# Patient Record
Sex: MALE | Race: WHITE | Employment: OTHER | URBAN - NONMETROPOLITAN AREA
[De-identification: names, ages, dates, MRNs, and addresses within clinical notes are randomized per-mention and may not be internally consistent; named-entity substitution may affect disease eponyms.]

---

## 2017-10-23 ENCOUNTER — HOSPITAL ENCOUNTER (OUTPATIENT)
Dept: GENERAL RADIOLOGY | Age: 52
Discharge: HOME | End: 2017-10-23
Payer: MEDICAID

## 2017-10-23 DIAGNOSIS — Z12.5 SCREENING FOR PROSTATE CANCER: ICD-10-CM

## 2017-10-23 DIAGNOSIS — E78.5 HYPERLIPIDEMIA, UNSPECIFIED HYPERLIPIDEMIA TYPE: ICD-10-CM

## 2017-10-23 DIAGNOSIS — I10 HYPERTENSION, UNSPECIFIED TYPE: ICD-10-CM

## 2017-10-23 DIAGNOSIS — M54.16 LUMBAR RADICULOPATHY: ICD-10-CM

## 2017-10-23 DIAGNOSIS — M54.5 LOW BACK PAIN, UNSPECIFIED BACK PAIN LATERALITY, UNSPECIFIED CHRONICITY, WITH SCIATICA PRESENCE UNSPECIFIED: ICD-10-CM

## 2017-10-23 DIAGNOSIS — G47.00 INSOMNIA, UNSPECIFIED TYPE: ICD-10-CM

## 2017-10-23 DIAGNOSIS — E55.9 VITAMIN D DEFICIENCY: ICD-10-CM

## 2017-10-23 PROCEDURE — 72110 X-RAY EXAM L-2 SPINE 4/>VWS: CPT

## 2017-10-26 ENCOUNTER — HOSPITAL ENCOUNTER (OUTPATIENT)
Dept: PHYSICAL THERAPY | Age: 52
Setting detail: THERAPIES SERIES
Discharge: HOME | End: 2017-10-26
Payer: MEDICAID

## 2017-10-26 PROCEDURE — 97162 PT EVAL MOD COMPLEX 30 MIN: CPT

## 2017-10-26 PROCEDURE — G8978 MOBILITY CURRENT STATUS: HCPCS

## 2017-10-26 PROCEDURE — G8979 MOBILITY GOAL STATUS: HCPCS

## 2017-10-26 ASSESSMENT — PAIN SCALES - GENERAL: PAINLEVEL_OUTOF10: 8

## 2017-10-26 ASSESSMENT — PAIN DESCRIPTION - ORIENTATION: ORIENTATION: RIGHT;LEFT

## 2017-10-26 ASSESSMENT — PAIN DESCRIPTION - LOCATION: LOCATION: BACK;LEG;BUTTOCKS

## 2017-10-26 ASSESSMENT — PAIN DESCRIPTION - PAIN TYPE: TYPE: ACUTE PAIN

## 2017-10-26 ASSESSMENT — PAIN DESCRIPTION - DESCRIPTORS: DESCRIPTORS: ACHING;DISCOMFORT;TIGHTNESS;SORE

## 2017-10-26 ASSESSMENT — PAIN DESCRIPTION - FREQUENCY: FREQUENCY: CONTINUOUS

## 2017-10-26 NOTE — PROGRESS NOTES
pain forward bending and \"slumping'  Sensation  Overall Sensation Status: WFL  Bed mobility  Rolling to Left: Modified independent  Rolling to Right: Modified independent  Supine to Sit: Modified independent  Transfers  Sit to Stand: Modified independent  Stand to sit: Modified independent  Bed to Chair: Independent  Stand Pivot Transfers: Independent  Ambulation  Ambulation?: Yes  Ambulation 1  Surface: level tile  Device: No Device  Assistance: Independent  Quality of Gait: Patient walks with decreased arm swing on right side, slow pace with decreased stance time on right LE. Comments: Patient able to walk with less gait deviation and with less reported pain when fitted with SI belt and heel lift in shoe. Also less pain with sitting and coming to standing. Balance  Posture: Fair  Sitting - Static: Good  Sitting - Dynamic: Good  Standing - Static: Good  Standing - Dynamic: Good  Comments: See postural deviations in the 'observation' section of chart. Assessment   Conditions Requiring Skilled Therapeutic Intervention  Body structures, Functions, Activity limitations: Decreased strength;Decreased ROM; Decreased functional mobility ; Decreased high-level IADLs  Assessment: Patient has roughly 2 month history of low back pain which appears to stem from 2 incidents involving heavy lifting and falling on stairs onto his buttocks. His pain appears to be concentrated greatest in low back and pelvis on right side and his symptoms improved with application of SI belt and lift. Tolerance for sitting and performing transitional motions improved as well. Reported radicular pain does not appear well defined, with some lateral thigh radiation reported on left side at time of evaluation. He was wearing a wallet in his right hip pocket and was encouraged to move it to front pocket to reduce pressure (he states he had already been told that by his doctor).    Prognosis: Good;Fair  Decision Making: Medium

## 2017-11-06 ENCOUNTER — APPOINTMENT (OUTPATIENT)
Dept: PHYSICAL THERAPY | Age: 52
End: 2017-11-06
Payer: MEDICAID

## 2017-11-07 ENCOUNTER — HOSPITAL ENCOUNTER (OUTPATIENT)
Dept: PHYSICAL THERAPY | Age: 52
Setting detail: THERAPIES SERIES
Discharge: HOME | End: 2017-11-07
Payer: MEDICAID

## 2017-11-07 PROCEDURE — 97110 THERAPEUTIC EXERCISES: CPT

## 2017-11-07 PROCEDURE — G0283 ELEC STIM OTHER THAN WOUND: HCPCS

## 2017-11-07 ASSESSMENT — PAIN SCALES - GENERAL: PAINLEVEL_OUTOF10: 7

## 2017-11-07 ASSESSMENT — PAIN DESCRIPTION - ORIENTATION: ORIENTATION: LOWER

## 2017-11-07 ASSESSMENT — PAIN DESCRIPTION - LOCATION: LOCATION: BACK

## 2017-11-07 ASSESSMENT — PAIN DESCRIPTION - PAIN TYPE: TYPE: CHRONIC PAIN

## 2017-11-07 NOTE — PROGRESS NOTES
Physical Therapy  Daily Treatment Note  Date: 2017  Patient Name: Wandy Crowe  MRN: [id number]     :   1965    Subjective:   General  Additional Pertinent Hx: 49year old male referred to PT with diagnosis of lower back pain and lumbar radiculopathy. Referring Practitioner: Ro Quach DO  PT Visit Information  Total # of Visits Approved: 7  Total # of Visits to Date: 2  Plan of Care/Certification Expiration Date: 17  Progress Note Due Date: 17  Subjective: States the SI belt is helping, hurting primairly across his low back today  Patient Currently in Pain: Yes  Pain Level: 7 (2/10 post tx)  Pain Type: Chronic pain  Pain Location: Back  Pain Orientation: Lower       Treatment Activities:      Exercises  Exercise 1: supine lower trunk rotation to right 3\" x 10  Exercise 2: left quadratus stretch to right  10\" x 5  Exercise 3: bilateral single leg bridges x 10 ea  Exercise 4: right leg manual distraction at 45 degrees  3 x 15\"  Exercise 5: isometric resisted right hip ext.  (from end range SKTC position) 5\" x 5  Exercise 6: isometric resisted left hip flexion (at 90/90 position) 5\" x 5 manual resistance by therapist (decreased L knee ROM)  Exercise 7: pelvic tilts with raised head  x 10  Exercise 8: abdominal series (holding pelvic tilt): alternating arm, legs, arms/legs  x 5 ea UE's only due to L knee pain  Exercise 9: sitting right hip retraction with left hip protraction  x 5  Exercise 10: sitting forward reach to left shoe  x 5  Exercise 11: repeated sit to stand with abdominal contractions  x 5 (wear SI belt for standing exercises)  Exercise 12: standing back rotation to left with right glute squeeze  x 10  Exercise 13: standing IT band stretch (left leg over right with slight forward bend)  5\" x 5  Exercise 14: standing back extension  x 10  Exercise 15: bilateral hip abduction  x 10  Exercise 16: standing left hip flexion  x 10  Exercise 17: standing left hip hike  x 5  Exercise 18: mobilization, lower level core strengthening exercises, and instruction in HEP.   Timed Code Treatment Minutes: 48 Minutes     Therapy Time   Individual Concurrent Group Co-treatment   Time In 1900         Time Out 1604         Minutes 69         Timed Code Treatment Minutes: [address] Cookie Heimlich, PTA    Electronically signed by Angeles Mattson PTA on 11/7/2017 at 4:08 PM

## 2017-11-10 ENCOUNTER — HOSPITAL ENCOUNTER (OUTPATIENT)
Dept: PHYSICAL THERAPY | Age: 52
Setting detail: THERAPIES SERIES
Discharge: HOME | End: 2017-11-10
Payer: MEDICAID

## 2017-11-10 PROCEDURE — 97110 THERAPEUTIC EXERCISES: CPT

## 2017-11-10 PROCEDURE — G0283 ELEC STIM OTHER THAN WOUND: HCPCS

## 2017-11-10 ASSESSMENT — PAIN DESCRIPTION - PAIN TYPE: TYPE: CHRONIC PAIN

## 2017-11-10 ASSESSMENT — PAIN DESCRIPTION - LOCATION: LOCATION: BACK

## 2017-11-10 ASSESSMENT — PAIN DESCRIPTION - ORIENTATION: ORIENTATION: LOWER

## 2017-11-10 ASSESSMENT — PAIN SCALES - GENERAL: PAINLEVEL_OUTOF10: 5

## 2017-11-10 NOTE — PROGRESS NOTES
degrees forward flexion. Long term goal 2: Patient to have >= 35 degrees sidebending bilaterally. Long term goal 3: Patient to score <= 19% on the Oswestry Disability Questionairre. Patient Goals   Patient goals : Decreased back pain. Plan:    Plan  Times per week: 2x per week  Plan weeks: 6 weeks  Current Treatment Recommendations: Strengthening, ROM, Safety Education & Training, Home Exercise Program, Patient/Caregiver Education & Training, Pain Management, Positioning, Modalities  Plan Comment: Plan of care to include e-stim to low back , trial use of heel lift and SI belt, lumbar ROM, SI joint mobilization, lower level core strengthening exercises, and instruction in HEP.   Timed Code Treatment Minutes: 40 Minutes     Therapy Time   Individual Concurrent Group Co-treatment   Time In 3251         Time Out 4781         Minutes 60         Timed Code Treatment Minutes: 40 Minutes    Electronically signed by Marvin Mercado PT on 11/10/2017 at 5:19 PM    Marvin Mercado PT

## 2017-11-14 ENCOUNTER — HOSPITAL ENCOUNTER (OUTPATIENT)
Dept: PHYSICAL THERAPY | Age: 52
Setting detail: THERAPIES SERIES
Discharge: HOME | End: 2017-11-14
Payer: MEDICAID

## 2017-11-14 PROCEDURE — 97110 THERAPEUTIC EXERCISES: CPT

## 2017-11-14 PROCEDURE — G0283 ELEC STIM OTHER THAN WOUND: HCPCS

## 2017-11-14 ASSESSMENT — PAIN DESCRIPTION - FREQUENCY: FREQUENCY: CONTINUOUS

## 2017-11-14 ASSESSMENT — PAIN DESCRIPTION - PAIN TYPE: TYPE: CHRONIC PAIN

## 2017-11-14 ASSESSMENT — PAIN DESCRIPTION - ORIENTATION: ORIENTATION: LOWER

## 2017-11-14 ASSESSMENT — PAIN DESCRIPTION - LOCATION: LOCATION: BACK

## 2017-11-14 ASSESSMENT — PAIN SCALES - GENERAL: PAINLEVEL_OUTOF10: 7

## 2017-11-14 NOTE — PROGRESS NOTES
Physical Therapy  Daily Treatment Note  Date: 2017  Patient Name: Uzma Le  MRN: [id number]     :   1965    Subjective:   General  Additional Pertinent Hx: 47year old male referred to PT with diagnosis of lower back pain and lumbar radiculopathy. Response To Previous Treatment: Patient reporting fatigue but able to participate. (pt reports soreness after last visit)  Family / Caregiver Present: No  Referring Practitioner: Agustin Jimenez,   PT Visit Information  Total # of Visits Approved: 7  Total # of Visits to Date: 4  Plan of Care/Certification Expiration Date: 17  Progress Note Due Date: 17  Subjective  Subjective: Pt relays he has been sore and is sore since last coming to PT. \"I've been trying to walk without my cane. \"  Pt is wearing SI belt. Pain Screening  Patient Currently in Pain: Yes  Pain Assessment  Pain Assessment: 0-10  Pain Level: 7 (7/10 to start and 3/10 to finish)  Pain Type: Chronic pain  Pain Location: Back  Pain Orientation: Lower  Pain Radiating Towards: radiates into L side and knee  Pain Descriptors: Radiating;Aching;Discomfort; Sore  Pain Frequency: Continuous (only brief relief)  Vital Signs  Patient Currently in Pain: Yes       Treatment Activities:                 Exercises  Exercise 1: supine lower trunk rotation to right 5\" x 10  Exercise 2: left quadratus stretch to right x  5 for 5-10\" hold  Exercise 3: bilateral single leg bridges x 10 ea  Exercise 4: right leg manual distraction at 45 degrees  5 x 5\"  Exercise 5: isometric resisted right hip ext.  (from end range SKTC position) 5\" x 5 (towel used to assist with reach)  Exercise 6: isometric resisted left hip flexion (at 90/90 position) 5\" x 5 manual resistance by therapist  Exercise 7: pelvic tilts with raised head  x 10 (had to place hand under LB as target to achieve proper motion)  Exercise 8: abdominal series (holding pelvic tilt): alternating arm, legs, arms/legs  x 10 (no combo today)  Exercise Patient independent with trial use of heel lift and SI belt. (11/14 came to session wearing both, no complaints)  Short term goal 3: Patient to report tolerating > 30 minutes of sitting. (11/14 reports cont limited benjie of sitting <30 min so alternates positions)  Short term goal 4: Patient to report less difficulty with negotiation of stairs. Long term goals  Time Frame for Long term goals : 4-6 weeks  Long term goal 1: Patient to have >=25 degrees back extension and 60 degrees forward flexion. Long term goal 2: Patient to have >= 35 degrees sidebending bilaterally. Long term goal 3: Patient to score <= 19% on the Oswestry Disability Questionairre. Patient Goals   Patient goals : Decreased back pain. Plan:    Plan  Times per week: 2x per week  Plan weeks: 6 weeks  Current Treatment Recommendations: Strengthening, ROM, Safety Education & Training, Home Exercise Program, Patient/Caregiver Education & Training, Pain Management, Positioning, Modalities  Plan Comment: Plan of care to include e-stim to low back , trial use of heel lift and SI belt, lumbar ROM, SI joint mobilization, lower level core strengthening exercises, and instruction in HEP.   Timed Code Treatment Minutes: 53 Minutes (20 min IFC)     Therapy Time   Individual Concurrent Group Co-treatment   Time In 1257         Time Out 1410         Minutes 73         Timed Code Treatment Minutes: 48 Minutes (20 min IFC)       Brien Mariee PTA     Electronically signed by Brien Mariee PTA on 11/14/2017 at 2:53 PM

## 2017-11-17 ENCOUNTER — HOSPITAL ENCOUNTER (OUTPATIENT)
Dept: PHYSICAL THERAPY | Age: 52
Setting detail: THERAPIES SERIES
Discharge: HOME | End: 2017-11-17
Payer: MEDICAID

## 2017-11-17 PROCEDURE — 97110 THERAPEUTIC EXERCISES: CPT

## 2017-11-17 ASSESSMENT — PAIN DESCRIPTION - ORIENTATION: ORIENTATION: LOWER

## 2017-11-17 ASSESSMENT — PAIN DESCRIPTION - LOCATION: LOCATION: BACK

## 2017-11-17 ASSESSMENT — PAIN SCALES - GENERAL: PAINLEVEL_OUTOF10: 5

## 2017-11-17 ASSESSMENT — PAIN DESCRIPTION - PAIN TYPE: TYPE: CHRONIC PAIN

## 2017-11-17 NOTE — PROGRESS NOTES
Physical Therapy  Daily Treatment Note  Date: 2017  Patient Name: Hector Souza  MRN: [id number]     :   1965    Subjective:   General  Additional Pertinent Hx: 47year old male referred to PT with diagnosis of lower back pain and lumbar radiculopathy. Response To Previous Treatment: Patient reporting fatigue but able to participate. (pt reports soreness after last visit)  Family / Caregiver Present: No  Referring Practitioner: Jackie Spann DO  PT Visit Information  Total # of Visits Approved: 7  Total # of Visits to Date: 5  Plan of Care/Certification Expiration Date: 17  Subjective  Subjective: Patient states he has the exercises at home and is trying to learn them. He reports being sore today. Pain Screening  Patient Currently in Pain: Yes  Pain Assessment  Pain Assessment: 0-10  Pain Level: 5 (2/10 post treatment)  Pain Type: Chronic pain  Pain Location: Back  Pain Orientation: Lower  Vital Signs  Patient Currently in Pain: Yes       Treatment Activities:           Exercises  Exercise 1: supine lower trunk rotation to right 5\" x 10  Exercise 2: left quadratus stretch to right x  5 for 5\" hold  Exercise 3: bilateral single leg bridges x 10 ea  (poor quality, limited motion)  Exercise 4: right leg manual distraction at 45 degrees  5 x 5\"  Exercise 5: isometric resisted right hip ext.  (from end range SKTC position) 5\" x 5 (towel used to assist with reach)  Exercise 6: isometric resisted left hip flexion (at 90/90 position) 5\" x 5 manual resistance by therapist  Exercise 7: pelvic tilts with raised head  x 10 (had to place hand under LB as target to achieve proper motion and said 'push your back down')  Exercise 8: abdominal series (holding pelvic tilt): alternating arm, legs, arms/legs  x 10 (no combo today)  Exercise 9: sitting right hip retraction with left hip protraction  x 5 (diff coordinating this even with physical assist)  Exercise 10: sitting forward reach to left shoe  x 5  Exercise 11: repeated sit to stand with abdominal contractions  x 5  Exercise 12: standing back rotation to left with right glute squeeze  x 10 with 3\" hold  Exercise 13: standing IT band stretch (left leg over right with slight forward bend)  5\" x 3 (discontinued due to diff with positioning and reports hurting R LB)  Exercise 14: standing back extension  x 10  Exercise 15: bilateral hip abduction  x 10  Exercise 16: standing left hip flexion  x 10  Exercise 17: standing left hip hike  x 5  Exercise 18: partial squats  x 10   not today  Exercise 19: corner stretch  5-10\" x 5  Exercise 20: e-stim with moist heat (PRN and if pain level rises)  IFC x 20'                                   Assessment:   Conditions Requiring Skilled Therapeutic Intervention  Body structures, Functions, Activity limitations: Decreased strength;Decreased ROM; Decreased functional mobility ; Decreased high-level IADLs  Assessment: Patient with difficulty performing pelvic tilts, hip hikes, and hip retraction/protraction today. Patient needed max tactile cues for improved technique on these exercises. Pain levels lower this session than previous sessions with even further decrease in pain after estim and moist heat. REQUIRES PT FOLLOW UP: Yes  Discharge Recommendations: Continue to assess pending progress        Goals:  Short term goals  Time Frame for Short term goals: 3-4 weeks   Short term goal 1: Patient independent with HEP. (11/10/17 HEP issued, 11/14 reports only looked at it, hasn't worked on it)  Short term goal 2: Patient independent with trial use of heel lift and SI belt. (11/14 came to session wearing both, no complaints)  Short term goal 3: Patient to report tolerating > 30 minutes of sitting. (11/14 reports cont limited benjie of sitting <30 min so alternates positions)  Short term goal 4: Patient to report less difficulty with negotiation of stairs.   Long term goals  Time Frame for Long term goals : 4-6 weeks  Long term goal 1: Patient to have >=25 degrees back extension and 60 degrees forward flexion. Long term goal 2: Patient to have >= 35 degrees sidebending bilaterally. Long term goal 3: Patient to score <= 19% on the Oswestry Disability Questionairre. Patient Goals   Patient goals : Decreased back pain. Plan:    Plan  Times per week: 2x per week  Plan weeks: 6 weeks  Current Treatment Recommendations: Strengthening, ROM, Safety Education & Training, Home Exercise Program, Patient/Caregiver Education & Training, Pain Management, Positioning, Modalities  Plan Comment: Plan of care to include e-stim to low back , trial use of heel lift and SI belt, lumbar ROM, SI joint mobilization, lower level core strengthening exercises, and instruction in HEP.   Timed Code Treatment Minutes: 38 Minutes (20 min IFC)     Therapy Time   Individual Concurrent Group Co-treatment   Time In 1331         Time Out 1429         Minutes 58         Timed Code Treatment Minutes: 38 Minutes (20 min IFC)       Juan Jose Grewal PT   Electronically signed by Juan Jose Grewal PT on 11/17/2017 at 2:37 PM

## 2017-11-21 ENCOUNTER — HOSPITAL ENCOUNTER (OUTPATIENT)
Dept: PHYSICAL THERAPY | Age: 52
Setting detail: THERAPIES SERIES
Discharge: HOME | End: 2017-11-21
Payer: MEDICAID

## 2017-11-21 PROCEDURE — 97110 THERAPEUTIC EXERCISES: CPT

## 2017-11-21 PROCEDURE — G0283 ELEC STIM OTHER THAN WOUND: HCPCS

## 2017-11-21 ASSESSMENT — PAIN DESCRIPTION - PAIN TYPE: TYPE: CHRONIC PAIN

## 2017-11-21 ASSESSMENT — PAIN SCALES - GENERAL: PAINLEVEL_OUTOF10: 4

## 2017-11-21 ASSESSMENT — PAIN DESCRIPTION - LOCATION: LOCATION: BACK

## 2017-11-21 ASSESSMENT — PAIN DESCRIPTION - FREQUENCY: FREQUENCY: CONTINUOUS

## 2017-11-21 ASSESSMENT — PAIN DESCRIPTION - ORIENTATION: ORIENTATION: LOWER

## 2017-11-21 NOTE — PROGRESS NOTES
worked on it; 11/21 states some of them he needs help with so does ones he can do indep)  Short term goal 2: Patient independent with trial use of heel lift and SI belt. (11/14 came to session wearing both, no complaints; 11/21 wearing both however SI belt is too high)  Short term goal 3: Patient to report tolerating > 30 minutes of sitting. (11/14 reports cont limited benjie of sitting <30 min so alternates positions; 11/21 states he can sit for 30-45 min before needing to stand)  Short term goal 4: Patient to report less difficulty with negotiation of stairs. (11/21 reports cont to be painful with stairs)  Long term goals  Time Frame for Long term goals : 4-6 weeks  Long term goal 1: Patient to have >=25 degrees back extension and 60 degrees forward flexion. Long term goal 2: Patient to have >= 35 degrees sidebending bilaterally. Long term goal 3: Patient to score <= 19% on the Oswestry Disability Questionairre. Patient Goals   Patient goals : Decreased back pain. Plan:    Plan  Times per week: 2x per week  Plan weeks: 6 weeks  Current Treatment Recommendations: Strengthening, ROM, Safety Education & Training, Home Exercise Program, Patient/Caregiver Education & Training, Pain Management, Positioning, Modalities  Plan Comment: Plan of care to include e-stim to low back , trial use of heel lift and SI belt, lumbar ROM, SI joint mobilization, lower level core strengthening exercises, and instruction in HEP.   Timed Code Treatment Minutes: 53 Minutes (20 min IFC)     Therapy Time   Individual Concurrent Group Co-treatment   Time In 1324         Time Out 1437         Minutes 73         Timed Code Treatment Minutes: 53 Minutes (20 min IFC)       Hal Mckeon PTA     Electronically signed by Hal Mckeon PTA on 11/21/2017 at 3:17 PM

## 2017-11-24 ENCOUNTER — APPOINTMENT (OUTPATIENT)
Dept: PHYSICAL THERAPY | Age: 52
End: 2017-11-24
Payer: MEDICAID

## 2017-11-28 ENCOUNTER — HOSPITAL ENCOUNTER (OUTPATIENT)
Dept: PHYSICAL THERAPY | Age: 52
Setting detail: THERAPIES SERIES
Discharge: HOME | End: 2017-11-28
Payer: MEDICAID

## 2017-11-28 PROCEDURE — G0283 ELEC STIM OTHER THAN WOUND: HCPCS

## 2017-11-28 PROCEDURE — G8978 MOBILITY CURRENT STATUS: HCPCS

## 2017-11-28 PROCEDURE — G8979 MOBILITY GOAL STATUS: HCPCS

## 2017-11-28 PROCEDURE — 97110 THERAPEUTIC EXERCISES: CPT

## 2017-11-28 ASSESSMENT — PAIN DESCRIPTION - LOCATION: LOCATION: BACK

## 2017-11-28 ASSESSMENT — PAIN DESCRIPTION - FREQUENCY: FREQUENCY: CONTINUOUS

## 2017-11-28 ASSESSMENT — PAIN SCALES - GENERAL: PAINLEVEL_OUTOF10: 3

## 2017-11-28 ASSESSMENT — PAIN DESCRIPTION - ORIENTATION: ORIENTATION: LOWER

## 2017-11-28 ASSESSMENT — PAIN DESCRIPTION - PAIN TYPE: TYPE: CHRONIC PAIN

## 2017-11-28 NOTE — PROGRESS NOTES
Patient to report tolerating > 30 minutes of sitting.--progress (11/14 reports cont limited benjie of sitting <30 min so alternates positions; 11/21 and 11/28/17 states he can sit for 30-45 min before needing to stand. )  Short term goal 4: Patient to report less difficulty with negotiation of stairs. --progress (11/21 reports cont to be painful with stairs. 11/28/17 able to negotiate stairs better.)  Long term goals  Time Frame for Long term goals : 4-6 weeks  Long term goal 1: Patient to have >=25 degrees back extension and 60 degrees forward flexion. --progress (11/28/17 back extension 20 degrees, forward flexion 50 degrees. )  Long term goal 2: Patient to have >= 35 degrees sidebending bilaterally. --progress (11/28/17 left sidebending 25 deg, right sidebending 20 degrees.)  Long term goal 3: Patient to score <= 19% on the Oswestry Disability [place]. --progress (11/28/17 26% impairment on the Oswestry.)  Patient Goals   Patient goals : Decreased back pain. Plan:    Plan  Times per week: 2x per week  Plan weeks: 6 weeks  Current Treatment Recommendations: Strengthening, ROM, Safety Education & Training, Home Exercise Program, Patient/Caregiver Education & Training, Pain Management, Positioning, Modalities  Plan Comment: Plan of care has included e-stim to low back , trial use of heel lift and SI belt, lumbar ROM, SI joint mobilization, lower level core strengthening exercises, and instruction in HEP.   Timed Code Treatment Minutes: 60 Minutes     Therapy Time   Individual Concurrent Group Co-treatment   Time In 1300         Time Out 1400         Minutes 60         Timed Code Treatment Minutes: 60 Minutes    Electronically signed by Justen Christiansen PT on 11/28/2017 at 2:16 PM    Justen Christiansen PT

## 2017-12-06 ENCOUNTER — APPOINTMENT (OUTPATIENT)
Dept: PHYSICAL THERAPY | Age: 52
End: 2017-12-06
Payer: MEDICAID

## 2017-12-08 ENCOUNTER — HOSPITAL ENCOUNTER (OUTPATIENT)
Dept: PHYSICAL THERAPY | Age: 52
Setting detail: THERAPIES SERIES
Discharge: HOME | End: 2017-12-08
Payer: MEDICAID

## 2017-12-08 PROCEDURE — G0283 ELEC STIM OTHER THAN WOUND: HCPCS

## 2017-12-08 PROCEDURE — 97110 THERAPEUTIC EXERCISES: CPT

## 2017-12-08 ASSESSMENT — PAIN SCALES - GENERAL: PAINLEVEL_OUTOF10: 4

## 2017-12-08 ASSESSMENT — PAIN DESCRIPTION - LOCATION: LOCATION: BACK

## 2017-12-08 ASSESSMENT — PAIN DESCRIPTION - PAIN TYPE: TYPE: CHRONIC PAIN

## 2017-12-08 ASSESSMENT — PAIN DESCRIPTION - ORIENTATION: ORIENTATION: LOWER

## 2017-12-08 NOTE — PROGRESS NOTES
Physical Therapy  Daily Treatment Note  Date: 2017  Patient Name: Gianfranco Navarro  MRN: [id number]     :   1965    Subjective:   General  Additional Pertinent Hx: 47year old male referred to PT with diagnosis of lower back pain and lumbar radiculopathy. Response To Previous Treatment: Patient reporting fatigue but able to participate. (pt reports soreness after last visit)  Family / Caregiver Present: No  Referring Practitioner: Neo Thomson DO  PT Visit Information  Total # of Visits Approved: 12  Total # of Visits to Date: 8  Plan of Care/Certification Expiration Date: 18  Progress Note Due Date: 17  Subjective  Subjective: Patient states he is hurting some today, but says when he rides the bus it makes him hurt due to how \"bumpy\" it is. Pain Screening  Patient Currently in Pain: Yes  Pain Assessment  Pain Assessment: 0-10  Pain Level: 4 (1/10 post tx)  Pain Type: Chronic pain  Pain Location: Back  Pain Orientation: Lower  Vital Signs  Patient Currently in Pain: Yes       Treatment Activities:                                      Exercises  Exercise 1: supine lower trunk rotation to right 5\" x 10  Exercise 2: left quadratus stretch to right x  5 for 5\" hold--  Exercise 3: bilateral single leg bridges x 10 ea  (poor quality, limited motion) (more diff with L due to painful)  Exercise 4: right leg manual distraction at 45 degrees  4 x 15\"  Exercise 5: isometric resisted right hip ext.  (from end range SKTC position) 5\" x 5 (towel used to assist with reach)  Exercise 6: isometric resisted left hip flexion (at 90/90 position) 5\" x 5 (pt able to perform with instruction and positioning)  Exercise 7: pelvic tilts with raised head  x 5 (had to place hand under LB as target to achieve proper motion and said 'push your back down' but still had difficulty and needed constant cues)  Exercise 8: abdominal series (holding pelvic tilt): alone 10 sec x 5, alternating arm, legs, arms/legs 1 x 10 ea (no combo today)  Exercise 9: sitting right hip retraction with left hip protraction  x 5 (diff coordinating this even with physical assist)  Exercise 10: sitting forward reach to left shoe  1 x 7  Exercise 11: repeated sit to stand with abdominal contractions  1 x 5  Exercise 12: standing back rotation to left with right glute squeeze  x 10 with 3\" hold (Exercises 12 through 19 not performed 11/28/17 due to reassessment.)  Exercise 14: standing back extension  x 10  Exercise 15: bilateral hip abduction  x 10  Exercise 16: standing left hip flexion  x 10  Exercise 17: standing left hip hike 1 x 5  Exercise 18: partial squats  x 10     Exercise 19: corner stretch  5-10\" x 5  Exercise 20: e-stim with moist heat (PRN and if pain level rises)  IFC x 20'                                   Assessment:   Conditions Requiring Skilled Therapeutic Intervention  Body structures, Functions, Activity limitations: Decreased strength;Decreased ROM; Decreased functional mobility ; Decreased high-level IADLs  Assessment: Patient did well with treatment with min mod to max v.c., tactile cues and demo for proper tech with ex's today. Patient had min report of increased pain during ex's, but was able to complete most all ex's and required min to no rest breaks. Patient had decreased pain post tx today. Goals:  Short term goals  Time Frame for Short term goals: 3-4 weeks   Short term goal 1: Patient independent with HEP.--progress (11/10/17 HEP issued, 11/14 reports only looked at it, hasn't worked on it; 11/21 states some of them he needs help with so does ones he can do indep. 11/28/17 patient states he had HEP but he lost it. )  Short term goal 2: Patient independent with trial use of heel lift and SI belt. --progress   (11/14 came to session wearing both, no complaints; 11/21 wearing both however SI belt is too high.  11/28/17 patient routinely wearing heel lift and SI belt.)  Short term goal 3:

## 2017-12-12 ENCOUNTER — APPOINTMENT (OUTPATIENT)
Dept: PHYSICAL THERAPY | Age: 52
End: 2017-12-12
Payer: MEDICAID

## 2017-12-14 ENCOUNTER — APPOINTMENT (OUTPATIENT)
Dept: PHYSICAL THERAPY | Age: 52
End: 2017-12-14
Payer: MEDICAID

## 2017-12-27 NOTE — PROGRESS NOTES
[facility]  OUTPATIENT PHYSICAL THERAPY  DISCHARGE SUMMARY    Date: 2017  Patient Name: Lockie Runner        MRN: [id number]    ACCOUNT #: [de-identified]  : 1965  (47 y.o.)  Gender: male   Referring Practitioner: Josué Fierro DO  Diagnosis: radiculopathy, lumbar region (M54.16), low back pain (M54.5)  Referral Date : 10/19/17       Total # of Visits Approved: 12  Total # of Visits to Date: 8    Subjective  Subjective: Patient states he is hurting some today, but says when he rides the bus it makes him hurt due to how \"bumpy\" it is. (Statement made at last session attended 17). Additional Pertinent Hx: 47year old male referred to PT with diagnosis of lower back pain and lumbar radiculopathy. Objective  Treatments received included e-stim to low back, trial use of heel lift and SI belt, lumbar ROM, SI joint mobilization, lower level core strengthening exercises, and instruction in HEP. See objective/subjective data in goals    Assessment  Assessment: Mr. Tejal Kidd did not return to PT after his 17 visit, reporting he was changing his insurance plan. He did attend 8 of his planned 12 PT visits and appeared to be making gradual progress as demonstrated by his reassessment on 17. He will be formally discharged from PT today pending further contact from patient and/or his referring practitioner. Plan: Discharge from PT today pending further contact from patient and/or his referring practitioner. Goals  Short term goals  Time Frame for Short term goals: 3-4 weeks   Short term goal 1: Patient independent with HEP.--progress (11/10/17 HEP issued,  reports only looked at it, hasn't worked on it;  states some of them he needs help with so does ones he can do indep. 17 patient states he had HEP but he lost it. )  Short term goal 2: Patient independent with trial use of heel lift and SI belt. --progress   ( came to session wearing both, no complaints; 11/21 wearing both however SI belt is too high. 11/28/17 patient routinely wearing heel lift and SI belt.)  Short term goal 3: Patient to report tolerating > 30 minutes of sitting.--progress (11/14 reports cont limited benjie of sitting <30 min so alternates positions; 11/21 and 11/28/17 states he can sit for 30-45 min before needing to stand. )  Short term goal 4: Patient to report less difficulty with negotiation of stairs. --progress (11/21 reports cont to be painful with stairs. 11/28/17 able to negotiate stairs better.)    Long term goals  Time Frame for Long term goals : 4-6 weeks  Long term goal 1: Patient to have >=25 degrees back extension and 60 degrees forward flexion. --progress (11/28/17 back extension 20 degrees, forward flexion 50 degrees. )  Long term goal 2: Patient to have >= 35 degrees sidebending bilaterally. --progress (11/28/17 left sidebending 25 deg, right sidebending 20 degrees.)  Long term goal 3: Patient to score <= 19% on the Oswestry Disability Jerez Amada. --progress (11/28/17 26% impairment on the Oswestry. )    PT G-Codes  Functional Assessment Tool Used: Ending g-codes not determined due to patient discontinuing PT     Electronically signed by Marvin Mercado PT on 12/27/2017 at 11:05 AM

## 2018-02-10 ENCOUNTER — HOSPITAL ENCOUNTER (EMERGENCY)
Facility: HOSPITAL | Age: 53
Discharge: HOME | End: 2018-02-10
Attending: EMERGENCY MEDICINE | Admitting: EMERGENCY MEDICINE

## 2018-02-10 ENCOUNTER — APPOINTMENT (OUTPATIENT)
Dept: GENERAL RADIOLOGY | Facility: HOSPITAL | Age: 53
End: 2018-02-10

## 2018-02-10 VITALS
HEART RATE: 79 BPM | TEMPERATURE: 98.4 F | HEIGHT: 68 IN | BODY MASS INDEX: 33.95 KG/M2 | DIASTOLIC BLOOD PRESSURE: 90 MMHG | SYSTOLIC BLOOD PRESSURE: 139 MMHG | RESPIRATION RATE: 18 BRPM | OXYGEN SATURATION: 94 % | WEIGHT: 224 LBS

## 2018-02-10 DIAGNOSIS — R73.9 HYPERGLYCEMIA: Primary | ICD-10-CM

## 2018-02-10 LAB
ACETONE BLD QL: NEGATIVE
ALBUMIN SERPL-MCNC: 4.4 G/DL (ref 3.5–5)
ALBUMIN/GLOB SERPL: 1.3 G/DL (ref 1.1–2.5)
ALP SERPL-CCNC: 106 U/L (ref 24–120)
ALT SERPL W P-5'-P-CCNC: 161 U/L (ref 0–54)
ANION GAP SERPL CALCULATED.3IONS-SCNC: 11 MMOL/L (ref 4–13)
ARTERIAL PATENCY WRIST A: POSITIVE
AST SERPL-CCNC: 99 U/L (ref 7–45)
ATMOSPHERIC PRESS: 751 MMHG
BASE EXCESS BLDA CALC-SCNC: 2.6 MMOL/L (ref 0–2)
BASOPHILS # BLD MANUAL: 0.13 10*3/MM3 (ref 0–0.2)
BASOPHILS NFR BLD AUTO: 1 % (ref 0–2)
BDY SITE: ABNORMAL
BILIRUB SERPL-MCNC: 0.5 MG/DL (ref 0.1–1)
BILIRUB UR QL STRIP: NEGATIVE
BODY TEMPERATURE: 37 C
BUN BLD-MCNC: 16 MG/DL (ref 5–21)
BUN/CREAT SERPL: 21.9 (ref 7–25)
CALCIUM SPEC-SCNC: 10 MG/DL (ref 8.4–10.4)
CHLORIDE SERPL-SCNC: 98 MMOL/L (ref 98–110)
CLARITY UR: CLEAR
CO2 SERPL-SCNC: 27 MMOL/L (ref 24–31)
COLOR UR: YELLOW
CREAT BLD-MCNC: 0.73 MG/DL (ref 0.5–1.4)
DEPRECATED RDW RBC AUTO: 39.8 FL (ref 40–54)
EOSINOPHIL # BLD MANUAL: 0.39 10*3/MM3 (ref 0–0.7)
EOSINOPHIL NFR BLD MANUAL: 3 % (ref 0–4)
ERYTHROCYTE [DISTWIDTH] IN BLOOD BY AUTOMATED COUNT: 12.9 % (ref 12–15)
GFR SERPL CREATININE-BSD FRML MDRD: 113 ML/MIN/1.73
GLOBULIN UR ELPH-MCNC: 3.5 GM/DL
GLUCOSE BLD-MCNC: 221 MG/DL (ref 70–100)
GLUCOSE BLDC GLUCOMTR-MCNC: 159 MG/DL (ref 70–130)
GLUCOSE BLDC GLUCOMTR-MCNC: 246 MG/DL (ref 70–130)
GLUCOSE UR STRIP-MCNC: ABNORMAL MG/DL
HBA1C MFR BLD: 8.2 %
HCO3 BLDA-SCNC: 27.7 MMOL/L (ref 20–26)
HCT VFR BLD AUTO: 45.2 % (ref 40–52)
HGB BLD-MCNC: 15.9 G/DL (ref 14–18)
HGB UR QL STRIP.AUTO: NEGATIVE
INR PPP: 0.91 (ref 0.91–1.09)
KETONES UR QL STRIP: NEGATIVE
LEUKOCYTE ESTERASE UR QL STRIP.AUTO: NEGATIVE
LYMPHOCYTES # BLD MANUAL: 4.01 10*3/MM3 (ref 0.72–4.86)
LYMPHOCYTES NFR BLD MANUAL: 31 % (ref 15–45)
LYMPHOCYTES NFR BLD MANUAL: 4 % (ref 4–12)
Lab: ABNORMAL
MCH RBC QN AUTO: 30.1 PG (ref 28–32)
MCHC RBC AUTO-ENTMCNC: 35.2 G/DL (ref 33–36)
MCV RBC AUTO: 85.4 FL (ref 82–95)
MODALITY: ABNORMAL
MONOCYTES # BLD AUTO: 0.52 10*3/MM3 (ref 0.19–1.3)
NEUTROPHILS # BLD AUTO: 6.73 10*3/MM3 (ref 1.87–8.4)
NEUTROPHILS NFR BLD MANUAL: 48 % (ref 39–78)
NEUTS BAND NFR BLD MANUAL: 4 % (ref 0–10)
NITRITE UR QL STRIP: NEGATIVE
PCO2 BLDA: 42.8 MM HG (ref 35–45)
PH BLDA: 7.42 PH UNITS (ref 7.35–7.45)
PH UR STRIP.AUTO: 7 [PH] (ref 5–8)
PLATELET # BLD AUTO: 300 10*3/MM3 (ref 130–400)
PMV BLD AUTO: 10.6 FL (ref 6–12)
PO2 BLDA: 77.1 MM HG (ref 83–108)
POTASSIUM BLD-SCNC: 4.5 MMOL/L (ref 3.5–5.3)
PROT SERPL-MCNC: 7.9 G/DL (ref 6.3–8.7)
PROT UR QL STRIP: NEGATIVE
PROTHROMBIN TIME: 12.5 SECONDS (ref 11.9–14.6)
RBC # BLD AUTO: 5.29 10*6/MM3 (ref 4.8–5.9)
RBC MORPH BLD: NORMAL
SAO2 % BLDCOA: 96.4 % (ref 94–99)
SCAN SLIDE: NORMAL
SMALL PLATELETS BLD QL SMEAR: ADEQUATE
SODIUM BLD-SCNC: 136 MMOL/L (ref 135–145)
SP GR UR STRIP: 1.02 (ref 1–1.03)
UROBILINOGEN UR QL STRIP: ABNORMAL
VARIANT LYMPHS NFR BLD MANUAL: 9 % (ref 0–5)
VENTILATOR MODE: ABNORMAL
WBC MORPH BLD: NORMAL
WBC NRBC COR # BLD: 12.94 10*3/MM3 (ref 4.8–10.8)

## 2018-02-10 PROCEDURE — 82962 GLUCOSE BLOOD TEST: CPT

## 2018-02-10 PROCEDURE — 85025 COMPLETE CBC W/AUTO DIFF WBC: CPT | Performed by: EMERGENCY MEDICINE

## 2018-02-10 PROCEDURE — 36600 WITHDRAWAL OF ARTERIAL BLOOD: CPT

## 2018-02-10 PROCEDURE — 82803 BLOOD GASES ANY COMBINATION: CPT

## 2018-02-10 PROCEDURE — 85007 BL SMEAR W/DIFF WBC COUNT: CPT | Performed by: EMERGENCY MEDICINE

## 2018-02-10 PROCEDURE — 82009 KETONE BODYS QUAL: CPT | Performed by: EMERGENCY MEDICINE

## 2018-02-10 PROCEDURE — 83036 HEMOGLOBIN GLYCOSYLATED A1C: CPT | Performed by: EMERGENCY MEDICINE

## 2018-02-10 PROCEDURE — 81003 URINALYSIS AUTO W/O SCOPE: CPT | Performed by: EMERGENCY MEDICINE

## 2018-02-10 PROCEDURE — 85610 PROTHROMBIN TIME: CPT | Performed by: EMERGENCY MEDICINE

## 2018-02-10 PROCEDURE — 99284 EMERGENCY DEPT VISIT MOD MDM: CPT

## 2018-02-10 PROCEDURE — 71045 X-RAY EXAM CHEST 1 VIEW: CPT

## 2018-02-10 PROCEDURE — 80053 COMPREHEN METABOLIC PANEL: CPT | Performed by: EMERGENCY MEDICINE

## 2018-02-10 RX ORDER — ACETAMINOPHEN 500 MG
1000 TABLET ORAL ONCE
Status: COMPLETED | OUTPATIENT
Start: 2018-02-10 | End: 2018-02-10

## 2018-02-10 RX ADMIN — ACETAMINOPHEN 1000 MG: 500 TABLET ORAL at 13:50

## 2018-02-10 NOTE — PROGRESS NOTES
"Continued Stay Note  [facility]     Patient Name: Mali Yarbrough  MRN: [id number]  Today's Date: 2/10/2018    Admit Date: 2/10/2018          Discharge Plan       02/10/18 1405    Case Management/Social Work Plan    Plan SW was called by ER  Riana due to pt needing a ride home.  SW spoke to pt's spouse who was here in the ER.  She stated that they had a friend drop them off but that the friend had already left.  SW asked if the friend could come back and she stated no.  SW asked if they had money for a cab and she stated no.  SW asked if she could call the friend to verify she couldn't come back and spouse got irate and said \"I will figure this out myself\" and hung up.  ARABELLA Terry.    Patient/Family In Agreement With Plan yes              Discharge Codes     None            ARABELLA Bird    "

## 2018-02-10 NOTE — ED NOTES
PT STATES HIS BG LEVELS HAVE BEEN RUNNING HIGH FOR AT LEAST THE LAST WEEK. PT DENIES NEW MEDS OR ILLNESS HOWEVER, HE'S BEEN UNDER A LOT OF STRESS. HE SAYS THE CHANGE BG STARTED AT THE SAME TIME HIS STRESS DID. PT IS TAKING METFORMIN TO CONTROL AND STATES  HE'S DONE WELL ON IT. PT IS ALERT AND ORIENTATED X3. FAMILY IS AT BEDSIDE. PT HAS A HEADACHE.      Priyanka Guerin RN  02/10/18 1095

## 2018-02-10 NOTE — ED PROVIDER NOTES
Subjective   HPI Comments: Patient is a 52-year-old male who presents with concerns for hyperglycemia.  History of diabetes on metformin, hypertension, hyperlipidemia.  Patient does smoke.  Over the past several weeks he has noted worsening stress.  He notes he has had difficulty controlling his blood glucose.  Yesterday, noted to be in the 300 range and he drank water although the blood glucose continued to rise.  Today, he also noted elevations near 300 and was concerned.  He does not take insulin.  He denies any cough, shortness of breath, difficulty breathing, chest pain.  Denies any fevers, sore throat.  Denies any urinary symptoms.  He does note some thirst today but otherwise normal.  He states he has been taking his metformin.  Denies any abdominal pain, nausea, vomiting.      History provided by:  Patient and spouse      Review of Systems   Constitutional: Negative for activity change, chills, diaphoresis and fever.   HENT: Negative for sore throat.    Eyes: Negative for visual disturbance.   Respiratory: Negative for shortness of breath.    Cardiovascular: Negative for chest pain.   Gastrointestinal: Negative for abdominal pain, constipation, diarrhea, nausea and vomiting.   Endocrine: Negative for polydipsia and polyuria.   Genitourinary: Negative for hematuria.   Musculoskeletal: Negative for back pain, neck pain and neck stiffness.   Skin: Negative for rash.   Neurological: Positive for headaches. Negative for dizziness, tremors, syncope, weakness, light-headedness and numbness.       Past Medical History:   Diagnosis Date   • Depression    • Diabetes mellitus    • Hyperlipidemia    • Hypertension        Allergies   Allergen Reactions   • Penicillins Hives       Past Surgical History:   Procedure Laterality Date   • ADENOIDECTOMY     • CHOLECYSTECTOMY         History reviewed. No pertinent family history.    Social History     Social History   • Marital status:      Spouse name: N/A   • Number of  children: N/A   • Years of education: N/A     Social History Main Topics   • Smoking status: Current Every Day Smoker     Types: Cigarettes   • Smokeless tobacco: None   • Alcohol use No   • Drug use: No   • Sexual activity: Defer     Other Topics Concern   • None     Social History Narrative   • None       Lab Results (last 24 hours)     Procedure Component Value Units Date/Time    POC Glucose Once [[id number]]  (Abnormal) Collected:  02/10/18 1108    Specimen:  Blood Updated:  02/10/18 1120     Glucose 246 (H) mg/dL       : [id number] DrivertobiasMeter ID: [id number]       CBC & Differential [[id number]] Collected:  02/10/18 1158    Specimen:  Blood Updated:  02/10/18 1251    Narrative:       The following orders were created for panel order CBC & Differential.  Procedure                               Abnormality         Status                     ---------                               -----------         ------                     Manual Differential[[id number]]          Abnormal            Final result               Scan Slide[[id number]]                                       Final result               CBC Auto Differential[[id number]]        Abnormal            Final result                 Please view results for these tests on the individual orders.    Comprehensive Metabolic Panel [[id number]]  (Abnormal) Collected:  02/10/18 1158    Specimen:  Blood Updated:  02/10/18 1302     Glucose 221 (H) mg/dL      BUN 16 mg/dL      Creatinine 0.73 mg/dL      Sodium 136 mmol/L      Potassium 4.5 mmol/L      Chloride 98 mmol/L      CO2 27.0 mmol/L      Calcium 10.0 mg/dL      Total Protein 7.9 g/dL      Albumin 4.40 g/dL      ALT (SGPT) 161 (H) U/L      AST (SGOT) 99 (H) U/L      Alkaline Phosphatase 106 U/L      Total Bilirubin 0.5 mg/dL      eGFR Non African Amer 113 mL/min/1.73      Globulin 3.5 gm/dL      A/G Ratio 1.3 g/dL      BUN/Creatinine Ratio 21.9     Anion Gap 11.0 mmol/L     Protime-INR [[id number]]   (Normal) Collected:  02/10/18 1158    Specimen:  Blood Updated:  02/10/18 1226     Protime 12.5 Seconds      INR 0.91    Ketone Bodies, Serum (Not performed at [place]) [[id number]] Collected:  02/10/18 1158    Specimen:  Blood Updated:  02/10/18 1231    Narrative:       The following orders were created for panel order Ketone Bodies, Serum (Not performed at [place]).  Procedure                               Abnormality         Status                     ---------                               -----------         ------                     Acetone[[id number]]                      Normal              Final result                 Please view results for these tests on the individual orders.    CBC Auto Differential [[id number]]  (Abnormal) Collected:  02/10/18 1158    Specimen:  Blood Updated:  02/10/18 1251     WBC 12.94 (H) 10*3/mm3      RBC 5.29 10*6/mm3      Hemoglobin 15.9 g/dL      Hematocrit 45.2 %      MCV 85.4 fL      MCH 30.1 pg      MCHC 35.2 g/dL      RDW 12.9 %      RDW-SD 39.8 (L) fl      MPV 10.6 fL      Platelets 300 10*3/mm3     Acetone [[id number]]  (Normal) Collected:  02/10/18 1158    Specimen:  Blood Updated:  02/10/18 1231     Acetone Negative    Hemoglobin A1c [[id number]] Collected:  02/10/18 1158    Specimen:  Blood Updated:  02/10/18 1253     Hemoglobin A1C 8.2 %     Narrative:       Less than 6.0           Non-Diabetic Range  6.0-7.0                 ADA Therapeutic Target  Greater than 7.0        Action Suggested    Scan Slide [[id number]] Collected:  02/10/18 1158    Specimen:  Blood Updated:  02/10/18 1251     Scan Slide --      See Manual Differential Results       Manual Differential [[id number]]  (Abnormal) Collected:  02/10/18 1158    Specimen:  Blood Updated:  02/10/18 1251     Neutrophil % 48.0 %      Lymphocyte % 31.0 %      Monocyte % 4.0 %      Eosinophil % 3.0 %      Basophil % 1.0 %      Bands %  4.0 %      Atypical Lymphocyte % 9.0 (H) %      Neutrophils Absolute 6.73 10*3/mm3       Lymphocytes Absolute 4.01 10*3/mm3      Monocytes Absolute 0.52 10*3/mm3      Eosinophils Absolute 0.39 10*3/mm3      Basophils Absolute 0.13 10*3/mm3      RBC Morphology Normal     WBC Morphology Normal     Platelet Estimate Adequate    Blood Gas, Arterial [[id number]]  (Abnormal) Collected:  02/10/18 1207    Specimen:  Arterial Blood Updated:  02/10/18 1213     Site Right Radial     Kar's Test Positive     pH, Arterial 7.419 pH units      pCO2, Arterial 42.8 mm Hg      pO2, Arterial 77.1 (L) mm Hg      HCO3, Arterial 27.7 (H) mmol/L      Base Excess, Arterial 2.6 (H) mmol/L      O2 Saturation, Arterial 96.4 %      Temperature 37.0 C      Barometric Pressure for Blood Gas 751 mmHg      Modality Room Air     Ventilator Mode NA     Collected by [id number]    Urinalysis With / Culture If Indicated - Urine, Clean Catch [[id number]]  (Abnormal) Collected:  02/10/18 1212    Specimen:  Urine from Urine, Clean Catch Updated:  02/10/18 1221     Color, UA Yellow     Appearance, UA Clear     pH, UA 7.0     Specific Gravity, UA 1.021     Glucose,  mg/dL (2+) (A)     Ketones, UA Negative     Bilirubin, UA Negative     Blood, UA Negative     Protein, UA Negative     Leuk Esterase, UA Negative     Nitrite, UA Negative     Urobilinogen, UA 1.0 E.U./dL    Narrative:       Urine microscopic not indicated.          Objective   Physical Exam   Constitutional: He is oriented to person, place, and time. Vital signs are normal. He appears well-developed and well-nourished. He is cooperative.  Non-toxic appearance. He does not have a sickly appearance. He does not appear ill. No distress.   HENT:   Head: Atraumatic.   Mouth/Throat: Uvula is midline, oropharynx is clear and moist and mucous membranes are normal.   Eyes: EOM are normal. Pupils are equal, round, and reactive to light.   Neck: Normal range of motion. Neck supple.   Cardiovascular: Normal rate, regular rhythm and normal heart sounds.  Exam reveals no gallop and no friction  "rub.    No murmur heard.  Pulmonary/Chest: Effort normal and breath sounds normal. No respiratory distress. He has no wheezes. He has no rhonchi. He has no rales.   Abdominal: Soft. He exhibits no distension. There is no tenderness. There is no rebound and no guarding.   Musculoskeletal: Normal range of motion. He exhibits no edema.   Neurological: He is alert and oriented to person, place, and time. He has normal strength. No cranial nerve deficit or sensory deficit. He exhibits normal muscle tone. GCS eye subscore is 4. GCS verbal subscore is 5. GCS motor subscore is 6.   Skin: Skin is warm and dry.   Psychiatric: He has a normal mood and affect.   Nursing note and vitals reviewed.      Procedures         XR Chest 1 View   Final Result   Shallow inspiration, no acute cardiopulmonary abnormality.       This report was finalized on 02/10/2018 12:04 by Dr. Arley Moss MD.          /90  Pulse 81  Temp 97.4 °F (36.3 °C)  Resp 14  Ht 171.5 cm (67.5\")  Wt 102 kg (224 lb)  SpO2 96%  BMI 34.57 kg/m2    ED Course    ED Course   Value Comment By Time   ALT (SGPT): (!) 161 (Reviewed) Henok Mahoney MD 02/10 1326   AST (SGOT): (!) 99 (Reviewed) Henok Mahoney MD 02/10 1326   Glucose: (!) 221 (Reviewed) Henok Mahoney MD 02/10 1326   Hemoglobin A1C: 8.2 (Reviewed) Henok Mahoney MD 02/10 1326    This is a 52-year-old male who presents in setting of hypoglycemia.  His blood glucose today is 221.  No acidosis or ketosis.  Her analysis unremarkable.  No evidence of infection.  Slight leukocytosis of 12,000.  Chest x-ray negative.  Patient had benign exam.  He is well-appearing.  No signs of dehydration.  He is only on 500 mg of metformin.  I did discuss he should follow up with his PCP this week for further instructions and possible increase in medication.  I would not start him on insulin today.  He does have slight increase in his LFTs which should be followed up.  His abdomen exam was " benign.  No hepatomegaly.  Patient does understand to follow up with his PCP this week.  I did discuss return precautions. Henok Mahoney MD 02/10 2506       Medications   acetaminophen (TYLENOL) tablet 1,000 mg (not administered)            MDM  Number of Diagnoses or Management Options  Hyperglycemia: new and requires workup     Amount and/or Complexity of Data Reviewed  Clinical lab tests: reviewed and ordered  Tests in the radiology section of CPT®: reviewed and ordered  Decide to obtain previous medical records or to obtain history from someone other than the patient: yes    Risk of Complications, Morbidity, and/or Mortality  Presenting problems: low  Diagnostic procedures: low  Management options: low    Patient Progress  Patient progress: stable      Final diagnoses:   Hyperglycemia          Henok Mahoney MD  02/10/18 [id number]

## 2018-09-07 ENCOUNTER — APPOINTMENT (OUTPATIENT)
Dept: PHYSICAL THERAPY | Age: 53
End: 2018-09-07
Payer: MEDICAID

## 2018-09-21 ENCOUNTER — HOSPITAL ENCOUNTER (OUTPATIENT)
Dept: PHYSICAL THERAPY | Age: 53
Setting detail: THERAPIES SERIES
Discharge: HOME | End: 2018-09-21
Payer: MEDICAID

## 2018-09-21 PROCEDURE — G8979 MOBILITY GOAL STATUS: HCPCS

## 2018-09-21 PROCEDURE — 97162 PT EVAL MOD COMPLEX 30 MIN: CPT

## 2018-09-21 PROCEDURE — G8978 MOBILITY CURRENT STATUS: HCPCS

## 2018-09-21 ASSESSMENT — PAIN DESCRIPTION - FREQUENCY: FREQUENCY: CONTINUOUS

## 2018-09-21 ASSESSMENT — PAIN DESCRIPTION - ORIENTATION: ORIENTATION: RIGHT;LEFT;LOWER

## 2018-09-21 ASSESSMENT — PAIN DESCRIPTION - LOCATION: LOCATION: BACK

## 2018-09-21 ASSESSMENT — PAIN DESCRIPTION - PAIN TYPE: TYPE: CHRONIC PAIN

## 2018-10-23 NOTE — PROGRESS NOTES
[facility]  Discharge Summary        Date:10/23/2018    Patient Name:Nguyen Patterson    ZCJ:[id number]    :1965    Diagnosis:Diagnosis: Low back pain           Total # of Visits to Date: 1 with 1 missed visits. Subjective: Pt presents with chronic low back pain. He attended PT at end of  and states \"It did get a little better for awhile, but now it's worse. \"  He generally is unable to tolerate any one position for greater than 15 minutes due to pain. States he has to use a wheelchair to wash dishes so he can sit down. Reports muscle spasms down bilat LE to feet, with R usually worse than L. His knees \"give out\" on occasion. Currently unable to work due to pain, and states he has difficulty and increased pain with ADLs due to pain. Reports pain at 4/10 today, \"10 plus at worst.  Its hard to get out of bed. I hate it. I'm usually active. \"  He states the only thing that helps relieve pain is a hot shower.     Objective data:     Range of Motion:     Initial Evaluation Discharge   Lumbar: 15 degrees flexion, 8 degrees extension, 5 degrees SB L, 10 degrees SB R, Rotation L 50%, Rotation R 25% Lumbar: 15 degrees flexion, 8 degrees extension, 5 degrees SB L, 10 degrees SB R, Rotation L 50%, Rotation R 25%       Manual Muscle Test:    Initial Evaluation Discharge   Strength RLE  R Hip Flexion: 4+/5  R Hip ABduction: 5/5  R Hip ADduction: 5/5  R Knee Flexion: 4+/5  R Knee Extension: 5/5  R Ankle Dorsiflexion: 4/5, 4+/5  R Ankle Plantar flexion: 5/5 Strength RLE  R Hip Flexion: 4+/5  R Hip ABduction: 5/5  R Hip ADduction: 5/5  R Knee Flexion: 4+/5  R Knee Extension: 5/5  R Ankle Dorsiflexion: 4/5, 4+/5  R Ankle Plantar flexion: 5/5   Strength LLE  L Hip Flexion: 4+/5  L Hip ABduction: 5/5  L Hip ADduction: 5/5  L Knee Flexion: 4+/5  L Knee Extension: 5/5  L Ankle Dorsiflexion: 4+/5  L Ankle Plantar Flexion: 5/5 Strength LLE  L Hip Flexion: 4+/5  L Hip ABduction: 5/5  L Hip ADduction: goal 4: Improve Oswestry score to less than 20% impairment. PLAN:  D/c from services.       Electronically signed by Jeramy Hawthorne PT on 10/23/2018 at 10:27 AM

## 2019-01-18 ENCOUNTER — NURSE TRIAGE (OUTPATIENT)
Dept: CALL CENTER | Facility: HOSPITAL | Age: 54
End: 2019-01-18

## 2019-01-19 NOTE — TELEPHONE ENCOUNTER
"Caller had been to [facility] two days ago with chest pain which was ruled out.  Diagnosed with stress and diabetes.  Discharged yesterday.  Today he has increased weakness, can hardly walk without assistance.  BG was >500 in the hospital, current BG = 391, refuses to take metformin.  Cannot walk without assistance.  Advised to go to ED.    Reason for Disposition  • [1] MODERATE weakness (i.e., interferes with work, school, normal activities) AND [2] cause unknown  (Exceptions: weakness with acute minor illness, or weakness from poor fluid intake)    Additional Information  • Negative: Severe difficulty breathing (e.g., struggling for each breath, speaks in single words)  • Negative: Shock suspected (e.g., cold/pale/clammy skin, too weak to stand, low BP, rapid pulse)  • Negative: Difficult to awaken or acting confused (e.g., disoriented, slurred speech)  • Negative: [1] Fainted > 15 minutes ago AND [2] still feels too weak or dizzy to stand  • Negative: [1] SEVERE weakness (i.e., unable to walk or barely able to walk, requires support) AND     [2] new onset or worsening  • Negative: Sounds like a life-threatening emergency to the triager  • Negative: Weakness of the face, arm or leg on one side of the body  • Negative: [1] Has diabetes (diabetes mellitus) AND [2] weakness from low blood sugar (i.e., < 60 mg/dl or 3.5 mmol/l)  • Negative: Heat exhaustion suspected (i.e., dehydration from heat exposure)  • Negative: Vomiting is main symptom  • Negative: Diarrhea is main symptom  • Negative: Difficulty breathing  • Negative: Heart beating < 50 beats per minute OR > 140 beats per minute  • Negative: Extra heart beats OR irregular heart beating   (i.e., \"palpitations\")  • Negative: Follows bleeding (e.g., from vomiting, rectum, vagina; EXCEPTION: small brief weakness from sight of a small amount blood)  • Negative: Black or tarry bowel movements  • Negative: [1] Drinking very little AND [2] " "dehydration suspected (e.g., no urine > 12 hours, very dry mouth, very lightheaded)  • Negative: Patient sounds very sick or weak to the triager    Answer Assessment - Initial Assessment Questions  1. DESCRIPTION: \"Describe how you are feeling.\"      Extremely weak, light headed  2. SEVERITY: \"How bad is it?\"  \"Can you stand and walk?\"    - MILD - Feels weak or tired, but does not interfere with work, school or normal activities    - MODERATE - Able to stand and walk; weakness interferes with work, school, or normal activities    - SEVERE - Unable to stand or walk      moderate  3. ONSET:  \"When did the weakness begin?\"      yesterday  4. CAUSE: \"What do you think is causing the weakness?\"      unknown  5. MEDICINES: \"Have you recently started a new medicine or had a change in the amount of a medicine?\"      Stopped taking celexa today  6. OTHER SYMPTOMS: \"Do you have any other symptoms?\" (e.g., chest pain, fever, cough, SOB, vomiting, diarrhea, bleeding)      Smokers cough, SOB  7. PREGNANCY: \"Is there any chance you are pregnant?\" \"When was your last menstrual period?\"      na    Protocols used: WEAKNESS (GENERALIZED) AND FATIGUE-ADULT-AH      "

## 2019-02-09 ENCOUNTER — HOSPITAL ENCOUNTER (INPATIENT)
Age: 54
LOS: 3 days | Discharge: HOME | DRG: 885 | End: 2019-02-12
Attending: EMERGENCY MEDICINE | Admitting: PSYCHIATRY & NEUROLOGY
Payer: MEDICAID

## 2019-02-09 DIAGNOSIS — R73.9 HYPERGLYCEMIA: ICD-10-CM

## 2019-02-09 DIAGNOSIS — F32.A DEPRESSION WITH SUICIDAL IDEATION: Primary | ICD-10-CM

## 2019-02-09 DIAGNOSIS — R45.851 DEPRESSION WITH SUICIDAL IDEATION: Primary | ICD-10-CM

## 2019-02-09 LAB
ACETAMINOPHEN LEVEL: <15 UG/ML
ALBUMIN SERPL-MCNC: 4.5 G/DL (ref 3.5–5.2)
ALP BLD-CCNC: 114 U/L (ref 40–130)
ALT SERPL-CCNC: 83 U/L (ref 5–41)
AMPHETAMINE SCREEN, URINE: NEGATIVE
ANION GAP SERPL CALCULATED.3IONS-SCNC: 13 MMOL/L (ref 7–19)
ANION GAP SERPL CALCULATED.3IONS-SCNC: 16 MMOL/L (ref 7–19)
AST SERPL-CCNC: 60 U/L (ref 5–40)
ATYPICAL LYMPHOCYTE RELATIVE PERCENT: 2 % (ref 0–8)
BARBITURATE SCREEN URINE: NEGATIVE
BASOPHILS ABSOLUTE: 0 K/UL (ref 0–0.2)
BASOPHILS MANUAL: 0 %
BASOPHILS RELATIVE PERCENT: 0 % (ref 0–1)
BENZODIAZEPINE SCREEN, URINE: NEGATIVE
BILIRUB SERPL-MCNC: 0.7 MG/DL (ref 0.2–1.2)
BUN BLDV-MCNC: 11 MG/DL (ref 6–20)
BUN BLDV-MCNC: 9 MG/DL (ref 6–20)
CALCIUM SERPL-MCNC: 9.3 MG/DL (ref 8.6–10)
CALCIUM SERPL-MCNC: 9.6 MG/DL (ref 8.6–10)
CANNABINOID SCREEN URINE: NEGATIVE
CHLORIDE BLD-SCNC: 90 MMOL/L (ref 98–111)
CHLORIDE BLD-SCNC: 94 MMOL/L (ref 98–111)
CO2: 23 MMOL/L (ref 22–29)
CO2: 26 MMOL/L (ref 22–29)
COCAINE METABOLITE SCREEN URINE: NEGATIVE
CREAT SERPL-MCNC: 0.6 MG/DL (ref 0.5–1.2)
CREAT SERPL-MCNC: 0.7 MG/DL (ref 0.5–1.2)
EOSINOPHILS ABSOLUTE: 0.3 K/UL (ref 0–0.6)
EOSINOPHILS RELATIVE PERCENT: 2 % (ref 0–5)
ETHANOL: <10 MG/DL (ref 0–0.08)
GFR NON-AFRICAN AMERICAN: >60
GFR NON-AFRICAN AMERICAN: >60
GLUCOSE BLD-MCNC: 285 MG/DL (ref 74–109)
GLUCOSE BLD-MCNC: 287 MG/DL (ref 70–99)
GLUCOSE BLD-MCNC: 288 MG/DL
GLUCOSE BLD-MCNC: 288 MG/DL (ref 70–99)
GLUCOSE BLD-MCNC: 347 MG/DL (ref 74–109)
HBA1C MFR BLD: 11.4 % (ref 4–6)
HCT VFR BLD CALC: 48.1 % (ref 42–52)
HEMOGLOBIN: 16.3 G/DL (ref 14–18)
LYMPHOCYTES ABSOLUTE: 5.4 K/UL (ref 1.1–4.5)
LYMPHOCYTES RELATIVE PERCENT: 34 % (ref 20–40)
Lab: NORMAL
MCH RBC QN AUTO: 30.4 PG (ref 27–31)
MCHC RBC AUTO-ENTMCNC: 33.9 G/DL (ref 33–37)
MCV RBC AUTO: 89.6 FL (ref 80–94)
MONOCYTES ABSOLUTE: 0.6 K/UL (ref 0–0.9)
MONOCYTES RELATIVE PERCENT: 4 % (ref 0–10)
NEUTROPHILS ABSOLUTE: 8.7 K/UL (ref 1.5–7.5)
NEUTROPHILS MANUAL: 58 %
NEUTROPHILS RELATIVE PERCENT: 58 % (ref 50–65)
OPIATE SCREEN URINE: NEGATIVE
PDW BLD-RTO: 12.6 % (ref 11.5–14.5)
PERFORMED ON: ABNORMAL
PERFORMED ON: ABNORMAL
PLATELET # BLD: 312 K/UL (ref 130–400)
PLATELET SLIDE REVIEW: ADEQUATE
PMV BLD AUTO: 10.8 FL (ref 9.4–12.4)
POTASSIUM SERPL-SCNC: 3.8 MMOL/L (ref 3.5–5)
POTASSIUM SERPL-SCNC: 4.3 MMOL/L (ref 3.5–5)
RBC # BLD: 5.37 M/UL (ref 4.7–6.1)
RBC # BLD: NORMAL 10*6/UL
SALICYLATE, SERUM: <3 MG/DL (ref 3–10)
SODIUM BLD-SCNC: 129 MMOL/L (ref 136–145)
SODIUM BLD-SCNC: 133 MMOL/L (ref 136–145)
TOTAL PROTEIN: 7.9 G/DL (ref 6.6–8.7)
WBC # BLD: 15 K/UL (ref 4.8–10.8)

## 2019-02-09 PROCEDURE — G0480 DRUG TEST DEF 1-7 CLASSES: HCPCS

## 2019-02-09 PROCEDURE — 80307 DRUG TEST PRSMV CHEM ANLYZR: CPT

## 2019-02-09 PROCEDURE — 6370000000 HC RX 637 (ALT 250 FOR IP): Performed by: EMERGENCY MEDICINE

## 2019-02-09 PROCEDURE — 80053 COMPREHEN METABOLIC PANEL: CPT

## 2019-02-09 PROCEDURE — 82948 REAGENT STRIP/BLOOD GLUCOSE: CPT

## 2019-02-09 PROCEDURE — 6370000000 HC RX 637 (ALT 250 FOR IP)

## 2019-02-09 PROCEDURE — 83036 HEMOGLOBIN GLYCOSYLATED A1C: CPT

## 2019-02-09 PROCEDURE — 99285 EMERGENCY DEPT VISIT HI MDM: CPT

## 2019-02-09 PROCEDURE — 36415 COLL VENOUS BLD VENIPUNCTURE: CPT

## 2019-02-09 PROCEDURE — 6370000000 HC RX 637 (ALT 250 FOR IP): Performed by: PSYCHIATRY & NEUROLOGY

## 2019-02-09 PROCEDURE — 85025 COMPLETE CBC W/AUTO DIFF WBC: CPT

## 2019-02-09 PROCEDURE — 1240000000 HC EMOTIONAL WELLNESS R&B

## 2019-02-09 RX ORDER — NICOTINE 21 MG/24HR
1 PATCH, TRANSDERMAL 24 HOURS TRANSDERMAL DAILY
Status: DISCONTINUED | OUTPATIENT
Start: 2019-02-10 | End: 2019-02-12 | Stop reason: HOSPADM

## 2019-02-09 RX ORDER — QUETIAPINE FUMARATE 50 MG/1
50 TABLET, FILM COATED ORAL ONCE
Status: COMPLETED | OUTPATIENT
Start: 2019-02-09 | End: 2019-02-09

## 2019-02-09 RX ORDER — MONTELUKAST SODIUM 10 MG/1
10 TABLET ORAL NIGHTLY
COMMUNITY
End: 2019-02-09 | Stop reason: SDDI

## 2019-02-09 RX ORDER — LAMOTRIGINE 100 MG/1
100 TABLET ORAL 2 TIMES DAILY
COMMUNITY
End: 2019-06-14 | Stop reason: SDUPTHER

## 2019-02-09 RX ORDER — PRAVASTATIN SODIUM 80 MG/1
80 TABLET ORAL NIGHTLY
COMMUNITY
End: 2019-07-03 | Stop reason: SDUPTHER

## 2019-02-09 RX ORDER — CARVEDILOL 25 MG/1
25 TABLET ORAL 2 TIMES DAILY WITH MEALS
COMMUNITY
End: 2019-07-03 | Stop reason: SDUPTHER

## 2019-02-09 RX ORDER — BUSPIRONE HYDROCHLORIDE 10 MG/1
10 TABLET ORAL 2 TIMES DAILY
COMMUNITY
End: 2019-02-09 | Stop reason: SDDI

## 2019-02-09 RX ORDER — OMEPRAZOLE 20 MG/1
20 CAPSULE, DELAYED RELEASE ORAL DAILY
COMMUNITY
End: 2019-10-28 | Stop reason: SDUPTHER

## 2019-02-09 RX ORDER — NAPROXEN 500 MG/1
500 TABLET ORAL 2 TIMES DAILY WITH MEALS
COMMUNITY
End: 2019-09-17 | Stop reason: SDUPTHER

## 2019-02-09 RX ORDER — TIZANIDINE 4 MG/1
4 TABLET ORAL EVERY 6 HOURS PRN
COMMUNITY
End: 2020-01-08

## 2019-02-09 RX ORDER — NICOTINE 21 MG/24HR
PATCH, TRANSDERMAL 24 HOURS TRANSDERMAL
Status: COMPLETED
Start: 2019-02-09 | End: 2019-02-10

## 2019-02-09 RX ORDER — CITALOPRAM 40 MG/1
40 TABLET ORAL DAILY
COMMUNITY
End: 2019-06-14 | Stop reason: SDUPTHER

## 2019-02-09 RX ADMIN — QUETIAPINE FUMARATE 50 MG: 50 TABLET ORAL at 22:27

## 2019-02-09 RX ADMIN — INSULIN HUMAN 10 UNITS: 100 INJECTION, SOLUTION PARENTERAL at 20:12

## 2019-02-09 ASSESSMENT — SLEEP AND FATIGUE QUESTIONNAIRES
DO YOU USE A SLEEP AID: NO
SLEEP PATTERN: RESTLESSNESS
DIFFICULTY STAYING ASLEEP: YES
RESTFUL SLEEP: YES
DIFFICULTY STAYING ASLEEP: YES
DIFFICULTY FALLING ASLEEP: YES
DIFFICULTY ARISING: YES
DO YOU HAVE DIFFICULTY SLEEPING: YES
DIFFICULTY FALLING ASLEEP: YES
DO YOU HAVE DIFFICULTY SLEEPING: YES
AVERAGE NUMBER OF SLEEP HOURS: 6
AVERAGE NUMBER OF SLEEP HOURS: 6
DIFFICULTY ARISING: YES
RESTFUL SLEEP: YES
DO YOU USE A SLEEP AID: YES
SLEEP PATTERN: RESTLESSNESS

## 2019-02-09 ASSESSMENT — LIFESTYLE VARIABLES
HISTORY_ALCOHOL_USE: NO
HISTORY_ALCOHOL_USE: NO

## 2019-02-09 ASSESSMENT — PATIENT HEALTH QUESTIONNAIRE - PHQ9
SUM OF ALL RESPONSES TO PHQ QUESTIONS 1-9: 10
SUM OF ALL RESPONSES TO PHQ QUESTIONS 1-9: 10

## 2019-02-09 ASSESSMENT — PAIN SCALES - GENERAL: PAINLEVEL_OUTOF10: 0

## 2019-02-10 PROBLEM — F32.A DEPRESSION WITH SUICIDAL IDEATION: Status: ACTIVE | Noted: 2019-02-10

## 2019-02-10 PROBLEM — R45.851 DEPRESSION WITH SUICIDAL IDEATION: Status: ACTIVE | Noted: 2019-02-10

## 2019-02-10 LAB
GLUCOSE BLD-MCNC: 194 MG/DL (ref 70–99)
GLUCOSE BLD-MCNC: 281 MG/DL (ref 70–99)
GLUCOSE BLD-MCNC: 383 MG/DL (ref 70–99)
PERFORMED ON: ABNORMAL

## 2019-02-10 PROCEDURE — 90792 PSYCH DIAG EVAL W/MED SRVCS: CPT | Performed by: PSYCHIATRY & NEUROLOGY

## 2019-02-10 PROCEDURE — 1240000000 HC EMOTIONAL WELLNESS R&B

## 2019-02-10 PROCEDURE — 6370000000 HC RX 637 (ALT 250 FOR IP): Performed by: EMERGENCY MEDICINE

## 2019-02-10 PROCEDURE — 6370000000 HC RX 637 (ALT 250 FOR IP): Performed by: PSYCHIATRY & NEUROLOGY

## 2019-02-10 PROCEDURE — 82948 REAGENT STRIP/BLOOD GLUCOSE: CPT

## 2019-02-10 PROCEDURE — 6370000000 HC RX 637 (ALT 250 FOR IP): Performed by: FAMILY MEDICINE

## 2019-02-10 RX ORDER — DEXTROSE MONOHYDRATE 25 G/50ML
12.5 INJECTION, SOLUTION INTRAVENOUS PRN
Status: DISCONTINUED | OUTPATIENT
Start: 2019-02-10 | End: 2019-02-12 | Stop reason: HOSPADM

## 2019-02-10 RX ORDER — CARVEDILOL 3.12 MG/1
6.25 TABLET ORAL 2 TIMES DAILY WITH MEALS
Status: DISCONTINUED | OUTPATIENT
Start: 2019-02-10 | End: 2019-02-12 | Stop reason: HOSPADM

## 2019-02-10 RX ORDER — LAMOTRIGINE 100 MG/1
100 TABLET ORAL 2 TIMES DAILY
Status: DISCONTINUED | OUTPATIENT
Start: 2019-02-10 | End: 2019-02-12 | Stop reason: HOSPADM

## 2019-02-10 RX ORDER — NICOTINE POLACRILEX 4 MG
15 LOZENGE BUCCAL PRN
Status: DISCONTINUED | OUTPATIENT
Start: 2019-02-10 | End: 2019-02-12 | Stop reason: HOSPADM

## 2019-02-10 RX ORDER — PANTOPRAZOLE SODIUM 40 MG/1
40 TABLET, DELAYED RELEASE ORAL
Status: DISCONTINUED | OUTPATIENT
Start: 2019-02-11 | End: 2019-02-12 | Stop reason: HOSPADM

## 2019-02-10 RX ORDER — QUETIAPINE FUMARATE 50 MG/1
50 TABLET, FILM COATED ORAL NIGHTLY
Status: DISCONTINUED | OUTPATIENT
Start: 2019-02-10 | End: 2019-02-12 | Stop reason: HOSPADM

## 2019-02-10 RX ORDER — INSULIN GLARGINE 100 [IU]/ML
10 INJECTION, SOLUTION SUBCUTANEOUS NIGHTLY
Status: DISCONTINUED | OUTPATIENT
Start: 2019-02-10 | End: 2019-02-12

## 2019-02-10 RX ORDER — ACETAMINOPHEN 325 MG/1
650 TABLET ORAL EVERY 4 HOURS PRN
Status: DISCONTINUED | OUTPATIENT
Start: 2019-02-10 | End: 2019-02-12 | Stop reason: HOSPADM

## 2019-02-10 RX ORDER — CITALOPRAM 20 MG/1
40 TABLET ORAL DAILY
Status: DISCONTINUED | OUTPATIENT
Start: 2019-02-10 | End: 2019-02-12 | Stop reason: HOSPADM

## 2019-02-10 RX ORDER — DEXTROSE MONOHYDRATE 50 MG/ML
100 INJECTION, SOLUTION INTRAVENOUS PRN
Status: DISCONTINUED | OUTPATIENT
Start: 2019-02-10 | End: 2019-02-12 | Stop reason: HOSPADM

## 2019-02-10 RX ORDER — PRAVASTATIN SODIUM 20 MG
80 TABLET ORAL DAILY
Status: DISCONTINUED | OUTPATIENT
Start: 2019-02-10 | End: 2019-02-10

## 2019-02-10 RX ORDER — CARVEDILOL 25 MG/1
25 TABLET ORAL 2 TIMES DAILY WITH MEALS
Status: DISCONTINUED | OUTPATIENT
Start: 2019-02-10 | End: 2019-02-10

## 2019-02-10 RX ORDER — ACETAMINOPHEN 325 MG/1
650 TABLET ORAL EVERY 4 HOURS PRN
Status: CANCELLED | OUTPATIENT
Start: 2019-02-10

## 2019-02-10 RX ORDER — NAPROXEN 500 MG/1
500 TABLET ORAL 2 TIMES DAILY WITH MEALS
Status: DISCONTINUED | OUTPATIENT
Start: 2019-02-10 | End: 2019-02-12 | Stop reason: HOSPADM

## 2019-02-10 RX ORDER — TIZANIDINE 4 MG/1
4 TABLET ORAL EVERY 6 HOURS PRN
Status: DISCONTINUED | OUTPATIENT
Start: 2019-02-10 | End: 2019-02-12 | Stop reason: HOSPADM

## 2019-02-10 RX ADMIN — METFORMIN HYDROCHLORIDE 500 MG: 500 TABLET ORAL at 18:48

## 2019-02-10 RX ADMIN — QUETIAPINE FUMARATE 50 MG: 50 TABLET ORAL at 21:08

## 2019-02-10 RX ADMIN — CARVEDILOL 6.25 MG: 3.12 TABLET, FILM COATED ORAL at 18:48

## 2019-02-10 RX ADMIN — INSULIN LISPRO 2 UNITS: 100 INJECTION, SOLUTION INTRAVENOUS; SUBCUTANEOUS at 21:54

## 2019-02-10 RX ADMIN — INSULIN LISPRO 5 UNITS: 100 INJECTION, SOLUTION INTRAVENOUS; SUBCUTANEOUS at 18:49

## 2019-02-10 RX ADMIN — INSULIN GLARGINE 10 UNITS: 100 INJECTION, SOLUTION SUBCUTANEOUS at 21:07

## 2019-02-10 RX ADMIN — LAMOTRIGINE 100 MG: 100 TABLET ORAL at 21:07

## 2019-02-10 RX ADMIN — CITALOPRAM HYDROBROMIDE 40 MG: 20 TABLET ORAL at 21:07

## 2019-02-10 RX ADMIN — NAPROXEN 500 MG: 500 TABLET ORAL at 21:54

## 2019-02-10 ASSESSMENT — PAIN SCALES - GENERAL: PAINLEVEL_OUTOF10: 7

## 2019-02-11 LAB
CHOLESTEROL, TOTAL: 151 MG/DL (ref 160–199)
GLUCOSE BLD-MCNC: 212 MG/DL (ref 70–99)
GLUCOSE BLD-MCNC: 222 MG/DL (ref 70–99)
GLUCOSE BLD-MCNC: 281 MG/DL (ref 70–99)
GLUCOSE BLD-MCNC: 284 MG/DL (ref 70–99)
HDLC SERPL-MCNC: 28 MG/DL (ref 55–121)
LDL CHOLESTEROL CALCULATED: 86 MG/DL
PERFORMED ON: ABNORMAL
TRIGL SERPL-MCNC: 185 MG/DL (ref 0–149)
TSH SERPL DL<=0.05 MIU/L-ACNC: 2.15 UIU/ML (ref 0.27–4.2)
VITAMIN B-12: 741 PG/ML (ref 211–946)
VITAMIN D 25-HYDROXY: 17.2 NG/ML

## 2019-02-11 PROCEDURE — 6370000000 HC RX 637 (ALT 250 FOR IP): Performed by: EMERGENCY MEDICINE

## 2019-02-11 PROCEDURE — 80061 LIPID PANEL: CPT

## 2019-02-11 PROCEDURE — 6370000000 HC RX 637 (ALT 250 FOR IP): Performed by: PSYCHIATRY & NEUROLOGY

## 2019-02-11 PROCEDURE — 36415 COLL VENOUS BLD VENIPUNCTURE: CPT

## 2019-02-11 PROCEDURE — 6370000000 HC RX 637 (ALT 250 FOR IP): Performed by: FAMILY MEDICINE

## 2019-02-11 PROCEDURE — 82607 VITAMIN B-12: CPT

## 2019-02-11 PROCEDURE — 1240000000 HC EMOTIONAL WELLNESS R&B

## 2019-02-11 PROCEDURE — 82948 REAGENT STRIP/BLOOD GLUCOSE: CPT

## 2019-02-11 PROCEDURE — 82306 VITAMIN D 25 HYDROXY: CPT

## 2019-02-11 PROCEDURE — 99233 SBSQ HOSP IP/OBS HIGH 50: CPT | Performed by: PSYCHIATRY & NEUROLOGY

## 2019-02-11 PROCEDURE — 84443 ASSAY THYROID STIM HORMONE: CPT

## 2019-02-11 RX ORDER — ERGOCALCIFEROL (VITAMIN D2) 1250 MCG
50000 CAPSULE ORAL WEEKLY
Qty: 11 CAPSULE | Refills: 0 | Status: SHIPPED | OUTPATIENT
Start: 2019-02-11 | End: 2019-02-12

## 2019-02-11 RX ORDER — BUSPIRONE HYDROCHLORIDE 10 MG/1
10 TABLET ORAL 2 TIMES DAILY
Status: ON HOLD | COMMUNITY
End: 2019-02-12 | Stop reason: HOSPADM

## 2019-02-11 RX ORDER — LEVOFLOXACIN 750 MG/1
750 TABLET ORAL DAILY
Status: ON HOLD | COMMUNITY
End: 2019-02-12 | Stop reason: HOSPADM

## 2019-02-11 RX ORDER — METFORMIN HYDROCHLORIDE 750 MG/1
750 TABLET, EXTENDED RELEASE ORAL 2 TIMES DAILY
COMMUNITY
End: 2019-04-29 | Stop reason: SINTOL

## 2019-02-11 RX ORDER — ERGOCALCIFEROL 1.25 MG/1
50000 CAPSULE ORAL WEEKLY
Status: DISCONTINUED | OUTPATIENT
Start: 2019-02-11 | End: 2019-02-12 | Stop reason: HOSPADM

## 2019-02-11 RX ADMIN — QUETIAPINE FUMARATE 50 MG: 50 TABLET ORAL at 20:52

## 2019-02-11 RX ADMIN — NAPROXEN 500 MG: 500 TABLET ORAL at 17:26

## 2019-02-11 RX ADMIN — INSULIN GLARGINE 10 UNITS: 100 INJECTION, SOLUTION SUBCUTANEOUS at 20:53

## 2019-02-11 RX ADMIN — PANTOPRAZOLE SODIUM 40 MG: 40 TABLET, DELAYED RELEASE ORAL at 06:21

## 2019-02-11 RX ADMIN — INSULIN LISPRO 3 UNITS: 100 INJECTION, SOLUTION INTRAVENOUS; SUBCUTANEOUS at 13:36

## 2019-02-11 RX ADMIN — CARVEDILOL 6.25 MG: 3.12 TABLET, FILM COATED ORAL at 17:26

## 2019-02-11 RX ADMIN — METFORMIN HYDROCHLORIDE 500 MG: 500 TABLET ORAL at 17:26

## 2019-02-11 RX ADMIN — LAMOTRIGINE 100 MG: 100 TABLET ORAL at 09:00

## 2019-02-11 RX ADMIN — NAPROXEN 500 MG: 500 TABLET ORAL at 09:01

## 2019-02-11 RX ADMIN — INSULIN LISPRO 2 UNITS: 100 INJECTION, SOLUTION INTRAVENOUS; SUBCUTANEOUS at 20:52

## 2019-02-11 RX ADMIN — LAMOTRIGINE 100 MG: 100 TABLET ORAL at 20:52

## 2019-02-11 RX ADMIN — ERGOCALCIFEROL 50000 UNITS: 1.25 CAPSULE ORAL at 13:44

## 2019-02-11 RX ADMIN — INSULIN LISPRO 2 UNITS: 100 INJECTION, SOLUTION INTRAVENOUS; SUBCUTANEOUS at 17:24

## 2019-02-11 RX ADMIN — METFORMIN HYDROCHLORIDE 500 MG: 500 TABLET ORAL at 09:01

## 2019-02-11 RX ADMIN — CARVEDILOL 6.25 MG: 3.12 TABLET, FILM COATED ORAL at 09:00

## 2019-02-11 RX ADMIN — CITALOPRAM HYDROBROMIDE 40 MG: 20 TABLET ORAL at 09:00

## 2019-02-11 ASSESSMENT — PAIN SCALES - GENERAL
PAINLEVEL_OUTOF10: 1
PAINLEVEL_OUTOF10: 0

## 2019-02-12 VITALS
BODY MASS INDEX: 34.78 KG/M2 | RESPIRATION RATE: 20 BRPM | WEIGHT: 221.6 LBS | OXYGEN SATURATION: 91 % | SYSTOLIC BLOOD PRESSURE: 130 MMHG | TEMPERATURE: 97.8 F | DIASTOLIC BLOOD PRESSURE: 87 MMHG | HEART RATE: 78 BPM | HEIGHT: 67 IN

## 2019-02-12 LAB
GLUCOSE BLD-MCNC: 200 MG/DL (ref 70–99)
GLUCOSE BLD-MCNC: 206 MG/DL (ref 70–99)
PERFORMED ON: ABNORMAL
PERFORMED ON: ABNORMAL

## 2019-02-12 PROCEDURE — 6370000000 HC RX 637 (ALT 250 FOR IP): Performed by: PSYCHIATRY & NEUROLOGY

## 2019-02-12 PROCEDURE — 6370000000 HC RX 637 (ALT 250 FOR IP): Performed by: EMERGENCY MEDICINE

## 2019-02-12 PROCEDURE — 99239 HOSP IP/OBS DSCHRG MGMT >30: CPT | Performed by: PSYCHIATRY & NEUROLOGY

## 2019-02-12 PROCEDURE — 82948 REAGENT STRIP/BLOOD GLUCOSE: CPT

## 2019-02-12 PROCEDURE — 6370000000 HC RX 637 (ALT 250 FOR IP): Performed by: FAMILY MEDICINE

## 2019-02-12 RX ORDER — NICOTINE 21 MG/24HR
1 PATCH, TRANSDERMAL 24 HOURS TRANSDERMAL DAILY
Qty: 30 PATCH | Refills: 3 | Status: SHIPPED | OUTPATIENT
Start: 2019-02-13 | End: 2019-04-29 | Stop reason: ALTCHOICE

## 2019-02-12 RX ORDER — INSULIN GLARGINE 100 [IU]/ML
15 INJECTION, SOLUTION SUBCUTANEOUS NIGHTLY
Status: DISCONTINUED | OUTPATIENT
Start: 2019-02-12 | End: 2019-02-12 | Stop reason: HOSPADM

## 2019-02-12 RX ORDER — QUETIAPINE FUMARATE 50 MG/1
50 TABLET, FILM COATED ORAL NIGHTLY
Qty: 30 TABLET | Refills: 3 | Status: SHIPPED | OUTPATIENT
Start: 2019-02-12 | End: 2020-01-08

## 2019-02-12 RX ORDER — ERGOCALCIFEROL (VITAMIN D2) 1250 MCG
50000 CAPSULE ORAL WEEKLY
Qty: 11 CAPSULE | Refills: 0 | Status: SHIPPED | OUTPATIENT
Start: 2019-02-12 | End: 2020-01-10

## 2019-02-12 RX ADMIN — METFORMIN HYDROCHLORIDE 500 MG: 500 TABLET ORAL at 08:31

## 2019-02-12 RX ADMIN — LAMOTRIGINE 100 MG: 100 TABLET ORAL at 08:33

## 2019-02-12 RX ADMIN — CITALOPRAM HYDROBROMIDE 40 MG: 20 TABLET ORAL at 08:31

## 2019-02-12 RX ADMIN — INSULIN LISPRO 2 UNITS: 100 INJECTION, SOLUTION INTRAVENOUS; SUBCUTANEOUS at 08:37

## 2019-02-12 RX ADMIN — INSULIN LISPRO 2 UNITS: 100 INJECTION, SOLUTION INTRAVENOUS; SUBCUTANEOUS at 13:10

## 2019-02-12 RX ADMIN — PANTOPRAZOLE SODIUM 40 MG: 40 TABLET, DELAYED RELEASE ORAL at 06:18

## 2019-02-12 RX ADMIN — CARVEDILOL 6.25 MG: 3.12 TABLET, FILM COATED ORAL at 08:32

## 2019-04-20 ENCOUNTER — HOSPITAL ENCOUNTER (INPATIENT)
Age: 54
LOS: 8 days | Discharge: HOME | DRG: 507 | End: 2019-04-28
Attending: EMERGENCY MEDICINE | Admitting: HOSPITALIST
Payer: MEDICAID

## 2019-04-20 ENCOUNTER — APPOINTMENT (OUTPATIENT)
Dept: GENERAL RADIOLOGY | Age: 54
DRG: 507 | End: 2019-04-20
Payer: MEDICAID

## 2019-04-20 DIAGNOSIS — L03.114 CELLULITIS OF LEFT UPPER EXTREMITY: Primary | ICD-10-CM

## 2019-04-20 DIAGNOSIS — M71.10 SEPTIC BURSITIS: ICD-10-CM

## 2019-04-20 DIAGNOSIS — L03.114 CELLULITIS OF LEFT ELBOW: ICD-10-CM

## 2019-04-20 LAB
ANION GAP SERPL CALCULATED.3IONS-SCNC: 12 MMOL/L (ref 7–19)
BUN BLDV-MCNC: 10 MG/DL (ref 6–20)
C-REACTIVE PROTEIN: 18 MG/DL (ref 0–0.5)
CALCIUM SERPL-MCNC: 9.3 MG/DL (ref 8.6–10)
CHLORIDE BLD-SCNC: 95 MMOL/L (ref 98–111)
CO2: 25 MMOL/L (ref 22–29)
CREAT SERPL-MCNC: 0.5 MG/DL (ref 0.5–1.2)
GFR NON-AFRICAN AMERICAN: >60
GLUCOSE BLD-MCNC: 267 MG/DL (ref 74–109)
HCT VFR BLD CALC: 43.9 % (ref 42–52)
HEMOGLOBIN: 15.1 G/DL (ref 14–18)
MCH RBC QN AUTO: 30.4 PG (ref 27–31)
MCHC RBC AUTO-ENTMCNC: 34.4 G/DL (ref 33–37)
MCV RBC AUTO: 88.3 FL (ref 80–94)
PDW BLD-RTO: 12.1 % (ref 11.5–14.5)
PLATELET # BLD: 297 K/UL (ref 130–400)
PMV BLD AUTO: 11.2 FL (ref 9.4–12.4)
POTASSIUM SERPL-SCNC: 4.1 MMOL/L (ref 3.5–5)
RBC # BLD: 4.97 M/UL (ref 4.7–6.1)
SODIUM BLD-SCNC: 132 MMOL/L (ref 136–145)
WBC # BLD: 18.1 K/UL (ref 4.8–10.8)

## 2019-04-20 PROCEDURE — 36415 COLL VENOUS BLD VENIPUNCTURE: CPT

## 2019-04-20 PROCEDURE — 87040 BLOOD CULTURE FOR BACTERIA: CPT

## 2019-04-20 PROCEDURE — 99284 EMERGENCY DEPT VISIT MOD MDM: CPT

## 2019-04-20 PROCEDURE — 6360000002 HC RX W HCPCS: Performed by: INTERNAL MEDICINE

## 2019-04-20 PROCEDURE — 1210000000 HC MED SURG R&B

## 2019-04-20 PROCEDURE — 6370000000 HC RX 637 (ALT 250 FOR IP): Performed by: INTERNAL MEDICINE

## 2019-04-20 PROCEDURE — 99222 1ST HOSP IP/OBS MODERATE 55: CPT | Performed by: INTERNAL MEDICINE

## 2019-04-20 PROCEDURE — 73080 X-RAY EXAM OF ELBOW: CPT

## 2019-04-20 PROCEDURE — 2500000003 HC RX 250 WO HCPCS: Performed by: INTERNAL MEDICINE

## 2019-04-20 PROCEDURE — 85027 COMPLETE CBC AUTOMATED: CPT

## 2019-04-20 PROCEDURE — 86140 C-REACTIVE PROTEIN: CPT

## 2019-04-20 PROCEDURE — 80048 BASIC METABOLIC PNL TOTAL CA: CPT

## 2019-04-20 PROCEDURE — 99285 EMERGENCY DEPT VISIT HI MDM: CPT | Performed by: EMERGENCY MEDICINE

## 2019-04-20 PROCEDURE — 2500000003 HC RX 250 WO HCPCS: Performed by: EMERGENCY MEDICINE

## 2019-04-20 RX ORDER — CARVEDILOL 25 MG/1
25 TABLET ORAL 2 TIMES DAILY WITH MEALS
Status: DISCONTINUED | OUTPATIENT
Start: 2019-04-20 | End: 2019-04-28 | Stop reason: HOSPADM

## 2019-04-20 RX ORDER — NAPROXEN 500 MG/1
500 TABLET ORAL 2 TIMES DAILY WITH MEALS
Status: DISCONTINUED | OUTPATIENT
Start: 2019-04-20 | End: 2019-04-28 | Stop reason: HOSPADM

## 2019-04-20 RX ORDER — PRAVASTATIN SODIUM 20 MG
80 TABLET ORAL NIGHTLY
Status: DISCONTINUED | OUTPATIENT
Start: 2019-04-20 | End: 2019-04-28 | Stop reason: HOSPADM

## 2019-04-20 RX ORDER — CLINDAMYCIN PHOSPHATE 600 MG/50ML
600 INJECTION INTRAVENOUS EVERY 6 HOURS
Status: DISCONTINUED | OUTPATIENT
Start: 2019-04-20 | End: 2019-04-23

## 2019-04-20 RX ORDER — QUETIAPINE FUMARATE 50 MG/1
50 TABLET, FILM COATED ORAL NIGHTLY
Status: DISCONTINUED | OUTPATIENT
Start: 2019-04-20 | End: 2019-04-28 | Stop reason: HOSPADM

## 2019-04-20 RX ORDER — SODIUM CHLORIDE 0.9 % (FLUSH) 0.9 %
10 SYRINGE (ML) INJECTION PRN
Status: DISCONTINUED | OUTPATIENT
Start: 2019-04-20 | End: 2019-04-28 | Stop reason: HOSPADM

## 2019-04-20 RX ORDER — PANTOPRAZOLE SODIUM 40 MG/1
40 TABLET, DELAYED RELEASE ORAL
Status: DISCONTINUED | OUTPATIENT
Start: 2019-04-21 | End: 2019-04-28 | Stop reason: HOSPADM

## 2019-04-20 RX ORDER — SODIUM CHLORIDE 0.9 % (FLUSH) 0.9 %
10 SYRINGE (ML) INJECTION EVERY 12 HOURS SCHEDULED
Status: DISCONTINUED | OUTPATIENT
Start: 2019-04-20 | End: 2019-04-28 | Stop reason: HOSPADM

## 2019-04-20 RX ORDER — TIZANIDINE 4 MG/1
4 TABLET ORAL EVERY 6 HOURS PRN
Status: DISCONTINUED | OUTPATIENT
Start: 2019-04-20 | End: 2019-04-28 | Stop reason: HOSPADM

## 2019-04-20 RX ORDER — ACETAMINOPHEN 325 MG/1
650 TABLET ORAL EVERY 4 HOURS PRN
Status: DISCONTINUED | OUTPATIENT
Start: 2019-04-20 | End: 2019-04-28 | Stop reason: HOSPADM

## 2019-04-20 RX ORDER — METFORMIN HYDROCHLORIDE 750 MG/1
750 TABLET, EXTENDED RELEASE ORAL 2 TIMES DAILY
Status: DISCONTINUED | OUTPATIENT
Start: 2019-04-20 | End: 2019-04-21

## 2019-04-20 RX ORDER — LAMOTRIGINE 100 MG/1
100 TABLET ORAL 2 TIMES DAILY
Status: DISCONTINUED | OUTPATIENT
Start: 2019-04-20 | End: 2019-04-28 | Stop reason: HOSPADM

## 2019-04-20 RX ORDER — CLINDAMYCIN PHOSPHATE 600 MG/50ML
600 INJECTION INTRAVENOUS ONCE
Status: COMPLETED | OUTPATIENT
Start: 2019-04-20 | End: 2019-04-20

## 2019-04-20 RX ORDER — ONDANSETRON 2 MG/ML
4 INJECTION INTRAMUSCULAR; INTRAVENOUS EVERY 6 HOURS PRN
Status: DISCONTINUED | OUTPATIENT
Start: 2019-04-20 | End: 2019-04-28 | Stop reason: HOSPADM

## 2019-04-20 RX ORDER — CITALOPRAM 40 MG/1
40 TABLET ORAL DAILY
Status: DISCONTINUED | OUTPATIENT
Start: 2019-04-21 | End: 2019-04-28 | Stop reason: HOSPADM

## 2019-04-20 RX ADMIN — QUETIAPINE FUMARATE 50 MG: 50 TABLET ORAL at 21:52

## 2019-04-20 RX ADMIN — METFORMIN HYDROCHLORIDE 750 MG: 750 TABLET, EXTENDED RELEASE ORAL at 21:53

## 2019-04-20 RX ADMIN — NAPROXEN 500 MG: 500 TABLET ORAL at 21:51

## 2019-04-20 RX ADMIN — CARVEDILOL 25 MG: 25 TABLET, FILM COATED ORAL at 21:51

## 2019-04-20 RX ADMIN — ENOXAPARIN SODIUM 40 MG: 40 INJECTION SUBCUTANEOUS at 21:46

## 2019-04-20 RX ADMIN — LAMOTRIGINE 100 MG: 100 TABLET ORAL at 21:50

## 2019-04-20 RX ADMIN — PRAVASTATIN SODIUM 80 MG: 20 TABLET ORAL at 21:50

## 2019-04-20 RX ADMIN — CLINDAMYCIN PHOSPHATE 600 MG: 600 INJECTION, SOLUTION INTRAVENOUS at 17:15

## 2019-04-20 RX ADMIN — CLINDAMYCIN PHOSPHATE 600 MG: 600 INJECTION, SOLUTION INTRAVENOUS at 23:47

## 2019-04-20 ASSESSMENT — ENCOUNTER SYMPTOMS
EYE PAIN: 0
CONSTIPATION: 0
SHORTNESS OF BREATH: 0
BACK PAIN: 0
DIARRHEA: 0
NAUSEA: 1
COLOR CHANGE: 1
VOMITING: 0
ABDOMINAL PAIN: 0

## 2019-04-20 ASSESSMENT — PAIN SCALES - GENERAL
PAINLEVEL_OUTOF10: 10
PAINLEVEL_OUTOF10: 0
PAINLEVEL_OUTOF10: 0

## 2019-04-20 ASSESSMENT — PAIN DESCRIPTION - ORIENTATION: ORIENTATION: LEFT

## 2019-04-20 ASSESSMENT — PAIN DESCRIPTION - LOCATION: LOCATION: ARM
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
## 2019-04-20 NOTE — H&P
[facility] Medicine H&P    Patient:  Yanelis Watkins  MRN: [id number]    Consulting Physician: Nish Razo MD  Reason for Consult: Medical Management  Primacy Care Physician: No primary care provider on file. HISTORY OF PRESENT ILLNESS:   The patient is a 47 y.o. male presents with worsening left elbow pain. He had some surgery on the elbow 3 months ago, but is unsure what he had done (?olecranon bursa resection?). He has developed swelling, redness, and warmth of the entire left arm. Past Medical History:        Diagnosis Date    Depression     Diabetes mellitus ([address]     GERD (gastroesophageal reflux disease)     Hyperlipidemia     Hypertension        Past Surgical History:        Procedure Laterality Date    APPENDECTOMY      CHOLECYSTECTOMY      ELBOW SURGERY      left       Medications: Scheduled Meds:   clindamycin (CLEOCIN) IV  600 mg Intravenous Once     Continuous Infusions:  PRN Meds:. Allergies:  Pcn [penicillins]    Social History:   TOBACCO:   reports that he has been smoking. He has never used smokeless tobacco.  ETOH:   reports that he does not drink alcohol. Family History:   History reviewed. No pertinent family history. ROS:  Review of Systems   Constitutional: Negative for activity change and fever. Respiratory: Negative for shortness of breath. Cardiovascular: Negative for chest pain and leg swelling. Gastrointestinal: Positive for nausea. Negative for constipation, diarrhea and vomiting. Genitourinary: Negative for difficulty urinating and dysuria. Musculoskeletal: Negative for back pain and neck pain. Skin: Positive for color change and wound. Neurological: Negative for dizziness and light-headedness. Physical Exam:    Vitals: /82   Pulse 72   Temp 97.6 °F (36.4 °C)   Resp 15   SpO2 93%     Physical Exam   Constitutional: He is oriented to person, place, and time. He appears well-developed and well-nourished.    HENT:   Head: Normocephalic and atraumatic. Mouth/Throat: Oropharynx is clear and moist.   Eyes: Pupils are equal, round, and reactive to light. Conjunctivae are normal.   Cardiovascular: Normal rate, regular rhythm and normal heart sounds. Pulmonary/Chest: Effort normal and breath sounds normal.   Musculoskeletal:        Arms:  Neurological: He is alert and oriented to person, place, and time. Skin: Skin is warm and dry. Vitals reviewed. CBC:   Recent Labs     04/20/19  1630   WBC 18.1*   HGB 15.1        BMP:    Recent Labs     04/20/19  1630   *   K 4.1   CL 95*   CO2 25   BUN 10   CREATININE 0.5   GLUCOSE 267*     Hepatic: No results for input(s): AST, ALT, ALB, BILITOT, ALKPHOS in the last 72 hours. Troponin: No results for input(s): TROPONINI in the last 72 hours. BNP: No results for input(s): BNP in the last 72 hours. Lipids: No results for input(s): CHOL, HDL in the last 72 hours. Invalid input(s): LDLCALCU  ABGs: No results found for: PHART, PO2ART, LET5SMX  INR: No results for input(s): INR in the last 72 hours. -----------------------------------------------------------------  Xr Elbow Left (min 3 Views)    Result Date: 4/20/2019  EXAMINATION: XR ELBOW LEFT (MIN 3 VIEWS) 4/20/2019 4:56 PM HISTORY: Left elbow swelling and pain. Report: 3 views of the left elbow were obtained. There are no comparison studies. No fracture or dislocation is identified. There is no joint effusion. There is extensive soft tissue swelling posteriorly over the olecranon region. No soft tissue gas or radiopaque foreign bodies identified. 1. No acute osseous findings. 2. Extensive soft tissue swelling over the posterior olecranon region compatible with cellulitis and possible olecranon bursitis. Signed by Dr Gregory Gimenez. Patti on 4/20/2019 4:57 PM          Assessment and Plan   1. Cellulitis    Active Problems:    Cellulitis of left elbow  Resolved Problems:    * No resolved hospital problems. *  Admit.   IV Clindamycin. Orthopedics consult. Supportive care.     Rasheed Barragan DO

## 2019-04-20 NOTE — ED NOTES
Medical record release form faxed to Ambrose steele at [facility]  04/20/19 0487

## 2019-04-21 LAB
ANION GAP SERPL CALCULATED.3IONS-SCNC: 11 MMOL/L (ref 7–19)
BASOPHILS ABSOLUTE: 0.1 K/UL (ref 0–0.2)
BASOPHILS RELATIVE PERCENT: 0.6 % (ref 0–1)
BUN BLDV-MCNC: 11 MG/DL (ref 6–20)
CALCIUM SERPL-MCNC: 9.4 MG/DL (ref 8.6–10)
CHLORIDE BLD-SCNC: 96 MMOL/L (ref 98–111)
CO2: 24 MMOL/L (ref 22–29)
CREAT SERPL-MCNC: 0.8 MG/DL (ref 0.5–1.2)
EOSINOPHILS ABSOLUTE: 0.8 K/UL (ref 0–0.6)
EOSINOPHILS RELATIVE PERCENT: 5.2 % (ref 0–5)
GFR NON-AFRICAN AMERICAN: >60
GLUCOSE BLD-MCNC: 167 MG/DL (ref 70–99)
GLUCOSE BLD-MCNC: 177 MG/DL (ref 70–99)
GLUCOSE BLD-MCNC: 214 MG/DL (ref 70–99)
GLUCOSE BLD-MCNC: 257 MG/DL (ref 70–99)
GLUCOSE BLD-MCNC: 260 MG/DL (ref 74–109)
HBA1C MFR BLD: 9.4 % (ref 4–6)
HCT VFR BLD CALC: 44.8 % (ref 42–52)
HEMOGLOBIN: 14.8 G/DL (ref 14–18)
LYMPHOCYTES ABSOLUTE: 3.8 K/UL (ref 1.1–4.5)
LYMPHOCYTES RELATIVE PERCENT: 24.1 % (ref 20–40)
MAGNESIUM: 1.9 MG/DL (ref 1.6–2.6)
MCH RBC QN AUTO: 29.7 PG (ref 27–31)
MCHC RBC AUTO-ENTMCNC: 33 G/DL (ref 33–37)
MCV RBC AUTO: 90 FL (ref 80–94)
MONOCYTES ABSOLUTE: 0.9 K/UL (ref 0–0.9)
MONOCYTES RELATIVE PERCENT: 5.7 % (ref 0–10)
NEUTROPHILS ABSOLUTE: 9.9 K/UL (ref 1.5–7.5)
NEUTROPHILS RELATIVE PERCENT: 63.8 % (ref 50–65)
PDW BLD-RTO: 12.3 % (ref 11.5–14.5)
PERFORMED ON: ABNORMAL
PHOSPHORUS: 3.5 MG/DL (ref 2.5–4.5)
PLATELET # BLD: 314 K/UL (ref 130–400)
PMV BLD AUTO: 10.9 FL (ref 9.4–12.4)
POTASSIUM SERPL-SCNC: 4.1 MMOL/L (ref 3.5–5)
RBC # BLD: 4.98 M/UL (ref 4.7–6.1)
SODIUM BLD-SCNC: 131 MMOL/L (ref 136–145)
WBC # BLD: 15.6 K/UL (ref 4.8–10.8)

## 2019-04-21 PROCEDURE — 36415 COLL VENOUS BLD VENIPUNCTURE: CPT

## 2019-04-21 PROCEDURE — 83036 HEMOGLOBIN GLYCOSYLATED A1C: CPT

## 2019-04-21 PROCEDURE — 1210000000 HC MED SURG R&B

## 2019-04-21 PROCEDURE — 80048 BASIC METABOLIC PNL TOTAL CA: CPT

## 2019-04-21 PROCEDURE — 6370000000 HC RX 637 (ALT 250 FOR IP): Performed by: INTERNAL MEDICINE

## 2019-04-21 PROCEDURE — 6360000002 HC RX W HCPCS: Performed by: INTERNAL MEDICINE

## 2019-04-21 PROCEDURE — 99232 SBSQ HOSP IP/OBS MODERATE 35: CPT | Performed by: INTERNAL MEDICINE

## 2019-04-21 PROCEDURE — 2500000003 HC RX 250 WO HCPCS: Performed by: INTERNAL MEDICINE

## 2019-04-21 PROCEDURE — 2580000003 HC RX 258: Performed by: INTERNAL MEDICINE

## 2019-04-21 PROCEDURE — 82948 REAGENT STRIP/BLOOD GLUCOSE: CPT

## 2019-04-21 PROCEDURE — 84100 ASSAY OF PHOSPHORUS: CPT

## 2019-04-21 PROCEDURE — 85025 COMPLETE CBC W/AUTO DIFF WBC: CPT

## 2019-04-21 PROCEDURE — 83735 ASSAY OF MAGNESIUM: CPT

## 2019-04-21 RX ORDER — DEXTROSE MONOHYDRATE 50 MG/ML
100 INJECTION, SOLUTION INTRAVENOUS PRN
Status: DISCONTINUED | OUTPATIENT
Start: 2019-04-21 | End: 2019-04-28 | Stop reason: HOSPADM

## 2019-04-21 RX ORDER — DEXTROSE MONOHYDRATE 25 G/50ML
12.5 INJECTION, SOLUTION INTRAVENOUS PRN
Status: DISCONTINUED | OUTPATIENT
Start: 2019-04-21 | End: 2019-04-28 | Stop reason: HOSPADM

## 2019-04-21 RX ORDER — INSULIN GLARGINE 100 [IU]/ML
20 INJECTION, SOLUTION SUBCUTANEOUS NIGHTLY
Status: DISCONTINUED | OUTPATIENT
Start: 2019-04-21 | End: 2019-04-24

## 2019-04-21 RX ORDER — NICOTINE POLACRILEX 4 MG
15 LOZENGE BUCCAL PRN
Status: DISCONTINUED | OUTPATIENT
Start: 2019-04-21 | End: 2019-04-28 | Stop reason: HOSPADM

## 2019-04-21 RX ADMIN — QUETIAPINE FUMARATE 50 MG: 50 TABLET ORAL at 22:26

## 2019-04-21 RX ADMIN — CARVEDILOL 25 MG: 25 TABLET, FILM COATED ORAL at 08:07

## 2019-04-21 RX ADMIN — NAPROXEN 500 MG: 500 TABLET ORAL at 18:14

## 2019-04-21 RX ADMIN — LAMOTRIGINE 100 MG: 100 TABLET ORAL at 08:07

## 2019-04-21 RX ADMIN — CLINDAMYCIN PHOSPHATE 600 MG: 600 INJECTION, SOLUTION INTRAVENOUS at 12:17

## 2019-04-21 RX ADMIN — INSULIN LISPRO 1 UNITS: 100 INJECTION, SOLUTION INTRAVENOUS; SUBCUTANEOUS at 22:26

## 2019-04-21 RX ADMIN — CITALOPRAM HYDROBROMIDE 40 MG: 40 TABLET ORAL at 08:07

## 2019-04-21 RX ADMIN — CLINDAMYCIN PHOSPHATE 600 MG: 600 INJECTION, SOLUTION INTRAVENOUS at 05:10

## 2019-04-21 RX ADMIN — ENOXAPARIN SODIUM 40 MG: 40 INJECTION SUBCUTANEOUS at 08:07

## 2019-04-21 RX ADMIN — INSULIN GLARGINE 20 UNITS: 100 INJECTION, SOLUTION SUBCUTANEOUS at 22:27

## 2019-04-21 RX ADMIN — INSULIN LISPRO 2 UNITS: 100 INJECTION, SOLUTION INTRAVENOUS; SUBCUTANEOUS at 08:16

## 2019-04-21 RX ADMIN — CLINDAMYCIN PHOSPHATE 600 MG: 600 INJECTION, SOLUTION INTRAVENOUS at 18:14

## 2019-04-21 RX ADMIN — Medication 10 ML: at 22:28

## 2019-04-21 RX ADMIN — CLINDAMYCIN PHOSPHATE 600 MG: 600 INJECTION, SOLUTION INTRAVENOUS at 23:20

## 2019-04-21 RX ADMIN — NAPROXEN 500 MG: 500 TABLET ORAL at 08:08

## 2019-04-21 RX ADMIN — INSULIN LISPRO 5 UNITS: 100 INJECTION, SOLUTION INTRAVENOUS; SUBCUTANEOUS at 12:18

## 2019-04-21 RX ADMIN — CARVEDILOL 25 MG: 25 TABLET, FILM COATED ORAL at 18:14

## 2019-04-21 RX ADMIN — LAMOTRIGINE 100 MG: 100 TABLET ORAL at 22:26

## 2019-04-21 RX ADMIN — INSULIN LISPRO 5 UNITS: 100 INJECTION, SOLUTION INTRAVENOUS; SUBCUTANEOUS at 18:18

## 2019-04-21 RX ADMIN — PRAVASTATIN SODIUM 80 MG: 20 TABLET ORAL at 22:26

## 2019-04-21 RX ADMIN — PANTOPRAZOLE SODIUM 40 MG: 40 TABLET, DELAYED RELEASE ORAL at 05:11

## 2019-04-21 ASSESSMENT — PAIN SCALES - GENERAL
PAINLEVEL_OUTOF10: 8
PAINLEVEL_OUTOF10: 3
PAINLEVEL_OUTOF10: 0
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
## 2019-04-21 NOTE — PROGRESS NOTES
[facility] Progress Note    Patient:  Chioma Watson  YOB: 1965  Date of Service: 4/21/2019  MRN: [id number]   Acct: [de-identified]   Primary Care Physician: No primary care provider on file. Advance Directive: Full Code  Admit Date: 4/20/2019       Hospital Day: 1      CHIEF COMPLAINT:     Chief Complaint   Patient presents with    Arm Swelling       4/21/2019 12:58 PM  Subjective / Interval History:   Left elbow pain improves/controlled. No acute overnight events reported. Patient in no acute distress, and denies any acute complaints at this time    Cumulative Hospital History:    As per Initial admission HPI 4/20/2019, quoted below; \"The patient is a 47 y.o. male presents with worsening left elbow pain. He had some surgery on the elbow 3 months ago, but is unsure what he had done (?olecranon bursa resection?). He has developed swelling, redness, and warmth of the entire left arm\"          Review of Systems:   Review of Systems  ROS: 14 point review of systems is negative except as specifically addressed above. DIET GENERAL;     Intake/Output Summary (Last 24 hours) at 4/21/2019 1258  Last data filed at 4/21/2019 6811  Gross per 24 hour   Intake 480 ml   Output --   Net 480 ml       Medications:   dextrose       Current Facility-Administered Medications   Medication Dose Route Frequency Provider Last Rate Last Dose    glucose (GLUTOSE) 40 % oral gel 15 g  15 g Oral PRN Vignesh Chawla MD        dextrose 50 % solution 12.5 g  12.5 g Intravenous PRN Vignesh Chawla MD        glucagon (rDNA) injection 1 mg  1 mg Intramuscular PRN Vignesh Chawla MD        dextrose 5 % solution  100 mL/hr Intravenous PRN Vignesh Chawla MD        insulin glargine (LANTUS) injection vial 20 Units  20 Units Subcutaneous Nightly Vignesh Chawla MD        insulin lispro (HUMALOG) injection vial 5 Units  5 Units Subcutaneous TID WC Vignesh Chawla MD   5 Units at 04/21/19 1218  insulin lispro (HUMALOG) injection vial 0-6 Units  0-6 Units Subcutaneous TID  Evans Tafoya MD   2 Units at 04/21/19 0816    insulin lispro (HUMALOG) injection vial 0-3 Units  0-3 Units Subcutaneous Nightly Evans Tafoya MD        carvedilol (COREG) tablet 25 mg  25 mg Oral BID WC Gonsalo Neeraj, DO   25 mg at 04/21/19 8589    citalopram (CELEXA) tablet 40 mg  40 mg Oral Daily Gonsalo Neeraj, DO   40 mg at 04/21/19 7350    lamoTRIgine (LAMICTAL) tablet 100 mg  100 mg Oral BID Gonsalo Neeraj, DO   100 mg at 04/21/19 7308    naproxen (NAPROSYN) tablet 500 mg  500 mg Oral BID WC Gonsalo Neeraj, DO   500 mg at 04/21/19 0900    pantoprazole (PROTONIX) tablet 40 mg  40 mg Oral QAM AC Gonsalo Neeraj, DO   40 mg at 04/21/19 4931    pravastatin (PRAVACHOL) tablet 80 mg  80 mg Oral Nightly Gonsalo Neeraj, DO   80 mg at 04/20/19 2150    QUEtiapine (SEROQUEL) tablet 50 mg  50 mg Oral Nightly Gonsalo Neeraj, DO   50 mg at 04/20/19 2152    tiZANidine (ZANAFLEX) tablet 4 mg  4 mg Oral Q6H PRN Gonsalo Neeraj, DO        sodium chloride flush 0.9 % injection 10 mL  10 mL Intravenous 2 times per day Gonsalo Neeraj, DO        sodium chloride flush 0.9 % injection 10 mL  10 mL Intravenous PRN Gonsalo Neeraj, DO        magnesium hydroxide (MILK OF MAGNESIA) 400 MG/5ML suspension 30 mL  30 mL Oral Daily PRN Gonsalo Neeraj, DO        ondansetron [facility]) injection 4 mg  4 mg Intravenous Q6H PRN Gonsalo Neeraj, DO        enoxaparin (LOVENOX) injection 40 mg  40 mg Subcutaneous Daily Gonsalo Neeraj, DO   40 mg at 04/21/19 3974    clindamycin (CLEOCIN) 600 mg in dextrose 5 % 50 mL IVPB  600 mg Intravenous Q6H Gonsalo Neeraj,  mL/hr at 04/21/19 1217 600 mg at 04/21/19 1217    acetaminophen (TYLENOL) tablet 650 mg  650 mg Oral Q4H PRN Gonsalo Pickard,              dextrose        insulin glargine  20 Units Subcutaneous Nightly lesions      Assessment/plan:     Patient Active Problem List    Diagnosis Date Noted    Cellulitis of left elbow 04/20/2019     Priority: High     Left elbow cellulitis  · Reportedly from recent ? Left open procedure   · Presenting initially reportedly with a Left olecranon bursa swelling  · Initial x-ray of left elbow: Impression: \"Extensive soft tissue swelling over the posterior olecranon region compatible with cellulitis and possible olecranon bursitis. \"   · Afebrile, with Initial WBC of 18.1  · Elevated C-reactive protein  · Blood cultures ×2 sets pending  · Continue clindamycin  · Orthopedics surgery consulted on admission        Diabetes mellitus ([facility]      Priority: Medium     # History of Type 2 Diabetes Mellitus   - Uncontrolled  - Reported Home regimen include,   --> Metformin  - Holding home PO Med while inpatient    Inpatient Regimens to include;  - Insulin Glargine (Lantus) 20 units subcu nightly  - Insulin Lispro (Humalog) 5 units subcu pre-meal 3 times a day  - Insulin Lispro (Humalog) on a low dose sliding scale  - Monitor POC glucose, and adjusts insulin regimen accordingly based on daily insulin requirement. - Hemoglobin A1C         Hypertension     Hyperlipidemia     GERD (gastroesophageal reflux disease)     Depression with suicidal ideation 02/10/2019         Principal Problem:    Cellulitis of left elbow  Active Problems:    Diabetes mellitus ([facility]    Hypertension    Hyperlipidemia    GERD (gastroesophageal reflux disease)  Resolved Problems:    * No resolved hospital problems. *                Continue management of other chronic medical conditions - see above and orders.           Advance Directive: Full Code    DIET GENERAL;     DVT prophylaxis: Enoxaparin    Consults Made:   IP CONSULT TO ORTHOPEDIC SURGERY  IP CONSULT TO ORTHOPEDIC SURGERY    Discharge planning: tbd       Electronically signed by   Ivana Nunez MD, MPH,   Internal Medicine Hospitalist   4/21/2019 12:58 PM

## 2019-04-21 NOTE — PROGRESS NOTES
[address] arrived to room # 26  Presented with: CELLULITIS TO LEFT ARM  Mental Status: Patient is able to concentrate and follow conversation. Vitals:    04/20/19 2358   BP: 101/66   Pulse: 71   Resp: 18   Temp: 96.4 °F (35.8 °C)   SpO2: 94%     Patient safety contract and falls prevention contract reviewed with patient Yes. Oriented Patient to room. Call light within reach. Yes.   Needs, issues or concerns expressed at this time:NO      Electronically signed by Jena Melchor LPN on 3/68/2664 at 9:25 AM

## 2019-04-22 ENCOUNTER — APPOINTMENT (OUTPATIENT)
Dept: CT IMAGING | Age: 54
DRG: 507 | End: 2019-04-22
Payer: MEDICAID

## 2019-04-22 LAB
ANION GAP SERPL CALCULATED.3IONS-SCNC: 13 MMOL/L (ref 7–19)
BASOPHILS ABSOLUTE: 0.1 K/UL (ref 0–0.2)
BASOPHILS RELATIVE PERCENT: 0.7 % (ref 0–1)
BUN BLDV-MCNC: 12 MG/DL (ref 6–20)
CALCIUM SERPL-MCNC: 9.2 MG/DL (ref 8.6–10)
CHLORIDE BLD-SCNC: 98 MMOL/L (ref 98–111)
CO2: 25 MMOL/L (ref 22–29)
CREAT SERPL-MCNC: 0.6 MG/DL (ref 0.5–1.2)
EOSINOPHILS ABSOLUTE: 0.8 K/UL (ref 0–0.6)
EOSINOPHILS RELATIVE PERCENT: 4.5 % (ref 0–5)
GFR NON-AFRICAN AMERICAN: >60
GLUCOSE BLD-MCNC: 174 MG/DL (ref 70–99)
GLUCOSE BLD-MCNC: 178 MG/DL (ref 70–99)
GLUCOSE BLD-MCNC: 183 MG/DL (ref 74–109)
GLUCOSE BLD-MCNC: 189 MG/DL (ref 70–99)
GLUCOSE BLD-MCNC: 214 MG/DL (ref 70–99)
HCT VFR BLD CALC: 40.2 % (ref 42–52)
HEMOGLOBIN: 13.5 G/DL (ref 14–18)
LYMPHOCYTES ABSOLUTE: 4.8 K/UL (ref 1.1–4.5)
LYMPHOCYTES RELATIVE PERCENT: 28.4 % (ref 20–40)
MCH RBC QN AUTO: 29.5 PG (ref 27–31)
MCHC RBC AUTO-ENTMCNC: 33.6 G/DL (ref 33–37)
MCV RBC AUTO: 88 FL (ref 80–94)
MONOCYTES ABSOLUTE: 1.2 K/UL (ref 0–0.9)
MONOCYTES RELATIVE PERCENT: 6.8 % (ref 0–10)
NEUTROPHILS ABSOLUTE: 10 K/UL (ref 1.5–7.5)
NEUTROPHILS RELATIVE PERCENT: 59 % (ref 50–65)
PDW BLD-RTO: 12.3 % (ref 11.5–14.5)
PERFORMED ON: ABNORMAL
PLATELET # BLD: 320 K/UL (ref 130–400)
PMV BLD AUTO: 10.7 FL (ref 9.4–12.4)
POTASSIUM SERPL-SCNC: 3.6 MMOL/L (ref 3.5–5)
RBC # BLD: 4.57 M/UL (ref 4.7–6.1)
SODIUM BLD-SCNC: 136 MMOL/L (ref 136–145)
WBC # BLD: 17 K/UL (ref 4.8–10.8)

## 2019-04-22 PROCEDURE — 6370000000 HC RX 637 (ALT 250 FOR IP): Performed by: INTERNAL MEDICINE

## 2019-04-22 PROCEDURE — 6360000002 HC RX W HCPCS: Performed by: INTERNAL MEDICINE

## 2019-04-22 PROCEDURE — 36415 COLL VENOUS BLD VENIPUNCTURE: CPT

## 2019-04-22 PROCEDURE — 2500000003 HC RX 250 WO HCPCS: Performed by: INTERNAL MEDICINE

## 2019-04-22 PROCEDURE — 1210000000 HC MED SURG R&B

## 2019-04-22 PROCEDURE — 85025 COMPLETE CBC W/AUTO DIFF WBC: CPT

## 2019-04-22 PROCEDURE — 82948 REAGENT STRIP/BLOOD GLUCOSE: CPT

## 2019-04-22 PROCEDURE — 2580000003 HC RX 258: Performed by: INTERNAL MEDICINE

## 2019-04-22 PROCEDURE — 80048 BASIC METABOLIC PNL TOTAL CA: CPT

## 2019-04-22 PROCEDURE — 99233 SBSQ HOSP IP/OBS HIGH 50: CPT | Performed by: INTERNAL MEDICINE

## 2019-04-22 PROCEDURE — 73200 CT UPPER EXTREMITY W/O DYE: CPT

## 2019-04-22 RX ADMIN — INSULIN GLARGINE 20 UNITS: 100 INJECTION, SOLUTION SUBCUTANEOUS at 21:55

## 2019-04-22 RX ADMIN — CARVEDILOL 25 MG: 25 TABLET, FILM COATED ORAL at 10:17

## 2019-04-22 RX ADMIN — PRAVASTATIN SODIUM 80 MG: 20 TABLET ORAL at 21:57

## 2019-04-22 RX ADMIN — ACETAMINOPHEN 650 MG: 325 TABLET ORAL at 11:47

## 2019-04-22 RX ADMIN — PANTOPRAZOLE SODIUM 40 MG: 40 TABLET, DELAYED RELEASE ORAL at 06:45

## 2019-04-22 RX ADMIN — INSULIN LISPRO 1 UNITS: 100 INJECTION, SOLUTION INTRAVENOUS; SUBCUTANEOUS at 11:59

## 2019-04-22 RX ADMIN — LAMOTRIGINE 100 MG: 100 TABLET ORAL at 21:57

## 2019-04-22 RX ADMIN — CLINDAMYCIN PHOSPHATE 600 MG: 600 INJECTION, SOLUTION INTRAVENOUS at 17:45

## 2019-04-22 RX ADMIN — INSULIN LISPRO 5 UNITS: 100 INJECTION, SOLUTION INTRAVENOUS; SUBCUTANEOUS at 09:12

## 2019-04-22 RX ADMIN — INSULIN LISPRO 5 UNITS: 100 INJECTION, SOLUTION INTRAVENOUS; SUBCUTANEOUS at 17:53

## 2019-04-22 RX ADMIN — INSULIN LISPRO 1 UNITS: 100 INJECTION, SOLUTION INTRAVENOUS; SUBCUTANEOUS at 21:56

## 2019-04-22 RX ADMIN — CLINDAMYCIN PHOSPHATE 600 MG: 600 INJECTION, SOLUTION INTRAVENOUS at 06:45

## 2019-04-22 RX ADMIN — Medication 10 ML: at 10:19

## 2019-04-22 RX ADMIN — NAPROXEN 500 MG: 500 TABLET ORAL at 10:16

## 2019-04-22 RX ADMIN — LAMOTRIGINE 100 MG: 100 TABLET ORAL at 10:17

## 2019-04-22 RX ADMIN — INSULIN LISPRO 1 UNITS: 100 INJECTION, SOLUTION INTRAVENOUS; SUBCUTANEOUS at 17:52

## 2019-04-22 RX ADMIN — CARVEDILOL 25 MG: 25 TABLET, FILM COATED ORAL at 17:45

## 2019-04-22 RX ADMIN — ENOXAPARIN SODIUM 40 MG: 40 INJECTION SUBCUTANEOUS at 10:17

## 2019-04-22 RX ADMIN — Medication 10 ML: at 21:58

## 2019-04-22 RX ADMIN — CITALOPRAM HYDROBROMIDE 40 MG: 40 TABLET ORAL at 10:16

## 2019-04-22 RX ADMIN — NAPROXEN 500 MG: 500 TABLET ORAL at 17:45

## 2019-04-22 RX ADMIN — INSULIN LISPRO 5 UNITS: 100 INJECTION, SOLUTION INTRAVENOUS; SUBCUTANEOUS at 12:01

## 2019-04-22 RX ADMIN — CLINDAMYCIN PHOSPHATE 600 MG: 600 INJECTION, SOLUTION INTRAVENOUS at 11:48

## 2019-04-22 ASSESSMENT — PAIN SCALES - GENERAL
PAINLEVEL_OUTOF10: 5
PAINLEVEL_OUTOF10: 2
PAINLEVEL_OUTOF10: 0
PAINLEVEL_OUTOF10: 0
PAINLEVEL_OUTOF10: 5

## 2019-04-22 ASSESSMENT — PAIN DESCRIPTION - ORIENTATION: ORIENTATION: LEFT

## 2019-04-22 ASSESSMENT — PAIN DESCRIPTION - LOCATION: LOCATION: ARM

## 2019-04-22 NOTE — PROGRESS NOTES
[facility]    Patient:  Jesenia Hansen  YOB: 1965  Date of Service: 4/22/2019  MRN: [id number]   Acct: [de-identified]   Primary Care Physician: No primary care provider on file. Advance Directive: Full Code  Admit Date: 4/20/2019       Hospital Day: 2      CHIEF COMPLAINT:     Chief Complaint   Patient presents with    Arm Swelling       4/22/2019 9:51 AM  Subjective / Interval History:   04/22/2019  Reported worsening Left Elbow swelling. Pain is fairly controlled. No acute overnight event or any other acute complaints reported. 04/21/2019  Left elbow pain improves/controlled. No acute overnight events reported. Patient in no acute distress, and denies any acute complaints at this time    Cumulative Hospital History:    As per Initial admission HPI 4/20/2019, quoted below; \"The patient is a 47 y.o. male presents with worsening left elbow pain. He had some surgery on the elbow 3 months ago, but is unsure what he had done (?olecranon bursa resection?). He has developed swelling, redness, and warmth of the entire left arm\"          Review of Systems:   Review of Systems  ROS: 14 point review of systems is negative except as specifically addressed above. DIET GENERAL;     Intake/Output Summary (Last 24 hours) at 4/22/2019 0951  Last data filed at 4/22/2019 0836  Gross per 24 hour   Intake 1910 ml   Output --   Net 1910 ml       Medications:   dextrose       Current Facility-Administered Medications   Medication Dose Route Frequency Provider Last Rate Last Dose    glucose (GLUTOSE) 40 % oral gel 15 g  15 g Oral PRN Josephine Samayoa MD        dextrose 50 % solution 12.5 g  12.5 g Intravenous PRN Josephine Samayoa MD        glucagon (rDNA) injection 1 mg  1 mg Intramuscular PRN Josephine Samayoa MD        dextrose 5 % solution  100 mL/hr Intravenous PRN Josephine Samayoa MD        insulin glargine (LANTUS) injection vial 20 Units  20 Units Subcutaneous Nightly Isma Gramajo DO   Stopped at 04/22/19 0715    acetaminophen (TYLENOL) tablet 650 mg  650 mg Oral Q4H PRN Teodora Anderson DO             dextrose        insulin glargine  20 Units Subcutaneous Nightly    insulin lispro  5 Units Subcutaneous TID WC    insulin lispro  0-6 Units Subcutaneous TID WC    insulin lispro  0-3 Units Subcutaneous Nightly    carvedilol  25 mg Oral BID WC    citalopram  40 mg Oral Daily    lamoTRIgine  100 mg Oral BID    naproxen  500 mg Oral BID WC    pantoprazole  40 mg Oral QAM AC    pravastatin  80 mg Oral Nightly    QUEtiapine  50 mg Oral Nightly    sodium chloride flush  10 mL Intravenous 2 times per day    enoxaparin  40 mg Subcutaneous Daily    clindamycin (CLEOCIN) IV  600 mg Intravenous Q6H     glucose, dextrose, glucagon (rDNA), dextrose, tiZANidine, sodium chloride flush, magnesium hydroxide, ondansetron, acetaminophen  DIET GENERAL;       Labs:     Recent Labs     04/20/19  1630 04/21/19  0925 04/22/19  0315   WBC 18.1* 15.6* 17.0*   RBC 4.97 4.98 4.57*   HGB 15.1 14.8 13.5*   HCT 43.9 44.8 40.2*   MCV 88.3 90.0 88.0   MCH 30.4 29.7 29.5   MCHC 34.4 33.0 33.6    314 320     Recent Labs     04/20/19  1630 04/21/19  0925 04/22/19  0315   * 131* 136   K 4.1 4.1 3.6   ANIONGAP 12 11 13   CL 95* 96* 98   CO2 25 24 25   BUN 10 11 12   CREATININE 0.5 0.8 0.6   GLUCOSE 267* 260* 183*   CALCIUM 9.3 9.4 9.2     Recent Labs     04/21/19  0925   MG 1.9   PHOS 3.5     No results for input(s): AST, ALT, ALB, BILITOT, ALKPHOS, ALB in the last 72 hours. HgBA1c:  No components found for: HGBA1C  FLP:    Lab Results   Component Value Date    TRIG 185 02/11/2019    HDL 28 02/11/2019    LDLCALC 86 02/11/2019     TSH:    Lab Results   Component Value Date    TSH 2.150 02/11/2019     Troponin T: No results for input(s): TROPONINI in the last 72 hours. Pro-BNP: No results for input(s): BNP in the last 72 hours.   INR: No results for input(s): INR in the last 72 organomegaly  Extremities: Tender Left elbow with erythema and edema. extremities normal, atraumatic, no cyanosis or edema  Neurologic: No focal neurologic deficits, normal sensation, alert and oriented, affect and mood appropriate. CN II-XII Intact  Skin: Skin color, texture, turgor normal. No rashes or lesions    Fluctuant Left Elbow photo below (04/22/219) - worsening Edema          Assessment/plan:     Patient Active Problem List    Diagnosis Date Noted    Cellulitis of left elbow 04/20/2019     Priority: High     Left elbow cellulitis  · Reportedly from recent ? Left open procedure   · Presenting initially reportedly with a Left olecranon bursa swelling  · Initial x-ray of left elbow: Impression: \"Extensive soft tissue swelling over the posterior olecranon region compatible with cellulitis and possible olecranon bursitis. \"   · Afebrile, with Initial WBC of 18.1  · Elevated C-reactive protein  · Blood cultures no growth to date  · Continue clindamycin  · Orthopedics surgery following - appreciate recommendations  · No indication for surgical intervention at this time    · Worsening Edema   · Consider CT Left Elbow. ·         Diabetes mellitus ([facility] [address]      Priority: Medium     # History of Type 2 Diabetes Mellitus   - Uncontrolled  - Reported Home regimen include,   --> Metformin  - Holding home PO Med while inpatient    Inpatient Regimens to include;  - Insulin Glargine (Lantus) 20 units subcu nightly  - Insulin Lispro (Humalog) 5 units subcu pre-meal 3 times a day  - Insulin Lispro (Humalog) on a low dose sliding scale  - Monitor POC glucose, and adjusts insulin regimen accordingly based on daily insulin requirement.   - Hemoglobin A1C         Hypertension     Hyperlipidemia     GERD (gastroesophageal reflux disease)     Depression with suicidal ideation 02/10/2019         Principal Problem:    Cellulitis of left elbow  Active Problems:    Diabetes mellitus ([address]    Hypertension    Hyperlipidemia    GERD

## 2019-04-23 PROBLEM — M70.22 OLECRANON BURSITIS OF LEFT ELBOW: Status: ACTIVE | Noted: 2019-04-23

## 2019-04-23 LAB
ANION GAP SERPL CALCULATED.3IONS-SCNC: 11 MMOL/L (ref 7–19)
BASOPHILS ABSOLUTE: 0.1 K/UL (ref 0–0.2)
BASOPHILS RELATIVE PERCENT: 0.8 % (ref 0–1)
BUN BLDV-MCNC: 8 MG/DL (ref 6–20)
CALCIUM SERPL-MCNC: 9.2 MG/DL (ref 8.6–10)
CHLORIDE BLD-SCNC: 97 MMOL/L (ref 98–111)
CO2: 27 MMOL/L (ref 22–29)
CREAT SERPL-MCNC: 0.7 MG/DL (ref 0.5–1.2)
EOSINOPHILS ABSOLUTE: 0.8 K/UL (ref 0–0.6)
EOSINOPHILS RELATIVE PERCENT: 4.8 % (ref 0–5)
GFR NON-AFRICAN AMERICAN: >60
GLUCOSE BLD-MCNC: 136 MG/DL (ref 70–99)
GLUCOSE BLD-MCNC: 164 MG/DL (ref 70–99)
GLUCOSE BLD-MCNC: 165 MG/DL (ref 74–109)
GLUCOSE BLD-MCNC: 189 MG/DL (ref 70–99)
GLUCOSE BLD-MCNC: 255 MG/DL (ref 70–99)
HCT VFR BLD CALC: 41.8 % (ref 42–52)
HEMOGLOBIN: 13.6 G/DL (ref 14–18)
LYMPHOCYTES ABSOLUTE: 4.7 K/UL (ref 1.1–4.5)
LYMPHOCYTES RELATIVE PERCENT: 27.7 % (ref 20–40)
MCH RBC QN AUTO: 29.5 PG (ref 27–31)
MCHC RBC AUTO-ENTMCNC: 32.5 G/DL (ref 33–37)
MCV RBC AUTO: 90.7 FL (ref 80–94)
MONOCYTES ABSOLUTE: 1 K/UL (ref 0–0.9)
MONOCYTES RELATIVE PERCENT: 5.9 % (ref 0–10)
NEUTROPHILS ABSOLUTE: 10.1 K/UL (ref 1.5–7.5)
NEUTROPHILS RELATIVE PERCENT: 59.9 % (ref 50–65)
PDW BLD-RTO: 12.4 % (ref 11.5–14.5)
PERFORMED ON: ABNORMAL
PLATELET # BLD: 313 K/UL (ref 130–400)
PMV BLD AUTO: 11 FL (ref 9.4–12.4)
POTASSIUM SERPL-SCNC: 3.8 MMOL/L (ref 3.5–5)
RBC # BLD: 4.61 M/UL (ref 4.7–6.1)
SODIUM BLD-SCNC: 135 MMOL/L (ref 136–145)
WBC # BLD: 16.8 K/UL (ref 4.8–10.8)

## 2019-04-23 PROCEDURE — 36415 COLL VENOUS BLD VENIPUNCTURE: CPT

## 2019-04-23 PROCEDURE — 99233 SBSQ HOSP IP/OBS HIGH 50: CPT | Performed by: INTERNAL MEDICINE

## 2019-04-23 PROCEDURE — 6370000000 HC RX 637 (ALT 250 FOR IP): Performed by: INTERNAL MEDICINE

## 2019-04-23 PROCEDURE — 80048 BASIC METABOLIC PNL TOTAL CA: CPT

## 2019-04-23 PROCEDURE — 85025 COMPLETE CBC W/AUTO DIFF WBC: CPT

## 2019-04-23 PROCEDURE — 1210000000 HC MED SURG R&B

## 2019-04-23 PROCEDURE — 82948 REAGENT STRIP/BLOOD GLUCOSE: CPT

## 2019-04-23 PROCEDURE — 2580000003 HC RX 258: Performed by: INTERNAL MEDICINE

## 2019-04-23 PROCEDURE — 6360000002 HC RX W HCPCS: Performed by: INTERNAL MEDICINE

## 2019-04-23 PROCEDURE — 2500000003 HC RX 250 WO HCPCS: Performed by: INTERNAL MEDICINE

## 2019-04-23 RX ADMIN — CLINDAMYCIN PHOSPHATE 600 MG: 600 INJECTION, SOLUTION INTRAVENOUS at 01:11

## 2019-04-23 RX ADMIN — PANTOPRAZOLE SODIUM 40 MG: 40 TABLET, DELAYED RELEASE ORAL at 06:36

## 2019-04-23 RX ADMIN — CITALOPRAM HYDROBROMIDE 40 MG: 40 TABLET ORAL at 10:16

## 2019-04-23 RX ADMIN — Medication 10 ML: at 13:07

## 2019-04-23 RX ADMIN — LAMOTRIGINE 100 MG: 100 TABLET ORAL at 10:17

## 2019-04-23 RX ADMIN — INSULIN LISPRO 5 UNITS: 100 INJECTION, SOLUTION INTRAVENOUS; SUBCUTANEOUS at 12:00

## 2019-04-23 RX ADMIN — INSULIN LISPRO 3 UNITS: 100 INJECTION, SOLUTION INTRAVENOUS; SUBCUTANEOUS at 09:30

## 2019-04-23 RX ADMIN — NAPROXEN 500 MG: 500 TABLET ORAL at 10:16

## 2019-04-23 RX ADMIN — LAMOTRIGINE 100 MG: 100 TABLET ORAL at 22:15

## 2019-04-23 RX ADMIN — INSULIN GLARGINE 20 UNITS: 100 INJECTION, SOLUTION SUBCUTANEOUS at 22:22

## 2019-04-23 RX ADMIN — CARVEDILOL 25 MG: 25 TABLET, FILM COATED ORAL at 17:13

## 2019-04-23 RX ADMIN — INSULIN LISPRO 5 UNITS: 100 INJECTION, SOLUTION INTRAVENOUS; SUBCUTANEOUS at 17:18

## 2019-04-23 RX ADMIN — NAPROXEN 500 MG: 500 TABLET ORAL at 17:13

## 2019-04-23 RX ADMIN — ENOXAPARIN SODIUM 40 MG: 40 INJECTION SUBCUTANEOUS at 10:17

## 2019-04-23 RX ADMIN — CARVEDILOL 25 MG: 25 TABLET, FILM COATED ORAL at 10:16

## 2019-04-23 RX ADMIN — VANCOMYCIN HYDROCHLORIDE 1500 MG: 10 INJECTION, POWDER, LYOPHILIZED, FOR SOLUTION INTRAVENOUS at 13:07

## 2019-04-23 RX ADMIN — INSULIN LISPRO 1 UNITS: 100 INJECTION, SOLUTION INTRAVENOUS; SUBCUTANEOUS at 12:00

## 2019-04-23 RX ADMIN — INSULIN LISPRO 5 UNITS: 100 INJECTION, SOLUTION INTRAVENOUS; SUBCUTANEOUS at 09:30

## 2019-04-23 RX ADMIN — QUETIAPINE FUMARATE 50 MG: 50 TABLET ORAL at 22:15

## 2019-04-23 RX ADMIN — PRAVASTATIN SODIUM 80 MG: 20 TABLET ORAL at 22:15

## 2019-04-23 ASSESSMENT — PAIN SCALES - GENERAL
PAINLEVEL_OUTOF10: 8
PAINLEVEL_OUTOF10: 1
PAINLEVEL_OUTOF10: 0

## 2019-04-23 NOTE — PROGRESS NOTES
Pharmacy Note  Vancomycin Consult    Elsie Avalos is a 47 y.o. male started on Vancomycin for cellulitis of left elbow; consult received from Dr. Gilmer Fuentes to manage therapy. Patient Active Problem List   Diagnosis    Depression with suicidal ideation    Cellulitis of left elbow    Hypertension    Hyperlipidemia    GERD (gastroesophageal reflux disease)    Diabetes mellitus (HCC)    Olecranon bursitis of left elbow       Allergies:  Pcn [penicillins]     Temp max: 97.8    Recent Labs     04/22/19  0315 04/23/19  0338   BUN 12 8       Recent Labs     04/22/19  0315 04/23/19  0338   CREATININE 0.6 0.7       Recent Labs     04/22/19  0315 04/23/19  0338   WBC 17.0* 16.8*         Intake/Output Summary (Last 24 hours) at 4/23/2019 0914  Last data filed at 4/23/2019 0142  Gross per 24 hour   Intake 720 ml   Output 500 ml   Net 220 ml       Culture Date Source Results   04/20/19 Blood x 2 No growth                 Ht Readings from Last 1 Encounters:   04/20/19 5' 7\" (1.702 m)        Wt Readings from Last 1 Encounters:   04/23/19 212 lb 4 oz (96.3 kg)         Body mass index is 33.24 kg/m². Estimated Creatinine Clearance: 137 mL/min (based on SCr of 0.7 mg/dL). Assessment/Plan:  Will initiate vancomycin 1500 mg IV every 12 hours. Timing of trough level will be determined based on culture results, renal function, and clinical response. Thank you for the consult. Will continue to follow.     Electronically signed by EMMA [facility] on 4/23/2019 at 9:14 AM

## 2019-04-23 NOTE — PROGRESS NOTES
[facility] Hospitalists Progress Note    Patient:  Shabana Lott  YOB: 1965  Date of Service: 4/23/2019  MRN: [id number]   Acct: [de-identified]   Primary Care Physician: No primary care provider on file. Advance Directive: Full Code  Admit Date: 4/20/2019       Hospital Day: 3      CHIEF COMPLAINT:     Chief Complaint   Patient presents with    Arm Swelling       4/23/2019 8:40 AM  Subjective / Interval History:   04/23/2019  No acute overnight event reported. Patient endorses persistently worsening non tender left elbow swelling. Dinies any other acute complaints or distress at this time. 04/22/2019  Reported worsening Left Elbow swelling. Pain is fairly controlled. No acute overnight event or any other acute complaints reported. Cumulative Hospital History:    As per Initial admission HPI 4/20/2019, quoted below; \"The patient is a 47 y.o. male presents with worsening left elbow pain. He had some surgery on the elbow 3 months ago, but is unsure what he had done (?olecranon bursa resection?). He has developed swelling, redness, and warmth of the entire left arm\"          Review of Systems:   Review of Systems  ROS: 14 point review of systems is negative except as specifically addressed above. DIET GENERAL;     Intake/Output Summary (Last 24 hours) at 4/23/2019 0840  Last data filed at 4/23/2019 0142  Gross per 24 hour   Intake 720 ml   Output 500 ml   Net 220 ml       Medications:   dextrose       Current Facility-Administered Medications   Medication Dose Route Frequency Provider Last Rate Last Dose    glucose (GLUTOSE) 40 % oral gel 15 g  15 g Oral PRN Jose Daniel Verdin MD        dextrose 50 % solution 12.5 g  12.5 g Intravenous PRN Jose Daniel Verdin MD        glucagon (rDNA) injection 1 mg  1 mg Intramuscular PRN Jose Daniel Verdin MD        dextrose 5 % solution  100 mL/hr Intravenous PRN Jose Daniel Verdin MD        insulin glargine (LANTUS) injection vial 20 Units  20 Units Subcutaneous Nightly Eliseo Brink MD   20 Units at 04/22/19 2155    insulin lispro (HUMALOG) injection vial 5 Units  5 Units Subcutaneous TID  Eliseo Brink MD   5 Units at 04/22/19 1753    insulin lispro (HUMALOG) injection vial 0-6 Units  0-6 Units Subcutaneous TID  Eliseo Brink MD   1 Units at 04/22/19 1752    insulin lispro (HUMALOG) injection vial 0-3 Units  0-3 Units Subcutaneous Nightly Eliseo Brink MD   1 Units at 04/22/19 2156    carvedilol (COREG) tablet 25 mg  25 mg Oral BID WC Ilean Failing, DO   25 mg at 04/22/19 1745    citalopram (CELEXA) tablet 40 mg  40 mg Oral Daily Ilean Failing, DO   40 mg at 04/22/19 1016    lamoTRIgine (LAMICTAL) tablet 100 mg  100 mg Oral BID Ilean Failing, DO   100 mg at 04/22/19 2157    naproxen (NAPROSYN) tablet 500 mg  500 mg Oral BID WC Ilean Failing, DO   500 mg at 04/22/19 1745    pantoprazole (PROTONIX) tablet 40 mg  40 mg Oral QAM AC Ilean Failing, DO   40 mg at 04/23/19 0636    pravastatin (PRAVACHOL) tablet 80 mg  80 mg Oral Nightly Ilean Failing, DO   80 mg at 04/22/19 2157    QUEtiapine (SEROQUEL) tablet 50 mg  50 mg Oral Nightly Ilean Failing, DO   50 mg at 04/21/19 2226    tiZANidine (ZANAFLEX) tablet 4 mg  4 mg Oral Q6H PRN Ilean Failing, DO        sodium chloride flush 0.9 % injection 10 mL  10 mL Intravenous 2 times per day Ilean Failing, DO   10 mL at 04/22/19 2158    sodium chloride flush 0.9 % injection 10 mL  10 mL Intravenous PRN Ilean Failing, DO        magnesium hydroxide (MILK OF MAGNESIA) 400 MG/5ML suspension 30 mL  30 mL Oral Daily PRN Ilean Failing, DO        ondansetron [facility]) injection 4 mg  4 mg Intravenous Q6H PRN Ilean Failing, DO        enoxaparin (LOVENOX) injection 40 mg  40 mg Subcutaneous Daily Ilean Failing, DO   40 mg at 04/22/19 1017    acetaminophen (TYLENOL) tablet 650 mg  650 mg Oral Q4H PRN Prakash [place], DO   650 mg at 04/22/19 1147         dextrose        insulin glargine  20 Units Subcutaneous Nightly    insulin lispro  5 Units Subcutaneous TID WC    insulin lispro  0-6 Units Subcutaneous TID WC    insulin lispro  0-3 Units Subcutaneous Nightly    carvedilol  25 mg Oral BID WC    citalopram  40 mg Oral Daily    lamoTRIgine  100 mg Oral BID    naproxen  500 mg Oral BID WC    pantoprazole  40 mg Oral QAM AC    pravastatin  80 mg Oral Nightly    QUEtiapine  50 mg Oral Nightly    sodium chloride flush  10 mL Intravenous 2 times per day    enoxaparin  40 mg Subcutaneous Daily     glucose, dextrose, glucagon (rDNA), dextrose, tiZANidine, sodium chloride flush, magnesium hydroxide, ondansetron, acetaminophen  DIET GENERAL;       Labs:     Recent Labs     04/21/19 0925 04/22/19 0315 04/23/19 0338   WBC 15.6* 17.0* 16.8*   RBC 4.98 4.57* 4.61*   HGB 14.8 13.5* 13.6*   HCT 44.8 40.2* 41.8*   MCV 90.0 88.0 90.7   MCH 29.7 29.5 29.5   MCHC 33.0 33.6 32.5*    320 313     Recent Labs     04/21/19 0925 04/22/19 0315 04/23/19 0338   * 136 135*   K 4.1 3.6 3.8   ANIONGAP 11 13 11   CL 96* 98 97*   CO2 24 25 27   BUN 11 12 8   CREATININE 0.8 0.6 0.7   GLUCOSE 260* 183* 165*   CALCIUM 9.4 9.2 9.2     Recent Labs     04/21/19 0925   MG 1.9   PHOS 3.5     No results for input(s): AST, ALT, ALB, BILITOT, ALKPHOS, ALB in the last 72 hours. HgBA1c:  No components found for: HGBA1C  FLP:    Lab Results   Component Value Date    TRIG 185 02/11/2019    HDL 28 02/11/2019    LDLCALC 86 02/11/2019     TSH:    Lab Results   Component Value Date    TSH 2.150 02/11/2019     Troponin T: No results for input(s): TROPONINI in the last 72 hours. Pro-BNP: No results for input(s): BNP in the last 72 hours. INR: No results for input(s): INR in the last 72 hours.   ABGs: No results found for: PHART, PO2ART, FOP4EMF  UA:No results for input(s): NITRITE, COLORU, PHUR, LABCAST, [address], RBCUA, MUCUS, TRICHOMONAS, YEAST, BACTERIA, CLARITYU, SPECGRAV, LEUKOCYTESUR, UROBILINOGEN, BILIRUBINUR, BLOODU, GLUCOSEU, AMORPHOUS in the last 72 hours. Invalid input(s): Xiao Young      RAD:   Xr Elbow Left (min 3 Views)    Result Date: 4/20/2019  EXAMINATION: XR ELBOW LEFT (MIN 3 VIEWS) 4/20/2019 4:56 PM HISTORY: Left elbow swelling and pain. Report: 3 views of the left elbow were obtained. There are no comparison studies. No fracture or dislocation is identified. There is no joint effusion. There is extensive soft tissue swelling posteriorly over the olecranon region. No soft tissue gas or radiopaque foreign bodies identified. 1. No acute osseous findings. 2. Extensive soft tissue swelling over the posterior olecranon region compatible with cellulitis and possible olecranon bursitis. Signed by Dr Joi Armstrong on 4/20/2019 4:57 PM    Narrative   EXAMINATION:  CT ELBOW LEFT WO CONTRAST  4/22/2019 3:07 PM   HISTORY: Elbow pain. Assess tendon tear and/or bursitis suspected. COMPARISON: 4/20/2019 elbow radiographs   There is a large olecranon bursitis. The fluid collection along the   olecranon measures approximate 6.4 x 3.4 x 8.5 cm. TECHNIQUE: Radiation dose equals  mGy cm. AUTOMATED EXPOSURE   CONTROL dose reduction technique was implemented. Thin section axial images were obtained. 2-D sagittal and coronal   reconstruction images were generated. FINDINGS:    There is a large fluid collection identified in the olecranon bursa   compatible with bursitis measuring 6.4 x 3.4 x 8.5 cm. Cyst the fluid   collection is low attenuation suggesting simple fluid, old hematoma as   well as infected fluid not excluded. There is no abnormal gas or   complicating features associated with the collection. Additionally there is a large amount of subcutaneous edema extending   diffusely along the proximal forearm as well as the distal arm. The bony structures of the elbow are maintained without fracture.    There is no periostitis. There are no blastic or lytic lesions. There   is no evidence of osteomyelitis. Mild degenerative spurring along the   coronoid process observed. No definite intramuscular abnormality observed. There is no CT evidence of significant fat pad displacement or elbow   joint effusion. Evaluation of ligamentous and tendinous structures is suboptimal, MR   is suggested clinically indicated.       Impression   1. Large fluid collection the olecranon bursa differential   considerations include bursitis. 2. Diffuse superficial edema extending into the forearm as well as the   proximal arm. 3. No acute osseous abnormality. Signed by Dr Bev Ha on 4/22/2019 3:17 PM             Objective:   Vitals: /67   Pulse 70   Temp 96.9 °F (36.1 °C) (Temporal)   Resp 18   Ht 5' 7\" (1.702 m)   Wt 212 lb 4 oz (96.3 kg)   SpO2 94%   BMI 33.24 kg/m²   24HR INTAKE/OUTPUT:      Intake/Output Summary (Last 24 hours) at 4/23/2019 0840  Last data filed at 4/23/2019 0142  Gross per 24 hour   Intake 720 ml   Output 500 ml   Net 220 ml        Physical Exam   Nursing note and vitals reviewed. General appearance: alert and cooperative with exam  HEENT: atraumatic, eyes with clear conjunctiva and normal lids, pupils and irises normal, external ears and nose are normal, lips normal.  Neck without masses, lympadenopathy, bruit, thyroid normal  Lungs: Patient in no acute respiratory distress, no increased work of breathing, \"clear to auscultation bilaterally\" without rales, rhonchi or wheezes  Heart: regular rate and rhythm, S1, S2 normal, no murmur, click, rub or gallop  Abdomen: soft, non-tender; non-distended normal bowel sounds no masses, no organomegaly  Extremities: Tender Left elbow with erythema and edema. extremities normal, atraumatic, no cyanosis or edema  Neurologic: No focal neurologic deficits, normal sensation, alert and oriented, affect and mood appropriate.  CN II-XII Intact  Skin: Skin color, texture, turgor normal. No rashes or lesions    Fluctuant Left Elbow photo below (04/22/219) - worsening Edema          Assessment/plan:     Patient Active Problem List    Diagnosis Date Noted    Cellulitis of left elbow 04/20/2019     Priority: High     Left elbow cellulitis  · Reportedly from recent ? Left open procedure   · Presenting initially reportedly with a Left olecranon bursa swelling  · Initial x-ray of left elbow: Impression: \"Extensive soft tissue swelling over the posterior olecranon region compatible with cellulitis and possible olecranon bursitis. \"   · Afebrile, with Initial WBC of 18.1  · Elevated C-reactive protein  · Blood cultures no growth to date  · Orthopedics surgery following - appreciate recommendations  · No indication for surgical intervention at this time    · Worsening Edema   · CT Left Elbow. -04/22/2019 - Findings;  · \"There is a large fluid collection identified in the olecranon bursa compatible with bursitis measuring 6.4 x 3.4 x 8.5 cm. Cyst the fluid collection is low attenuation suggesting simple fluid, old hematoma as well as infected fluid not excluded. There is no abnormal gas or complicating features associated with the collection.  Additionally there is a large amount of subcutaneous edema extending diffusely along the proximal forearm as well as the distal arm\"    · Will need Bursa Aspiration  vs I&D and Culture of the Aspirate to Guide Antibiotic therapy  · Expand Empiric Antibiotic Coverage, given worsening Edema and Persistent Leukocytosis   · Start Vancomycin, pending Cx  · Anticipate 14-21 day Course of Antibiotic          Diabetes mellitus ([facility] [address]      Priority: Medium     # History of Type 2 Diabetes Mellitus   - Uncontrolled  - Reported Home regimen include,   --> Metformin  - Holding home PO Med while inpatient    Inpatient Regimens to include;  - Insulin Glargine (Lantus) 20 units subcu nightly  - Insulin Lispro (Humalog) 5 units subcu pre-meal 3 times a day  - Insulin Lispro (Humalog) on a low dose sliding scale  - Monitor POC glucose, and adjusts insulin regimen accordingly based on daily insulin requirement. - Hemoglobin A1C         Hypertension     Hyperlipidemia     GERD (gastroesophageal reflux disease)     Depression with suicidal ideation 02/10/2019         Principal Problem:    Cellulitis of left elbow  Active Problems:    Diabetes mellitus ([address]    Hypertension    Hyperlipidemia    GERD (gastroesophageal reflux disease)  Resolved Problems:    * No resolved hospital problems. *                Continue management of other chronic medical conditions - see above and orders.           Advance Directive: Full Code    DIET GENERAL;     DVT prophylaxis: Enoxaparin    Consults Made:   IP CONSULT TO ORTHOPEDIC SURGERY  IP CONSULT TO ORTHOPEDIC SURGERY  PHARMACY TO DOSE VANCOMYCIN    Discharge planning: tbd       Electronically signed by   Huong Bose MD, MPH,   Internal Medicine Hospitalist   4/23/2019 8:40 AM

## 2019-04-23 NOTE — PROGRESS NOTES
Spoke with Dr. Pedro Rodriguez regarding Dr. Wil Lino seeing patient again. He stated just to call him in the morning and let him know.

## 2019-04-24 LAB
ANION GAP SERPL CALCULATED.3IONS-SCNC: 12 MMOL/L (ref 7–19)
APPEARANCE FLUID: NORMAL
BASOPHILS ABSOLUTE: 0.1 K/UL (ref 0–0.2)
BASOPHILS RELATIVE PERCENT: 0.9 % (ref 0–1)
BUN BLDV-MCNC: 9 MG/DL (ref 6–20)
CALCIUM SERPL-MCNC: 9.1 MG/DL (ref 8.6–10)
CELL COUNT FLUID TYPE: NORMAL
CHLORIDE BLD-SCNC: 97 MMOL/L (ref 98–111)
CLOT EVALUATION: NORMAL
CO2: 27 MMOL/L (ref 22–29)
COLOR FLUID: NORMAL
CREAT SERPL-MCNC: 0.7 MG/DL (ref 0.5–1.2)
EOSINOPHILS ABSOLUTE: 0.6 K/UL (ref 0–0.6)
EOSINOPHILS RELATIVE PERCENT: 4.3 % (ref 0–5)
FLUID PATH CONSULT: NO
GFR NON-AFRICAN AMERICAN: >60
GLUCOSE BLD-MCNC: 171 MG/DL (ref 70–99)
GLUCOSE BLD-MCNC: 177 MG/DL (ref 70–99)
GLUCOSE BLD-MCNC: 182 MG/DL (ref 70–99)
GLUCOSE BLD-MCNC: 218 MG/DL (ref 74–109)
GLUCOSE BLD-MCNC: 222 MG/DL (ref 70–99)
HCT VFR BLD CALC: 40.1 % (ref 42–52)
HEMOGLOBIN: 13.6 G/DL (ref 14–18)
LYMPHOCYTES ABSOLUTE: 4.2 K/UL (ref 1.1–4.5)
LYMPHOCYTES RELATIVE PERCENT: 28.7 % (ref 20–40)
LYMPHOCYTES, BODY FLUID: 2 %
MCH RBC QN AUTO: 30.3 PG (ref 27–31)
MCHC RBC AUTO-ENTMCNC: 33.9 G/DL (ref 33–37)
MCV RBC AUTO: 89.3 FL (ref 80–94)
MONOCYTE, FLUID: 1 %
MONOCYTES ABSOLUTE: 0.9 K/UL (ref 0–0.9)
MONOCYTES RELATIVE PERCENT: 6.4 % (ref 0–10)
NEUTROPHIL, FLUID: 97 %
NEUTROPHILS ABSOLUTE: 8.6 K/UL (ref 1.5–7.5)
NEUTROPHILS RELATIVE PERCENT: 58.2 % (ref 50–65)
NUCLEATED CELLS FLUID: NORMAL /CUMM
NUMBER OF CELLS COUNTED FLUID: 100
PDW BLD-RTO: 12.3 % (ref 11.5–14.5)
PERFORMED ON: ABNORMAL
PLATELET # BLD: 386 K/UL (ref 130–400)
PMV BLD AUTO: 10.5 FL (ref 9.4–12.4)
POTASSIUM SERPL-SCNC: 3.8 MMOL/L (ref 3.5–5)
RBC # BLD: 4.49 M/UL (ref 4.7–6.1)
RBC FLUID: NORMAL /CUMM
SODIUM BLD-SCNC: 136 MMOL/L (ref 136–145)
WBC # BLD: 14.8 K/UL (ref 4.8–10.8)

## 2019-04-24 PROCEDURE — 87070 CULTURE OTHR SPECIMN AEROBIC: CPT

## 2019-04-24 PROCEDURE — 6360000002 HC RX W HCPCS: Performed by: INTERNAL MEDICINE

## 2019-04-24 PROCEDURE — 80048 BASIC METABOLIC PNL TOTAL CA: CPT

## 2019-04-24 PROCEDURE — 87186 SC STD MICRODIL/AGAR DIL: CPT

## 2019-04-24 PROCEDURE — 87205 SMEAR GRAM STAIN: CPT

## 2019-04-24 PROCEDURE — 85025 COMPLETE CBC W/AUTO DIFF WBC: CPT

## 2019-04-24 PROCEDURE — 87015 SPECIMEN INFECT AGNT CONCNTJ: CPT

## 2019-04-24 PROCEDURE — 6370000000 HC RX 637 (ALT 250 FOR IP): Performed by: INTERNAL MEDICINE

## 2019-04-24 PROCEDURE — 82948 REAGENT STRIP/BLOOD GLUCOSE: CPT

## 2019-04-24 PROCEDURE — 2580000003 HC RX 258: Performed by: INTERNAL MEDICINE

## 2019-04-24 PROCEDURE — 89060 EXAM SYNOVIAL FLUID CRYSTALS: CPT

## 2019-04-24 PROCEDURE — 0R9M3ZX DRAINAGE OF LEFT ELBOW JOINT, PERCUTANEOUS APPROACH, DIAGNOSTIC: ICD-10-PCS | Performed by: ORTHOPAEDIC SURGERY

## 2019-04-24 PROCEDURE — 36415 COLL VENOUS BLD VENIPUNCTURE: CPT

## 2019-04-24 PROCEDURE — 89051 BODY FLUID CELL COUNT: CPT

## 2019-04-24 PROCEDURE — 87075 CULTR BACTERIA EXCEPT BLOOD: CPT

## 2019-04-24 PROCEDURE — 87077 CULTURE AEROBIC IDENTIFY: CPT

## 2019-04-24 PROCEDURE — 1210000000 HC MED SURG R&B

## 2019-04-24 PROCEDURE — 99232 SBSQ HOSP IP/OBS MODERATE 35: CPT | Performed by: INTERNAL MEDICINE

## 2019-04-24 RX ORDER — ETHYL CHLORIDE 100 %
AEROSOL, SPRAY (ML) TOPICAL PRN
Status: DISCONTINUED | OUTPATIENT
Start: 2019-04-24 | End: 2019-04-28 | Stop reason: HOSPADM

## 2019-04-24 RX ORDER — LIDOCAINE HYDROCHLORIDE 10 MG/ML
2 INJECTION, SOLUTION EPIDURAL; INFILTRATION; INTRACAUDAL; PERINEURAL ONCE
Status: DISCONTINUED | OUTPATIENT
Start: 2019-04-24 | End: 2019-04-28 | Stop reason: HOSPADM

## 2019-04-24 RX ORDER — INSULIN GLARGINE 100 [IU]/ML
25 INJECTION, SOLUTION SUBCUTANEOUS NIGHTLY
Status: DISCONTINUED | OUTPATIENT
Start: 2019-04-24 | End: 2019-04-28 | Stop reason: HOSPADM

## 2019-04-24 RX ADMIN — CITALOPRAM HYDROBROMIDE 40 MG: 40 TABLET ORAL at 08:16

## 2019-04-24 RX ADMIN — LAMOTRIGINE 100 MG: 100 TABLET ORAL at 08:16

## 2019-04-24 RX ADMIN — TIZANIDINE 4 MG: 4 TABLET ORAL at 01:10

## 2019-04-24 RX ADMIN — Medication 10 ML: at 08:17

## 2019-04-24 RX ADMIN — INSULIN GLARGINE 25 UNITS: 100 INJECTION, SOLUTION SUBCUTANEOUS at 20:55

## 2019-04-24 RX ADMIN — INSULIN LISPRO 6 UNITS: 100 INJECTION, SOLUTION INTRAVENOUS; SUBCUTANEOUS at 12:29

## 2019-04-24 RX ADMIN — VANCOMYCIN HYDROCHLORIDE 1500 MG: 10 INJECTION, POWDER, LYOPHILIZED, FOR SOLUTION INTRAVENOUS at 01:08

## 2019-04-24 RX ADMIN — PANTOPRAZOLE SODIUM 40 MG: 40 TABLET, DELAYED RELEASE ORAL at 06:14

## 2019-04-24 RX ADMIN — INSULIN LISPRO 1 UNITS: 100 INJECTION, SOLUTION INTRAVENOUS; SUBCUTANEOUS at 08:21

## 2019-04-24 RX ADMIN — CARVEDILOL 25 MG: 25 TABLET, FILM COATED ORAL at 08:16

## 2019-04-24 RX ADMIN — INSULIN LISPRO 1 UNITS: 100 INJECTION, SOLUTION INTRAVENOUS; SUBCUTANEOUS at 17:43

## 2019-04-24 RX ADMIN — Medication 10 ML: at 21:00

## 2019-04-24 RX ADMIN — CARVEDILOL 25 MG: 25 TABLET, FILM COATED ORAL at 17:37

## 2019-04-24 RX ADMIN — INSULIN LISPRO 1 UNITS: 100 INJECTION, SOLUTION INTRAVENOUS; SUBCUTANEOUS at 20:55

## 2019-04-24 RX ADMIN — INSULIN LISPRO 5 UNITS: 100 INJECTION, SOLUTION INTRAVENOUS; SUBCUTANEOUS at 08:17

## 2019-04-24 RX ADMIN — NAPROXEN 500 MG: 500 TABLET ORAL at 17:37

## 2019-04-24 RX ADMIN — VANCOMYCIN HYDROCHLORIDE 1500 MG: 10 INJECTION, POWDER, LYOPHILIZED, FOR SOLUTION INTRAVENOUS at 12:30

## 2019-04-24 RX ADMIN — ENOXAPARIN SODIUM 40 MG: 40 INJECTION SUBCUTANEOUS at 08:16

## 2019-04-24 RX ADMIN — NAPROXEN 500 MG: 500 TABLET ORAL at 08:16

## 2019-04-24 RX ADMIN — ACETAMINOPHEN 650 MG: 325 TABLET ORAL at 23:30

## 2019-04-24 RX ADMIN — LAMOTRIGINE 100 MG: 100 TABLET ORAL at 20:56

## 2019-04-24 RX ADMIN — INSULIN LISPRO 6 UNITS: 100 INJECTION, SOLUTION INTRAVENOUS; SUBCUTANEOUS at 17:38

## 2019-04-24 RX ADMIN — Medication 10 ML: at 01:11

## 2019-04-24 RX ADMIN — PRAVASTATIN SODIUM 80 MG: 20 TABLET ORAL at 20:56

## 2019-04-24 RX ADMIN — INSULIN LISPRO 2 UNITS: 100 INJECTION, SOLUTION INTRAVENOUS; SUBCUTANEOUS at 12:30

## 2019-04-24 ASSESSMENT — PAIN SCALES - GENERAL
PAINLEVEL_OUTOF10: 0
PAINLEVEL_OUTOF10: 7
PAINLEVEL_OUTOF10: 1
PAINLEVEL_OUTOF10: 1

## 2019-04-24 ASSESSMENT — PAIN DESCRIPTION - ORIENTATION: ORIENTATION: LEFT

## 2019-04-24 ASSESSMENT — PAIN DESCRIPTION - LOCATION: LOCATION: ARM

## 2019-04-24 NOTE — PROGRESS NOTES
sodium chloride flush 0.9 % injection 10 mL  10 mL Intravenous 2 times per day Shayla Latus, DO   10 mL at 04/24/19 0115    sodium chloride flush 0.9 % injection 10 mL  10 mL Intravenous PRN Shayla Latus, DO        magnesium hydroxide (MILK OF MAGNESIA) 400 MG/5ML suspension 30 mL  30 mL Oral Daily PRN Shayla Latus, DO        ondansetron [facility]) injection 4 mg  4 mg Intravenous Q6H PRN Shayla Latus, DO        enoxaparin (LOVENOX) injection 40 mg  40 mg Subcutaneous Daily Shayla Latus, DO   40 mg at 04/24/19 2048    acetaminophen (TYLENOL) tablet 650 mg  650 mg Oral Q4H PRN Shayla Latus, DO   650 mg at 04/22/19 1147         dextrose        lidocaine PF  2 mL Intradermal Once    insulin glargine  25 Units Subcutaneous Nightly    insulin lispro  6 Units Subcutaneous TID WC    vancomycin (VANCOCIN) intermittent dosing (placeholder)   Other RX Placeholder    vancomycin  1,500 mg Intravenous Q12H    insulin lispro  0-6 Units Subcutaneous TID WC    insulin lispro  0-3 Units Subcutaneous Nightly    carvedilol  25 mg Oral BID WC    citalopram  40 mg Oral Daily    lamoTRIgine  100 mg Oral BID    naproxen  500 mg Oral BID WC    pantoprazole  40 mg Oral QAM AC    pravastatin  80 mg Oral Nightly    QUEtiapine  50 mg Oral Nightly    sodium chloride flush  10 mL Intravenous 2 times per day    enoxaparin  40 mg Subcutaneous Daily     ethyl chloride, glucose, dextrose, glucagon (rDNA), dextrose, tiZANidine, sodium chloride flush, magnesium hydroxide, ondansetron, acetaminophen  DIET GENERAL;       Labs:     Recent Labs     04/22/19 0315 04/23/19 0338 04/24/19  0423   WBC 17.0* 16.8* 14.8*   RBC 4.57* 4.61* 4.49*   HGB 13.5* 13.6* 13.6*   HCT 40.2* 41.8* 40.1*   MCV 88.0 90.7 89.3   MCH 29.5 29.5 30.3   MCHC 33.6 32.5* 33.9    313 386     Recent Labs     04/22/19 0315 04/23/19 0338 04/24/19  0423    135* 136   K 3.6 3.8 3.8   ANIONGAP 13 11 12   CL 98 97* 97*   CO2 25 27 27   BUN 12 8 9   CREATININE 0.6 0.7 0.7   GLUCOSE 183* 165* 218*   CALCIUM 9.2 9.2 9.1     No results for input(s): MG, PHOS in the last 72 hours. No results for input(s): AST, ALT, ALB, BILITOT, ALKPHOS, ALB in the last 72 hours. HgBA1c:  No components found for: HGBA1C  FLP:    Lab Results   Component Value Date    TRIG 185 02/11/2019    HDL 28 02/11/2019    LDLCALC 86 02/11/2019     TSH:    Lab Results   Component Value Date    TSH 2.150 02/11/2019     Troponin T: No results for input(s): TROPONINI in the last 72 hours. Pro-BNP: No results for input(s): BNP in the last 72 hours. INR: No results for input(s): INR in the last 72 hours. ABGs: No results found for: PHART, PO2ART, WDE2YNZ  UA:No results for input(s): NITRITE, COLORU, PHUR, LABCAST, WBCUA, RBCUA, MUCUS, TRICHOMONAS, YEAST, BACTERIA, CLARITYU, SPECGRAV, LEUKOCYTESUR, UROBILINOGEN, BILIRUBINUR, BLOODU, GLUCOSEU, AMORPHOUS in the last 72 hours. Invalid input(s): Alexandro Ratliff      RAD:   Xr Elbow Left (min 3 Views)    Result Date: 4/20/2019  EXAMINATION: XR ELBOW LEFT (MIN 3 VIEWS) 4/20/2019 4:56 PM HISTORY: Left elbow swelling and pain. Report: 3 views of the left elbow were obtained. There are no comparison studies. No fracture or dislocation is identified. There is no joint effusion. There is extensive soft tissue swelling posteriorly over the olecranon region. No soft tissue gas or radiopaque foreign bodies identified. 1. No acute osseous findings. 2. Extensive soft tissue swelling over the posterior olecranon region compatible with cellulitis and possible olecranon bursitis. Signed by Dr Millie Valdez. Patti on 4/20/2019 4:57 PM    Narrative   EXAMINATION:  CT ELBOW LEFT WO CONTRAST  4/22/2019 3:07 PM   HISTORY: Elbow pain. Assess tendon tear and/or bursitis suspected. COMPARISON: 4/20/2019 elbow radiographs   There is a large olecranon bursitis.  The fluid collection along the   olecranon measures approximate 6.4 x 3.4 x 8. 5 cm. TECHNIQUE: Radiation dose equals  mGy cm. AUTOMATED EXPOSURE   CONTROL dose reduction technique was implemented. Thin section axial images were obtained. 2-D sagittal and coronal   reconstruction images were generated. FINDINGS:    There is a large fluid collection identified in the olecranon bursa   compatible with bursitis measuring 6.4 x 3.4 x 8.5 cm. Cyst the fluid   collection is low attenuation suggesting simple fluid, old hematoma as   well as infected fluid not excluded. There is no abnormal gas or   complicating features associated with the collection. Additionally there is a large amount of subcutaneous edema extending   diffusely along the proximal forearm as well as the distal arm. The bony structures of the elbow are maintained without fracture. There is no periostitis. There are no blastic or lytic lesions. There   is no evidence of osteomyelitis. Mild degenerative spurring along the   coronoid process observed. No definite intramuscular abnormality observed. There is no CT evidence of significant fat pad displacement or elbow   joint effusion. Evaluation of ligamentous and tendinous structures is suboptimal, MR   is suggested clinically indicated.       Impression   1. Large fluid collection the olecranon bursa differential   considerations include bursitis. 2. Diffuse superficial edema extending into the forearm as well as the   proximal arm. 3. No acute osseous abnormality. Signed by Dr Lorraine Mcclain on 4/22/2019 3:17 PM             Objective:   Vitals: /76   Pulse 66   Temp 97.4 °F (36.3 °C) (Temporal)   Resp 16   Ht 5' 7\" (1.702 m)   Wt 211 lb 6 oz (95.9 kg)   SpO2 95%   BMI 33.11 kg/m²   24HR INTAKE/OUTPUT:      Intake/Output Summary (Last 24 hours) at 4/24/2019 0940  Last data filed at 4/24/2019 9100  Gross per 24 hour   Intake 1959 ml   Output 300 ml   Net 1659 ml        Physical Exam   Nursing note and vitals reviewed.   General appearance: alert and cooperative with exam  HEENT: atraumatic, eyes with clear conjunctiva and normal lids, pupils and irises normal, external ears and nose are normal, lips normal.  Neck without masses, lympadenopathy, bruit, thyroid normal  Lungs: Patient in no acute respiratory distress, no increased work of breathing, \"clear to auscultation bilaterally\" without rales, rhonchi or wheezes  Heart: regular rate and rhythm, S1, S2 normal, no murmur, click, rub or gallop  Abdomen: soft, non-tender; non-distended normal bowel sounds no masses, no organomegaly  Extremities: notender Left elbow with erythema and edema. extremities normal, atraumatic, no cyanosis or edema  Neurologic: No focal neurologic deficits, normal sensation, alert and oriented, affect and mood appropriate. CN II-XII Intact  Skin: Skin color, texture, turgor normal. No rashes or lesions        Assessment/plan:     Patient Active Problem List    Diagnosis Date Noted    Olecranon bursitis of left elbow 04/23/2019     Priority: High    Cellulitis of left elbow 04/20/2019     Priority: High     Olecranon bursitis of left elbow and Left elbow cellulitis  · Reportedly from recent ? Left open procedure   · Presenting initially reportedly with a Left olecranon bursa swelling  · Initial x-ray of left elbow: Impression: \"Extensive soft tissue swelling over the posterior olecranon region compatible with cellulitis and possible olecranon bursitis. \"   · Afebrile, with leukocytosis (Initial WBC of 18.1)  · Leukocytosis improving  · Elevated C-reactive protein  · Blood cultures no growth to date  · Orthopedics surgery following - appreciate recommendations  · No indication for surgical intervention at this time    · CT Left Elbow. -04/22/2019 - Findings;  · \"There is a large fluid collection identified in the olecranon bursa compatible with bursitis measuring 6.4 x 3.4 x 8.5 cm.  Cyst the fluid collection is low attenuation suggesting simple fluid, old hematoma as well as infected fluid not excluded. There is no abnormal gas or complicating features associated with the collection. Additionally there is a large amount of subcutaneous edema extending diffusely along the proximal forearm as well as the distal arm\"    · S/p Bursa Aspiration - 04/24/2019  · Aspirate sent for culture, cell count, crystals   · Culture of the Aspirate to Guide Antibiotic therapy  · Continue Vancomycin, pending Cx  · Anticipate 14-21 day Course of Antibiotic          Hypertension      Priority: Medium    Hyperlipidemia      Priority: Medium    GERD (gastroesophageal reflux disease)      Priority: Medium    Diabetes mellitus ([facility] [address]      Priority: Medium     # History of Type 2 Diabetes Mellitus   · - Uncontrolled  · - Hemoglobin A1c 9.4% (04/21/2019)  · - Reported Home regimen include,   · --> Metformin  · - Holding home PO Med while inpatient  ·   · Inpatient Regimens to include;  · - Increase basal - Insulin Glargine (Lantus) 25 units subcu nightly  · - Increase pre-meal - Insulin Lispro (Humalog) 6 units subcu pre-meal 3 times a day  · - Insulin Lispro (Humalog) on a low dose sliding scale  · - Continue to Monitor POC glucose, and adjusts insulin regimen accordingly based on daily insulin requirement.  Depression with suicidal ideation 02/10/2019         Principal Problem:    Cellulitis of left elbow  Active Problems:    Olecranon bursitis of left elbow    Hypertension    Hyperlipidemia    GERD (gastroesophageal reflux disease)    Diabetes mellitus ([facility] [address]  Resolved Problems:    * No resolved hospital problems. *                Continue management of other chronic medical conditions - see above and orders.           Advance Directive: Full Code    DIET GENERAL;     DVT prophylaxis: Enoxaparin    Consults Made:   IP CONSULT TO ORTHOPEDIC SURGERY  IP CONSULT TO ORTHOPEDIC SURGERY  PHARMACY TO DOSE VANCOMYCIN    Discharge planning: tbd       Electronically signed by   Karla De Souza MD, MPH,

## 2019-04-24 NOTE — PROGRESS NOTES
Orthopedic Surgery Progress Note    Nguyen Olivo  4/24/2019      Subjective:     Systemic or Specific Complaints: Pain Control    Pain moderate. C/o no better today than yesterday or day before. Objective:     Patient Vitals for the past 24 hrs:   BP Temp Temp src Pulse Resp SpO2 Weight   04/24/19 0718 116/76 97.4 °F (36.3 °C) Temporal 66 16 95 % --   04/24/19 0503 -- -- -- -- -- -- 211 lb 6 oz (95.9 kg)   04/23/19 1914 119/69 97.5 °F (36.4 °C) Temporal 76 18 97 % --       Left upper  General: alert, appears stated age and cooperative   Wound: None. Dressing: clean, dry, and intact   Extremity: Distal NVI. Swollen, red, warm bursa left elbow. Tender to palpation           DVT Exam: No evidence of DVT seen on physical exam.                   Data Review:  Recent Labs     04/23/19  0338 04/24/19  0423   HGB 13.6* 13.6*     Recent Labs     04/24/19  0423      K 3.8   CREATININE 0.7       Assessment:     Cellulitis, olecranon bursitis, L arm. Plan:      1:  DVT prophylaxis, ICE, elevate  2:  Pain control  3:  Physical therapy/Occupational therapy  4:  Performed aspiration of bursa this am.  See full note. Aspirate sent for crystals, culture, cell count.    5: Weight bearing as tolerated, KORI.        Electronically signed by Jon Shi PA-C on 4/24/2019 at 7:30 AM

## 2019-04-24 NOTE — PLAN OF CARE
Problem: Pain:  Goal: Pain level will decrease  Description  Pain level will decrease  4/24/2019 0921 by Barbara Armstrong RN  Outcome: Ongoing  4/24/2019 0419 by Shay Dugan RN  Outcome: Ongoing  Goal: Control of acute pain  Description  Control of acute pain  4/24/2019 0921 by Barbara Armstrong RN  Outcome: Ongoing  4/24/2019 0419 by Shay Dugan RN  Outcome: Ongoing  Goal: Control of chronic pain  Description  Control of chronic pain  4/24/2019 0921 by Barbara Armstrong RN  Outcome: Ongoing  4/24/2019 0419 by Shay Dugan RN  Outcome: Ongoing     Problem: Discharge Planning:  Goal: Discharged to appropriate level of care  Description  Discharged to appropriate level of care  4/24/2019 0921 by Barbara Armstrong RN  Outcome: Ongoing  4/24/2019 0419 by Shay Dugan RN  Outcome: Ongoing     Problem:  Body Temperature - Imbalanced:  Goal: Ability to maintain a body temperature in the normal range will improve  Description  Ability to maintain a body temperature in the normal range will improve  4/24/2019 0921 by Barbara Armstrong RN  Outcome: Ongoing  4/24/2019 0419 by Shay Dugan RN  Outcome: Ongoing     Problem: Mobility - Impaired:  Goal: Mobility will improve to maximum level  Description  Mobility will improve to maximum level  4/24/2019 0921 by Barbara Armstrong RN  Outcome: Ongoing  4/24/2019 0419 by Shay Dugan RN  Outcome: Ongoing     Problem: Skin Integrity - Impaired:  Goal: Will show no infection signs and symptoms  Description  Will show no infection signs and symptoms  4/24/2019 0921 by Barbara Armstrong RN  Outcome: Ongoing  4/24/2019 0419 by Shay Dugan RN  Outcome: Ongoing  Goal: Absence of new skin breakdown  Description  Absence of new skin breakdown  4/24/2019 0921 by Barbara Armstrong RN  Outcome: Ongoing  4/24/2019 0419 by Shay Dugan RN  Outcome: Ongoing     Problem: Serum Glucose Level - Abnormal:  Goal: Ability to maintain appropriate glucose levels will improve  Description  Ability to maintain appropriate glucose levels will improve  4/24/2019 0921 by Pascual Edwards RN  Outcome: Ongoing  4/24/2019 0419 by Dashawn Cormier RN  Outcome: Ongoing     Problem: Sensory Perception - Impaired:  Goal: Ability to maintain a stable neurologic state will improve  Description  Ability to maintain a stable neurologic state will improve  4/24/2019 0921 by Pascual Edwards RN  Outcome: Ongoing  4/24/2019 0419 by Dashawn Cormier RN  Outcome: Ongoing     Problem: Falls - Risk of:  Goal: Will remain free from falls  Description  Will remain free from falls  4/24/2019 0921 by Pascual Edwards RN  Outcome: Ongoing  4/24/2019 0419 by Dashawn Cormier RN  Outcome: Ongoing  Goal: Absence of physical injury  Description  Absence of physical injury  4/24/2019 0921 by Pascual Edwards RN  Outcome: Ongoing  4/24/2019 0419 by Dashawn Cormier RN  Outcome: Ongoing

## 2019-04-25 LAB
ANION GAP SERPL CALCULATED.3IONS-SCNC: 9 MMOL/L (ref 7–19)
BASOPHILS ABSOLUTE: 0.2 K/UL (ref 0–0.2)
BASOPHILS RELATIVE PERCENT: 1 % (ref 0–1)
BLOOD CULTURE, ROUTINE: NORMAL
BUN BLDV-MCNC: 9 MG/DL (ref 6–20)
CALCIUM SERPL-MCNC: 9.6 MG/DL (ref 8.6–10)
CHLORIDE BLD-SCNC: 98 MMOL/L (ref 98–111)
CO2: 28 MMOL/L (ref 22–29)
CREAT SERPL-MCNC: 0.7 MG/DL (ref 0.5–1.2)
CULTURE, BLOOD 2: NORMAL
EOSINOPHILS ABSOLUTE: 0.33 K/UL (ref 0–0.6)
EOSINOPHILS RELATIVE PERCENT: 2 % (ref 0–5)
GFR NON-AFRICAN AMERICAN: >60
GLUCOSE BLD-MCNC: 103 MG/DL (ref 70–99)
GLUCOSE BLD-MCNC: 154 MG/DL (ref 74–109)
GLUCOSE BLD-MCNC: 155 MG/DL (ref 70–99)
GLUCOSE BLD-MCNC: 176 MG/DL (ref 70–99)
GLUCOSE BLD-MCNC: 256 MG/DL (ref 70–99)
HCT VFR BLD CALC: 40.9 % (ref 42–52)
HEMOGLOBIN: 13.7 G/DL (ref 14–18)
LYMPHOCYTES ABSOLUTE: 5.3 K/UL (ref 1.1–4.5)
LYMPHOCYTES RELATIVE PERCENT: 32 % (ref 20–40)
MCH RBC QN AUTO: 30.6 PG (ref 27–31)
MCHC RBC AUTO-ENTMCNC: 33.5 G/DL (ref 33–37)
MCV RBC AUTO: 91.5 FL (ref 80–94)
MONOCYTES ABSOLUTE: 0.7 K/UL (ref 0–0.9)
MONOCYTES RELATIVE PERCENT: 4 % (ref 0–10)
MYELOCYTE PERCENT: 1 %
NEUTROPHILS ABSOLUTE: 10.2 K/UL (ref 1.5–7.5)
NEUTROPHILS RELATIVE PERCENT: 60 % (ref 50–65)
OVALOCYTES: ABNORMAL
PDW BLD-RTO: 12.2 % (ref 11.5–14.5)
PERFORMED ON: ABNORMAL
PLATELET # BLD: 401 K/UL (ref 130–400)
PLATELET SLIDE REVIEW: ABNORMAL
PMV BLD AUTO: 10.6 FL (ref 9.4–12.4)
POTASSIUM SERPL-SCNC: 4.2 MMOL/L (ref 3.5–5)
RBC # BLD: 4.47 M/UL (ref 4.7–6.1)
SODIUM BLD-SCNC: 135 MMOL/L (ref 136–145)
VANCOMYCIN TROUGH: 23.2 UG/ML (ref 10–20)
VANCOMYCIN TROUGH: 6.5 UG/ML (ref 10–20)
WBC # BLD: 16.7 K/UL (ref 4.8–10.8)

## 2019-04-25 PROCEDURE — 80048 BASIC METABOLIC PNL TOTAL CA: CPT

## 2019-04-25 PROCEDURE — 2580000003 HC RX 258: Performed by: INTERNAL MEDICINE

## 2019-04-25 PROCEDURE — 6360000002 HC RX W HCPCS: Performed by: INTERNAL MEDICINE

## 2019-04-25 PROCEDURE — 6370000000 HC RX 637 (ALT 250 FOR IP): Performed by: INTERNAL MEDICINE

## 2019-04-25 PROCEDURE — 80202 ASSAY OF VANCOMYCIN: CPT

## 2019-04-25 PROCEDURE — 1210000000 HC MED SURG R&B

## 2019-04-25 PROCEDURE — 85025 COMPLETE CBC W/AUTO DIFF WBC: CPT

## 2019-04-25 PROCEDURE — 82948 REAGENT STRIP/BLOOD GLUCOSE: CPT

## 2019-04-25 PROCEDURE — 0R9M0ZZ DRAINAGE OF LEFT ELBOW JOINT, OPEN APPROACH: ICD-10-PCS | Performed by: ORTHOPAEDIC SURGERY

## 2019-04-25 PROCEDURE — 36415 COLL VENOUS BLD VENIPUNCTURE: CPT

## 2019-04-25 PROCEDURE — 99232 SBSQ HOSP IP/OBS MODERATE 35: CPT | Performed by: HOSPITALIST

## 2019-04-25 RX ADMIN — INSULIN LISPRO 6 UNITS: 100 INJECTION, SOLUTION INTRAVENOUS; SUBCUTANEOUS at 12:26

## 2019-04-25 RX ADMIN — INSULIN LISPRO 1 UNITS: 100 INJECTION, SOLUTION INTRAVENOUS; SUBCUTANEOUS at 12:26

## 2019-04-25 RX ADMIN — INSULIN LISPRO 2 UNITS: 100 INJECTION, SOLUTION INTRAVENOUS; SUBCUTANEOUS at 21:27

## 2019-04-25 RX ADMIN — CITALOPRAM HYDROBROMIDE 40 MG: 40 TABLET ORAL at 08:38

## 2019-04-25 RX ADMIN — Medication 10 ML: at 08:43

## 2019-04-25 RX ADMIN — Medication 10 ML: at 00:35

## 2019-04-25 RX ADMIN — CARVEDILOL 25 MG: 25 TABLET, FILM COATED ORAL at 17:50

## 2019-04-25 RX ADMIN — VANCOMYCIN HYDROCHLORIDE 1500 MG: 10 INJECTION, POWDER, LYOPHILIZED, FOR SOLUTION INTRAVENOUS at 00:35

## 2019-04-25 RX ADMIN — NAPROXEN 500 MG: 500 TABLET ORAL at 08:38

## 2019-04-25 RX ADMIN — ENOXAPARIN SODIUM 40 MG: 40 INJECTION SUBCUTANEOUS at 08:38

## 2019-04-25 RX ADMIN — NAPROXEN 500 MG: 500 TABLET ORAL at 17:50

## 2019-04-25 RX ADMIN — INSULIN GLARGINE 25 UNITS: 100 INJECTION, SOLUTION SUBCUTANEOUS at 21:26

## 2019-04-25 RX ADMIN — LAMOTRIGINE 100 MG: 100 TABLET ORAL at 08:38

## 2019-04-25 RX ADMIN — PANTOPRAZOLE SODIUM 40 MG: 40 TABLET, DELAYED RELEASE ORAL at 07:17

## 2019-04-25 RX ADMIN — PRAVASTATIN SODIUM 80 MG: 20 TABLET ORAL at 21:26

## 2019-04-25 RX ADMIN — INSULIN LISPRO 1 UNITS: 100 INJECTION, SOLUTION INTRAVENOUS; SUBCUTANEOUS at 08:39

## 2019-04-25 RX ADMIN — LAMOTRIGINE 100 MG: 100 TABLET ORAL at 21:26

## 2019-04-25 RX ADMIN — INSULIN LISPRO 6 UNITS: 100 INJECTION, SOLUTION INTRAVENOUS; SUBCUTANEOUS at 08:42

## 2019-04-25 RX ADMIN — INSULIN LISPRO 6 UNITS: 100 INJECTION, SOLUTION INTRAVENOUS; SUBCUTANEOUS at 17:50

## 2019-04-25 ASSESSMENT — PAIN DESCRIPTION - LOCATION: LOCATION: ARM

## 2019-04-25 ASSESSMENT — PAIN SCALES - GENERAL
PAINLEVEL_OUTOF10: 4
PAINLEVEL_OUTOF10: 0
PAINLEVEL_OUTOF10: 0

## 2019-04-25 ASSESSMENT — PAIN DESCRIPTION - ORIENTATION: ORIENTATION: LEFT

## 2019-04-25 NOTE — PLAN OF CARE
Problem: Pain:  Goal: Pain level will decrease  Description  Pain level will decrease  Outcome: Ongoing  Goal: Control of acute pain  Description  Control of acute pain  Outcome: Ongoing  Goal: Control of chronic pain  Description  Control of chronic pain  Outcome: Ongoing     Problem: Discharge Planning:  Goal: Discharged to appropriate level of care  Description  Discharged to appropriate level of care  Outcome: Ongoing     Problem:  Body Temperature - Imbalanced:  Goal: Ability to maintain a body temperature in the normal range will improve  Description  Ability to maintain a body temperature in the normal range will improve  Outcome: Ongoing     Problem: Mobility - Impaired:  Goal: Mobility will improve to maximum level  Description  Mobility will improve to maximum level  Outcome: Ongoing

## 2019-04-25 NOTE — PROGRESS NOTES
[facility]    Patient:  Palmer Amezcua  YOB: 1965  Date of Service: 4/25/2019  MRN: [id number]   Acct: [de-identified]   Primary Care Physician: ALENA Cleary  Advance Directive: Full Code  Admit Date: 4/20/2019       Hospital Day: 5      CHIEF COMPLAINT:     Chief Complaint   Patient presents with    Arm Swelling       4/25/2019 3:08 PM  Subjective / Interval History:   04/25: pt seen and examined. Pain is somewhat controlled. No new complaints. Awaiting I/D    04/24/2019  Doing well. Status post bursa aspiration left elbow. No acute overnight event reported patient denies any acute distress or complaints at this time. 04/23/2019  No acute overnight event reported. Patient endorses persistently worsening non tender left elbow swelling. Dinies any other acute complaints or distress at this time. 04/22/2019  Reported worsening Left Elbow swelling. Pain is fairly controlled. No acute overnight event or any other acute complaints reported. Cumulative Hospital History:    As per Initial admission HPI 4/20/2019, quoted below; \"The patient is a 47 y.o. male presents with worsening left elbow pain. He had some surgery on the elbow 3 months ago, but is unsure what he had done (?olecranon bursa resection? ). He has developed swelling, redness, and warmth of the entire left arm\"    Status post aspiration of bursa  - 04/24/2019. Aspirate sent for culture, cell count, crystals. Review of Systems:   Review of Systems  ROS: 14 point review of systems is negative except as specifically addressed above. DIET GENERAL;     Intake/Output Summary (Last 24 hours) at 4/25/2019 1508  Last data filed at 4/25/2019 1336  Gross per 24 hour   Intake 1684 ml   Output --   Net 1684 ml       Medications:   dextrose       Current Facility-Administered Medications   Medication Dose Route Frequency Provider Last Rate Last Dose    vancomycin (VANCOCIN) 1,250 mg in dextrose 5 % 250 mL IVPB 1,250 mg Intravenous Q8H Shana Sow MD        lidocaine PF 1 % injection 2 mL  2 mL Intradermal Once Shana Sow MD        ethyl chloride spray   Topical PRN Shana Sow MD        insulin glargine (LANTUS) injection vial 25 Units  25 Units Subcutaneous Nightly Shana Sow MD   25 Units at 04/24/19 2055    insulin lispro (HUMALOG) injection vial 6 Units  6 Units Subcutaneous TID WC Shana Sow MD   6 Units at 04/25/19 1226    vancomycin (VANCOCIN) intermittent dosing (placeholder)   Other RX Placeholder Shana Sow MD        glucose (GLUTOSE) 40 % oral gel 15 g  15 g Oral PRN Shana Sow MD        dextrose 50 % solution 12.5 g  12.5 g Intravenous PRN Shana Sow MD        glucagon (rDNA) injection 1 mg  1 mg Intramuscular PRN Shana Sow MD        dextrose 5 % solution  100 mL/hr Intravenous PRN Shana Sow MD        insulin lispro (HUMALOG) injection vial 0-6 Units  0-6 Units Subcutaneous TID WC Shana Sow MD   1 Units at 04/25/19 1226    insulin lispro (HUMALOG) injection vial 0-3 Units  0-3 Units Subcutaneous Nightly Shana Sow MD   1 Units at 04/24/19 2055    carvedilol (COREG) tablet 25 mg  25 mg Oral BID WC Ginette Homar, DO   25 mg at 04/24/19 1737    citalopram (CELEXA) tablet 40 mg  40 mg Oral Daily Ginette Homar, DO   40 mg at 04/25/19 3191    lamoTRIgine (LAMICTAL) tablet 100 mg  100 mg Oral BID Ginette Homar, DO   100 mg at 04/25/19 9382    naproxen (NAPROSYN) tablet 500 mg  500 mg Oral BID WC Ginette Homar, DO   500 mg at 04/25/19 0257    pantoprazole (PROTONIX) tablet 40 mg  40 mg Oral QAM AC Ginette Homar, DO   40 mg at 04/25/19 2743    pravastatin (PRAVACHOL) tablet 80 mg  80 mg Oral Nightly Ginette Homar, DO   80 mg at 04/24/19 2056    QUEtiapine (SEROQUEL) tablet 50 mg  50 mg Oral Nightly Ginette Homar, DO   50 mg at 04/23/19 5997    04/23/19  0338 04/24/19  0423 04/25/19  0103   * 136 135*   K 3.8 3.8 4.2   ANIONGAP 11 12 9   CL 97* 97* 98   CO2 27 27 28   BUN 8 9 9   CREATININE 0.7 0.7 0.7   GLUCOSE 165* 218* 154*   CALCIUM 9.2 9.1 9.6     No results for input(s): MG, PHOS in the last 72 hours. No results for input(s): AST, ALT, ALB, BILITOT, ALKPHOS, ALB in the last 72 hours. HgBA1c:  No components found for: HGBA1C  FLP:    Lab Results   Component Value Date    TRIG 185 02/11/2019    HDL 28 02/11/2019    LDLCALC 86 02/11/2019     TSH:    Lab Results   Component Value Date    TSH 2.150 02/11/2019     Troponin T: No results for input(s): TROPONINI in the last 72 hours. Pro-BNP: No results for input(s): BNP in the last 72 hours. INR: No results for input(s): INR in the last 72 hours. ABGs: No results found for: PHART, PO2ART, UTV4DQD  UA:No results for input(s): NITRITE, COLORU, PHUR, LABCAST, WBCUA, RBCUA, MUCUS, TRICHOMONAS, YEAST, BACTERIA, CLARITYU, SPECGRAV, LEUKOCYTESUR, UROBILINOGEN, BILIRUBINUR, BLOODU, GLUCOSEU, AMORPHOUS in the last 72 hours. Invalid input(s): Shanda Boggs      RAD:   Xr Elbow Left (min 3 Views)    Result Date: 4/20/2019  EXAMINATION: XR ELBOW LEFT (MIN 3 VIEWS) 4/20/2019 4:56 PM HISTORY: Left elbow swelling and pain. Report: 3 views of the left elbow were obtained. There are no comparison studies. No fracture or dislocation is identified. There is no joint effusion. There is extensive soft tissue swelling posteriorly over the olecranon region. No soft tissue gas or radiopaque foreign bodies identified. 1. No acute osseous findings. 2. Extensive soft tissue swelling over the posterior olecranon region compatible with cellulitis and possible olecranon bursitis. Signed by Dr Gregory Gimenez. Patti on 4/20/2019 4:57 PM    Narrative   EXAMINATION:  CT ELBOW LEFT WO CONTRAST  4/22/2019 3:07 PM   HISTORY: Elbow pain. Assess tendon tear and/or bursitis suspected.    COMPARISON: 4/20/2019 elbow radiographs   There is a large olecranon bursitis. The fluid collection along the   olecranon measures approximate 6.4 x 3.4 x 8.5 cm. TECHNIQUE: Radiation dose equals  mGy cm. AUTOMATED EXPOSURE   CONTROL dose reduction technique was implemented. Thin section axial images were obtained. 2-D sagittal and coronal   reconstruction images were generated. FINDINGS:    There is a large fluid collection identified in the olecranon bursa   compatible with bursitis measuring 6.4 x 3.4 x 8.5 cm. Cyst the fluid   collection is low attenuation suggesting simple fluid, old hematoma as   well as infected fluid not excluded. There is no abnormal gas or   complicating features associated with the collection. Additionally there is a large amount of subcutaneous edema extending   diffusely along the proximal forearm as well as the distal arm. The bony structures of the elbow are maintained without fracture. There is no periostitis. There are no blastic or lytic lesions. There   is no evidence of osteomyelitis. Mild degenerative spurring along the   coronoid process observed. No definite intramuscular abnormality observed. There is no CT evidence of significant fat pad displacement or elbow   joint effusion. Evaluation of ligamentous and tendinous structures is suboptimal, MR   is suggested clinically indicated.       Impression   1. Large fluid collection the olecranon bursa differential   considerations include bursitis. 2. Diffuse superficial edema extending into the forearm as well as the   proximal arm. 3. No acute osseous abnormality.    Signed by Dr Bev aH on 4/22/2019 3:17 PM             Objective:   Vitals: /79   Pulse 61   Temp 96.8 °F (36 °C) (Temporal)   Resp 20   Ht 5' 7\" (1.702 m)   Wt 214 lb 12.8 oz (97.4 kg)   SpO2 93%   BMI 33.64 kg/m²   24HR INTAKE/OUTPUT:      Intake/Output Summary (Last 24 hours) at 4/25/2019 1508  Last data filed at 4/25/2019 1336  Gross per 24 hour   Intake 1684 ml Output --   Net 1684 ml        Physical Exam   Nursing note and vitals reviewed. General appearance: alert and cooperative with exam  HEENT: atraumatic, eyes with clear conjunctiva and normal lids, pupils and irises normal, external ears and nose are normal, lips normal.  Neck without masses, lympadenopathy, bruit, thyroid normal  Lungs: Patient in no acute respiratory distress, no increased work of breathing, \"clear to auscultation bilaterally\" without rales, rhonchi or wheezes  Heart: regular rate and rhythm, S1, S2 normal, no murmur, click, rub or gallop  Abdomen: soft, non-tender; non-distended normal bowel sounds no masses, no organomegaly  Extremities: notender Left elbow with erythema and edema. extremities normal, atraumatic, no cyanosis or edema  Neurologic: No focal neurologic deficits, normal sensation, alert and oriented, affect and mood appropriate. CN II-XII Intact  Skin: Skin color, texture, turgor normal. No rashes or lesions        Assessment/plan:     Patient Active Problem List    Diagnosis Date Noted    Olecranon bursitis of left elbow 04/23/2019    Hypertension     Hyperlipidemia     GERD (gastroesophageal reflux disease)     Diabetes mellitus ([facility] [address]      # History of Type 2 Diabetes Mellitus   · - Uncontrolled  · - Hemoglobin A1c 9.4% (04/21/2019)  · - Reported Home regimen include,   · --> Metformin  · - Holding home PO Med while inpatient  ·   · Inpatient Regimens to include;  · - Increase basal - Insulin Glargine (Lantus) 25 units subcu nightly  · - Increase pre-meal - Insulin Lispro (Humalog) 6 units subcu pre-meal 3 times a day  · - Insulin Lispro (Humalog) on a low dose sliding scale  · - Continue to Monitor POC glucose, and adjusts insulin regimen accordingly based on daily insulin requirement.  Cellulitis of left elbow 04/20/2019     Olecranon bursitis of left elbow and Left elbow cellulitis  · Reportedly from recent ?  Left open procedure   · Presenting initially reportedly with a Left olecranon bursa swelling  · Initial x-ray of left elbow: Impression: \"Extensive soft tissue swelling over the posterior olecranon region compatible with cellulitis and possible olecranon bursitis. \"   · Afebrile, with leukocytosis (Initial WBC of 18.1)  · Leukocytosis improving  · Elevated C-reactive protein  · Blood cultures no growth to date  · Orthopedics surgery following - appreciate recommendations  · No indication for surgical intervention at this time    · CT Left Elbow. -04/22/2019 - Findings;  · \"There is a large fluid collection identified in the olecranon bursa compatible with bursitis measuring 6.4 x 3.4 x 8.5 cm. Cyst the fluid collection is low attenuation suggesting simple fluid, old hematoma as well as infected fluid not excluded. There is no abnormal gas or complicating features associated with the collection. Additionally there is a large amount of subcutaneous edema extending diffusely along the proximal forearm as well as the distal arm\"    · S/p Bursa Aspiration - 04/24/2019  · Aspirate sent for culture, cell count, crystals   · Culture of the Aspirate to Guide Antibiotic therapy  · Continue Vancomycin, pending Cx  · Anticipate 14-21 day Course of Antibiotic          Depression with suicidal ideation 02/10/2019         Principal Problem:    Cellulitis of left elbow  Active Problems:    Hypertension    Hyperlipidemia    GERD (gastroesophageal reflux disease)    Diabetes mellitus (HCC)    Olecranon bursitis of left elbow  Resolved Problems:    * No resolved hospital problems. *                Continue management of other chronic medical conditions - see above and orders.           Advance Directive: Full Code    DIET GENERAL;     DVT prophylaxis: Enoxaparin    Consults Made:   IP CONSULT TO ORTHOPEDIC SURGERY  IP CONSULT TO ORTHOPEDIC SURGERY  PHARMACY TO DOSE VANCOMYCIN    Discharge planning: tbd       Electronically signed by   Shanon Sandhu,   Internal Medicine Hospitalist   4/25/2019 3:08 PM

## 2019-04-25 NOTE — PROGRESS NOTES
Note:    I returned this afternoon to perform bedside incision and drainage on left elbow. Before I arrived, his abscess had partially ruptured already and   Expressed a large amount of purulence. Nonetheless, after a sterile prep, I incised his elbow an additional 10 mm using a #11 scalpel. No anesthetic was required. About 5 cc of additional   Purulence was expressed. He tolerated the procedure without complication. He states he feels much better since wound evacuated. Begin dressing changes TID. Upon d/c, he needs to followup with his primary orthopedist, Dr. Yaya Nguyen.           Sagar Walters, MPAS, PA-C

## 2019-04-25 NOTE — PROGRESS NOTES
Orthopedic Surgery Progress Note    Nguyen Hightower  4/25/2019      Subjective:     Systemic or Specific Complaints:     Pain is much better today. Objective:     Patient Vitals for the past 24 hrs:   BP Temp Temp src Pulse Resp SpO2 Weight   04/25/19 0614 127/79 96.8 °F (36 °C) Temporal 61 20 93 % --   04/25/19 0414 -- -- -- -- -- -- 214 lb 12.8 oz (97.4 kg)   04/24/19 1859 105/66 97.9 °F (36.6 °C) Temporal 75 18 94 % --       Left upper  General: alert, appears stated age and cooperative   Wound: None. Dressing: clean, dry, and intact   Extremity: Distal NVI. Swollen, red, warm bursa left elbow. Minimally Tender to palpation. Two pustular heads noted this am.             DVT Exam: No evidence of DVT seen on physical exam.                   Data Review:  Recent Labs     04/24/19  0423 04/25/19  0103   HGB 13.6* 13.7*     Recent Labs     04/25/19  0103   *   K 4.2   CREATININE 0.7       Assessment:     Cellulitis, olecranon bursitis, L arm. Plan:      1:  DVT prophylaxis, ICE, elevate  2:  Pain control  3:  Physical therapy/Occupational therapy  4:  No rush for operative management given clinical improvement. Will discuss with Dr. Tiffanie Suarez.     5: Weight bearing as tolerated, KORI.        Electronically signed by Cecilia Garcia PA-C on 4/25/2019 at 7:19 AM

## 2019-04-25 NOTE — PROGRESS NOTES
Pharmacy Vancomycin Consult     Vancomycin Day: 3  Current Dosing: Vancomycin 1500 mg every 12 hours    Temp max:  97.9    Recent Labs     04/24/19  0423 04/25/19  0103   BUN 9 9       Recent Labs     04/24/19  0423 04/25/19  0103   CREATININE 0.7 0.7       Recent Labs     04/24/19  0423 04/25/19  0103   WBC 14.8* 16.7*         Intake/Output Summary (Last 24 hours) at 4/25/2019 1449  Last data filed at 4/25/2019 1336  Gross per 24 hour   Intake 1684 ml   Output --   Net 1684 ml       Culture Date Source Results   04/20/19 Blood No growth   04/24/19 Synovial fluid streptococcus            Ht Readings from Last 1 Encounters:   04/20/19 5' 7\" (1.702 m)        Wt Readings from Last 1 Encounters:   04/25/19 214 lb 12.8 oz (97.4 kg)         Body mass index is 33.64 kg/m². Estimated Creatinine Clearance: 137 mL/min (based on SCr of 0.7 mg/dL).     Trough: 6.5 darwn 1.5 hours late    Assessment/Plan: Change Vancomycin IV to 1250 mg every 8 hours    Electronically signed by Rylee Arevalo [facility] on 4/25/2019 at 2:49 PM

## 2019-04-25 NOTE — PLAN OF CARE
Problem: Pain:  Goal: Pain level will decrease  Description  Pain level will decrease  4/25/2019 1141 by Laura Nunez RN  Outcome: Ongoing  4/25/2019 0231 by Ibeth Jordan RN  Outcome: Ongoing  Goal: Control of acute pain  Description  Control of acute pain  4/25/2019 1141 by Laura Nunez RN  Outcome: Ongoing  4/25/2019 0231 by Ibeth Jordan RN  Outcome: Ongoing  Goal: Control of chronic pain  Description  Control of chronic pain  4/25/2019 1141 by Laura Nunez RN  Outcome: Ongoing  4/25/2019 0231 by Ibeth Jordan RN  Outcome: Ongoing     Problem: Discharge Planning:  Goal: Discharged to appropriate level of care  Description  Discharged to appropriate level of care  4/25/2019 1141 by Laura Nunez RN  Outcome: Ongoing  4/25/2019 0231 by Ibeth Jordan RN  Outcome: Ongoing     Problem:  Body Temperature - Imbalanced:  Goal: Ability to maintain a body temperature in the normal range will improve  Description  Ability to maintain a body temperature in the normal range will improve  4/25/2019 1141 by Laura Nunez RN  Outcome: Ongoing  4/25/2019 0231 by Ibeth Jordan RN  Outcome: Ongoing     Problem: Mobility - Impaired:  Goal: Mobility will improve to maximum level  Description  Mobility will improve to maximum level  4/25/2019 1141 by Laura Nunez RN  Outcome: Ongoing  4/25/2019 0231 by Ibeth Jordan RN  Outcome: Ongoing     Problem: Skin Integrity - Impaired:  Goal: Will show no infection signs and symptoms  Description  Will show no infection signs and symptoms  4/25/2019 1141 by Laura Nunez RN  Outcome: Ongoing  4/25/2019 0231 by Ibeth Jordan RN  Outcome: Ongoing  Goal: Absence of new skin breakdown  Description  Absence of new skin breakdown  4/25/2019 1141 by Laura Nunez RN  Outcome: Ongoing  4/25/2019 0231 by Ibeth Jordan RN  Outcome: Ongoing     Problem: Serum Glucose Level - Abnormal:  Goal: Ability to maintain appropriate glucose levels will improve  Description  Ability to maintain appropriate glucose levels will improve  4/25/2019 1141 by Norma Torres RN  Outcome: Ongoing  4/25/2019 0231 by Eb Baig RN  Outcome: Ongoing     Problem: Sensory Perception - Impaired:  Goal: Ability to maintain a stable neurologic state will improve  Description  Ability to maintain a stable neurologic state will improve  4/25/2019 1141 by Norma Torres RN  Outcome: Ongoing  4/25/2019 0231 by Eb Baig RN  Outcome: Ongoing     Problem: Falls - Risk of:  Goal: Will remain free from falls  Description  Will remain free from falls  4/25/2019 1141 by Norma Torres RN  Outcome: Ongoing  4/25/2019 0231 by Eb Baig RN  Outcome: Ongoing  Goal: Absence of physical injury  Description  Absence of physical injury  4/25/2019 1141 by Norma Torres RN  Outcome: Ongoing  4/25/2019 0231 by Eb Baig RN  Outcome: Ongoing

## 2019-04-26 LAB
ANION GAP SERPL CALCULATED.3IONS-SCNC: 12 MMOL/L (ref 7–19)
BASOPHILS ABSOLUTE: 0.2 K/UL (ref 0–0.2)
BASOPHILS RELATIVE PERCENT: 1.2 % (ref 0–1)
BUN BLDV-MCNC: 10 MG/DL (ref 6–20)
C-REACTIVE PROTEIN: 8.08 MG/DL (ref 0–0.5)
CALCIUM SERPL-MCNC: 9.7 MG/DL (ref 8.6–10)
CHLORIDE BLD-SCNC: 96 MMOL/L (ref 98–111)
CO2: 29 MMOL/L (ref 22–29)
CREAT SERPL-MCNC: 0.8 MG/DL (ref 0.5–1.2)
EOSINOPHILS ABSOLUTE: 0.6 K/UL (ref 0–0.6)
EOSINOPHILS RELATIVE PERCENT: 4.7 % (ref 0–5)
GFR NON-AFRICAN AMERICAN: >60
GLUCOSE BLD-MCNC: 100 MG/DL (ref 70–99)
GLUCOSE BLD-MCNC: 131 MG/DL (ref 70–99)
GLUCOSE BLD-MCNC: 239 MG/DL (ref 70–99)
GLUCOSE BLD-MCNC: 250 MG/DL (ref 70–99)
GLUCOSE BLD-MCNC: 268 MG/DL (ref 74–109)
HCT VFR BLD CALC: 40.4 % (ref 42–52)
HEMOGLOBIN: 13.3 G/DL (ref 14–18)
LACTIC ACID: 2.2 MMOL/L (ref 0.5–1.9)
LYMPHOCYTES ABSOLUTE: 4.8 K/UL (ref 1.1–4.5)
LYMPHOCYTES RELATIVE PERCENT: 36.6 % (ref 20–40)
MCH RBC QN AUTO: 29.6 PG (ref 27–31)
MCHC RBC AUTO-ENTMCNC: 32.9 G/DL (ref 33–37)
MCV RBC AUTO: 90 FL (ref 80–94)
MONOCYTES ABSOLUTE: 0.8 K/UL (ref 0–0.9)
MONOCYTES RELATIVE PERCENT: 5.9 % (ref 0–10)
NEUTROPHILS ABSOLUTE: 6.5 K/UL (ref 1.5–7.5)
NEUTROPHILS RELATIVE PERCENT: 49.5 % (ref 50–65)
PDW BLD-RTO: 12.4 % (ref 11.5–14.5)
PERFORMED ON: ABNORMAL
PLATELET # BLD: 461 K/UL (ref 130–400)
PMV BLD AUTO: 10.2 FL (ref 9.4–12.4)
POTASSIUM SERPL-SCNC: 4 MMOL/L (ref 3.5–5)
RBC # BLD: 4.49 M/UL (ref 4.7–6.1)
SODIUM BLD-SCNC: 137 MMOL/L (ref 136–145)
WBC # BLD: 13.2 K/UL (ref 4.8–10.8)

## 2019-04-26 PROCEDURE — 82948 REAGENT STRIP/BLOOD GLUCOSE: CPT

## 2019-04-26 PROCEDURE — 6360000002 HC RX W HCPCS: Performed by: INTERNAL MEDICINE

## 2019-04-26 PROCEDURE — 2580000003 HC RX 258: Performed by: INTERNAL MEDICINE

## 2019-04-26 PROCEDURE — 83605 ASSAY OF LACTIC ACID: CPT

## 2019-04-26 PROCEDURE — 6370000000 HC RX 637 (ALT 250 FOR IP): Performed by: INTERNAL MEDICINE

## 2019-04-26 PROCEDURE — 36415 COLL VENOUS BLD VENIPUNCTURE: CPT

## 2019-04-26 PROCEDURE — 86140 C-REACTIVE PROTEIN: CPT

## 2019-04-26 PROCEDURE — 80048 BASIC METABOLIC PNL TOTAL CA: CPT

## 2019-04-26 PROCEDURE — 85025 COMPLETE CBC W/AUTO DIFF WBC: CPT

## 2019-04-26 PROCEDURE — 99232 SBSQ HOSP IP/OBS MODERATE 35: CPT | Performed by: HOSPITALIST

## 2019-04-26 PROCEDURE — 1210000000 HC MED SURG R&B

## 2019-04-26 RX ADMIN — INSULIN LISPRO 2 UNITS: 100 INJECTION, SOLUTION INTRAVENOUS; SUBCUTANEOUS at 21:33

## 2019-04-26 RX ADMIN — INSULIN LISPRO 6 UNITS: 100 INJECTION, SOLUTION INTRAVENOUS; SUBCUTANEOUS at 12:49

## 2019-04-26 RX ADMIN — PRAVASTATIN SODIUM 80 MG: 20 TABLET ORAL at 20:56

## 2019-04-26 RX ADMIN — NAPROXEN 500 MG: 500 TABLET ORAL at 17:56

## 2019-04-26 RX ADMIN — VANCOMYCIN HYDROCHLORIDE 1250 MG: 10 INJECTION, POWDER, LYOPHILIZED, FOR SOLUTION INTRAVENOUS at 13:18

## 2019-04-26 RX ADMIN — ENOXAPARIN SODIUM 40 MG: 40 INJECTION SUBCUTANEOUS at 08:40

## 2019-04-26 RX ADMIN — INSULIN LISPRO 2 UNITS: 100 INJECTION, SOLUTION INTRAVENOUS; SUBCUTANEOUS at 12:49

## 2019-04-26 RX ADMIN — PANTOPRAZOLE SODIUM 40 MG: 40 TABLET, DELAYED RELEASE ORAL at 06:17

## 2019-04-26 RX ADMIN — NAPROXEN 500 MG: 500 TABLET ORAL at 08:39

## 2019-04-26 RX ADMIN — INSULIN LISPRO 6 UNITS: 100 INJECTION, SOLUTION INTRAVENOUS; SUBCUTANEOUS at 08:38

## 2019-04-26 RX ADMIN — CITALOPRAM HYDROBROMIDE 40 MG: 40 TABLET ORAL at 08:39

## 2019-04-26 RX ADMIN — LAMOTRIGINE 100 MG: 100 TABLET ORAL at 20:56

## 2019-04-26 RX ADMIN — INSULIN LISPRO 6 UNITS: 100 INJECTION, SOLUTION INTRAVENOUS; SUBCUTANEOUS at 17:57

## 2019-04-26 RX ADMIN — INSULIN GLARGINE 25 UNITS: 100 INJECTION, SOLUTION SUBCUTANEOUS at 20:56

## 2019-04-26 RX ADMIN — CARVEDILOL 25 MG: 25 TABLET, FILM COATED ORAL at 08:39

## 2019-04-26 RX ADMIN — Medication 10 ML: at 20:56

## 2019-04-26 RX ADMIN — CARVEDILOL 25 MG: 25 TABLET, FILM COATED ORAL at 17:56

## 2019-04-26 RX ADMIN — LAMOTRIGINE 100 MG: 100 TABLET ORAL at 08:40

## 2019-04-26 ASSESSMENT — PAIN SCALES - GENERAL
PAINLEVEL_OUTOF10: 0
PAINLEVEL_OUTOF10: 0

## 2019-04-26 NOTE — PROGRESS NOTES
Nutrition Assessment    Type and Reason for Visit: Initial    Nutrition Recommendations: modify current diet to include Carb Control and double protein portions    Nutrition Assessment: Following for LOS x 6 days. Modifying current diet to include Carb control and adding double protein portions for increased needs. Pt is at nutritional risk due to wound    Malnutrition Assessment:  · Malnutrition Status: At risk for malnutrition  · Context: Acute illness or injury  · Findings of the 6 clinical characteristics of malnutrition (Minimum of 2 out of 6 clinical characteristics is required to make the diagnosis of moderate or severe Protein Calorie Malnutrition based on AND/ASPEN Guidelines):  1. Energy Intake- ,      2. Weight Loss-No significant weight loss,    3. Fat Loss-No significant subcutaneous fat loss,    4. Muscle Loss-No significant muscle mass loss,    5. Fluid Accumulation-No significant fluid accumulation,    6.  Strength-Not measured    Nutrition Risk Level:  Moderate    Nutrient Needs:  · Estimated Daily Total Kcal: 1780-2812 kcals (15-18kcals/kg)  · Estimated Daily Protein (g): 97-117g  · Estimated Daily Total Fluid (ml/day): 7517-7419 ml (15-25ml/kg)    Nutrition Diagnosis:   · Problem: Inadequate oral intake, Altered nutrition-related lab values  · Etiology: related to Increased demand for energy/nutrients, Endocrine dysfunction     Signs and symptoms:  as evidenced by Presence of wounds, Lab values    Objective Information:  · Nutrition-Focused Physical Findings: well nourished apperaing gentleman  · Wound Type: Pressure Ulcer  · Current Nutrition Therapies:  · Oral Diet Orders: General   · Oral Diet intake: %  · Oral Nutrition Supplement (ONS) Orders: None    · Anthropometric Measures:  · Ht: 5' 7\" (170.2 cm)   · Current Body Wt: 214 lb 12 oz (97.4 kg)  · Admission Body Wt: 211 lb 3 oz (95.8 kg)  · Ideal Body Wt: 148 lb (67.1 kg), % Ideal Body 144.6%  · BMI Classification: BMI 30.0 - 34.9 Obese Class I    Nutrition Interventions:   Modify current diet  Continued Inpatient Monitoring    Nutrition Evaluation:   · Evaluation: Goals set   · Goals: po intake 75% or greater.   Accuc[place]k;s 175 or less, weight stable    · Monitoring: Meal Intake, Diet Tolerance, Skin Integrity, Wound Healing, Weight, Pertinent Labs      Electronically signed by Richard Sanchez MS, RD, LD on 4/26/19 at 1:30 PM    Contact Number: [phone]

## 2019-04-26 NOTE — PROGRESS NOTES
Contacted pharmacy and notified patient now agreeing to take Vancomycin; requesting times and trough be adjusted.   Electronically signed by Jayden Santizo RN on 4/26/2019 at 1:38 PM

## 2019-04-26 NOTE — PROGRESS NOTES
After speaking with family, patient stated he is now agreeable to IV access and antibiotics.   Electronically signed by Tigre Castillo RN on 4/26/2019 at 1:02 PM

## 2019-04-26 NOTE — PLAN OF CARE
Nutrition Problem: Inadequate oral intake, Altered nutrition-related lab values  Intervention: Food and/or Nutrient Delivery: Modify current diet  Nutritional Goals: po intake 75% or greater.   Accuchek;s 175 or less, weight stable

## 2019-04-26 NOTE — PROGRESS NOTES
Patient refusing IV access and refusing antibiotics. I explained to patient the importance of antibiotics to treat the infection as well as to decrease re-occurrence, exemplifying that this is the second occurrence of similar infection to elbow according to patient. I also explained that failure to treat infections with antibiotics can lead to continued infection, spread of infection, sepsis and death. Patient voiced understanding to all of this and continued to state he did not want an IV and did not want the IV antibiotics. I explained that the antibiotics are the physicians order and are ordered to treat his infection, and that not receiving the antibiotics is choosing against the medical advice of the physician. He continued to voice understanding and continued to refuse.   Electronically signed by Annemarie Diaz RN on 4/26/2019 at 8:50 AM

## 2019-04-27 LAB
ANAEROBIC CULTURE: ABNORMAL
ANION GAP SERPL CALCULATED.3IONS-SCNC: 11 MMOL/L (ref 7–19)
ATYPICAL LYMPHOCYTE RELATIVE PERCENT: 3 % (ref 0–8)
BASOPHILS ABSOLUTE: 0 K/UL (ref 0–0.2)
BASOPHILS RELATIVE PERCENT: 0 % (ref 0–1)
BODY FLUID CULTURE, STERILE: ABNORMAL
BODY FLUID CULTURE, STERILE: ABNORMAL
BUN BLDV-MCNC: 14 MG/DL (ref 6–20)
CALCIUM SERPL-MCNC: 9.3 MG/DL (ref 8.6–10)
CHLORIDE BLD-SCNC: 96 MMOL/L (ref 98–111)
CO2: 28 MMOL/L (ref 22–29)
CREAT SERPL-MCNC: 0.7 MG/DL (ref 0.5–1.2)
EOSINOPHILS ABSOLUTE: 0.57 K/UL (ref 0–0.6)
EOSINOPHILS RELATIVE PERCENT: 4 % (ref 0–5)
GFR NON-AFRICAN AMERICAN: >60
GLUCOSE BLD-MCNC: 187 MG/DL (ref 70–99)
GLUCOSE BLD-MCNC: 189 MG/DL (ref 70–99)
GLUCOSE BLD-MCNC: 210 MG/DL (ref 70–99)
GLUCOSE BLD-MCNC: 245 MG/DL (ref 70–99)
GLUCOSE BLD-MCNC: 289 MG/DL (ref 74–109)
GRAM STAIN RESULT: ABNORMAL
HCT VFR BLD CALC: 41 % (ref 42–52)
HEMOGLOBIN: 13.5 G/DL (ref 14–18)
LYMPHOCYTES ABSOLUTE: 5.5 K/UL (ref 1.1–4.5)
LYMPHOCYTES RELATIVE PERCENT: 36 % (ref 20–40)
MCH RBC QN AUTO: 29.8 PG (ref 27–31)
MCHC RBC AUTO-ENTMCNC: 32.9 G/DL (ref 33–37)
MCV RBC AUTO: 90.5 FL (ref 80–94)
MONOCYTES ABSOLUTE: 0 K/UL (ref 0–0.9)
MONOCYTES RELATIVE PERCENT: 0 % (ref 0–10)
NEUTROPHILS ABSOLUTE: 8.1 K/UL (ref 1.5–7.5)
NEUTROPHILS RELATIVE PERCENT: 57 % (ref 50–65)
ORGANISM: ABNORMAL
PDW BLD-RTO: 12.1 % (ref 11.5–14.5)
PERFORMED ON: ABNORMAL
PLATELET # BLD: 498 K/UL (ref 130–400)
PLATELET SLIDE REVIEW: ABNORMAL
PMV BLD AUTO: 10.3 FL (ref 9.4–12.4)
POTASSIUM SERPL-SCNC: 4.3 MMOL/L (ref 3.5–5)
RBC # BLD: 4.53 M/UL (ref 4.7–6.1)
SODIUM BLD-SCNC: 135 MMOL/L (ref 136–145)
WBC # BLD: 14.2 K/UL (ref 4.8–10.8)

## 2019-04-27 PROCEDURE — 1210000000 HC MED SURG R&B

## 2019-04-27 PROCEDURE — 82948 REAGENT STRIP/BLOOD GLUCOSE: CPT

## 2019-04-27 PROCEDURE — 6370000000 HC RX 637 (ALT 250 FOR IP): Performed by: INTERNAL MEDICINE

## 2019-04-27 PROCEDURE — 6360000002 HC RX W HCPCS: Performed by: INTERNAL MEDICINE

## 2019-04-27 PROCEDURE — 2580000003 HC RX 258: Performed by: INTERNAL MEDICINE

## 2019-04-27 PROCEDURE — 6370000000 HC RX 637 (ALT 250 FOR IP): Performed by: HOSPITALIST

## 2019-04-27 PROCEDURE — 36415 COLL VENOUS BLD VENIPUNCTURE: CPT

## 2019-04-27 PROCEDURE — 85025 COMPLETE CBC W/AUTO DIFF WBC: CPT

## 2019-04-27 PROCEDURE — 80048 BASIC METABOLIC PNL TOTAL CA: CPT

## 2019-04-27 RX ORDER — SULFAMETHOXAZOLE AND TRIMETHOPRIM 800; 160 MG/1; MG/1
1 TABLET ORAL EVERY 12 HOURS SCHEDULED
Status: DISCONTINUED | OUTPATIENT
Start: 2019-04-27 | End: 2019-04-27

## 2019-04-27 RX ORDER — CLINDAMYCIN HYDROCHLORIDE 150 MG/1
300 CAPSULE ORAL EVERY 8 HOURS SCHEDULED
Status: DISCONTINUED | OUTPATIENT
Start: 2019-04-27 | End: 2019-04-28 | Stop reason: HOSPADM

## 2019-04-27 RX ADMIN — NAPROXEN 500 MG: 500 TABLET ORAL at 08:10

## 2019-04-27 RX ADMIN — INSULIN LISPRO 6 UNITS: 100 INJECTION, SOLUTION INTRAVENOUS; SUBCUTANEOUS at 12:14

## 2019-04-27 RX ADMIN — CARVEDILOL 25 MG: 25 TABLET, FILM COATED ORAL at 08:12

## 2019-04-27 RX ADMIN — INSULIN LISPRO 2 UNITS: 100 INJECTION, SOLUTION INTRAVENOUS; SUBCUTANEOUS at 08:12

## 2019-04-27 RX ADMIN — CARVEDILOL 25 MG: 25 TABLET, FILM COATED ORAL at 17:07

## 2019-04-27 RX ADMIN — VANCOMYCIN HYDROCHLORIDE 1250 MG: 10 INJECTION, POWDER, LYOPHILIZED, FOR SOLUTION INTRAVENOUS at 17:08

## 2019-04-27 RX ADMIN — VANCOMYCIN HYDROCHLORIDE 1250 MG: 10 INJECTION, POWDER, LYOPHILIZED, FOR SOLUTION INTRAVENOUS at 08:10

## 2019-04-27 RX ADMIN — PRAVASTATIN SODIUM 80 MG: 20 TABLET ORAL at 22:33

## 2019-04-27 RX ADMIN — ENOXAPARIN SODIUM 40 MG: 40 INJECTION SUBCUTANEOUS at 08:10

## 2019-04-27 RX ADMIN — VANCOMYCIN HYDROCHLORIDE 1250 MG: 10 INJECTION, POWDER, LYOPHILIZED, FOR SOLUTION INTRAVENOUS at 02:24

## 2019-04-27 RX ADMIN — INSULIN GLARGINE 25 UNITS: 100 INJECTION, SOLUTION SUBCUTANEOUS at 22:34

## 2019-04-27 RX ADMIN — CLINDAMYCIN HYDROCHLORIDE 300 MG: 150 CAPSULE ORAL at 22:33

## 2019-04-27 RX ADMIN — INSULIN LISPRO 1 UNITS: 100 INJECTION, SOLUTION INTRAVENOUS; SUBCUTANEOUS at 22:34

## 2019-04-27 RX ADMIN — INSULIN LISPRO 1 UNITS: 100 INJECTION, SOLUTION INTRAVENOUS; SUBCUTANEOUS at 17:08

## 2019-04-27 RX ADMIN — LAMOTRIGINE 100 MG: 100 TABLET ORAL at 22:34

## 2019-04-27 RX ADMIN — CLINDAMYCIN HYDROCHLORIDE 300 MG: 150 CAPSULE ORAL at 12:12

## 2019-04-27 RX ADMIN — INSULIN LISPRO 1 UNITS: 100 INJECTION, SOLUTION INTRAVENOUS; SUBCUTANEOUS at 12:10

## 2019-04-27 RX ADMIN — SULFAMETHOXAZOLE AND TRIMETHOPRIM 1 TABLET: 800; 160 TABLET ORAL at 10:43

## 2019-04-27 RX ADMIN — NAPROXEN 500 MG: 500 TABLET ORAL at 17:07

## 2019-04-27 RX ADMIN — Medication 10 ML: at 10:00

## 2019-04-27 RX ADMIN — LAMOTRIGINE 100 MG: 100 TABLET ORAL at 08:10

## 2019-04-27 RX ADMIN — PANTOPRAZOLE SODIUM 40 MG: 40 TABLET, DELAYED RELEASE ORAL at 06:10

## 2019-04-27 RX ADMIN — CITALOPRAM HYDROBROMIDE 40 MG: 40 TABLET ORAL at 08:10

## 2019-04-27 RX ADMIN — Medication 10 ML: at 22:34

## 2019-04-27 RX ADMIN — INSULIN LISPRO 6 UNITS: 100 INJECTION, SOLUTION INTRAVENOUS; SUBCUTANEOUS at 08:10

## 2019-04-27 RX ADMIN — INSULIN LISPRO 6 UNITS: 100 INJECTION, SOLUTION INTRAVENOUS; SUBCUTANEOUS at 17:07

## 2019-04-27 ASSESSMENT — PAIN SCALES - GENERAL
PAINLEVEL_OUTOF10: 0

## 2019-04-27 NOTE — PROGRESS NOTES
Progress Note:     Cannot find patient. I understand he is somewhere outside smoking. Just had conversation with Dr. Matthew Pryor. Dr. Matthew Pryor says Mr. Moses Quintana is doing well today. Plan for d/c likely today on oral abx. ..  possibly Clindamycin. Continue dressing changes TID. F/u with primary orthopedist, Dr. Yaya Nguyen in [facility].        Sagar Walters, EMANUELS, PA-C

## 2019-04-27 NOTE — PROGRESS NOTES
[facility] Progress Note    Patient:  Sirisha Esquivel  YOB: 1965  Date of Service: 4/27/2019  MRN: [id number]   Acct: [de-identified]   Primary Care Physician: ALENA Tan  Advance Directive: Full Code  Admit Date: 4/20/2019       Hospital Day: 7      CHIEF COMPLAINT:     Chief Complaint   Patient presents with    Arm Swelling       4/27/2019 11:01 AM  Subjective / Interval History:   4/27: pt seen and examined. Decided to stay and continue with IV abx. Wbc increased slightly today received dose last night of Vanco. Otherwise pain is controlled. 4/26: Pt seen and exmained. Refusing to take IV abx bc of getting poked and bruised. Pt is Contemplating on deciding whether to leave AMA.      04/25: pt seen and examined. Pain is somewhat controlled. No new complaints. Awaiting I/D    04/24/2019  Doing well. Status post bursa aspiration left elbow. No acute overnight event reported patient denies any acute distress or complaints at this time. 04/23/2019  No acute overnight event reported. Patient endorses persistently worsening non tender left elbow swelling. Dinies any other acute complaints or distress at this time. 04/22/2019  Reported worsening Left Elbow swelling. Pain is fairly controlled. No acute overnight event or any other acute complaints reported. Cumulative Hospital History:    As per Initial admission HPI 4/20/2019, quoted below; \"The patient is a 47 y.o. male presents with worsening left elbow pain. He had some surgery on the elbow 3 months ago, but is unsure what he had done (?olecranon bursa resection? ). He has developed swelling, redness, and warmth of the entire left arm\"    Status post aspiration of bursa  - 04/24/2019. Aspirate sent for culture, cell count, crystals. Review of Systems:   Review of Systems  ROS: 14 point review of systems is negative except as specifically addressed above. DIET CARB CONTROL;   No intake or output data in the 24 hours ending 04/27/19 1101    Medications:   dextrose       Current Facility-Administered Medications   Medication Dose Route Frequency Provider Last Rate Last Dose    sulfamethoxazole-trimethoprim (BACTRIM DS;SEPTRA DS) 800-160 MG per tablet 1 tablet  1 tablet Oral 2 times per day Jay Alvarez MD   1 tablet at 04/27/19 1043    vancomycin (VANCOCIN) 1,250 mg in dextrose 5 % 250 mL IVPB  1,250 mg Intravenous Q8H Vignesh Chawla MD   Stopped at 04/27/19 0940    lidocaine PF 1 % injection 2 mL  2 mL Intradermal Once Vignesh Chawla MD        ethyl chloride spray   Topical PRN Vignesh Chawla MD        insulin glargine (LANTUS) injection vial 25 Units  25 Units Subcutaneous Nightly Vignesh Chawla MD   25 Units at 04/26/19 2056    insulin lispro (HUMALOG) injection vial 6 Units  6 Units Subcutaneous TID  Vignesh Chawla MD   6 Units at 04/27/19 0810    vancomycin (VANCOCIN) intermittent dosing (placeholder)   Other RX Placeholder Vignesh Chawla MD        glucose (GLUTOSE) 40 % oral gel 15 g  15 g Oral PRN Vignesh Chawla MD        dextrose 50 % solution 12.5 g  12.5 g Intravenous PRN Vignesh Chawla MD        glucagon (rDNA) injection 1 mg  1 mg Intramuscular PRN Vignesh Chawla MD        dextrose 5 % solution  100 mL/hr Intravenous PRN Vignesh Chawla MD        insulin lispro (HUMALOG) injection vial 0-6 Units  0-6 Units Subcutaneous TID  Vignesh Chawla MD   2 Units at 04/27/19 0194    insulin lispro (HUMALOG) injection vial 0-3 Units  0-3 Units Subcutaneous Nightly Vignesh Chawla MD   2 Units at 04/26/19 2133    carvedilol (COREG) tablet 25 mg  25 mg Oral BID  Ionia Hanson, DO   25 mg at 04/27/19 5247    citalopram (CELEXA) tablet 40 mg  40 mg Oral Daily Ionia Hanson, DO   40 mg at 04/27/19 0810    lamoTRIgine (LAMICTAL) tablet 100 mg  100 mg Oral BID Ionia Hanson, DO   100 mg at 04/27/19 0810    naproxen (NAPROSYN) tablet 500 mg  500 mg Oral BID WC Merlinda Penning, DO   500 mg at 04/27/19 0810    pantoprazole (PROTONIX) tablet 40 mg  40 mg Oral QAM AC Merlinda Penning, DO   40 mg at 04/27/19 6515    pravastatin (PRAVACHOL) tablet 80 mg  80 mg Oral Nightly Merlinda Penning, DO   80 mg at 04/26/19 2056    QUEtiapine (SEROQUEL) tablet 50 mg  50 mg Oral Nightly Merlinda Penning, DO   50 mg at 04/23/19 2215    tiZANidine (ZANAFLEX) tablet 4 mg  4 mg Oral Q6H PRN Merlinda Penning, DO   4 mg at 04/24/19 0110    sodium chloride flush 0.9 % injection 10 mL  10 mL Intravenous 2 times per day Merlinda Penning, DO   10 mL at 04/27/19 1000    sodium chloride flush 0.9 % injection 10 mL  10 mL Intravenous PRN Merlinda Penning, DO        magnesium hydroxide (MILK OF MAGNESIA) 400 MG/5ML suspension 30 mL  30 mL Oral Daily PRN Merlinda Penning, DO        ondansetron [facility]) injection 4 mg  4 mg Intravenous Q6H PRN Merlinda Penning, DO        enoxaparin (LOVENOX) injection 40 mg  40 mg Subcutaneous Daily Merlinda Penning, DO   40 mg at 04/27/19 0810    acetaminophen (TYLENOL) tablet 650 mg  650 mg Oral Q4H PRN Merlinda Penning, DO   650 mg at 04/24/19 2330         dextrose        sulfamethoxazole-trimethoprim  1 tablet Oral 2 times per day    vancomycin  1,250 mg Intravenous Q8H    lidocaine PF  2 mL Intradermal Once    insulin glargine  25 Units Subcutaneous Nightly    insulin lispro  6 Units Subcutaneous TID     vancomycin (VANCOCIN) intermittent dosing (placeholder)   Other RX Placeholder    insulin lispro  0-6 Units Subcutaneous TID     insulin lispro  0-3 Units Subcutaneous Nightly    carvedilol  25 mg Oral BID     citalopram  40 mg Oral Daily    lamoTRIgine  100 mg Oral BID    naproxen  500 mg Oral BID     pantoprazole  40 mg Oral QAM AC    pravastatin  80 mg Oral Nightly    QUEtiapine  50 mg Oral Nightly    sodium chloride flush  10 mL Intravenous 2 times per day    enoxaparin 40 mg Subcutaneous Daily     ethyl chloride, glucose, dextrose, glucagon (rDNA), dextrose, tiZANidine, sodium chloride flush, magnesium hydroxide, ondansetron, acetaminophen  DIET CARB CONTROL;       Labs:     Recent Labs     04/25/19 0103 04/26/19 0252 04/27/19 0358   WBC 16.7* 13.2* 14.2*   RBC 4.47* 4.49* 4.53*   HGB 13.7* 13.3* 13.5*   HCT 40.9* 40.4* 41.0*   MCV 91.5 90.0 90.5   MCH 30.6 29.6 29.8   MCHC 33.5 32.9* 32.9*   * 461* 498*     Recent Labs     04/25/19 0103 04/26/19 0252 04/27/19 0358   * 137 135*   K 4.2 4.0 4.3   ANIONGAP 9 12 11   CL 98 96* 96*   CO2 28 29 28   BUN 9 10 14   CREATININE 0.7 0.8 0.7   GLUCOSE 154* 268* 289*   CALCIUM 9.6 9.7 9.3     No results for input(s): MG, PHOS in the last 72 hours. No results for input(s): AST, ALT, ALB, BILITOT, ALKPHOS, ALB in the last 72 hours. HgBA1c:  No components found for: HGBA1C  FLP:    Lab Results   Component Value Date    TRIG 185 02/11/2019    HDL 28 02/11/2019    LDLCALC 86 02/11/2019     TSH:    Lab Results   Component Value Date    TSH 2.150 02/11/2019     Troponin T: No results for input(s): TROPONINI in the last 72 hours. Pro-BNP: No results for input(s): BNP in the last 72 hours. INR: No results for input(s): INR in the last 72 hours. ABGs: No results found for: PHART, PO2ART, OJC1XDP  UA:No results for input(s): NITRITE, COLORU, PHUR, LABCAST, WBCUA, RBCUA, MUCUS, TRICHOMONAS, YEAST, BACTERIA, CLARITYU, SPECGRAV, LEUKOCYTESUR, UROBILINOGEN, BILIRUBINUR, BLOODU, GLUCOSEU, AMORPHOUS in the last 72 hours. Invalid input(s): Danna Dear      RAD:   Xr Elbow Left (min 3 Views)    Result Date: 4/20/2019  EXAMINATION: XR ELBOW LEFT (MIN 3 VIEWS) 4/20/2019 4:56 PM HISTORY: Left elbow swelling and pain. Report: 3 views of the left elbow were obtained. There are no comparison studies. No fracture or dislocation is identified. There is no joint effusion.  There is extensive soft tissue swelling posteriorly over the olecranon region. No soft tissue gas or radiopaque foreign bodies identified. 1. No acute osseous findings. 2. Extensive soft tissue swelling over the posterior olecranon region compatible with cellulitis and possible olecranon bursitis. Signed by Dr German Londono. Patti on 4/20/2019 4:57 PM    Narrative   EXAMINATION:  CT ELBOW LEFT WO CONTRAST  4/22/2019 3:07 PM   HISTORY: Elbow pain. Assess tendon tear and/or bursitis suspected. COMPARISON: 4/20/2019 elbow radiographs   There is a large olecranon bursitis. The fluid collection along the   olecranon measures approximate 6.4 x 3.4 x 8.5 cm. TECHNIQUE: Radiation dose equals  mGy cm. AUTOMATED EXPOSURE   CONTROL dose reduction technique was implemented. Thin section axial images were obtained. 2-D sagittal and coronal   reconstruction images were generated. FINDINGS:    There is a large fluid collection identified in the olecranon bursa   compatible with bursitis measuring 6.4 x 3.4 x 8.5 cm. Cyst the fluid   collection is low attenuation suggesting simple fluid, old hematoma as   well as infected fluid not excluded. There is no abnormal gas or   complicating features associated with the collection. Additionally there is a large amount of subcutaneous edema extending   diffusely along the proximal forearm as well as the distal arm. The bony structures of the elbow are maintained without fracture. There is no periostitis. There are no blastic or lytic lesions. There   is no evidence of osteomyelitis. Mild degenerative spurring along the   coronoid process observed. No definite intramuscular abnormality observed. There is no CT evidence of significant fat pad displacement or elbow   joint effusion. Evaluation of ligamentous and tendinous structures is suboptimal, MR   is suggested clinically indicated.       Impression   1. Large fluid collection the olecranon bursa differential   considerations include bursitis.    2. Diffuse superficial edema extending into the forearm as well as the   proximal arm. 3. No acute osseous abnormality. Signed by Dr Erum Daniel on 4/22/2019 3:17 PM             Objective:   Vitals: /78   Pulse 60   Temp 97.6 °F (36.4 °C) (Temporal)   Resp 18   Ht 5' 7\" (1.702 m)   Wt 216 lb 4 oz (98.1 kg)   SpO2 96%   BMI 33.87 kg/m²   24HR INTAKE/OUTPUT:    No intake or output data in the 24 hours ending 04/27/19 1101     Physical Exam   Nursing note and vitals reviewed. General appearance: alert and cooperative with exam  HEENT: atraumatic, eyes with clear conjunctiva and normal lids, pupils and irises normal, external ears and nose are normal, lips normal.  Neck without masses, lympadenopathy, bruit, thyroid normal  Lungs: Patient in no acute respiratory distress, no increased work of breathing, \"clear to auscultation bilaterally\" without rales, rhonchi or wheezes  Heart: regular rate and rhythm, S1, S2 normal, no murmur, click, rub or gallop  Abdomen: soft, non-tender; non-distended normal bowel sounds no masses, no organomegaly  Extremities: notender Left elbow with erythema and edema. extremities normal, atraumatic, no cyanosis or edema  Neurologic: No focal neurologic deficits, normal sensation, alert and oriented, affect and mood appropriate.  CN II-XII Intact  Skin: Skin color, texture, turgor normal. No rashes or lesions        Assessment/plan:     Patient Active Problem List    Diagnosis Date Noted    Olecranon bursitis of left elbow 04/23/2019    Hypertension     Hyperlipidemia     GERD (gastroesophageal reflux disease)     Diabetes mellitus ([facility] [address]      # History of Type 2 Diabetes Mellitus   · - Uncontrolled  · - Hemoglobin A1c 9.4% (04/21/2019)  · - Reported Home regimen include,   · --> Metformin  · - Holding home PO Med while inpatient  ·   · Inpatient Regimens to include;  · - Increase basal - Insulin Glargine (Lantus) 25 units subcu nightly  · - Increase pre-meal - Insulin Lispro (Humalog) 6 units subcu pre-meal 3 times a day  · - Insulin Lispro (Humalog) on a low dose sliding scale  · - Continue to Monitor POC glucose, and adjusts insulin regimen accordingly based on daily insulin requirement.  Cellulitis of left elbow 04/20/2019     Olecranon bursitis of left elbow and Left elbow cellulitis  · Reportedly from recent ? Left open procedure   · Presenting initially reportedly with a Left olecranon bursa swelling  · Initial x-ray of left elbow: Impression: \"Extensive soft tissue swelling over the posterior olecranon region compatible with cellulitis and possible olecranon bursitis. \"   · Afebrile, with leukocytosis (Initial WBC of 18.1)  · Leukocytosis improving  · Elevated C-reactive protein  · Blood cultures no growth to date  · Orthopedics surgery following - appreciate recommendations  · No indication for surgical intervention at this time    · CT Left Elbow. -04/22/2019 - Findings;  · \"There is a large fluid collection identified in the olecranon bursa compatible with bursitis measuring 6.4 x 3.4 x 8.5 cm. Cyst the fluid collection is low attenuation suggesting simple fluid, old hematoma as well as infected fluid not excluded. There is no abnormal gas or complicating features associated with the collection. Additionally there is a large amount of subcutaneous edema extending diffusely along the proximal forearm as well as the distal arm\"    · S/p Bursa Aspiration - 04/24/2019  · Aspirate sent for culture, cell count, crystals   · Culture of the Aspirate to Guide Antibiotic therapy  · Continue Vancomycin, pending Cx  · Anticipate 14-21 day Course of Antibiotic          Depression with suicidal ideation 02/10/2019         Principal Problem:    Cellulitis of left elbow  Active Problems:    Hypertension    Hyperlipidemia    GERD (gastroesophageal reflux disease)    Diabetes mellitus (HCC)    Olecranon bursitis of left elbow  Resolved Problems:    * No resolved hospital problems.  *          Pt advised to continue IV abx/ f/u on cultures for abx guidance- will likey dc on clindamycin    Ok to discharge from ortho standpoint        Continue management of other chronic medical conditions - see above and orders.           Advance Directive: Full Code    DIET CARB CONTROL;     DVT prophylaxis: Enoxaparin    Consults Made:   IP CONSULT TO ORTHOPEDIC SURGERY  IP CONSULT TO ORTHOPEDIC SURGERY  PHARMACY TO DOSE VANCOMYCIN    Discharge planning: likely tomorrow       Electronically signed by   Joy Velazquez,   Internal Medicine Hospitalist   4/27/2019 11:01 AM

## 2019-04-27 NOTE — PLAN OF CARE
Problem: Pain:  Goal: Pain level will decrease  Description  Pain level will decrease  4/27/2019 1301 by Carlitos Washburn RN  Outcome: Met This Shift  4/27/2019 0113 by Meryle Arbour, RN  Outcome: Ongoing  Goal: Control of acute pain  Description  Control of acute pain  4/27/2019 1301 by Carlitos Washburn RN  Outcome: Met This Shift  4/27/2019 0113 by Meryle Arbour, RN  Outcome: Ongoing  Goal: Control of chronic pain  Description  Control of chronic pain  4/27/2019 1301 by Carlitos Washburn RN  Outcome: Met This Shift  4/27/2019 0113 by Meryle Arbour, RN  Outcome: Ongoing

## 2019-04-28 VITALS
WEIGHT: 217.44 LBS | HEART RATE: 61 BPM | TEMPERATURE: 98.2 F | OXYGEN SATURATION: 95 % | HEIGHT: 67 IN | BODY MASS INDEX: 34.13 KG/M2 | RESPIRATION RATE: 16 BRPM | SYSTOLIC BLOOD PRESSURE: 121 MMHG | DIASTOLIC BLOOD PRESSURE: 68 MMHG

## 2019-04-28 LAB
ANION GAP SERPL CALCULATED.3IONS-SCNC: 12 MMOL/L (ref 7–19)
ATYPICAL LYMPHOCYTE RELATIVE PERCENT: 1 % (ref 0–8)
BANDED NEUTROPHILS RELATIVE PERCENT: 1 % (ref 0–5)
BASOPHILS ABSOLUTE: 0.5 K/UL (ref 0–0.2)
BASOPHILS RELATIVE PERCENT: 3 % (ref 0–1)
BUN BLDV-MCNC: 15 MG/DL (ref 6–20)
CALCIUM SERPL-MCNC: 9.4 MG/DL (ref 8.6–10)
CHLORIDE BLD-SCNC: 94 MMOL/L (ref 98–111)
CO2: 26 MMOL/L (ref 22–29)
CREAT SERPL-MCNC: 0.9 MG/DL (ref 0.5–1.2)
EOSINOPHILS ABSOLUTE: 0.46 K/UL (ref 0–0.6)
EOSINOPHILS RELATIVE PERCENT: 3 % (ref 0–5)
GFR NON-AFRICAN AMERICAN: >60
GLUCOSE BLD-MCNC: 183 MG/DL (ref 70–99)
GLUCOSE BLD-MCNC: 218 MG/DL (ref 70–99)
GLUCOSE BLD-MCNC: 291 MG/DL (ref 74–109)
HCT VFR BLD CALC: 42.7 % (ref 42–52)
HEMOGLOBIN: 14.3 G/DL (ref 14–18)
LYMPHOCYTES ABSOLUTE: 6.4 K/UL (ref 1.1–4.5)
LYMPHOCYTES RELATIVE PERCENT: 41 % (ref 20–40)
MCH RBC QN AUTO: 30.2 PG (ref 27–31)
MCHC RBC AUTO-ENTMCNC: 33.5 G/DL (ref 33–37)
MCV RBC AUTO: 90.1 FL (ref 80–94)
MONOCYTES ABSOLUTE: 0.6 K/UL (ref 0–0.9)
MONOCYTES RELATIVE PERCENT: 4 % (ref 0–10)
NEUTROPHILS ABSOLUTE: 7.3 K/UL (ref 1.5–7.5)
NEUTROPHILS RELATIVE PERCENT: 47 % (ref 50–65)
OVALOCYTES: ABNORMAL
PDW BLD-RTO: 12.1 % (ref 11.5–14.5)
PERFORMED ON: ABNORMAL
PERFORMED ON: ABNORMAL
PLATELET # BLD: 485 K/UL (ref 130–400)
PLATELET SLIDE REVIEW: ABNORMAL
PMV BLD AUTO: 10.6 FL (ref 9.4–12.4)
POTASSIUM SERPL-SCNC: 4.2 MMOL/L (ref 3.5–5)
RBC # BLD: 4.74 M/UL (ref 4.7–6.1)
SODIUM BLD-SCNC: 132 MMOL/L (ref 136–145)
VANCOMYCIN TROUGH: 12.8 UG/ML (ref 10–20)
WBC # BLD: 15.2 K/UL (ref 4.8–10.8)

## 2019-04-28 PROCEDURE — 6370000000 HC RX 637 (ALT 250 FOR IP): Performed by: INTERNAL MEDICINE

## 2019-04-28 PROCEDURE — 6360000002 HC RX W HCPCS: Performed by: INTERNAL MEDICINE

## 2019-04-28 PROCEDURE — 85025 COMPLETE CBC W/AUTO DIFF WBC: CPT

## 2019-04-28 PROCEDURE — 6370000000 HC RX 637 (ALT 250 FOR IP): Performed by: HOSPITALIST

## 2019-04-28 PROCEDURE — 2580000003 HC RX 258: Performed by: INTERNAL MEDICINE

## 2019-04-28 PROCEDURE — 80048 BASIC METABOLIC PNL TOTAL CA: CPT

## 2019-04-28 PROCEDURE — 82948 REAGENT STRIP/BLOOD GLUCOSE: CPT

## 2019-04-28 PROCEDURE — 36415 COLL VENOUS BLD VENIPUNCTURE: CPT

## 2019-04-28 PROCEDURE — 80202 ASSAY OF VANCOMYCIN: CPT

## 2019-04-28 RX ORDER — CLINDAMYCIN HYDROCHLORIDE 300 MG/1
300 CAPSULE ORAL EVERY 8 HOURS SCHEDULED
Qty: 30 CAPSULE | Refills: 0 | Status: SHIPPED | OUTPATIENT
Start: 2019-04-28 | End: 2019-05-08

## 2019-04-28 RX ADMIN — INSULIN LISPRO 6 UNITS: 100 INJECTION, SOLUTION INTRAVENOUS; SUBCUTANEOUS at 08:51

## 2019-04-28 RX ADMIN — INSULIN LISPRO 1 UNITS: 100 INJECTION, SOLUTION INTRAVENOUS; SUBCUTANEOUS at 12:52

## 2019-04-28 RX ADMIN — LAMOTRIGINE 100 MG: 100 TABLET ORAL at 08:51

## 2019-04-28 RX ADMIN — VANCOMYCIN HYDROCHLORIDE 1250 MG: 10 INJECTION, POWDER, LYOPHILIZED, FOR SOLUTION INTRAVENOUS at 01:50

## 2019-04-28 RX ADMIN — CITALOPRAM HYDROBROMIDE 40 MG: 40 TABLET ORAL at 08:51

## 2019-04-28 RX ADMIN — CLINDAMYCIN HYDROCHLORIDE 300 MG: 150 CAPSULE ORAL at 06:34

## 2019-04-28 RX ADMIN — ENOXAPARIN SODIUM 40 MG: 40 INJECTION SUBCUTANEOUS at 08:52

## 2019-04-28 RX ADMIN — CARVEDILOL 25 MG: 25 TABLET, FILM COATED ORAL at 08:51

## 2019-04-28 RX ADMIN — NAPROXEN 500 MG: 500 TABLET ORAL at 08:51

## 2019-04-28 RX ADMIN — VANCOMYCIN HYDROCHLORIDE 1250 MG: 10 INJECTION, POWDER, LYOPHILIZED, FOR SOLUTION INTRAVENOUS at 08:52

## 2019-04-28 RX ADMIN — PANTOPRAZOLE SODIUM 40 MG: 40 TABLET, DELAYED RELEASE ORAL at 06:34

## 2019-04-28 RX ADMIN — INSULIN LISPRO 2 UNITS: 100 INJECTION, SOLUTION INTRAVENOUS; SUBCUTANEOUS at 08:54

## 2019-04-28 RX ADMIN — INSULIN LISPRO 6 UNITS: 100 INJECTION, SOLUTION INTRAVENOUS; SUBCUTANEOUS at 12:52

## 2019-04-28 RX ADMIN — Medication 10 ML: at 08:52

## 2019-04-28 RX ADMIN — CLINDAMYCIN HYDROCHLORIDE 300 MG: 150 CAPSULE ORAL at 13:49

## 2019-04-28 ASSESSMENT — PAIN SCALES - GENERAL: PAINLEVEL_OUTOF10: 0

## 2019-04-28 NOTE — PROGRESS NOTES
Pharmacy Vancomycin Consult     Vancomycin Day: 6  Current Dosinmg Q8hr    Temp max:  98.3    Recent Labs     19  0358 19  0049   BUN 14 15       Recent Labs     19  0358 19  0049   CREATININE 0.7 0.9       Recent Labs     19  0358 19  0049   WBC 14.2* 15.2*       No intake or output data in the 24 hours ending 19 05    Culture Date Source Results   19 Body fluid Strep pyogenes  Rare Gram+ cocci   19 Blood No growth            Ht Readings from Last 1 Encounters:   19 5' 7\" (1.702 m)        Wt Readings from Last 1 Encounters:   19 217 lb 7 oz (98.6 kg)         Body mass index is 34.06 kg/m². Estimated Creatinine Clearance: 107 mL/min (based on SCr of 0.9 mg/dL).     Trough: 12.8    Assessment/Plan: Level good; continue same    Electronically signed by Tab Frye, [facility] on 2019 at 5:26 AM

## 2019-04-28 NOTE — DISCHARGE SUMMARY
Hospitalist   Discharge Summary    Gordy Francis  :  1965  MRN:  [id number]    Admit date:  2019  Discharge date:  2019    Admitting Physician:  Sarah Nazario MD    Advance Directive: Full Code    Consults: orthopedic surgery    Primary Care Physician:  Pamela Faust, ALENA    Discharge Diagnoses:  Principal Problem:    Cellulitis of left elbow  Active Problems:    Hypertension    Hyperlipidemia    GERD (gastroesophageal reflux disease)    Diabetes mellitus ([address]    Olecranon bursitis of left elbow  Resolved Problems:    * No resolved hospital problems. *      Significant Diagnostic Studies:   Xr Elbow Left (min 3 Views)    Result Date: 2019  EXAMINATION: XR ELBOW LEFT (MIN 3 VIEWS) 2019 4:56 PM HISTORY: Left elbow swelling and pain. Report: 3 views of the left elbow were obtained. There are no comparison studies. No fracture or dislocation is identified. There is no joint effusion. There is extensive soft tissue swelling posteriorly over the olecranon region. No soft tissue gas or radiopaque foreign bodies identified. 1. No acute osseous findings. 2. Extensive soft tissue swelling over the posterior olecranon region compatible with cellulitis and possible olecranon bursitis. Signed by Dr Yahir Perera. Patti on 2019 4:57 PM      Labs:    CBC:   Recent Labs     19  0252 19  0358 19  0049   WBC 13.2* 14.2* 15.2*   HGB 13.3* 13.5* 14.3   * 498* 485*     BMP:    Recent Labs     19  0252 19  0358 19  0049    135* 132*   K 4.0 4.3 4.2   CL 96* 96* 94*   CO2 29 28 26   BUN 10 14 15   CREATININE 0.8 0.7 0.9   GLUCOSE 268* 289* 291*     Hepatic: No results for input(s): AST, ALT, ALB, BILITOT, ALKPHOS in the last 72 hours. Troponin: No results for input(s): TROPONINI in the last 72 hours. BNP: No results for input(s): BNP in the last 72 hours. Lipids: No results for input(s): CHOL, HDL in the last 72 hours.     Invalid input(s): LDLCALCU  INR: No results for input(s): INR in the last 72 hours. ABG: No results for input(s): PHART, LNM3PVJ, PO2ART, WZE6VSP, A5SDIBQR, BEART in the last 72 hours. 30 Day lookback of cultures:    Blood Culture Recent:   Recent Labs     04/20/19  1630   BC No growth after 5 days of incubation. Gram Stain Recent:   Recent Labs     04/24/19  0724   LABGRAM Many WBC's (Polymorphonuclear)  Rare Gram positive cocci  in chains  *     Resp Culture Recent: No results for input(s): CULTRESP in the last 720 hours. Body Fluid Recent : No results for input(s): BFCX in the last 720 hours. MRSA Recent : No results for input(s): [address] in the last 720 hours. Urine Culture Recent : No results for input(s): LABURIN in the last 720 hours. Organism Recent :   Recent Labs     04/24/19  0724   ORG Strep pyogenes (Beta Strep Group A)*        Hospital Course: The patient is a 47 y.o. male presented to the ED with complaints of pain, swelling, and erythema beginning at the left elbow that has been going on for several days. He denies trauma but had what sounds to be an incision and drainage of his olecranon bursa by Dr. Seth Reddy in [address] [place] [address] approximately a month ago. He is admitted for antibiotics. I have been asked to see him for any needed surgical intervention. Pain is located in the left elbow and rated a 2/5, constant, dull, worse with movement, better with rest and medication. There are no associated symptoms. Doing well. Status post bursa aspiration left elbow. No acute overnight event reported patient denies any acute distress or complaints at this time. Cultures growing sensitivities to gr B strep: ClindamycinPO  Responded well to Vancomycin IV for 8 days. .Blood cultures neg. Pt stable for discharge and cleared by ortho.     Physical Exam:    Vitals: /68   Pulse 61   Temp 98.2 °F (36.8 °C)   Resp 16   Ht 5' 7\" (1.702 m)   Wt 217 lb 7 oz (98.6 kg)   SpO2 95%   BMI 34.06 kg/m²   General appearance: alert (SEROQUEL) 50 MG tablet  Take 1 tablet by mouth nightly             tiZANidine (ZANAFLEX) 4 MG tablet  Take 4 mg by mouth every 6 hours as needed                 Discharge Instructions: Follow up with Romelia in 7 days. Take medications as directed. Resume activity as tolerated. Diet: DIET CARB CONTROL;     Disposition: Patient is medically stable and will be discharged to home. .    Time spent on discharge 30 minutes.     Signed:  Ashley Houston MD  Hospitalist service  4/28/2019  1:36 PM

## 2019-04-29 ENCOUNTER — OFFICE VISIT (OUTPATIENT)
Dept: PRIMARY CARE CLINIC | Age: 54
End: 2019-04-29
Payer: MEDICAID

## 2019-04-29 VITALS
HEIGHT: 68 IN | DIASTOLIC BLOOD PRESSURE: 82 MMHG | HEART RATE: 76 BPM | SYSTOLIC BLOOD PRESSURE: 122 MMHG | OXYGEN SATURATION: 98 % | TEMPERATURE: 98.6 F | WEIGHT: 214 LBS | BODY MASS INDEX: 32.43 KG/M2

## 2019-04-29 DIAGNOSIS — F32.A ANXIETY AND DEPRESSION: ICD-10-CM

## 2019-04-29 DIAGNOSIS — F41.9 ANXIETY AND DEPRESSION: ICD-10-CM

## 2019-04-29 DIAGNOSIS — F39 MOOD DISORDER (HCC): ICD-10-CM

## 2019-04-29 DIAGNOSIS — E55.9 VITAMIN D DEFICIENCY: ICD-10-CM

## 2019-04-29 DIAGNOSIS — E78.5 HYPERLIPIDEMIA, UNSPECIFIED HYPERLIPIDEMIA TYPE: ICD-10-CM

## 2019-04-29 DIAGNOSIS — Z09 HOSPITAL DISCHARGE FOLLOW-UP: Primary | ICD-10-CM

## 2019-04-29 DIAGNOSIS — M70.22 OLECRANON BURSITIS OF LEFT ELBOW: ICD-10-CM

## 2019-04-29 DIAGNOSIS — Z76.89 ENCOUNTER TO ESTABLISH CARE: ICD-10-CM

## 2019-04-29 DIAGNOSIS — L03.114 CELLULITIS OF LEFT ELBOW: ICD-10-CM

## 2019-04-29 DIAGNOSIS — E11.9 TYPE 2 DIABETES MELLITUS WITHOUT COMPLICATION, WITHOUT LONG-TERM CURRENT USE OF INSULIN (HCC): ICD-10-CM

## 2019-04-29 DIAGNOSIS — I10 HYPERTENSION, UNSPECIFIED TYPE: ICD-10-CM

## 2019-04-29 PROCEDURE — 1111F DSCHRG MED/CURRENT MED MERGE: CPT | Performed by: NURSE PRACTITIONER

## 2019-04-29 PROCEDURE — 99496 TRANSJ CARE MGMT HIGH F2F 7D: CPT | Performed by: NURSE PRACTITIONER

## 2019-04-29 NOTE — PROGRESS NOTES
Post-Discharge Transitional Care Management Services or [facility] [address]   YOB: 1965    Date of Office Visit:  4/29/2019  Date of Hospital Admission: 4/20/19  Date of Hospital Discharge: 4/28/19  Readmission Risk Score(high >=14%. Medium >=10%):Readmission Risk Score: 10      Care management risk score Rising risk (score 2-5) and Complex Care (Scores >=6): 2     Non face to face  following discharge, date last encounter closed (first attempt may have been earlier): *No documented post hospital discharge outreach found in the last 14 days *No documented post hospital discharge outreach found in the last 14 days Patient was discharged yesterday from [facility]. Call initiated 2 business days of discharge: *No response recorded in the last 14 days Face to Face visit within 2 days of discharge.      Patient Active Problem List   Diagnosis    Depression with suicidal ideation    Cellulitis of left elbow    Hypertension    Hyperlipidemia    GERD (gastroesophageal reflux disease)    Diabetes mellitus ([address]    Olecranon bursitis of left elbow       Allergies   Allergen Reactions    Pcn [Penicillins] Anaphylaxis       Medications listed as ordered at the time of discharge from hospital   Jorge Rowdy[address] Medication Instructions KOFI:    Printed on:04/29/19 1140   Medication Information                      carvedilol (COREG) 25 MG tablet  Take 25 mg by mouth 2 times daily (with meals)             citalopram (CELEXA) 40 MG tablet  Take 40 mg by mouth daily             clindamycin (CLEOCIN) 300 MG capsule  Take 1 capsule by mouth every 8 hours for 10 days             empagliflozin (JARDIANCE) 10 MG tablet  Take 1 tablet by mouth daily             ergocalciferol (ERGOCALCIFEROL) 16928 units capsule  Take 1 capsule by mouth once a week for 11 doses             lamoTRIgine (LAMICTAL) 100 MG tablet  Take 100 mg by mouth 2 times daily              naproxen (NAPROSYN) 500 MG tablet  Take 500 mg by mouth 2 times daily (with meals)             omeprazole (PRILOSEC) 20 MG delayed release capsule  Take 20 mg by mouth daily             pravastatin (PRAVACHOL) 80 MG tablet  Take 80 mg by mouth nightly              QUEtiapine (SEROQUEL) 50 MG tablet  Take 1 tablet by mouth nightly             tiZANidine (ZANAFLEX) 4 MG tablet  Take 4 mg by mouth every 6 hours as needed                   Medications marked \"taking\" at this time  Outpatient Medications Marked as Taking for the 4/29/19 encounter (Office Visit) with ALENA Brandt   Medication Sig Dispense Refill    empagliflozin (JARDIANCE) 10 MG tablet Take 1 tablet by mouth daily 30 tablet 2    clindamycin (CLEOCIN) 300 MG capsule Take 1 capsule by mouth every 8 hours for 10 days 30 capsule 0    QUEtiapine (SEROQUEL) 50 MG tablet Take 1 tablet by mouth nightly 30 tablet 3    naproxen (NAPROSYN) 500 MG tablet Take 500 mg by mouth 2 times daily (with meals)      tiZANidine (ZANAFLEX) 4 MG tablet Take 4 mg by mouth every 6 hours as needed      carvedilol (COREG) 25 MG tablet Take 25 mg by mouth 2 times daily (with meals)      citalopram (CELEXA) 40 MG tablet Take 40 mg by mouth daily      omeprazole (PRILOSEC) 20 MG delayed release capsule Take 20 mg by mouth daily      pravastatin (PRAVACHOL) 80 MG tablet Take 80 mg by mouth nightly       lamoTRIgine (LAMICTAL) 100 MG tablet Take 100 mg by mouth 2 times daily           Medications patient taking as of now reconciled against medications ordered at time of hospital discharge: Yes    Chief Complaint   Patient presents with    Establish Care     new pt. medicaid    Follow-Up from Hospital     elbow problem       HPI    Aureliano Alfred presents today for a hospital follow up on cellulitis and olecranon bursitis and to establish care. He was placed in the hospital from 4/20-4/28 for IV antibiotics- Vanco. He was discharged yesterday from [facility].  He is to is 33.02 kg/m². Wt Readings from Last 3 Encounters:   04/29/19 214 lb (97.1 kg)   04/27/19 217 lb 7 oz (98.6 kg)     BP Readings from Last 3 Encounters:   04/29/19 122/82   04/28/19 121/68       Physical Exam   Constitutional: He is oriented to person, place, and time. He appears well-developed and well-nourished. No distress. HENT:   Head: Normocephalic and atraumatic. Right Ear: External ear normal.   Left Ear: External ear normal.   Nose: Nose normal.   Eyes: Pupils are equal, round, and reactive to light. Conjunctivae are normal. Right eye exhibits no discharge. Left eye exhibits no discharge. Neck: Normal range of motion. Cardiovascular: Normal rate, regular rhythm, normal heart sounds and intact distal pulses. No murmur heard. Pulmonary/Chest: Effort normal and breath sounds normal. No respiratory distress. He has no wheezes. Abdominal: Soft. Bowel sounds are normal. He exhibits no distension. There is no tenderness. Musculoskeletal: He exhibits edema. Lumbar back: He exhibits normal range of motion and no tenderness. Left forearm: He exhibits tenderness and swelling. Arms:  Lymphadenopathy:     He has no cervical adenopathy. Neurological: He is alert and oriented to person, place, and time. Skin: Skin is warm and dry. No rash noted. He is not diaphoretic. Psychiatric: He has a normal mood and affect. His behavior is normal. Thought content normal.   Nursing note and vitals reviewed. Assessment/Plan:    Follow up with ortho as scheduled. Start Jardiance for Type II DM. Start Clindamycin today. Nolan martinez with [facility] for moods, depression, anxiety. BP stable today. 1. Hospital discharge follow-up      2. Encounter to establish care      3. Olecranon bursitis of left elbow    - External Referral To Orthopedic Surgery    4. Cellulitis of left elbow    - External Referral To Orthopedic Surgery    5.  Type 2 diabetes mellitus without complication, without long-term current use of insulin (HCC)    - empagliflozin (JARDIANCE) 10 MG tablet; Take 1 tablet by mouth daily  Dispense: 30 tablet; Refill: 2    6. Hyperlipidemia, unspecified hyperlipidemia type      7. Hypertension, unspecified type      8. Vitamin D deficiency      9. Anxiety and depression      10. Mood disorder [facility])          Medical Decision Making: high complexity    Return in about 3 months (around 7/29/2019), or if symptoms worsen or fail to improve, for HTN, Diabetes.

## 2019-04-29 NOTE — PATIENT INSTRUCTIONS
not use empagliflozin during the second or third trimester of pregnancy. You should not breast-feed while using this medicine. Empagliflozin is not approved for use by anyone younger than 25years old. How should I take empagliflozin? Follow all directions on your prescription label and read all medication guides or instruction sheets. Your doctor may occasionally change your dose. Use the medicine exactly as directed. You may take empagliflozin with or without food. Call your doctor if you are sick with vomiting or diarrhea, if you consume less food or fluid than usual, or if you are sweating more than usual.  Your blood sugar will need to be checked often, and you may also need to test the level of ketones your urine. Empagliflozin can cause life-threatening ketoacidosis (too much acid in the blood). Even if your blood sugar is normal, contact your doctor if a urine test shows that you have ketones in the urine. Low blood sugar (hypoglycemia) can happen to everyone who has diabetes. Symptoms include headache, hunger, sweating, irritability, dizziness, nausea, fast heart rate, and feeling anxious or shaky. To quickly treat low blood sugar, always keep a fast-acting source of sugar with you such as fruit juice, hard candy, crackers, raisins, or non-diet soda. Your doctor can prescribe a glucagon emergency injection kit to use in case you have severe hypoglycemia and cannot eat or drink. Be sure your family and close friends know how to give you this injection in an emergency. Also watch for signs of high blood sugar (hyperglycemia) such as increased thirst or urination, blurred vision, headache, and tiredness. Blood sugar levels can be affected by stress, illness, surgery, exercise, alcohol use, or skipping meals. Ask your doctor before changing your dose or medication schedule. This medicine can affect the results of certain medical tests.  Tell any doctor who treats you that you are using empagliflozin. Empagliflozin is only part of a treatment program that may also include diet, exercise, weight control, blood sugar testing, and special medical care. Follow your doctor's instructions very closely. Store at room temperature away from moisture and heat. What happens if I miss a dose? Take the medicine as soon as you can, but skip the missed dose if it is almost time for your next dose. Do not take two doses at one time. What happens if I overdose? Seek emergency medical attention or call the Poison Help line at 1-766.635.2190. What should I avoid while taking empagliflozin? Avoid getting up too fast from a sitting or lying position, or you may feel dizzy. What are the possible side effects of empagliflozin? Get emergency medical help if you have signs of an allergic reaction: hives; difficult breathing; swelling of your face, lips, tongue, or throat. Seek medical attention right away if you have signs of a genital infection (penis or vagina): burning, itching, odor, discharge, pain, tenderness, redness or swelling of the genital or rectal area, fever, not feeling well. These symptoms may get worse quickly. Call your doctor at once if you have:  · little or no urination;  · dehydration symptoms --dizziness, weakness, feeling light-headed (like you might pass out);  · ketoacidosis (too much acid in the blood) --nausea, vomiting, stomach pain, confusion, unusual drowsiness, or trouble breathing; or  · signs of a bladder infection --pain or burning when you urinate, increased urination, blood in your urine, fever, pain in your pelvis or back. Older adults may be more likely to have side effects from this medicine. Common side effects may include:  · bladder infection; or  · vaginal yeast infection. This is not a complete list of side effects and others may occur. Call your doctor for medical advice about side effects. You may report side effects to FDA at 4-687-FDA-6856.   What other drugs will affect empagliflozin? Tell your doctor about all your other medicines, especially:  · insulin or other oral diabetes medications; or  · a diuretic or \"water pill. \"  This list is not complete. Other drugs may affect empagliflozin, including prescription and over-the-counter medicines, vitamins, and herbal products. Not all possible drug interactions are listed here. Where can I get more information? Your pharmacist can provide more information about empagliflozin. Remember, keep this and all other medicines out of the reach of children, never share your medicines with others, and use this medication only for the indication prescribed. Every effort has been made to ensure that the information provided by [facility]  is accurate, up-to-date, and complete, but no guarantee is made to that effect. Drug information contained herein may be time sensitive. [facility] information has been compiled for use by healthcare practitioners and consumers in the [place] and therefore [employer] does not warrant that uses outside of the [place] are appropriate, unless specifically indicated otherwise. [facility]'s drug information does not endorse drugs, diagnose patients or recommend therapy. [employer] drug information is an informational resource designed to assist licensed healthcare practitioners in caring for their patients and/or to serve consumers viewing this service as a supplement to, and not a substitute for, the expertise, skill, knowledge and judgment of healthcare practitioners. The absence of a warning for a given drug or drug combination in no way should be construed to indicate that the drug or drug combination is safe, effective or appropriate for any given patient. [facility] does not assume any responsibility for any aspect of healthcare administered with the aid of information [facility] provides.  The information contained herein is not intended to cover all possible uses, directions, precautions, warnings, drug interactions, allergic reactions, or adverse effects. If you have questions about the drugs you are taking, check with your doctor, nurse or pharmacist.  Copyright 0996-1180 [address] Version: 3.01. Revision date: 8/30/2018. Care instructions adapted under license by [facility] ([facility]). If you have questions about a medical condition or this instruction, always ask your healthcare professional. [place] [address] any warranty or liability for your use of this information. Thank you for enrolling in [address] Please follow the instructions below to securely access your online medical record. [employer] allows you to send messages to your doctor, view your test results, renew your prescriptions, schedule appointments, and more. How Do I Sign Up? 1. In your Internet browser, go to [email or web address]  2. Click on the Sign Up Now link in the Sign In box. You will see the New Member Sign Up page. 3. Enter your [employer]Beat Access Code exactly as it appears below. You will not need to use this code after youve completed the sign-up process. If you do not sign up before the expiration date, you must request a new code. [employer] Access Code: [email or web address]  Expires: 6/12/2019  1:53 PM    4. Enter your Social Security Number ([id number]) and Date of Birth (mm/dd/yyyy) as indicated and click Submit. You will be taken to the next sign-up page. 5. Create a [employer]Beat ID. This will be your [employer]Beat login ID and cannot be changed, so think of one that is secure and easy to remember. 6. Create a [employer] password. You can change your password at any time. 7. Enter your Password Reset Question and Answer. This can be used at a later time if you forget your password. 8. Enter your e-mail address. You will receive e-mail notification when new information is available in [address] 9. Click Sign Up. You can now view your medical record.      Additional Information  If you have questions, please contact your physician practice where you receive care. Remember, MyChart is NOT to be used for urgent needs. For medical emergencies, dial 911.     Tamera Saini [address] [place] [address]    [phone]

## 2019-04-30 PROBLEM — F41.9 ANXIETY AND DEPRESSION: Status: ACTIVE | Noted: 2019-04-30

## 2019-04-30 PROBLEM — F39 MOOD DISORDER (HCC): Status: ACTIVE | Noted: 2019-04-30

## 2019-04-30 PROBLEM — F32.A ANXIETY AND DEPRESSION: Status: ACTIVE | Noted: 2019-04-30

## 2019-04-30 PROBLEM — E55.9 VITAMIN D DEFICIENCY: Status: ACTIVE | Noted: 2019-04-30

## 2019-04-30 ASSESSMENT — ENCOUNTER SYMPTOMS
ABDOMINAL PAIN: 0
SHORTNESS OF BREATH: 0
WHEEZING: 0
SINUS PAIN: 0
VOMITING: 0
SINUS PRESSURE: 0
DIARRHEA: 0
BACK PAIN: 0
NAUSEA: 0
COUGH: 0
EYE PAIN: 0

## 2019-06-14 ENCOUNTER — OFFICE VISIT (OUTPATIENT)
Dept: PRIMARY CARE CLINIC | Age: 54
End: 2019-06-14
Payer: MEDICAID

## 2019-06-14 ENCOUNTER — HOSPITAL ENCOUNTER (OUTPATIENT)
Dept: GENERAL RADIOLOGY | Age: 54
Discharge: HOME | End: 2019-06-14
Payer: MEDICAID

## 2019-06-14 ENCOUNTER — TELEPHONE (OUTPATIENT)
Dept: PRIMARY CARE CLINIC | Age: 54
End: 2019-06-14

## 2019-06-14 VITALS
DIASTOLIC BLOOD PRESSURE: 82 MMHG | BODY MASS INDEX: 33.53 KG/M2 | WEIGHT: 213.6 LBS | HEART RATE: 96 BPM | TEMPERATURE: 97.1 F | OXYGEN SATURATION: 98 % | SYSTOLIC BLOOD PRESSURE: 124 MMHG | HEIGHT: 67 IN

## 2019-06-14 DIAGNOSIS — F32.A ANXIETY AND DEPRESSION: ICD-10-CM

## 2019-06-14 DIAGNOSIS — M70.22 OLECRANON BURSITIS OF LEFT ELBOW: ICD-10-CM

## 2019-06-14 DIAGNOSIS — L03.114 CELLULITIS OF LEFT ELBOW: ICD-10-CM

## 2019-06-14 DIAGNOSIS — S51.002A ELBOW WOUND, LEFT, INITIAL ENCOUNTER: ICD-10-CM

## 2019-06-14 DIAGNOSIS — E11.9 TYPE 2 DIABETES MELLITUS WITHOUT COMPLICATION, WITHOUT LONG-TERM CURRENT USE OF INSULIN (HCC): ICD-10-CM

## 2019-06-14 DIAGNOSIS — F41.9 ANXIETY AND DEPRESSION: ICD-10-CM

## 2019-06-14 DIAGNOSIS — F39 MOOD DISORDER (HCC): ICD-10-CM

## 2019-06-14 DIAGNOSIS — M70.22 OLECRANON BURSITIS OF LEFT ELBOW: Primary | ICD-10-CM

## 2019-06-14 PROCEDURE — 73080 X-RAY EXAM OF ELBOW: CPT

## 2019-06-14 PROCEDURE — 99214 OFFICE O/P EST MOD 30 MIN: CPT | Performed by: NURSE PRACTITIONER

## 2019-06-14 RX ORDER — CITALOPRAM 40 MG/1
40 TABLET ORAL DAILY
Qty: 90 TABLET | Refills: 0 | Status: SHIPPED | OUTPATIENT
Start: 2019-06-14 | End: 2019-10-28 | Stop reason: SDUPTHER

## 2019-06-14 RX ORDER — LAMOTRIGINE 100 MG/1
100 TABLET ORAL 2 TIMES DAILY
Qty: 180 TABLET | Refills: 0 | Status: SHIPPED | OUTPATIENT
Start: 2019-06-14 | End: 2019-09-17 | Stop reason: SDUPTHER

## 2019-06-14 RX ORDER — CLINDAMYCIN HYDROCHLORIDE 300 MG/1
300 CAPSULE ORAL 4 TIMES DAILY
Qty: 40 CAPSULE | Refills: 0 | Status: SHIPPED | OUTPATIENT
Start: 2019-06-14 | End: 2019-09-17 | Stop reason: SDUPTHER

## 2019-06-14 ASSESSMENT — ENCOUNTER SYMPTOMS
VOMITING: 0
BACK PAIN: 0
COUGH: 0
NAUSEA: 0
WHEEZING: 0
SHORTNESS OF BREATH: 0
DIARRHEA: 0
ABDOMINAL PAIN: 0

## 2019-06-14 NOTE — TELEPHONE ENCOUNTER
----- Message from ALENA Toussaint sent at 6/14/2019  3:22 PM CDT -----  Please call patient with results. Olecranon bursitis noted. No bony issue noted on xray.

## 2019-06-14 NOTE — PATIENT INSTRUCTIONS
Patient Education   Patient Education        Learning About Diabetes Food Guidelines  Your Care Instructions    Meal planning is important to manage diabetes. It helps keep your blood sugar at a target level (which you set with your doctor). You don't have to eat special foods. You can eat what your family eats, including sweets once in a while. But you do have to pay attention to how often you eat and how much you eat of certain foods. You may want to work with a dietitian or a certified diabetes educator (CDE) to help you plan meals and snacks. A dietitian or CDE can also help you lose weight if that is one of your goals. What should you know about eating carbs? Managing the amount of carbohydrate (carbs) you eat is an important part of healthy meals when you have diabetes. Carbohydrate is found in many foods. · Learn which foods have carbs. And learn the amounts of carbs in different foods. ? Bread, cereal, pasta, and rice have about 15 grams of carbs in a serving. A serving is 1 slice of bread (1 ounce), ½ cup of cooked cereal, or 1/3 cup of cooked pasta or rice. ? Fruits have 15 grams of carbs in a serving. A serving is 1 small fresh fruit, such as an apple or orange; ½ of a banana; ½ cup of cooked or canned fruit; ½ cup of fruit juice; 1 cup of melon or raspberries; or 2 tablespoons of dried fruit. ? Milk and no-sugar-added yogurt have 15 grams of carbs in a serving. A serving is 1 cup of milk or 2/3 cup of no-sugar-added yogurt. ? Starchy vegetables have 15 grams of carbs in a serving. A serving is ½ cup of mashed potatoes or sweet potato; 1 cup winter squash; ½ of a small baked potato; ½ cup of cooked beans; or ½ cup cooked corn or green peas. · Learn how much carbs to eat each day and at each meal. A dietitian or CDE can teach you how to keep track of the amount of carbs you eat. This is called carbohydrate counting.   · If you are not sure how to count carbohydrate grams, use the Plate Method to plan meals. It is a good, quick way to make sure that you have a balanced meal. It also helps you spread carbs throughout the day. ? Divide your plate by types of foods. Put non-starchy vegetables on half the plate, meat or other protein food on one-quarter of the plate, and a grain or starchy vegetable in the final quarter of the plate. To this you can add a small piece of fruit and 1 cup of milk or yogurt, depending on how many carbs you are supposed to eat at a meal.  · Try to eat about the same amount of carbs at each meal. Do not \"save up\" your daily allowance of carbs to eat at one meal.  · Proteins have very little or no carbs per serving. Examples of proteins are beef, chicken, turkey, fish, eggs, tofu, cheese, cottage cheese, and peanut butter. A serving size of meat is 3 ounces, which is about the size of a deck of cards. Examples of meat substitute serving sizes (equal to 1 ounce of meat) are 1/4 cup of cottage cheese, 1 egg, 1 tablespoon of peanut butter, and ½ cup of tofu. How can you eat out and still eat healthy? · Learn to estimate the serving sizes of foods that have carbohydrate. If you measure food at home, it will be easier to estimate the amount in a serving of restaurant food. · If the meal you order has too much carbohydrate (such as potatoes, corn, or baked beans), ask to have a low-carbohydrate food instead. Ask for a salad or green vegetables. · If you use insulin, check your blood sugar before and after eating out to help you plan how much to eat in the future. · If you eat more carbohydrate at a meal than you had planned, take a walk or do other exercise. This will help lower your blood sugar. What else should you know? · Limit saturated fat, such as the fat from meat and dairy products. This is a healthy choice because people who have diabetes are at higher risk of heart disease. So choose lean cuts of meat and nonfat or low-fat dairy products.  Use olive or canola oil instead of butter or shortening when cooking. · Don't skip meals. Your blood sugar may drop too low if you skip meals and take insulin or certain medicines for diabetes. · Check with your doctor before you drink alcohol. Alcohol can cause your blood sugar to drop too low. Alcohol can also cause a bad reaction if you take certain diabetes medicines. Follow-up care is a key part of your treatment and safety. Be sure to make and go to all appointments, and call your doctor if you are having problems. It's also a good idea to know your test results and keep a list of the medicines you take. Where can you learn more? Go to [email or web address].[email or web address] org and sign in to your [employer] account. Enter X111 in the [employer]et box to learn more about \"Learning About Diabetes Food Guidelines. \"     If you do not have an account, please click on the \"Sign Up Now\" link. Current as of: July 25, 2018  Content Version: 12.0  © 3886-2198 [employer]. Care instructions adapted under license by [facility] ([facility]). If you have questions about a medical condition or this instruction, always ask your healthcare professional. [place] [address] any warranty or liability for your use of this information. Bursitis of the Elbow: Care Instructions  Your Care Instructions  Bursitis is pain and swelling of the bursae. These are sacs of fluid that help your joints move smoothly. Olecranon bursitis is a type of bursitis that affects the back of the elbow. This is sometimes called Amadeo elbow because the bump that develops looks like the cartoon character Amadeo's elbow. Injury, overuse, or prolonged pressure on your elbow can cause this form of bursitis. Sometimes it happens when people have arthritis. It also can occur for unknown reasons. Treatment may include draining fluid from the bursa with a needle. If your doctor thought there was infection, he or she may have prescribed antibiotics.  You also may get shots of medicine into the bursa to help the swelling go down. Your elbow should get better in a few days or weeks. Follow-up care is a key part of your treatment and safety. Be sure to make and go to all appointments, and call your doctor if you are having problems. It's also a good idea to know your test results and keep a list of the medicines you take. How can you care for yourself at home? · Take pain medicines exactly as directed. ? If the doctor gave you a prescription medicine for pain, take it as prescribed. ? If you are not taking a prescription pain medicine, ask your doctor if you can take an over-the-counter medicine. ? Do not take two or more pain medicines at the same time unless the doctor told you to. Many pain medicines have acetaminophen, which is Tylenol. Too much acetaminophen (Tylenol) can be harmful. · If your doctor prescribed antibiotics, take them as directed. Do not stop taking them just because you feel better. You need to take the full course of antibiotics. · If your doctor gave you a sling, an elastic bandage, or a compression sleeve, wear it exactly as instructed. · Put ice or a cold pack on your elbow for 10 to 20 minutes at a time. Try to do this every 1 to 2 hours for the next 3 days (when you are awake) or until the swelling goes down. Put a thin cloth between the ice and your skin. · After 3 days, you can try heat, or alternate heat and ice. · Rest your elbow. Try to stop or reduce any activity that causes pain. · Wear elbow pads during physical activity to prevent injury. · Do not lean your elbows on tables or armrests. When should you call for help? Call your doctor now or seek immediate medical care if:    · You have new or worse symptoms of infection, such as:  ? Increased pain, swelling, warmth, or redness. ? Red streaks leading from the area. ? Pus draining from the area.   ? A fever.    Watch closely for changes in your health, and be sure to contact your doctor if:    · You do not get better as expected. Where can you learn more? Go to https://chpepiceweb.[email or web address] org and sign in to your [employer] account. Enter  in the [facility] box to learn more about \"Bursitis of the Elbow: Care Instructions. \"     If you do not have an account, please click on the \"Sign Up Now\" link. Current as of: September 20, 2018  Content Version: 12.0  © 3852-7034 [employer]. Care instructions adapted under license by [facility] ([facility]). If you have questions about a medical condition or this instruction, always ask your healthcare professional. [address] any warranty or liability for your use of this information.

## 2019-06-14 NOTE — PROGRESS NOTES
[address] [address]     Phone:  [phone]  Fax:  [phone]      Palmer Amezcua is a 47 y.o. male who presents today for his medical conditions/complaints as noted below. Palmer Amezcua is c/o of Joint Swelling (left elbow)      Chief Complaint   Patient presents with    Joint Swelling     left elbow       HPI:     HPI    Palmer Amezcua presents today for left elbow pain and swelling. He is also needing medication refills. He has a history of olecranon bursitis in the left elbow. She states 2 days ago it started swelling and becoming painful again. He was hospitalized for this in April and established care here after that appointment. He has not yet followed up with orthopedics. He states he forgot his appointment. He is wanting to see Dr. Jeanna Corcoran in [facility]. He has been afebrile. No states no drainage from this elbow. He is also a new diabetic. He states his glucose has been elevated. He has seen numbers in the 200s. He is taking his medications as prescribed. He states his moods are stable on Lamictal and Celexa. He sees [employer] at [facility].      Past Medical History:   Diagnosis Date    Depression     Diabetes mellitus (HCC)     GERD (gastroesophageal reflux disease)     Hyperlipidemia     Hypertension         Past Surgical History:   Procedure Laterality Date    APPENDECTOMY      CHOLECYSTECTOMY      ELBOW SURGERY      left       Social History     Tobacco Use    Smoking status: Current Every Day Smoker     Packs/day: 0.50     Years: 38.00     Pack years: 19.00    Smokeless tobacco: Never Used   Substance Use Topics    Alcohol use: No        Current Outpatient Medications   Medication Sig Dispense Refill    empagliflozin (JARDIANCE) 10 MG tablet Take 1 tablet by mouth daily 90 tablet 0    citalopram (CELEXA) 40 MG tablet Take 1 tablet by mouth daily 90 tablet 0    lamoTRIgine (LAMICTAL) 100 MG tablet Take 1 tablet by mouth 2 times daily 180 tablet 0  clindamycin (CLEOCIN) 300 MG capsule Take 1 capsule by mouth 4 times daily for 10 days 40 capsule 0    mupirocin (BACTROBAN) 2 % ointment Apply 3 times daily. 1 Tube 0    QUEtiapine (SEROQUEL) 50 MG tablet Take 1 tablet by mouth nightly 30 tablet 3    naproxen (NAPROSYN) 500 MG tablet Take 500 mg by mouth 2 times daily (with meals)      tiZANidine (ZANAFLEX) 4 MG tablet Take 4 mg by mouth every 6 hours as needed      carvedilol (COREG) 25 MG tablet Take 25 mg by mouth 2 times daily (with meals)      omeprazole (PRILOSEC) 20 MG delayed release capsule Take 20 mg by mouth daily      pravastatin (PRAVACHOL) 80 MG tablet Take 80 mg by mouth nightly       ergocalciferol (ERGOCALCIFEROL) 84172 units capsule Take 1 capsule by mouth once a week for 11 doses 11 capsule 0     No current facility-administered medications for this visit. Allergies   Allergen Reactions    Pcn [Penicillins] Anaphylaxis       History reviewed. No pertinent family history. Review of Systems   Constitutional: Negative for appetite change, fatigue and fever. HENT: Negative. Respiratory: Negative for cough, shortness of breath and wheezing. Cardiovascular: Negative for chest pain and leg swelling. Gastrointestinal: Negative for abdominal pain, diarrhea, nausea and vomiting. Endocrine: Negative for cold intolerance and heat intolerance. Genitourinary: Negative for difficulty urinating, dysuria and frequency. Musculoskeletal: Positive for arthralgias (left elbow). Negative for back pain. Skin: Positive for wound (left elbow). Negative for rash. Neurological: Negative for dizziness, weakness and headaches. Hematological: Negative for adenopathy. Psychiatric/Behavioral: The patient is not nervous/anxious. Objective:     Physical Exam   Constitutional: He is oriented to person, place, and time. He appears well-developed and well-nourished. No distress.    HENT:   Head: Normocephalic and bactroban for left elbow cellulitis/wound. Return in about 3 weeks (around 7/5/2019), or if symptoms worsen or fail to improve, for olecranon bursitis, cellulitis-left elbow, Diabetes. Orders Placed This Encounter   Procedures    XR ELBOW LEFT (MIN 3 VIEWS)     Standing Status:   Future     Number of Occurrences:   1     Standing Expiration Date:   6/14/2020     Order Specific Question:   Reason for exam:     Answer:   left elbow swelling, olecranon bursitis    External Referral To Orthopedic Surgery     Referral Priority:   Routine     Referral Type:   Eval and Treat     Referral Reason:   Patient Preference     Requested Specialty:   Orthopedic Surgery     Number of Visits Requested:   1       Orders Placed This Encounter   Medications    empagliflozin (JARDIANCE) 10 MG tablet     Sig: Take 1 tablet by mouth daily     Dispense:  90 tablet     Refill:  0    citalopram (CELEXA) 40 MG tablet     Sig: Take 1 tablet by mouth daily     Dispense:  90 tablet     Refill:  0    lamoTRIgine (LAMICTAL) 100 MG tablet     Sig: Take 1 tablet by mouth 2 times daily     Dispense:  180 tablet     Refill:  0    clindamycin (CLEOCIN) 300 MG capsule     Sig: Take 1 capsule by mouth 4 times daily for 10 days     Dispense:  40 capsule     Refill:  0    mupirocin (BACTROBAN) 2 % ointment     Sig: Apply 3 times daily. Dispense:  1 Tube     Refill:  0        Patient offered educational materials - see patient instructions for any instruction needed. Discussed use, benefit, and side effects of prescribed medications. All patient questions answered. Instructed to continue current medications, diet and exercise. Patient agreed with treatment plan. Follow up as directed. Patient was advised to go to the ED if condition ever becomes emergent.            Electronically signed by Neyda Solomon on 6/14/2019 at 12:10 PM

## 2019-06-14 NOTE — TELEPHONE ENCOUNTER
Spoke with pt about his results he does understand. I did tell him that Sierra Conrad referred him to Dr. Brian Ramirez and that office will be calling him.

## 2019-06-25 ENCOUNTER — HOSPITAL ENCOUNTER (EMERGENCY)
Age: 54
Discharge: HOME | End: 2019-06-25
Payer: MEDICAID

## 2019-06-25 VITALS
HEART RATE: 92 BPM | HEIGHT: 67 IN | RESPIRATION RATE: 20 BRPM | DIASTOLIC BLOOD PRESSURE: 88 MMHG | BODY MASS INDEX: 33.43 KG/M2 | SYSTOLIC BLOOD PRESSURE: 133 MMHG | TEMPERATURE: 98.5 F | WEIGHT: 213 LBS | OXYGEN SATURATION: 94 %

## 2019-06-25 DIAGNOSIS — S40.862A INSECT BITE OF LEFT UPPER EXTREMITY, INITIAL ENCOUNTER: Primary | ICD-10-CM
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
## 2019-06-25 PROCEDURE — 99282 EMERGENCY DEPT VISIT SF MDM: CPT

## 2019-06-25 PROCEDURE — 99282 EMERGENCY DEPT VISIT SF MDM: CPT | Performed by: NURSE PRACTITIONER

## 2019-06-25 ASSESSMENT — ENCOUNTER SYMPTOMS
ABDOMINAL PAIN: 0
PHOTOPHOBIA: 0
EYE REDNESS: 0
DIARRHEA: 0
CONSTIPATION: 0
COLOR CHANGE: 0
SHORTNESS OF BREATH: 0
VOMITING: 0
BLOOD IN STOOL: 0
ABDOMINAL DISTENTION: 0
NAUSEA: 0
SINUS PAIN: 0
BACK PAIN: 0
EYE DISCHARGE: 0
RECTAL PAIN: 0
SORE THROAT: 0
APNEA: 0
STRIDOR: 0
WHEEZING: 0
CHEST TIGHTNESS: 0
EYE PAIN: 0

## 2019-06-25 ASSESSMENT — PAIN SCALES - GENERAL: PAINLEVEL_OUTOF10: 3
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
## 2019-07-03 DIAGNOSIS — E11.9 TYPE 2 DIABETES MELLITUS WITHOUT COMPLICATION, WITHOUT LONG-TERM CURRENT USE OF INSULIN (HCC): ICD-10-CM

## 2019-07-03 RX ORDER — CARVEDILOL 25 MG/1
25 TABLET ORAL 2 TIMES DAILY WITH MEALS
Qty: 60 TABLET | Refills: 2 | Status: SHIPPED | OUTPATIENT
Start: 2019-07-03 | End: 2019-10-09 | Stop reason: SDUPTHER

## 2019-07-03 RX ORDER — PRAVASTATIN SODIUM 80 MG/1
80 TABLET ORAL NIGHTLY
Qty: 30 TABLET | Refills: 5 | Status: SHIPPED | OUTPATIENT
Start: 2019-07-03 | End: 2020-06-23 | Stop reason: SDUPTHER

## 2019-09-12 ENCOUNTER — NURSE TRIAGE (OUTPATIENT)
Dept: OTHER | Facility: CLINIC | Age: 54
End: 2019-09-12

## 2019-09-17 ENCOUNTER — OFFICE VISIT (OUTPATIENT)
Dept: PRIMARY CARE CLINIC | Age: 54
End: 2019-09-17
Payer: MEDICAID

## 2019-09-17 VITALS
HEART RATE: 70 BPM | DIASTOLIC BLOOD PRESSURE: 80 MMHG | WEIGHT: 216.6 LBS | OXYGEN SATURATION: 98 % | TEMPERATURE: 99.3 F | SYSTOLIC BLOOD PRESSURE: 124 MMHG | BODY MASS INDEX: 34 KG/M2 | HEIGHT: 67 IN

## 2019-09-17 DIAGNOSIS — M70.22 OLECRANON BURSITIS OF LEFT ELBOW: ICD-10-CM

## 2019-09-17 DIAGNOSIS — Z71.6 ENCOUNTER FOR SMOKING CESSATION COUNSELING: ICD-10-CM

## 2019-09-17 DIAGNOSIS — L03.114 CELLULITIS OF LEFT ELBOW: Primary | ICD-10-CM

## 2019-09-17 DIAGNOSIS — E78.5 HYPERLIPIDEMIA, UNSPECIFIED HYPERLIPIDEMIA TYPE: ICD-10-CM

## 2019-09-17 DIAGNOSIS — E11.9 TYPE 2 DIABETES MELLITUS WITHOUT COMPLICATION, WITHOUT LONG-TERM CURRENT USE OF INSULIN (HCC): ICD-10-CM

## 2019-09-17 DIAGNOSIS — Z72.0 TOBACCO ABUSE: ICD-10-CM

## 2019-09-17 PROCEDURE — 99406 BEHAV CHNG SMOKING 3-10 MIN: CPT | Performed by: NURSE PRACTITIONER

## 2019-09-17 PROCEDURE — 99213 OFFICE O/P EST LOW 20 MIN: CPT | Performed by: NURSE PRACTITIONER

## 2019-09-17 RX ORDER — NAPROXEN 500 MG/1
500 TABLET ORAL 2 TIMES DAILY WITH MEALS
Qty: 60 TABLET | Refills: 2 | Status: SHIPPED | OUTPATIENT
Start: 2019-09-17 | End: 2020-01-08 | Stop reason: SDUPTHER

## 2019-09-17 RX ORDER — LAMOTRIGINE 100 MG/1
100 TABLET ORAL 2 TIMES DAILY
Qty: 180 TABLET | Refills: 0 | Status: SHIPPED | OUTPATIENT
Start: 2019-09-17 | End: 2020-01-02 | Stop reason: SDUPTHER

## 2019-09-17 RX ORDER — NICOTINE 21 MG/24HR
1 PATCH, TRANSDERMAL 24 HOURS TRANSDERMAL EVERY 24 HOURS
Qty: 30 PATCH | Refills: 1 | Status: SHIPPED | OUTPATIENT
Start: 2019-09-17 | End: 2020-01-08 | Stop reason: SDUPTHER

## 2019-09-17 RX ORDER — CLINDAMYCIN HYDROCHLORIDE 300 MG/1
300 CAPSULE ORAL 4 TIMES DAILY
Qty: 40 CAPSULE | Refills: 0 | Status: SHIPPED | OUTPATIENT
Start: 2019-09-17 | End: 2019-09-27

## 2019-09-17 NOTE — PROGRESS NOTES
[address] [address]     Phone:  [phone]  Fax:  [phone]      Jose J Wilson is a 47 y.o. male who presents today for his medical conditions/complaints as noted below. Jose J Wilson is c/o of Elbow Pain (swelling and infection in right elbow 2 days ago)      Chief Complaint   Patient presents with    Elbow Pain     swelling and infection in right elbow 2 days ago       HPI:     HPI    Jose J Wilson presents today for left elbow swelling, redness, and pain for the last 2 days. He has history of infection in this area. He states it was draining, but is not anymore. He has been afebrile. He has been treated for this issue before 2 months ago. He was supposed to follow up with orthopedics several months ago, but states he could not because his mother has no one else to help her. He is also interested in Smoking cessation. He would like to try smoking patches if his insurance will pay for this. He smokes 1.5 to 2 packs of cigarettes per day. He is also needing medication refills.      Past Medical History:   Diagnosis Date    Depression     Diabetes mellitus ([facility] [address]     GERD (gastroesophageal reflux disease)     Hyperlipidemia     Hypertension         Past Surgical History:   Procedure Laterality Date    APPENDECTOMY      CHOLECYSTECTOMY      ELBOW SURGERY      left       Social History     Tobacco Use    Smoking status: Current Every Day Smoker     Packs/day: 0.50     Years: 38.00     Pack years: 19.00    Smokeless tobacco: Never Used   Substance Use Topics    Alcohol use: No        Current Outpatient Medications   Medication Sig Dispense Refill    naproxen (NAPROSYN) 500 MG tablet Take 1 tablet by mouth 2 times daily (with meals) 60 tablet 2    lamoTRIgine (LAMICTAL) 100 MG tablet Take 1 tablet by mouth 2 times daily 180 tablet 0    nicotine (NICODERM CQ) 21 MG/24HR Place 1 patch onto the skin every 24 hours 30 patch 1    clindamycin (CLEOCIN) 300

## 2019-09-18 ASSESSMENT — ENCOUNTER SYMPTOMS: RESPIRATORY NEGATIVE: 1

## 2019-10-09 RX ORDER — CARVEDILOL 25 MG/1
TABLET ORAL
Qty: 60 TABLET | Refills: 5 | Status: SHIPPED | OUTPATIENT
Start: 2019-10-09 | End: 2020-01-08 | Stop reason: SDUPTHER

## 2020-01-02 RX ORDER — LAMOTRIGINE 100 MG/1
100 TABLET ORAL 2 TIMES DAILY
Qty: 60 TABLET | Refills: 0 | Status: SHIPPED | OUTPATIENT
Start: 2020-01-02 | End: 2020-02-05

## 2020-01-08 ENCOUNTER — OFFICE VISIT (OUTPATIENT)
Dept: PRIMARY CARE CLINIC | Age: 55
End: 2020-01-08
Payer: MEDICAID

## 2020-01-08 VITALS
WEIGHT: 214.6 LBS | DIASTOLIC BLOOD PRESSURE: 86 MMHG | SYSTOLIC BLOOD PRESSURE: 120 MMHG | OXYGEN SATURATION: 98 % | HEIGHT: 68 IN | HEART RATE: 99 BPM | BODY MASS INDEX: 32.52 KG/M2 | TEMPERATURE: 97.4 F | RESPIRATION RATE: 16 BRPM

## 2020-01-08 PROCEDURE — 11200 RMVL SKIN TAGS UP TO&INC 15: CPT | Performed by: NURSE PRACTITIONER

## 2020-01-08 PROCEDURE — 99215 OFFICE O/P EST HI 40 MIN: CPT | Performed by: NURSE PRACTITIONER

## 2020-01-08 RX ORDER — NICOTINE 21 MG/24HR
1 PATCH, TRANSDERMAL 24 HOURS TRANSDERMAL EVERY 24 HOURS
Qty: 30 PATCH | Refills: 1 | Status: SHIPPED | OUTPATIENT
Start: 2020-01-08 | End: 2021-01-07

## 2020-01-08 RX ORDER — OMEPRAZOLE 20 MG/1
CAPSULE, DELAYED RELEASE ORAL
Qty: 30 CAPSULE | Refills: 5 | Status: SHIPPED | OUTPATIENT
Start: 2020-01-08 | End: 2020-07-20 | Stop reason: SDUPTHER

## 2020-01-08 RX ORDER — CARVEDILOL 25 MG/1
TABLET ORAL
Qty: 60 TABLET | Refills: 5 | Status: SHIPPED | OUTPATIENT
Start: 2020-01-08 | End: 2020-07-27 | Stop reason: SDUPTHER

## 2020-01-08 RX ORDER — CITALOPRAM 40 MG/1
40 TABLET ORAL DAILY
Qty: 90 TABLET | Refills: 1 | Status: SHIPPED | OUTPATIENT
Start: 2020-01-08 | End: 2020-07-27 | Stop reason: SDUPTHER

## 2020-01-08 RX ORDER — SULFAMETHOXAZOLE AND TRIMETHOPRIM 800; 160 MG/1; MG/1
1 TABLET ORAL 2 TIMES DAILY
Qty: 14 TABLET | Refills: 0 | Status: SHIPPED | OUTPATIENT
Start: 2020-01-08 | End: 2020-01-15

## 2020-01-08 RX ORDER — ALBUTEROL SULFATE 90 UG/1
AEROSOL, METERED RESPIRATORY (INHALATION)
COMMUNITY
Start: 2019-12-31 | End: 2021-05-19 | Stop reason: SDUPTHER

## 2020-01-08 RX ORDER — NAPROXEN 500 MG/1
500 TABLET ORAL 2 TIMES DAILY WITH MEALS
Qty: 60 TABLET | Refills: 2 | Status: SHIPPED | OUTPATIENT
Start: 2020-01-08 | End: 2020-08-11 | Stop reason: SDUPTHER

## 2020-01-08 NOTE — PROGRESS NOTES
800-160 MG per tablet       Plan:   1. Type 2 diabetes mellitus without complication, without long-term current use of insulin (HCC)  - Hemoglobin A1C; Future  - Jardiance 10 mg refilled     2. Tobacco abuse  - prescribed nicoderm patches 21 mg/ 24 hr x 6 weeks, then plan to decrease mg     3. Shortness of breath  - refilled albuterol inhaler   - would like to schedule spirometry in office      4. Mood disorder ([facility] [address]  - refilled celexa     5. Anxiety and depression  - refilled celexa    6. Olecranon bursitis of left elbow  - refilled naproxen     7. Gastroesophageal reflux disease, esophagitis presence not specified  - refilled omeprazole     8. Vitamin D deficiency  - Vitamin D 25 Hydroxy; Future    9. Hyperlipidemia, unspecified hyperlipidemia type  - Lipid Panel; Future    10. Hypertension, unspecified type  - CBC Auto Differential; Future  - Comprehensive Metabolic Panel; Future    11. Fatigue, unspecified type  - CBC Auto Differential; Future  - Comprehensive Metabolic Panel; Future  - T4, Free; Future  - TSH without Reflex; Future    12. Skin tag  - see procedure note above   - Surgical Pathology; Future  - apply mupirocin to area; also apply to elbow     13.  Dog bite   - prescribed bactrim       Orders Placed This Encounter   Procedures    CBC Auto Differential     Standing Status:   Future     Standing Expiration Date:   1/7/2021    Comprehensive Metabolic Panel     Standing Status:   Future     Standing Expiration Date:   1/7/2021    Lipid Panel     Standing Status:   Future     Standing Expiration Date:   1/7/2021     Order Specific Question:   Is Patient Fasting?/# of Hours     Answer:   yes    T4, Free     Standing Status:   Future     Standing Expiration Date:   1/7/2021    TSH without Reflex     Standing Status:   Future     Standing Expiration Date:   1/7/2021    Vitamin D 25 Hydroxy     Standing Status:   Future     Standing Expiration Date:   1/8/2021    Hemoglobin A1C     Standing Status: Future     Standing Expiration Date:   1/8/2021   [facility] Surgical Pathology     Left Nostril     Standing Status:   Future     Standing Expiration Date:   1/8/2021     Order Specific Question:   PREVIOUS BIOPSY     Answer:   No     Order Specific Question:   PREOP DIAGNOSIS     Answer:   Skin Tag     Order Specific Question:   FROZEN SECTION - NO OR YES/SPECIMEN     Answer:   No    Surgical Pathology     Orders Placed This Encounter   Medications    mupirocin (BACTROBAN) 2 % ointment     Sig: Apply 3 times daily.      Dispense:  1 Tube     Refill:  0    empagliflozin (JARDIANCE) 10 MG tablet     Sig: Take 1 tablet by mouth daily     Dispense:  90 tablet     Refill:  0    nicotine (NICODERM CQ) 21 MG/24HR     Sig: Place 1 patch onto the skin every 24 hours     Dispense:  30 patch     Refill:  1    carvedilol (COREG) 25 MG tablet     Sig: TAKE 1 TABLET BY MOUTH TWO TIMES A DAY (WITH MEALS)     Dispense:  60 tablet     Refill:  5    citalopram (CELEXA) 40 MG tablet     Sig: Take 1 tablet by mouth daily     Dispense:  90 tablet     Refill:  1    naproxen (NAPROSYN) 500 MG tablet     Sig: Take 1 tablet by mouth 2 times daily (with meals)     Dispense:  60 tablet     Refill:  2    omeprazole (PRILOSEC) 20 MG delayed release capsule     Sig: TAKE ONE CAPSULE BY MOUTH EVERY DAY     Dispense:  30 capsule     Refill:  5    sulfamethoxazole-trimethoprim (BACTRIM DS;SEPTRA DS) 800-160 MG per tablet     Sig: Take 1 tablet by mouth 2 times daily for 7 days     Dispense:  14 tablet     Refill:  0     Medications Discontinued During This Encounter   Medication Reason    empagliflozin (JARDIANCE) 10 MG tablet REORDER    QUEtiapine (SEROQUEL) 50 MG tablet     tiZANidine (ZANAFLEX) 4 MG tablet     nicotine (NICODERM CQ) 21 MG/24HR REORDER    carvedilol (COREG) 25 MG tablet REORDER    citalopram (CELEXA) 40 MG tablet REORDER    naproxen (NAPROSYN) 500 MG tablet REORDER    omeprazole (PRILOSEC) 20 MG delayed release capsule REORDER    ergocalciferol (ERGOCALCIFEROL) 73567 units capsule      There are no Patient Instructions on file for this visit. Patient given educational handouts and has had all questions answered. Patient voices understanding and agrees to plans along with risks and benefits of plan. Patient isinstructed to continue prior meds, diet, and exercise plans unless instructed otherwise. Patient agrees to follow up as instructed and sooner if needed. Patient agrees to go to ER if condition becomes emergent. Notesmay be completed with dictation device and spelling errors may occur. Return in about 1 month (around 2/8/2020) for Chronic conditions, Med recheck, Wound check.

## 2020-01-09 ASSESSMENT — ENCOUNTER SYMPTOMS
BACK PAIN: 1
GASTROINTESTINAL NEGATIVE: 1

## 2020-01-10 ASSESSMENT — ENCOUNTER SYMPTOMS
EYES NEGATIVE: 1
RESPIRATORY NEGATIVE: 1
SHORTNESS OF BREATH: 0
COUGH: 0

## 2020-01-13 ENCOUNTER — TELEPHONE (OUTPATIENT)
Dept: PRIMARY CARE CLINIC | Age: 55
End: 2020-01-13

## 2020-04-17 RX ORDER — LAMOTRIGINE 100 MG/1
TABLET ORAL
Qty: 60 TABLET | Refills: 1 | Status: SHIPPED | OUTPATIENT
Start: 2020-04-17 | End: 2020-06-23 | Stop reason: SDUPTHER

## 2020-06-23 RX ORDER — LAMOTRIGINE 100 MG/1
TABLET ORAL
Qty: 60 TABLET | Refills: 1 | OUTPATIENT
Start: 2020-06-23

## 2020-06-23 RX ORDER — PRAVASTATIN SODIUM 80 MG/1
80 TABLET ORAL NIGHTLY
Qty: 30 TABLET | Refills: 0 | Status: SHIPPED | OUTPATIENT
Start: 2020-06-23 | End: 2021-05-19 | Stop reason: SDUPTHER

## 2020-06-23 RX ORDER — LAMOTRIGINE 100 MG/1
TABLET ORAL
Qty: 60 TABLET | Refills: 0 | Status: SHIPPED | OUTPATIENT
Start: 2020-06-23 | End: 2020-07-27 | Stop reason: SDUPTHER

## 2020-07-20 RX ORDER — OMEPRAZOLE 20 MG/1
CAPSULE, DELAYED RELEASE ORAL
Qty: 30 CAPSULE | Refills: 5 | Status: SHIPPED | OUTPATIENT
Start: 2020-07-20 | End: 2021-07-23 | Stop reason: SDUPTHER

## 2020-07-20 NOTE — TELEPHONE ENCOUNTER
Nguyen called requesting a refill of the below medication which has been pended for you:     Requested Prescriptions     Signed Prescriptions Disp Refills    omeprazole (PRILOSEC) 20 MG delayed release capsule 30 capsule 5     Sig: TAKE ONE CAPSULE BY MOUTH EVERY DAY     Authorizing Provider: Minerva Lares     Ordering User: Richi Cruz       Last Appointment Date: 1/8/2020  Next Appointment Date: Visit date not found    Allergies   Allergen Reactions    Pcn [Penicillins] Anaphylaxis

## 2020-07-27 ENCOUNTER — TELEPHONE (OUTPATIENT)
Dept: PRIMARY CARE CLINIC | Age: 55
End: 2020-07-27

## 2020-07-27 RX ORDER — LAMOTRIGINE 100 MG/1
TABLET ORAL
Qty: 60 TABLET | Refills: 0 | Status: SHIPPED | OUTPATIENT
Start: 2020-07-27 | End: 2020-08-27

## 2020-07-27 RX ORDER — CARVEDILOL 25 MG/1
TABLET ORAL
Qty: 60 TABLET | Refills: 5 | Status: SHIPPED | OUTPATIENT
Start: 2020-07-27 | End: 2021-02-15

## 2020-07-27 RX ORDER — CITALOPRAM 40 MG/1
40 TABLET ORAL DAILY
Qty: 90 TABLET | Refills: 1 | Status: SHIPPED | OUTPATIENT
Start: 2020-07-27 | End: 2020-11-01

## 2020-07-27 NOTE — TELEPHONE ENCOUNTER
Pt pharmacy called states that jardiance is not covered but Steglatro would be covered requesting for Rx to be changed. Please advise. Thank you.

## 2020-07-28 RX ORDER — ERTUGLIFLOZIN 5 MG/1
1 TABLET, FILM COATED ORAL DAILY
Qty: 30 TABLET | Refills: 0 | Status: SHIPPED | OUTPATIENT
Start: 2020-07-28 | End: 2020-09-16

## 2020-07-28 NOTE — TELEPHONE ENCOUNTER
Bimal Woodruff called to request a refill on his medication.       Last office visit : 1/8/2020   Next office visit : 8/11/2020     Requested Prescriptions     Signed Prescriptions Disp Refills    citalopram (CELEXA) 40 MG tablet 90 tablet 1     Sig: Take 1 tablet by mouth daily     Authorizing Provider: Marquise Chino     Ordering User: Karla Brannon lamoTRIgine (LAMICTAL) 100 MG tablet 60 tablet 0     Sig: TAKE 1 TABLET BY MOUTH TWO TIMES A DAY     Authorizing Provider: Marquise Chino     Ordering User: Karla Lean empagliflozin (JARDIANCE) 10 MG tablet 90 tablet 0     Sig: Take 1 tablet by mouth daily     Authorizing Provider: Marquise Chino     Ordering User: Karla Lean carvedilol (COREG) 25 MG tablet 60 tablet 5     Sig: TAKE 1 TABLET BY MOUTH TWO TIMES A DAY (WITH MEALS)     Authorizing Provider: Marquise Chino     Ordering User: Karlaav Brannon Ertugliflozin L-PyroglutamicAc (STEGLATRO) 5 MG TABS 30 tablet 0     Sig: Take 1 tablet by mouth daily     Authorizing Provider: Nilson Buck     Ordering User: Stephen Lamar

## 2020-08-11 ENCOUNTER — VIRTUAL VISIT (OUTPATIENT)
Dept: PRIMARY CARE CLINIC | Age: 55
End: 2020-08-11
Payer: MEDICAID

## 2020-08-11 PROCEDURE — 99443 PR PHYS/QHP TELEPHONE EVALUATION 21-30 MIN: CPT | Performed by: NURSE PRACTITIONER

## 2020-08-11 RX ORDER — NAPROXEN 500 MG/1
500 TABLET ORAL 2 TIMES DAILY WITH MEALS
Qty: 60 TABLET | Refills: 2 | Status: SHIPPED | OUTPATIENT
Start: 2020-08-11 | End: 2020-11-10

## 2020-08-11 RX ORDER — CLINDAMYCIN HYDROCHLORIDE 300 MG/1
300 CAPSULE ORAL 3 TIMES DAILY
Qty: 30 CAPSULE | Refills: 0 | Status: SHIPPED | OUTPATIENT
Start: 2020-08-11 | End: 2020-08-21

## 2020-08-11 NOTE — PROGRESS NOTES
[address] [address]            Phone:  [phone]  Fax:  [phone]    Kendal Santa is a 49 y.o. male evaluated via telephone on 8/11/2020. Consent:    He and/or health care decision maker is aware that that he may receive a bill for this telephone service, depending on his insurance coverage, and has provided verbal consent to proceed: Yes      HPI  Chief Complaint   Patient presents with    Back Pain    Hypertension    Elbow Pain    Diabetes    Anxiety    Depression       I communicated with the patient and/or health care decision maker about chronic and acute conditions. Mr. Ranjit Marcial has chronic back pain, hypertension, diabetes, anxiety, and depression. He is also having left elbow pain. He does have a history of olecranon bursitis in this elbow. He has not yet followed up with orthopedics as suggested. He has been dealing with this for over 1 year. He was hospitalized last year with this. Patient is unable to come in office at this time or do a video visit due to for not allowing this. He has a history of chronic back pain. He was taking naproxen for his back and elbow pain. He is out of this medication. He states this medication helps some. He has a history of hypertension. He continues to take Carvedilol. He denies any chest pain, shortness of breath, swelling. He has a history of anxiety and depression. He states this is well controlled with Lamictal.  He still has some down moods about his health. He denies any suicidal or homicidal ideation. He is a known diabetic. He does take his Steglatro as prescribed. He is not checking his glucose at home and he has not yet obtained labs that were ordered for him in January or last September when I saw him in office. Review of Systems   Constitutional: Negative for appetite change, fatigue and fever. HENT: Negative for congestion, sinus pressure and sinus pain.     Eyes: Negative for pain and visual disturbance. Respiratory: Negative for cough, shortness of breath and wheezing. Cardiovascular: Negative for chest pain and leg swelling. Gastrointestinal: Negative for abdominal pain, diarrhea, nausea and vomiting. Endocrine: Negative for cold intolerance and heat intolerance. Genitourinary: Negative for dysuria, frequency and urgency. Musculoskeletal: Positive for arthralgias, back pain and joint swelling. Skin: Negative for rash and wound. Neurological: Negative for dizziness and weakness. Hematological: Negative for adenopathy. Psychiatric/Behavioral: Positive for dysphoric mood. Negative for sleep disturbance. The patient is not nervous/anxious. PLAN    Details of this discussion including any medical advice provided:     1. Olecranon bursitis of left elbow  Start:  - clindamycin (CLEOCIN) 300 MG capsule; Take 1 capsule by mouth 3 times daily for 10 days  Dispense: 30 capsule; Refill: 0  - mupirocin (BACTROBAN) 2 % ointment; Apply 3 times daily. Dispense: 1 Tube; Refill: 0  - naproxen (NAPROSYN) 500 MG tablet; Take 1 tablet by mouth 2 times daily (with meals)  Dispense: 60 tablet; Refill: 2    2. Essential hypertension  Continue current medications, get labs    3. Type 2 diabetes mellitus without complication, without long-term current use of insulin (HCC)  Continue Steglatro, get A1c    4. Anxiety and depression  Continue Lamictal    5. Mood disorder (HCC)  Continue Lamictal    6. Chronic bilateral low back pain with bilateral sciatica  Naproxen     I want his next visit to be in office only. If his elbow does not improve, I would like him to follow-up with me or orthopedic in office only. He can also be seen in urgent care if needed. I have highly encouraged him to get his labs checked that were ordered in January and last September. He is to get these while fasting.   I have reiterated to him that we will be best able to take care of him if we know his lab values especially his A1c. I have relayed to him that it could cause delay in healing as he has seen with his left elbow. Return in about 4 weeks (around 9/8/2020), or if symptoms worsen or fail to improve, for Physical-OV only. I affirm this is a Patient Initiated Episode with an Established Patient who has not had a related appointment within my department in the past 7 days or scheduled within the next 24 hours. Total Time: minutes: 21-30 minutes      Note: not billable if this call serves to triage the patient into an appointment for the relevant concern      [id number] to the emergency declaration under the [facility], [facility] waiver authority and the [employer] Act, this Virtual  Visit was conducted, with patient's consent, to reduce the patient's risk of exposure to COVID-19 and provide continuity of care for an established patient. Services were provided through a telephone discussion  to substitute for in-person clinic visit. Parties involved during telephone call include myself, ALENA Rascon and patient listed.

## 2020-08-17 ASSESSMENT — ENCOUNTER SYMPTOMS
BACK PAIN: 1
COUGH: 0
WHEEZING: 0
NAUSEA: 0
VOMITING: 0
SINUS PRESSURE: 0
EYE PAIN: 0
SHORTNESS OF BREATH: 0
SINUS PAIN: 0
DIARRHEA: 0
ABDOMINAL PAIN: 0

## 2020-08-27 RX ORDER — LAMOTRIGINE 100 MG/1
TABLET ORAL
Qty: 60 TABLET | Refills: 4 | Status: SHIPPED | OUTPATIENT
Start: 2020-08-27 | End: 2021-02-15

## 2020-08-28 DIAGNOSIS — E11.9 TYPE 2 DIABETES MELLITUS WITHOUT COMPLICATION, WITHOUT LONG-TERM CURRENT USE OF INSULIN (HCC): ICD-10-CM

## 2020-08-28 LAB — HBA1C MFR BLD: 10.1 % (ref 4–6)

## 2020-08-30 RX ORDER — INSULIN GLARGINE 100 [IU]/ML
10 INJECTION, SOLUTION SUBCUTANEOUS NIGHTLY
Qty: 1 PEN | Refills: 1 | Status: SHIPPED | OUTPATIENT
Start: 2020-08-30 | End: 2020-08-31

## 2020-08-31 ENCOUNTER — TELEPHONE (OUTPATIENT)
Dept: PRIMARY CARE CLINIC | Age: 55
End: 2020-08-31

## 2020-08-31 RX ORDER — INSULIN GLARGINE 100 [IU]/ML
10 INJECTION, SOLUTION SUBCUTANEOUS NIGHTLY
Qty: 1 PEN | Refills: 1 | Status: SHIPPED | OUTPATIENT
Start: 2020-08-31 | End: 2021-05-19 | Stop reason: SDUPTHER

## 2020-08-31 NOTE — TELEPHONE ENCOUNTER
Pharmacy called and request basaglar be sent in placed of lantus. Insurance prefers [facility].  escribed

## 2020-11-01 RX ORDER — CITALOPRAM 40 MG/1
TABLET ORAL
Qty: 90 TABLET | Refills: 1 | Status: SHIPPED | OUTPATIENT
Start: 2020-11-01 | End: 2021-05-13 | Stop reason: SDUPTHER

## 2020-11-09 ENCOUNTER — APPOINTMENT (OUTPATIENT)
Dept: CT IMAGING | Age: 55
End: 2020-11-09
Payer: MEDICAID

## 2020-11-09 ENCOUNTER — TELEPHONE (OUTPATIENT)
Dept: PRIMARY CARE CLINIC | Age: 55
End: 2020-11-09

## 2020-11-09 ENCOUNTER — HOSPITAL ENCOUNTER (EMERGENCY)
Age: 55
Discharge: HOME | End: 2020-11-09
Payer: MEDICAID

## 2020-11-09 ENCOUNTER — NURSE TRIAGE (OUTPATIENT)
Dept: OTHER | Facility: CLINIC | Age: 55
End: 2020-11-09

## 2020-11-09 VITALS
HEART RATE: 74 BPM | DIASTOLIC BLOOD PRESSURE: 74 MMHG | TEMPERATURE: 98.1 F | OXYGEN SATURATION: 98 % | RESPIRATION RATE: 19 BRPM | BODY MASS INDEX: 34.41 KG/M2 | SYSTOLIC BLOOD PRESSURE: 120 MMHG | WEIGHT: 223 LBS

## 2020-11-09 LAB
ACETONE BLOOD: NEGATIVE
ALBUMIN SERPL-MCNC: 4.3 G/DL (ref 3.5–5.2)
ALP BLD-CCNC: 96 U/L (ref 40–130)
ALT SERPL-CCNC: 52 U/L (ref 5–41)
ANION GAP SERPL CALCULATED.3IONS-SCNC: 10 MMOL/L (ref 7–19)
AST SERPL-CCNC: 28 U/L (ref 5–40)
ATYPICAL LYMPHOCYTE RELATIVE PERCENT: 2 % (ref 0–8)
BASOPHILS ABSOLUTE: 0 K/UL (ref 0–0.2)
BASOPHILS RELATIVE PERCENT: 0 % (ref 0–1)
BILIRUB SERPL-MCNC: 0.6 MG/DL (ref 0.2–1.2)
BILIRUBIN URINE: NEGATIVE
BLOOD, URINE: NEGATIVE
BUN BLDV-MCNC: 10 MG/DL (ref 6–20)
CALCIUM SERPL-MCNC: 9.5 MG/DL (ref 8.6–10)
CHLORIDE BLD-SCNC: 98 MMOL/L (ref 98–111)
CLARITY: CLEAR
CO2: 27 MMOL/L (ref 22–29)
COLOR: YELLOW
CREAT SERPL-MCNC: 0.6 MG/DL (ref 0.5–1.2)
EOSINOPHILS ABSOLUTE: 0.48 K/UL (ref 0–0.6)
EOSINOPHILS RELATIVE PERCENT: 3 % (ref 0–5)
GFR AFRICAN AMERICAN: >59
GFR NON-AFRICAN AMERICAN: >60
GLUCOSE BLD-MCNC: 201 MG/DL (ref 74–109)
GLUCOSE BLD-MCNC: 227 MG/DL (ref 70–99)
GLUCOSE BLD-MCNC: 257 MG/DL (ref 70–99)
GLUCOSE URINE: 500 MG/DL
HCT VFR BLD CALC: 47.7 % (ref 42–52)
HEMOGLOBIN: 16.6 G/DL (ref 14–18)
IMMATURE GRANULOCYTES #: 0.1 K/UL
KETONES, URINE: NEGATIVE MG/DL
LEUKOCYTE ESTERASE, URINE: NEGATIVE
LYMPHOCYTES ABSOLUTE: 8.3 K/UL (ref 1.1–4.5)
LYMPHOCYTES RELATIVE PERCENT: 50 % (ref 20–40)
MCH RBC QN AUTO: 30.5 PG (ref 27–31)
MCHC RBC AUTO-ENTMCNC: 34.8 G/DL (ref 33–37)
MCV RBC AUTO: 87.7 FL (ref 80–94)
MONOCYTES ABSOLUTE: 0.8 K/UL (ref 0–0.9)
MONOCYTES RELATIVE PERCENT: 5 % (ref 0–10)
NEUTROPHILS ABSOLUTE: 6.4 K/UL (ref 1.5–7.5)
NEUTROPHILS RELATIVE PERCENT: 40 % (ref 50–65)
NITRITE, URINE: NEGATIVE
PDW BLD-RTO: 12.9 % (ref 11.5–14.5)
PERFORMED ON: ABNORMAL
PERFORMED ON: ABNORMAL
PH UA: 7.5 (ref 5–8)
PLATELET # BLD: 308 K/UL (ref 130–400)
PLATELET SLIDE REVIEW: ADEQUATE
PMV BLD AUTO: 11.2 FL (ref 9.4–12.4)
POTASSIUM REFLEX MAGNESIUM: 4.1 MMOL/L (ref 3.5–5)
PROTEIN UA: NEGATIVE MG/DL
RBC # BLD: 5.44 M/UL (ref 4.7–6.1)
RBC # BLD: NORMAL 10*6/UL
SODIUM BLD-SCNC: 135 MMOL/L (ref 136–145)
SPECIFIC GRAVITY UA: 1.01 (ref 1–1.03)
TOTAL PROTEIN: 7.4 G/DL (ref 6.6–8.7)
UROBILINOGEN, URINE: 1 E.U./DL
WBC # BLD: 16 K/UL (ref 4.8–10.8)

## 2020-11-09 PROCEDURE — 81003 URINALYSIS AUTO W/O SCOPE: CPT

## 2020-11-09 PROCEDURE — 80053 COMPREHEN METABOLIC PANEL: CPT

## 2020-11-09 PROCEDURE — 82009 KETONE BODYS QUAL: CPT

## 2020-11-09 PROCEDURE — 36415 COLL VENOUS BLD VENIPUNCTURE: CPT

## 2020-11-09 PROCEDURE — 70450 CT HEAD/BRAIN W/O DYE: CPT

## 2020-11-09 PROCEDURE — 99282 EMERGENCY DEPT VISIT SF MDM: CPT

## 2020-11-09 PROCEDURE — 2580000003 HC RX 258: Performed by: NURSE PRACTITIONER

## 2020-11-09 PROCEDURE — 6360000002 HC RX W HCPCS: Performed by: NURSE PRACTITIONER

## 2020-11-09 PROCEDURE — 93005 ELECTROCARDIOGRAM TRACING: CPT | Performed by: NURSE PRACTITIONER

## 2020-11-09 PROCEDURE — 99999 PR OFFICE/OUTPT VISIT,PROCEDURE ONLY: CPT | Performed by: NURSE PRACTITIONER

## 2020-11-09 PROCEDURE — 82947 ASSAY GLUCOSE BLOOD QUANT: CPT

## 2020-11-09 PROCEDURE — 85025 COMPLETE CBC W/AUTO DIFF WBC: CPT

## 2020-11-09 PROCEDURE — 96374 THER/PROPH/DIAG INJ IV PUSH: CPT

## 2020-11-09 RX ORDER — 0.9 % SODIUM CHLORIDE 0.9 %
1000 INTRAVENOUS SOLUTION INTRAVENOUS ONCE
Status: COMPLETED | OUTPATIENT
Start: 2020-11-09 | End: 2020-11-09

## 2020-11-09 RX ORDER — KETOROLAC TROMETHAMINE 30 MG/ML
30 INJECTION, SOLUTION INTRAMUSCULAR; INTRAVENOUS ONCE
Status: COMPLETED | OUTPATIENT
Start: 2020-11-09 | End: 2020-11-09

## 2020-11-09 RX ADMIN — SODIUM CHLORIDE 1000 ML: 9 INJECTION, SOLUTION INTRAVENOUS at 18:55

## 2020-11-09 RX ADMIN — KETOROLAC TROMETHAMINE 30 MG: 30 INJECTION, SOLUTION INTRAMUSCULAR; INTRAVENOUS at 18:52

## 2020-11-09 ASSESSMENT — ENCOUNTER SYMPTOMS
NAUSEA: 0
VOMITING: 0
ABDOMINAL PAIN: 0
SHORTNESS OF BREATH: 0
DIARRHEA: 0

## 2020-11-09 ASSESSMENT — PAIN SCALES - GENERAL
PAINLEVEL_OUTOF10: 5
PAINLEVEL_OUTOF10: 0
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
## 2020-11-09 NOTE — TELEPHONE ENCOUNTER
Reason for Disposition   Blood glucose > 400 mg/dL (22.2 mmol/L)    Answer Assessment - Initial Assessment Questions  1. BLOOD GLUCOSE: \"What is your blood glucose level? \"       441 taken right now    2. ONSET: \"When did you check the blood glucose? \"       Right now        3. USUAL RANGE: \"What is your glucose level usually? \" (e.g., usual fasting morning value, usual evening value)      Not for sure    4. KETONES: \"Do you check for ketones (urine or blood test strips)? \" If yes, ask: \"What does the test show now? \"       No    5. TYPE 1 or 2:  \"Do you know what type of diabetes you have? \"  (e.g., Type 1, Type 2, Gestational; doesn't know)       Type 2    6. INSULIN: \"Do you take insulin? \" \"What type of insulin(s) do you use? What is the mode of delivery? (syringe, pen; injection or pump)? \"       No insulin injections    7. DIABETES PILLS: \"Do you take any pills for your diabetes? \" If yes, ask: \"Have you missed taking any pills recently? \"      Has not missed any dosages    8. OTHER SYMPTOMS: \"Do you have any symptoms? \" (e.g., fever, frequent urination, difficulty breathing, dizziness, weakness, vomiting)      Dizziness and fatigue    9. PREGNANCY: \"Is there any chance you are pregnant? \" \"When was your last menstrual period? \"      No    Protocols used: DIABETES - HIGH BLOOD SUGAR-ADULT-OH    Patient reports labile blood glucose levels. Currently his blood glucose is 480 and he reports feeling tired and dizzy. In the very recent past he reports having elevated blood sugars greater than 400 and then when he takes his medications it drops into the 60's leaving him with the same symptoms as the elevated glucose levels: dizzy and tired. Patient denies any recent illness or missed doses of his diabetic medications. He reports that he is NOT having frequent urination, extreme thirst, dark urine, or intense hunger. Triage resulted in a disposition of a call back from the provider (nurse) within one hour.   Reached out to provider's office and spoke with Loreta Desai RN who promised to call the patient back. Provided alternate contact information for the patient (sister Russell shell - [phone]). Provided care advice.

## 2020-11-10 LAB
EKG P AXIS: 50 DEGREES
EKG P-R INTERVAL: 170 MS
EKG Q-T INTERVAL: 412 MS
EKG QRS DURATION: 98 MS
EKG QTC CALCULATION (BAZETT): 426 MS
EKG T AXIS: 43 DEGREES

## 2020-11-10 PROCEDURE — 93010 ELECTROCARDIOGRAM REPORT: CPT | Performed by: INTERNAL MEDICINE

## 2020-11-10 RX ORDER — NAPROXEN 500 MG/1
TABLET ORAL
Qty: 60 TABLET | Refills: 2 | Status: SHIPPED | OUTPATIENT
Start: 2020-11-10 | End: 2021-02-15

## 2020-11-12 ENCOUNTER — TELEPHONE (OUTPATIENT)
Dept: PRIMARY CARE CLINIC | Age: 55
End: 2020-11-12

## 2020-11-12 NOTE — TELEPHONE ENCOUNTER
Called patients home number to schedule appt,disconnected  Called mobile-not working  [facility] number and request taken off patients chart-\"he no longer lives here\"

## 2021-05-07 ENCOUNTER — TELEPHONE (OUTPATIENT)
Dept: PRIMARY CARE CLINIC | Age: 56
End: 2021-05-07

## 2021-05-07 NOTE — TELEPHONE ENCOUNTER
Pt pharmacy called ask for refills on all medications. I tried to call pt to remind him he needs a office visit before we can get medication refills.

## 2021-05-13 DIAGNOSIS — F41.9 ANXIETY AND DEPRESSION: ICD-10-CM

## 2021-05-13 DIAGNOSIS — F39 MOOD DISORDER (HCC): ICD-10-CM

## 2021-05-13 DIAGNOSIS — I10 HYPERTENSION, UNSPECIFIED TYPE: ICD-10-CM

## 2021-05-13 DIAGNOSIS — M70.22 OLECRANON BURSITIS OF LEFT ELBOW: ICD-10-CM

## 2021-05-13 DIAGNOSIS — F32.A ANXIETY AND DEPRESSION: ICD-10-CM

## 2021-05-13 RX ORDER — NAPROXEN 500 MG/1
500 TABLET ORAL 2 TIMES DAILY WITH MEALS
Qty: 60 TABLET | Refills: 0 | Status: SHIPPED | OUTPATIENT
Start: 2021-05-13

## 2021-05-13 RX ORDER — CITALOPRAM 40 MG/1
TABLET ORAL
Qty: 30 TABLET | Refills: 0 | Status: SHIPPED | OUTPATIENT
Start: 2021-05-13 | End: 2021-06-14

## 2021-05-13 RX ORDER — CARVEDILOL 25 MG/1
TABLET ORAL
Qty: 60 TABLET | Refills: 0 | Status: SHIPPED | OUTPATIENT
Start: 2021-05-13 | End: 2021-06-14

## 2021-05-13 RX ORDER — LAMOTRIGINE 100 MG/1
TABLET ORAL
Qty: 60 TABLET | Refills: 0 | Status: SHIPPED | OUTPATIENT
Start: 2021-05-13 | End: 2021-06-14

## 2021-05-19 ENCOUNTER — TELEPHONE (OUTPATIENT)
Dept: PRIMARY CARE CLINIC | Age: 56
End: 2021-05-19

## 2021-05-19 ENCOUNTER — VIRTUAL VISIT (OUTPATIENT)
Dept: PRIMARY CARE CLINIC | Age: 56
End: 2021-05-19
Payer: MEDICAID

## 2021-05-19 DIAGNOSIS — E78.5 HYPERLIPIDEMIA, UNSPECIFIED HYPERLIPIDEMIA TYPE: ICD-10-CM

## 2021-05-19 DIAGNOSIS — R06.02 SHORTNESS OF BREATH: ICD-10-CM

## 2021-05-19 DIAGNOSIS — Z11.59 NEED FOR HEPATITIS C SCREENING TEST: ICD-10-CM

## 2021-05-19 DIAGNOSIS — Z76.89 ENCOUNTER TO ESTABLISH CARE: Primary | ICD-10-CM

## 2021-05-19 DIAGNOSIS — Z12.11 COLON CANCER SCREENING: ICD-10-CM

## 2021-05-19 DIAGNOSIS — E11.9 TYPE 2 DIABETES MELLITUS WITHOUT COMPLICATION, WITHOUT LONG-TERM CURRENT USE OF INSULIN (HCC): ICD-10-CM

## 2021-05-19 PROCEDURE — 99422 OL DIG E/M SVC 11-20 MIN: CPT | Performed by: FAMILY MEDICINE

## 2021-05-19 RX ORDER — EPINEPHRINE 0.3 MG/.3ML
0.3 INJECTION SUBCUTANEOUS ONCE
Qty: 0.3 ML | Refills: 2 | Status: SHIPPED | OUTPATIENT
Start: 2021-05-19 | End: 2021-05-19

## 2021-05-19 RX ORDER — BUPROPION HYDROCHLORIDE 150 MG/1
150 TABLET ORAL EVERY MORNING
Qty: 30 TABLET | Refills: 2 | Status: SHIPPED | OUTPATIENT
Start: 2021-05-19

## 2021-05-19 RX ORDER — INSULIN GLARGINE 100 [IU]/ML
10 INJECTION, SOLUTION SUBCUTANEOUS NIGHTLY
Qty: 1 PEN | Refills: 1 | Status: SHIPPED | OUTPATIENT
Start: 2021-05-19

## 2021-05-19 RX ORDER — ALBUTEROL SULFATE 90 UG/1
2 AEROSOL, METERED RESPIRATORY (INHALATION) DAILY
Qty: 1 INHALER | Refills: 1 | Status: SHIPPED | OUTPATIENT
Start: 2021-05-19 | End: 2021-07-01

## 2021-05-19 RX ORDER — NICOTINE 21 MG/24HR
1 PATCH, TRANSDERMAL 24 HOURS TRANSDERMAL DAILY
Qty: 42 PATCH | Refills: 0 | Status: SHIPPED | OUTPATIENT
Start: 2021-05-19 | End: 2021-06-30

## 2021-05-19 RX ORDER — PRAVASTATIN SODIUM 80 MG/1
80 TABLET ORAL NIGHTLY
Qty: 30 TABLET | Refills: 0 | Status: SHIPPED | OUTPATIENT
Start: 2021-05-19

## 2021-05-19 SDOH — ECONOMIC STABILITY: FOOD INSECURITY: WITHIN THE PAST 12 MONTHS, YOU WORRIED THAT YOUR FOOD WOULD RUN OUT BEFORE YOU GOT MONEY TO BUY MORE.: NEVER TRUE

## 2021-05-19 ASSESSMENT — PATIENT HEALTH QUESTIONNAIRE - PHQ9
SUM OF ALL RESPONSES TO PHQ QUESTIONS 1-9: 2
1. LITTLE INTEREST OR PLEASURE IN DOING THINGS: 1

## 2021-05-19 ASSESSMENT — SOCIAL DETERMINANTS OF HEALTH (SDOH): HOW HARD IS IT FOR YOU TO PAY FOR THE VERY BASICS LIKE FOOD, HOUSING, MEDICAL CARE, AND HEATING?: NOT VERY HARD

## 2021-05-19 NOTE — TELEPHONE ENCOUNTER
Patient's Pamella Bookbinder is not covered by insurance. Pharmacy wants to know if they can change it to lantus as this is covered by his insurance.      Please advise

## 2021-05-19 NOTE — PROGRESS NOTES
Darrell Lau (:  1965) is a 48 y.o. male,Established patient, here for evaluation of the following chief complaint(s): [address] (was patient of bhakti, needs epipen for allergy to wasps and bee's ) and Manic Behavior (patient was being treated at [address] [place] [address] but would like to be referred elsewhere for therapy)         ASSESSMENT/PLAN:  1. Encounter to establish care  -     Hepatitis C Antibody; Future  2. Type 2 diabetes mellitus without complication, without long-term current use of insulin (HCC)  -     Microalbumin / Creatinine Urine Ratio; Future  -     Hemoglobin A1C; Future  3. Shortness of breath  -     albuterol sulfate  (90 Base) MCG/ACT inhaler; Inhale 2 puffs into the lungs daily, Disp-1 Inhaler, R-1Normal  4. Hyperlipidemia, unspecified hyperlipidemia type  -     Lipid, Fasting; Future  5. Colon cancer screening  -     Cologuard (For External Results Only); Future  6. Need for hepatitis C screening test  -     Hepatitis C Antibody; Future      Return in about 3 months (around 2021) for routine follow-up. SUBJECTIVE/OBJECTIVE:  HPI   This is a virtual visit using the [employer]. me platform. Pt requested this type of visit due to the fact that he lives 30 miles away and he has no reliable form of transportation. He states that his car is not working right now and he is not able to get the \"govt to pay for a taxi\". He is requesting refills of some of his medications. He is also requesting a Rx for an epipen wsp which he states he  Pt is a diabetic. He states his FBS this am was 150 mg/dl, He takes Jardiance 10 mg & Steglatro 5 mg . His medical record shows he should be taking Basaglar as well but he states he never got a Rx for it. He is due for a diabetic eye exam. He denies polyuria, polyphagia or polydipsia. He does admit to depression after loss of several family members and close family friends lately. He states that Celexa is helping.  He has not done grief or bereavement counseling. No SI or HI  Pt lives alone but his sister lives a few miles down the road from him. Pt is a 1 1/2 ppd smoker. He wants to try to quit again. He had used the nicotine patch before and was able to tolerate it. He quit drinking 13 yrs ago. He denies recreational drug use. He is overdue for several immunization. He states that he will get his Covid vaccine through the pharmacy that is close to him. Review of Systems    Patient-Reported Vitals 5/19/2021   Patient-Reported Weight 215 lbs   Patient-Reported Height 5'7        Physical Exam   none        On this date 5/19/2021 I have spent 20 minutes reviewing previous notes, test results and face to face (virtual) with the patient discussing the diagnosis and importance of compliance with the treatment plan as well as documenting on the day of the visit. Justine Almonte, was evaluated through a synchronous (real-time) audio-video encounter. The patient (or guardian if applicable) is aware that this is a billable service. Verbal consent to proceed has been obtained within the past 12 months. The visit was conducted pursuant to the emergency declaration under the [facility], [address] [place] [address] authority and the [employer] Act. Patient identification was verified, and a caregiver was present when appropriate. The patient was located in a state where the provider was credentialed to provide care. An electronic signature was used to authenticate this note.     --Allan Bean MD

## 2021-05-20 NOTE — TELEPHONE ENCOUNTER
Spoke with Aly Valle at pharmacy and told her ok to change to Lantus just to keep the pt on the same dose

## 2021-05-20 NOTE — TELEPHONE ENCOUNTER
Yes, Prashanth Congress can be switched to Lantus.  Please inform pt to use same dose - 10 units at bedtime daily,

## 2021-05-21 ENCOUNTER — TELEPHONE (OUTPATIENT)
Dept: PRIMARY CARE CLINIC | Age: 56
End: 2021-05-21

## 2021-05-21 NOTE — TELEPHONE ENCOUNTER
Pt called today stating that he is having chest pain but no other symptoms of a heart attack. He states chest pain started this morning and has continued through the day. I told the patient he needed to go to the ER immediately for a full work up. Patient VU and stated he would go to the ER.

## 2021-06-14 ENCOUNTER — TELEPHONE (OUTPATIENT)
Dept: PRIMARY CARE CLINIC | Age: 56
End: 2021-06-14

## 2021-06-14 DIAGNOSIS — I10 HYPERTENSION, UNSPECIFIED TYPE: ICD-10-CM

## 2021-06-14 DIAGNOSIS — E11.9 TYPE 2 DIABETES MELLITUS WITHOUT COMPLICATION, WITHOUT LONG-TERM CURRENT USE OF INSULIN (HCC): ICD-10-CM

## 2021-06-14 RX ORDER — LAMOTRIGINE 100 MG/1
TABLET ORAL
Qty: 60 TABLET | Refills: 0 | Status: SHIPPED | OUTPATIENT
Start: 2021-06-14 | End: 2021-07-23 | Stop reason: SDUPTHER

## 2021-06-14 RX ORDER — CARVEDILOL 25 MG/1
TABLET ORAL
Qty: 60 TABLET | Refills: 0 | Status: SHIPPED | OUTPATIENT
Start: 2021-06-14 | End: 2021-07-23 | Stop reason: SDUPTHER

## 2021-06-14 RX ORDER — ERTUGLIFLOZIN 5 MG/1
TABLET, FILM COATED ORAL
Qty: 30 TABLET | Refills: 5 | Status: SHIPPED | OUTPATIENT
Start: 2021-06-14

## 2021-06-14 NOTE — TELEPHONE ENCOUNTER
PT needs refills on the following medications sent to pharmacy please.       LAMICTAL 100MG  COREG 25 MG  STEGLATRO 5MG  CITALOPRAM 40MG

## 2021-07-01 ENCOUNTER — TELEPHONE (OUTPATIENT)
Dept: PRIMARY CARE CLINIC | Age: 56
End: 2021-07-01

## 2021-07-01 DIAGNOSIS — R06.02 SHORTNESS OF BREATH: ICD-10-CM

## 2021-07-01 RX ORDER — EMPAGLIFLOZIN 10 MG/1
1 TABLET, FILM COATED ORAL DAILY
Qty: 30 TABLET | Refills: 2 | Status: SHIPPED | OUTPATIENT
Start: 2021-07-01

## 2021-07-01 NOTE — TELEPHONE ENCOUNTER
Betsy Rizzo at [address] [place] [address] called states INS will not pay for [facility] however they will pay for jardiance. How would you like to proceed?

## 2021-07-01 NOTE — TELEPHONE ENCOUNTER
I have sent Jardiance to pharmacy attempted to reach pt to provide with instructions to keep BS log and left detailed message with instructions for patient.

## 2021-07-01 NOTE — TELEPHONE ENCOUNTER
Let's switch him to Jardiance 10 mg to be taken daily. He has a scheduled virtual visit with on 7/7.  Please instruct pt to monitor his BS daily before breakfast. I want to know what kind of blood sugar readings he is getting at home

## 2021-07-07 ENCOUNTER — TELEPHONE (OUTPATIENT)
Dept: PRIMARY CARE CLINIC | Age: 56
End: 2021-07-07

## 2021-07-23 DIAGNOSIS — K21.9 GASTROESOPHAGEAL REFLUX DISEASE WITHOUT ESOPHAGITIS: ICD-10-CM

## 2021-07-23 DIAGNOSIS — I10 HYPERTENSION, UNSPECIFIED TYPE: ICD-10-CM

## 2021-07-23 RX ORDER — LAMOTRIGINE 100 MG/1
TABLET ORAL
Qty: 60 TABLET | Refills: 0 | Status: SHIPPED | OUTPATIENT
Start: 2021-07-23 | End: 2021-07-27

## 2021-07-23 RX ORDER — OMEPRAZOLE 20 MG/1
CAPSULE, DELAYED RELEASE ORAL
Qty: 30 CAPSULE | Refills: 5 | Status: SHIPPED | OUTPATIENT
Start: 2021-07-23 | End: 2022-02-08

## 2021-07-23 RX ORDER — CARVEDILOL 25 MG/1
TABLET ORAL
Qty: 60 TABLET | Refills: 0 | Status: SHIPPED | OUTPATIENT
Start: 2021-07-23 | End: 2021-08-23

## 2021-07-23 NOTE — TELEPHONE ENCOUNTER
Rox Garza called to request a refill on his medication.       Last office visit : 5/19/2021   Next office visit : Visit date not found     Requested Prescriptions     Pending Prescriptions Disp Refills    lamoTRIgine (LAMICTAL) 100 MG tablet 60 tablet 0     Sig: Take 1 tablet by mouth two times daily    carvedilol (COREG) 25 MG tablet 60 tablet 0     Sig: Take 1 tablet by mouth two times daily    omeprazole (PRILOSEC) 20 MG delayed release capsule 30 capsule 5     Sig: TAKE ONE CAPSULE BY MOUTH EVERY DAY            Amisha Davison

## 2021-07-27 RX ORDER — LAMOTRIGINE 100 MG/1
TABLET ORAL
Qty: 60 TABLET | Refills: 0 | Status: SHIPPED | OUTPATIENT
Start: 2021-07-27 | End: 2021-09-26

## 2021-08-23 DIAGNOSIS — I10 HYPERTENSION, UNSPECIFIED TYPE: ICD-10-CM

## 2021-08-23 RX ORDER — CARVEDILOL 25 MG/1
TABLET ORAL
Qty: 60 TABLET | Refills: 0 | Status: SHIPPED | OUTPATIENT
Start: 2021-08-23 | End: 2021-09-26

## 2022-02-08 DIAGNOSIS — K21.9 GASTROESOPHAGEAL REFLUX DISEASE WITHOUT ESOPHAGITIS: ICD-10-CM

## 2022-02-08 RX ORDER — OMEPRAZOLE 20 MG/1
CAPSULE, DELAYED RELEASE ORAL
Qty: 30 CAPSULE | Refills: 0 | Status: SHIPPED | OUTPATIENT
Start: 2022-02-08

## 2022-02-24 RX ORDER — LAMOTRIGINE 100 MG/1
TABLET ORAL
Qty: 60 TABLET | Refills: 0 | Status: SHIPPED | OUTPATIENT
Start: 2022-02-24

## 2022-08-17 ENCOUNTER — HOSPITAL ENCOUNTER (EMERGENCY)
Age: 57
Discharge: HOME | End: 2022-08-17
Attending: EMERGENCY MEDICINE
Payer: MEDICAID

## 2022-08-17 VITALS
OXYGEN SATURATION: 96 % | RESPIRATION RATE: 20 BRPM | HEART RATE: 80 BPM | BODY MASS INDEX: 30.46 KG/M2 | DIASTOLIC BLOOD PRESSURE: 84 MMHG | WEIGHT: 201 LBS | HEIGHT: 68 IN | SYSTOLIC BLOOD PRESSURE: 126 MMHG | TEMPERATURE: 97.9 F

## 2022-08-17 DIAGNOSIS — T63.481A ALLERGIC REACTION TO INSECT STING, ACCIDENTAL OR UNINTENTIONAL, INITIAL ENCOUNTER: Primary | ICD-10-CM

## 2022-08-17 PROCEDURE — 96372 THER/PROPH/DIAG INJ SC/IM: CPT

## 2022-08-17 PROCEDURE — 99284 EMERGENCY DEPT VISIT MOD MDM: CPT

## 2022-08-17 PROCEDURE — 6360000002 HC RX W HCPCS: Performed by: EMERGENCY MEDICINE

## 2022-08-17 RX ORDER — EPINEPHRINE 0.3 MG/.3ML
INJECTION SUBCUTANEOUS
Qty: 1 EACH | Refills: 3 | Status: SHIPPED | OUTPATIENT
Start: 2022-08-17 | End: 2022-08-17

## 2022-08-17 RX ORDER — EPINEPHRINE 0.3 MG/.3ML
INJECTION SUBCUTANEOUS
Qty: 1 EACH | Refills: 3 | Status: SHIPPED | OUTPATIENT
Start: 2022-08-17

## 2022-08-17 RX ORDER — DEXAMETHASONE SODIUM PHOSPHATE 10 MG/ML
8 INJECTION, SOLUTION INTRAMUSCULAR; INTRAVENOUS ONCE
Status: COMPLETED | OUTPATIENT
Start: 2022-08-17 | End: 2022-08-17

## 2022-08-17 RX ADMIN — DEXAMETHASONE SODIUM PHOSPHATE 8 MG: 10 INJECTION, SOLUTION INTRAMUSCULAR; INTRAVENOUS at 19:46

## 2022-08-17 ASSESSMENT — PAIN - FUNCTIONAL ASSESSMENT: PAIN_FUNCTIONAL_ASSESSMENT: NONE - DENIES PAIN

## 2022-08-17 ASSESSMENT — ENCOUNTER SYMPTOMS
STRIDOR: 0
WHEEZING: 0
COUGH: 0
BACK PAIN: 0
SHORTNESS OF BREATH: 0
NAUSEA: 1
VOICE CHANGE: 0
VOMITING: 0
TROUBLE SWALLOWING: 0
ABDOMINAL PAIN: 0
DIARRHEA: 0
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
## 2023-08-02 ENCOUNTER — APPOINTMENT (OUTPATIENT)
Dept: GENERAL RADIOLOGY | Age: 58
End: 2023-08-02
Payer: MEDICAID

## 2023-08-02 ENCOUNTER — HOSPITAL ENCOUNTER (OUTPATIENT)
Age: 58
Setting detail: OBSERVATION
Discharge: HOME | End: 2023-08-03
Attending: EMERGENCY MEDICINE
Payer: MEDICAID

## 2023-08-02 DIAGNOSIS — R07.9 CHEST PAIN, UNSPECIFIED TYPE: Primary | ICD-10-CM

## 2023-08-02 PROBLEM — I25.10 CAD (CORONARY ARTERY DISEASE): Status: ACTIVE | Noted: 2023-08-02

## 2023-08-02 PROBLEM — R07.2 SUBSTERNAL CHEST PAIN RELIEVED BY NITROGLYCERIN: Status: ACTIVE | Noted: 2023-08-02

## 2023-08-02 LAB
ALBUMIN SERPL-MCNC: 3.9 G/DL (ref 3.5–5.2)
ALP SERPL-CCNC: 104 U/L (ref 40–130)
ALT SERPL-CCNC: 18 U/L (ref 5–41)
ANION GAP SERPL CALCULATED.3IONS-SCNC: 13 MMOL/L (ref 7–19)
APTT PPP: 28.2 SEC (ref 26–36.2)
AST SERPL-CCNC: 16 U/L (ref 5–40)
BILIRUB SERPL-MCNC: 0.3 MG/DL (ref 0.2–1.2)
BNP BLD-MCNC: <36 PG/ML (ref 0–124)
BUN SERPL-MCNC: 8 MG/DL (ref 6–20)
CALCIUM SERPL-MCNC: 9.4 MG/DL (ref 8.6–10)
CHLORIDE SERPL-SCNC: 95 MMOL/L (ref 98–111)
CHOLEST SERPL-MCNC: 210 MG/DL (ref 160–199)
CO2 SERPL-SCNC: 24 MMOL/L (ref 22–29)
CREAT SERPL-MCNC: 0.7 MG/DL (ref 0.5–1.2)
D DIMER PPP FEU-MCNC: 0.39 UG/ML FEU (ref 0–0.48)
GLUCOSE BLD-MCNC: 172 MG/DL (ref 70–99)
GLUCOSE SERPL-MCNC: 284 MG/DL (ref 74–109)
HBA1C MFR BLD: 8.7 % (ref 4–6)
HDLC SERPL-MCNC: 31 MG/DL (ref 55–121)
INR PPP: 1.05 (ref 0.88–1.18)
LDLC SERPL CALC-MCNC: 125 MG/DL
MAGNESIUM SERPL-MCNC: 1.8 MG/DL (ref 1.6–2.6)
MAGNESIUM SERPL-MCNC: 1.8 MG/DL (ref 1.6–2.6)
PERFORMED ON: ABNORMAL
PHOSPHATE SERPL-MCNC: 4.2 MG/DL (ref 2.5–4.5)
POTASSIUM SERPL-SCNC: 4.1 MMOL/L (ref 3.5–5)
PROT SERPL-MCNC: 7.2 G/DL (ref 6.6–8.7)
PROTHROMBIN TIME: 13.4 SEC (ref 12–14.6)
SODIUM SERPL-SCNC: 132 MMOL/L (ref 136–145)
TRIGL SERPL-MCNC: 270 MG/DL (ref 0–149)
TROPONIN T SERPL-MCNC: <0.01 NG/ML (ref 0–0.03)
TSH SERPL DL<=0.005 MIU/L-ACNC: 2.35 UIU/ML (ref 0.35–5.5)

## 2023-08-02 PROCEDURE — 83735 ASSAY OF MAGNESIUM: CPT

## 2023-08-02 PROCEDURE — 80061 LIPID PANEL: CPT

## 2023-08-02 PROCEDURE — 83036 HEMOGLOBIN GLYCOSYLATED A1C: CPT

## 2023-08-02 PROCEDURE — 36415 COLL VENOUS BLD VENIPUNCTURE: CPT

## 2023-08-02 PROCEDURE — G0378 HOSPITAL OBSERVATION PER HR: HCPCS

## 2023-08-02 PROCEDURE — 93005 ELECTROCARDIOGRAM TRACING: CPT | Performed by: EMERGENCY MEDICINE

## 2023-08-02 PROCEDURE — 85730 THROMBOPLASTIN TIME PARTIAL: CPT

## 2023-08-02 PROCEDURE — 6370000000 HC RX 637 (ALT 250 FOR IP): Performed by: EMERGENCY MEDICINE

## 2023-08-02 PROCEDURE — 94760 N-INVAS EAR/PLS OXIMETRY 1: CPT

## 2023-08-02 PROCEDURE — 85025 COMPLETE CBC W/AUTO DIFF WBC: CPT

## 2023-08-02 PROCEDURE — 99285 EMERGENCY DEPT VISIT HI MDM: CPT

## 2023-08-02 PROCEDURE — 6370000000 HC RX 637 (ALT 250 FOR IP): Performed by: HOSPITALIST

## 2023-08-02 PROCEDURE — 84100 ASSAY OF PHOSPHORUS: CPT

## 2023-08-02 PROCEDURE — 71045 X-RAY EXAM CHEST 1 VIEW: CPT

## 2023-08-02 PROCEDURE — 84443 ASSAY THYROID STIM HORMONE: CPT

## 2023-08-02 PROCEDURE — 80053 COMPREHEN METABOLIC PANEL: CPT

## 2023-08-02 PROCEDURE — 83880 ASSAY OF NATRIURETIC PEPTIDE: CPT

## 2023-08-02 PROCEDURE — 82962 GLUCOSE BLOOD TEST: CPT

## 2023-08-02 PROCEDURE — 85610 PROTHROMBIN TIME: CPT

## 2023-08-02 PROCEDURE — 85379 FIBRIN DEGRADATION QUANT: CPT

## 2023-08-02 PROCEDURE — 84484 ASSAY OF TROPONIN QUANT: CPT

## 2023-08-02 RX ORDER — DEXTROSE MONOHYDRATE 100 MG/ML
INJECTION, SOLUTION INTRAVENOUS CONTINUOUS PRN
Status: DISCONTINUED | OUTPATIENT
Start: 2023-08-02 | End: 2023-08-03 | Stop reason: HOSPADM

## 2023-08-02 RX ORDER — PANTOPRAZOLE SODIUM 20 MG/1
20 TABLET, DELAYED RELEASE ORAL
Status: DISCONTINUED | OUTPATIENT
Start: 2023-08-03 | End: 2023-08-03 | Stop reason: HOSPADM

## 2023-08-02 RX ORDER — ASPIRIN 325 MG
325 TABLET ORAL EVERY 4 HOURS PRN
COMMUNITY

## 2023-08-02 RX ORDER — NITROGLYCERIN 0.4 MG/1
0.4 TABLET SUBLINGUAL EVERY 5 MIN PRN
Status: DISCONTINUED | OUTPATIENT
Start: 2023-08-02 | End: 2023-08-03 | Stop reason: SDUPTHER

## 2023-08-02 RX ORDER — CARVEDILOL 25 MG/1
25 TABLET ORAL 2 TIMES DAILY WITH MEALS
Status: DISCONTINUED | OUTPATIENT
Start: 2023-08-03 | End: 2023-08-03 | Stop reason: HOSPADM

## 2023-08-02 RX ORDER — MULTIVIT-MIN/IRON/FOLIC ACID/K 18-600-40
2000 CAPSULE ORAL
COMMUNITY

## 2023-08-02 RX ORDER — ATORVASTATIN CALCIUM 40 MG/1
40 TABLET, FILM COATED ORAL NIGHTLY
Status: DISCONTINUED | OUTPATIENT
Start: 2023-08-02 | End: 2023-08-03 | Stop reason: HOSPADM

## 2023-08-02 RX ORDER — INSULIN GLARGINE 100 [IU]/ML
10 INJECTION, SOLUTION SUBCUTANEOUS NIGHTLY
Status: DISCONTINUED | OUTPATIENT
Start: 2023-08-02 | End: 2023-08-03 | Stop reason: HOSPADM

## 2023-08-02 RX ORDER — POTASSIUM CHLORIDE 7.45 MG/ML
10 INJECTION INTRAVENOUS PRN
Status: DISCONTINUED | OUTPATIENT
Start: 2023-08-02 | End: 2023-08-03 | Stop reason: HOSPADM

## 2023-08-02 RX ORDER — ENOXAPARIN SODIUM 100 MG/ML
40 INJECTION SUBCUTANEOUS DAILY
Status: DISCONTINUED | OUTPATIENT
Start: 2023-08-03 | End: 2023-08-03 | Stop reason: HOSPADM

## 2023-08-02 RX ORDER — POTASSIUM CHLORIDE 20 MEQ/1
40 TABLET, EXTENDED RELEASE ORAL PRN
Status: DISCONTINUED | OUTPATIENT
Start: 2023-08-02 | End: 2023-08-03 | Stop reason: HOSPADM

## 2023-08-02 RX ORDER — CITALOPRAM 20 MG/1
40 TABLET ORAL DAILY
Status: DISCONTINUED | OUTPATIENT
Start: 2023-08-03 | End: 2023-08-03 | Stop reason: HOSPADM

## 2023-08-02 RX ORDER — INSULIN LISPRO 100 [IU]/ML
0-4 INJECTION, SOLUTION INTRAVENOUS; SUBCUTANEOUS NIGHTLY
Status: DISCONTINUED | OUTPATIENT
Start: 2023-08-02 | End: 2023-08-03 | Stop reason: HOSPADM

## 2023-08-02 RX ORDER — PRAZOSIN HYDROCHLORIDE 1 MG/1
1 CAPSULE ORAL NIGHTLY
COMMUNITY

## 2023-08-02 RX ORDER — SODIUM CHLORIDE 9 MG/ML
INJECTION, SOLUTION INTRAVENOUS PRN
Status: DISCONTINUED | OUTPATIENT
Start: 2023-08-02 | End: 2023-08-03 | Stop reason: HOSPADM

## 2023-08-02 RX ORDER — SODIUM CHLORIDE 0.9 % (FLUSH) 0.9 %
5-40 SYRINGE (ML) INJECTION EVERY 12 HOURS SCHEDULED
Status: DISCONTINUED | OUTPATIENT
Start: 2023-08-02 | End: 2023-08-03 | Stop reason: HOSPADM

## 2023-08-02 RX ORDER — ONDANSETRON 2 MG/ML
4 INJECTION INTRAMUSCULAR; INTRAVENOUS EVERY 6 HOURS PRN
Status: DISCONTINUED | OUTPATIENT
Start: 2023-08-02 | End: 2023-08-03 | Stop reason: HOSPADM

## 2023-08-02 RX ORDER — ASPIRIN 81 MG/1
81 TABLET, CHEWABLE ORAL DAILY
Status: DISCONTINUED | OUTPATIENT
Start: 2023-08-03 | End: 2023-08-03 | Stop reason: HOSPADM

## 2023-08-02 RX ORDER — NITROGLYCERIN 0.4 MG/1
0.4 TABLET SUBLINGUAL EVERY 5 MIN PRN
Status: DISCONTINUED | OUTPATIENT
Start: 2023-08-02 | End: 2023-08-03 | Stop reason: HOSPADM

## 2023-08-02 RX ORDER — SODIUM CHLORIDE 0.9 % (FLUSH) 0.9 %
5-40 SYRINGE (ML) INJECTION PRN
Status: DISCONTINUED | OUTPATIENT
Start: 2023-08-02 | End: 2023-08-03 | Stop reason: HOSPADM

## 2023-08-02 RX ORDER — ATORVASTATIN CALCIUM 40 MG/1
40 TABLET, FILM COATED ORAL DAILY
COMMUNITY

## 2023-08-02 RX ORDER — INSULIN LISPRO 100 [IU]/ML
0-8 INJECTION, SOLUTION INTRAVENOUS; SUBCUTANEOUS
Status: DISCONTINUED | OUTPATIENT
Start: 2023-08-03 | End: 2023-08-03 | Stop reason: HOSPADM

## 2023-08-02 RX ORDER — POLYETHYLENE GLYCOL 3350 17 G/17G
17 POWDER, FOR SOLUTION ORAL DAILY PRN
Status: DISCONTINUED | OUTPATIENT
Start: 2023-08-02 | End: 2023-08-03 | Stop reason: HOSPADM

## 2023-08-02 RX ORDER — ACETAMINOPHEN 325 MG/1
650 TABLET ORAL EVERY 4 HOURS PRN
Status: DISCONTINUED | OUTPATIENT
Start: 2023-08-02 | End: 2023-08-03 | Stop reason: HOSPADM

## 2023-08-02 RX ORDER — MAGNESIUM SULFATE IN WATER 40 MG/ML
2000 INJECTION, SOLUTION INTRAVENOUS PRN
Status: DISCONTINUED | OUTPATIENT
Start: 2023-08-02 | End: 2023-08-03 | Stop reason: HOSPADM

## 2023-08-02 RX ORDER — MECOBALAMIN 5000 MCG
5 TABLET,DISINTEGRATING ORAL NIGHTLY PRN
Status: DISCONTINUED | OUTPATIENT
Start: 2023-08-02 | End: 2023-08-03 | Stop reason: HOSPADM

## 2023-08-02 RX ORDER — ACETAMINOPHEN 650 MG/1
650 SUPPOSITORY RECTAL EVERY 4 HOURS PRN
Status: DISCONTINUED | OUTPATIENT
Start: 2023-08-02 | End: 2023-08-03 | Stop reason: HOSPADM

## 2023-08-02 RX ORDER — ONDANSETRON 4 MG/1
4 TABLET, ORALLY DISINTEGRATING ORAL EVERY 8 HOURS PRN
Status: DISCONTINUED | OUTPATIENT
Start: 2023-08-02 | End: 2023-08-03 | Stop reason: HOSPADM

## 2023-08-02 RX ORDER — CALCIUM CARBONATE 500 MG/1
500 TABLET, CHEWABLE ORAL 3 TIMES DAILY PRN
Status: DISCONTINUED | OUTPATIENT
Start: 2023-08-02 | End: 2023-08-03 | Stop reason: HOSPADM

## 2023-08-02 RX ORDER — LAMOTRIGINE 100 MG/1
100 TABLET ORAL 2 TIMES DAILY
Status: DISCONTINUED | OUTPATIENT
Start: 2023-08-02 | End: 2023-08-03 | Stop reason: HOSPADM

## 2023-08-02 RX ADMIN — ATORVASTATIN CALCIUM 40 MG: 40 TABLET, FILM COATED ORAL at 22:13

## 2023-08-02 RX ADMIN — LAMOTRIGINE 100 MG: 100 TABLET ORAL at 22:13

## 2023-08-02 RX ADMIN — NITROGLYCERIN 0.4 MG: 0.4 TABLET, ORALLY DISINTEGRATING SUBLINGUAL at 18:02

## 2023-08-02 RX ADMIN — INSULIN GLARGINE 10 UNITS: 100 INJECTION, SOLUTION SUBCUTANEOUS at 22:13

## 2023-08-02 SDOH — ECONOMIC STABILITY: HOUSING INSECURITY: IN THE LAST 12 MONTHS, HOW MANY PLACES HAVE YOU LIVED?: 3

## 2023-08-02 SDOH — HEALTH STABILITY: MENTAL HEALTH
STRESS IS WHEN SOMEONE FEELS TENSE, NERVOUS, ANXIOUS, OR CAN'T SLEEP AT NIGHT BECAUSE THEIR MIND IS TROUBLED. HOW STRESSED ARE YOU?: RATHER MUCH

## 2023-08-02 SDOH — SOCIAL STABILITY: SOCIAL NETWORK
DO YOU BELONG TO ANY CLUBS OR ORGANIZATIONS SUCH AS CHURCH GROUPS UNIONS, FRATERNAL OR ATHLETIC GROUPS, OR SCHOOL GROUPS?: NO

## 2023-08-02 SDOH — SOCIAL STABILITY: SOCIAL INSECURITY
WITHIN THE LAST YEAR, HAVE TO BEEN RAPED OR FORCED TO HAVE ANY KIND OF SEXUAL ACTIVITY BY YOUR PARTNER OR EX-PARTNER?: NO

## 2023-08-02 SDOH — SOCIAL STABILITY: SOCIAL NETWORK: ARE YOU MARRIED, WIDOWED, DIVORCED, SEPARATED, NEVER MARRIED, OR LIVING WITH A PARTNER?: DIVORCED

## 2023-08-02 SDOH — ECONOMIC STABILITY: INCOME INSECURITY: IN THE LAST 12 MONTHS, WAS THERE A TIME WHEN YOU WERE NOT ABLE TO PAY THE MORTGAGE OR RENT ON TIME?: NO

## 2023-08-02 SDOH — SOCIAL STABILITY: SOCIAL NETWORK: HOW OFTEN DO YOU ATTEND CHURCH OR RELIGIOUS SERVICES?: NEVER

## 2023-08-02 SDOH — ECONOMIC STABILITY: HOUSING INSECURITY
IN THE LAST 12 MONTHS, WAS THERE A TIME WHEN YOU DID NOT HAVE A STEADY PLACE TO SLEEP OR SLEPT IN A SHELTER (INCLUDING NOW)?: NO

## 2023-08-02 SDOH — ECONOMIC STABILITY: INCOME INSECURITY: HOW HARD IS IT FOR YOU TO PAY FOR THE VERY BASICS LIKE FOOD, HOUSING, MEDICAL CARE, AND HEATING?: SOMEWHAT HARD

## 2023-08-02 SDOH — ECONOMIC STABILITY: FOOD INSECURITY: WITHIN THE PAST 12 MONTHS, YOU WORRIED THAT YOUR FOOD WOULD RUN OUT BEFORE YOU GOT MONEY TO BUY MORE.: SOMETIMES TRUE

## 2023-08-02 SDOH — HEALTH STABILITY: PHYSICAL HEALTH: ON AVERAGE, HOW MANY MINUTES DO YOU ENGAGE IN EXERCISE AT THIS LEVEL?: 20 MIN

## 2023-08-02 SDOH — SOCIAL STABILITY: SOCIAL NETWORK: HOW OFTEN DO YOU ATTENT MEETINGS OF THE CLUB OR ORGANIZATION YOU BELONG TO?: NEVER

## 2023-08-02 SDOH — SOCIAL STABILITY: SOCIAL INSECURITY: WITHIN THE LAST YEAR, HAVE YOU BEEN HUMILIATED OR EMOTIONALLY ABUSED IN OTHER WAYS BY YOUR PARTNER OR EX-PARTNER?: NO

## 2023-08-02 SDOH — HEALTH STABILITY: MENTAL HEALTH: HOW OFTEN DO YOU HAVE A DRINK CONTAINING ALCOHOL?: MONTHLY OR LESS

## 2023-08-02 SDOH — ECONOMIC STABILITY: TRANSPORTATION INSECURITY
IN THE PAST 12 MONTHS, HAS LACK OF TRANSPORTATION KEPT YOU FROM MEETINGS, WORK, OR FROM GETTING THINGS NEEDED FOR DAILY LIVING?: NO

## 2023-08-02 SDOH — ECONOMIC STABILITY: TRANSPORTATION INSECURITY
IN THE PAST 12 MONTHS, HAS THE LACK OF TRANSPORTATION KEPT YOU FROM MEDICAL APPOINTMENTS OR FROM GETTING MEDICATIONS?: YES

## 2023-08-02 SDOH — SOCIAL STABILITY: SOCIAL INSECURITY
WITHIN THE LAST YEAR, HAVE YOU BEEN KICKED, HIT, SLAPPED, OR OTHERWISE PHYSICALLY HURT BY YOUR PARTNER OR EX-PARTNER?: NO

## 2023-08-02 SDOH — HEALTH STABILITY: MENTAL HEALTH: HOW MANY STANDARD DRINKS CONTAINING ALCOHOL DO YOU HAVE ON A TYPICAL DAY?: 1 OR 2

## 2023-08-02 SDOH — SOCIAL STABILITY: SOCIAL INSECURITY: WITHIN THE LAST YEAR, HAVE YOU BEEN AFRAID OF YOUR PARTNER OR EX-PARTNER?: NO

## 2023-08-02 SDOH — ECONOMIC STABILITY: FOOD INSECURITY: WITHIN THE PAST 12 MONTHS, THE FOOD YOU BOUGHT JUST DIDN'T LAST AND YOU DIDN'T HAVE MONEY TO GET MORE.: NEVER TRUE

## 2023-08-02 SDOH — SOCIAL STABILITY: SOCIAL NETWORK: HOW OFTEN DO YOU GET TOGETHER WITH FRIENDS OR RELATIVES?: ONCE A WEEK

## 2023-08-02 SDOH — SOCIAL STABILITY: SOCIAL NETWORK
IN A TYPICAL WEEK, HOW MANY TIMES DO YOU TALK ON THE PHONE WITH FAMILY, FRIENDS, OR NEIGHBORS?: MORE THAN THREE TIMES A WEEK

## 2023-08-02 SDOH — HEALTH STABILITY: PHYSICAL HEALTH: ON AVERAGE, HOW MANY DAYS PER WEEK DO YOU ENGAGE IN MODERATE TO STRENUOUS EXERCISE (LIKE A BRISK WALK)?: 7 DAYS

## 2023-08-02 ASSESSMENT — PAIN SCALES - GENERAL: PAINLEVEL_OUTOF10: 8

## 2023-08-02 ASSESSMENT — ENCOUNTER SYMPTOMS
SHORTNESS OF BREATH: 0
DIARRHEA: 0
SORE THROAT: 0
RHINORRHEA: 0
ABDOMINAL PAIN: 0
NAUSEA: 0
SHORTNESS OF BREATH: 1
SINUS PRESSURE: 0
VOMITING: 0

## 2023-08-02 ASSESSMENT — PAIN DESCRIPTION - ORIENTATION: ORIENTATION: LEFT

## 2023-08-02 ASSESSMENT — PAIN - FUNCTIONAL ASSESSMENT: PAIN_FUNCTIONAL_ASSESSMENT: 0-10

## 2023-08-02 ASSESSMENT — LIFESTYLE VARIABLES
HOW OFTEN DO YOU HAVE A DRINK CONTAINING ALCOHOL: MONTHLY OR LESS
HOW MANY STANDARD DRINKS CONTAINING ALCOHOL DO YOU HAVE ON A TYPICAL DAY: 1 OR 2

## 2023-08-02 ASSESSMENT — PAIN DESCRIPTION - DESCRIPTORS: DESCRIPTORS: GNAWING

## 2023-08-02 ASSESSMENT — PAIN DESCRIPTION - LOCATION: LOCATION: CHEST

## 2023-08-02 NOTE — ED TRIAGE NOTES
Pt presents to ED via pv with c/o L sided chest pain that radiates to L shoulder/jaw. Pt states that it began this morning. PT states he has a hx of MI in 2021. Pt has 1 stent. Pt states he takes blood thinner. States he takes a baby ASA at home. Denies use of nitro. ABCs are intact. Skin wpd. A&ox4.

## 2023-08-02 NOTE — ED PROVIDER NOTES
[facility] CARE  eMERGENCY dEPARTMENT eNCOUnter      Pt Name: Lance Simmons  MRN: [id number]  [address] [facility] 1965  Date of evaluation: 8/2/2023  Provider: Dorian Hdez MD    CHIEF COMPLAINT       Chief Complaint   Patient presents with    Chest Pain     Pt presents with L sided chest pain that radiates into L shoulder and jaw. Hx of MI of 2021. Pt states it began this morning. HISTORY OF PRESENT ILLNESS   (Location/Symptom, Timing/Onset,Context/Setting, Quality, Duration, Modifying Factors, Severity)  Note limiting factors. Lance Simmons is a 65 y.o. male who presents to the emergency department chest pain. HPI    Patient is a 15-year-old white male with history of MI as well as cardiac catheterization at [facility] in 2021 acute coronary syndrome; bipolar disorder; depression; diabetes mellitus; reflux; hyperlipidemia; hypertension; on Brilinta. Reports pain began this morning left-sided chest pain radiating to his left shoulder and jaw associated with shortness of breath but no diaphoresis no nausea or vomiting. No pleuritic character. Unable to call an ambulance secondary to some childcare issues earlier today. It is atypical for him to get chest pain since the cardiac catheterization but he has had bouts over the last few days. No fever. No cough. Claims compliance with his medications. NursingNotes were reviewed. REVIEW OF SYSTEMS    (2-9 systems for level 4, 10 or more for level 5)     Review of Systems   Constitutional:  Negative for chills, diaphoresis, fatigue and fever. HENT:  Negative for rhinorrhea, sinus pressure and sore throat. Eyes:  Negative for visual disturbance. Respiratory:  Negative for shortness of breath. Cardiovascular:  Positive for chest pain. Gastrointestinal:  Negative for abdominal pain, diarrhea, nausea and vomiting. Genitourinary:  Negative for difficulty urinating and dysuria.    Musculoskeletal:  Negative for arthralgias and myalgias. Skin:  Negative for rash. Neurological:  Negative for weakness. Psychiatric/Behavioral:  Negative for confusion. All other systems reviewed and are negative.          PAST MEDICALHISTORY     Past Medical History:   Diagnosis Date    Bipolar disorder, current episode mixed, mild ([address])     diagnosed at [facility]    Depression     Diabetes mellitus ([address])     GERD (gastroesophageal reflux disease)     Hyperlipidemia     Hypertension          SURGICAL HISTORY       Past Surgical History:   Procedure Laterality Date    APPENDECTOMY      patient was 17    CHOLECYSTECTOMY  07/06/2006    CORONARY ANGIOPLASTY WITH STENT PLACEMENT  2021    ELBOW SURGERY      left, removed cyst         CURRENT MEDICATIONS     Discharge Medication List as of 8/3/2023  2:37 PM        CONTINUE these medications which have NOT CHANGED    Details   !! atorvastatin (LIPITOR) 40 MG tablet Take 1 tablet by mouth dailyHistorical Med      prazosin (MINIPRESS) 1 MG capsule Take 1 capsule by mouth nightlyHistorical Med      Cholecalciferol (VITAMIN D) 50 MCG (2000 UT) CAPS capsule Take 2,000 Units by mouthHistorical Med      aspirin 325 MG tablet Take 1 tablet by mouth every 4 hours as needed for PainHistorical Med      ticagrelor (BRILINTA) 90 MG TABS tablet Take 1 tablet by mouth 2 times dailyHistorical Med      lamoTRIgine (LAMICTAL) 100 MG tablet TAKE 1 TABLET BY MOUTH TWO TIMES A DAY, Disp-60 tablet, R-0Normal      omeprazole (PRILOSEC) 20 MG delayed release capsule TAKE 1 CAPSULE BY MOUTH EVERY DAY, Disp-30 capsule, R-0Normal      carvedilol (COREG) 25 MG tablet TAKE 1 TABLET BY MOUTH TWICE DAILY, Disp-30 tablet, R-0Normal      EPINEPHrine (EPIPEN 2-MELISA) 0.3 MG/0.3ML SOAJ injection Inject 0.3 mLs into the skin once for 1 dose Use as directed for allergic reaction, Disp-0.3 mL, R-2Normal      insulin glargine (BASAGLAR KWIKPEN) 100 UNIT/ML injection pen Inject 10 Units into the skin nightly, Disp-1 pen, R-1Normal      nicotine

## 2023-08-03 ENCOUNTER — APPOINTMENT (OUTPATIENT)
Dept: NUCLEAR MEDICINE | Age: 58
End: 2023-08-03
Payer: MEDICAID

## 2023-08-03 VITALS
SYSTOLIC BLOOD PRESSURE: 136 MMHG | RESPIRATION RATE: 16 BRPM | WEIGHT: 194.4 LBS | HEART RATE: 68 BPM | DIASTOLIC BLOOD PRESSURE: 89 MMHG | BODY MASS INDEX: 29.46 KG/M2 | TEMPERATURE: 96.3 F | OXYGEN SATURATION: 100 % | HEIGHT: 68 IN

## 2023-08-03 LAB
ANION GAP SERPL CALCULATED.3IONS-SCNC: 11 MMOL/L (ref 7–19)
BASOPHILS # BLD: 0.1 K/UL (ref 0–0.2)
BASOPHILS # BLD: 0.6 K/UL (ref 0–0.2)
BASOPHILS NFR BLD: 1 % (ref 0–1)
BASOPHILS NFR BLD: 4 % (ref 0–1)
BUN SERPL-MCNC: 10 MG/DL (ref 6–20)
CALCIUM SERPL-MCNC: 9.6 MG/DL (ref 8.6–10)
CHLORIDE SERPL-SCNC: 97 MMOL/L (ref 98–111)
CO2 SERPL-SCNC: 26 MMOL/L (ref 22–29)
CREAT SERPL-MCNC: 0.7 MG/DL (ref 0.5–1.2)
DACRYOCYTES BLD QL SMEAR: ABNORMAL
EKG P AXIS: 51 DEGREES
EKG P-R INTERVAL: 168 MS
EKG Q-T INTERVAL: 392 MS
EKG QRS DURATION: 98 MS
EKG QTC CALCULATION (BAZETT): 421 MS
EKG T AXIS: 56 DEGREES
EOSINOPHIL # BLD: 0.68 K/UL (ref 0–0.6)
EOSINOPHIL # BLD: 0.83 K/UL (ref 0–0.6)
EOSINOPHIL NFR BLD: 5 % (ref 0–5)
EOSINOPHIL NFR BLD: 6 % (ref 0–5)
ERYTHROCYTE [DISTWIDTH] IN BLOOD BY AUTOMATED COUNT: 13.5 % (ref 11.5–14.5)
ERYTHROCYTE [DISTWIDTH] IN BLOOD BY AUTOMATED COUNT: 13.7 % (ref 11.5–14.5)
GLUCOSE BLD-MCNC: 192 MG/DL (ref 70–99)
GLUCOSE BLD-MCNC: 203 MG/DL (ref 70–99)
GLUCOSE SERPL-MCNC: 203 MG/DL (ref 74–109)
HCT VFR BLD AUTO: 43.2 % (ref 42–52)
HCT VFR BLD AUTO: 44.9 % (ref 42–52)
HGB BLD-MCNC: 14.9 G/DL (ref 14–18)
HGB BLD-MCNC: 15.1 G/DL (ref 14–18)
IMM GRANULOCYTES # BLD: 0.1 K/UL
IMM GRANULOCYTES # BLD: 0.1 K/UL
LV EF: 58 %
LVEF MODALITY: NORMAL
LYMPHOCYTES # BLD: 5.8 K/UL (ref 1.1–4.5)
LYMPHOCYTES # BLD: 7 K/UL (ref 1.1–4.5)
LYMPHOCYTES NFR BLD: 22 % (ref 20–40)
LYMPHOCYTES NFR BLD: 50 % (ref 20–40)
MCH RBC QN AUTO: 29.4 PG (ref 27–31)
MCH RBC QN AUTO: 30.1 PG (ref 27–31)
MCHC RBC AUTO-ENTMCNC: 33.6 G/DL (ref 33–37)
MCHC RBC AUTO-ENTMCNC: 34.5 G/DL (ref 33–37)
MCV RBC AUTO: 87.3 FL (ref 80–94)
MCV RBC AUTO: 87.5 FL (ref 80–94)
MONOCYTES # BLD: 0 K/UL (ref 0–0.9)
MONOCYTES # BLD: 0.9 K/UL (ref 0–0.9)
MONOCYTES NFR BLD: 0 % (ref 0–10)
MONOCYTES NFR BLD: 7 % (ref 0–10)
NEUTROPHILS # BLD: 5.4 K/UL (ref 1.5–7.5)
NEUTROPHILS # BLD: 5.9 K/UL (ref 1.5–7.5)
NEUTS SEG NFR BLD: 39 % (ref 50–65)
NEUTS SEG NFR BLD: 44 % (ref 50–65)
PERFORMED ON: ABNORMAL
PERFORMED ON: ABNORMAL
PLATELET # BLD AUTO: 303 K/UL (ref 130–400)
PLATELET # BLD AUTO: 324 K/UL (ref 130–400)
PLATELET SLIDE REVIEW: ADEQUATE
PLATELET SLIDE REVIEW: ADEQUATE
PMV BLD AUTO: 10.8 FL (ref 9.4–12.4)
PMV BLD AUTO: 10.8 FL (ref 9.4–12.4)
POTASSIUM SERPL-SCNC: 3.7 MMOL/L (ref 3.5–5)
RBC # BLD AUTO: 4.95 M/UL (ref 4.7–6.1)
RBC # BLD AUTO: 5.13 M/UL (ref 4.7–6.1)
RBC MORPH BLD: NORMAL
SODIUM SERPL-SCNC: 134 MMOL/L (ref 136–145)
TROPONIN T SERPL-MCNC: <0.01 NG/ML (ref 0–0.03)
TROPONIN T SERPL-MCNC: <0.01 NG/ML (ref 0–0.03)
VARIANT LYMPHS NFR BLD: 1 % (ref 0–8)
VARIANT LYMPHS NFR BLD: 21 % (ref 0–8)
WBC # BLD AUTO: 13.5 K/UL (ref 4.8–10.8)
WBC # BLD AUTO: 13.8 K/UL (ref 4.8–10.8)

## 2023-08-03 PROCEDURE — 6360000004 HC RX CONTRAST MEDICATION: Performed by: HOSPITALIST

## 2023-08-03 PROCEDURE — G0378 HOSPITAL OBSERVATION PER HR: HCPCS

## 2023-08-03 PROCEDURE — A9502 TC99M TETROFOSMIN: HCPCS | Performed by: INTERNAL MEDICINE

## 2023-08-03 PROCEDURE — 93017 CV STRESS TEST TRACING ONLY: CPT

## 2023-08-03 PROCEDURE — 36415 COLL VENOUS BLD VENIPUNCTURE: CPT

## 2023-08-03 PROCEDURE — 3430000000 HC RX DIAGNOSTIC RADIOPHARMACEUTICAL: Performed by: INTERNAL MEDICINE

## 2023-08-03 PROCEDURE — C8929 TTE W OR WO FOL WCON,DOPPLER: HCPCS

## 2023-08-03 PROCEDURE — 84484 ASSAY OF TROPONIN QUANT: CPT

## 2023-08-03 PROCEDURE — 6360000002 HC RX W HCPCS: Performed by: HOSPITALIST

## 2023-08-03 PROCEDURE — 6360000002 HC RX W HCPCS: Performed by: INTERNAL MEDICINE

## 2023-08-03 PROCEDURE — 85025 COMPLETE CBC W/AUTO DIFF WBC: CPT

## 2023-08-03 PROCEDURE — 96372 THER/PROPH/DIAG INJ SC/IM: CPT

## 2023-08-03 PROCEDURE — 2580000003 HC RX 258: Performed by: HOSPITALIST

## 2023-08-03 PROCEDURE — 93010 ELECTROCARDIOGRAM REPORT: CPT | Performed by: INTERNAL MEDICINE

## 2023-08-03 PROCEDURE — 82962 GLUCOSE BLOOD TEST: CPT

## 2023-08-03 PROCEDURE — 6370000000 HC RX 637 (ALT 250 FOR IP): Performed by: HOSPITALIST

## 2023-08-03 PROCEDURE — 80048 BASIC METABOLIC PNL TOTAL CA: CPT

## 2023-08-03 RX ORDER — ATORVASTATIN CALCIUM 40 MG/1
40 TABLET, FILM COATED ORAL NIGHTLY
Qty: 30 TABLET | Refills: 0 | Status: SHIPPED | OUTPATIENT
Start: 2023-08-03 | End: 2023-09-02

## 2023-08-03 RX ORDER — REGADENOSON 0.08 MG/ML
0.4 INJECTION, SOLUTION INTRAVENOUS
Status: COMPLETED | OUTPATIENT
Start: 2023-08-03 | End: 2023-08-03

## 2023-08-03 RX ADMIN — SODIUM CHLORIDE, PRESERVATIVE FREE 10 ML: 5 INJECTION INTRAVENOUS at 09:23

## 2023-08-03 RX ADMIN — LAMOTRIGINE 100 MG: 100 TABLET ORAL at 09:23

## 2023-08-03 RX ADMIN — PERFLUTREN 1.5 ML: 6.52 INJECTION, SUSPENSION INTRAVENOUS at 07:21

## 2023-08-03 RX ADMIN — TETROFOSMIN 24 MILLICURIE: 1.38 INJECTION, POWDER, LYOPHILIZED, FOR SOLUTION INTRAVENOUS at 10:30

## 2023-08-03 RX ADMIN — ENOXAPARIN SODIUM 40 MG: 100 INJECTION SUBCUTANEOUS at 09:23

## 2023-08-03 RX ADMIN — CITALOPRAM HYDROBROMIDE 40 MG: 20 TABLET ORAL at 09:23

## 2023-08-03 RX ADMIN — TETROFOSMIN 8 MILLICURIE: 1.38 INJECTION, POWDER, LYOPHILIZED, FOR SOLUTION INTRAVENOUS at 09:15

## 2023-08-03 RX ADMIN — ASPIRIN 81 MG: 81 TABLET, CHEWABLE ORAL at 09:23

## 2023-08-03 RX ADMIN — PANTOPRAZOLE SODIUM 20 MG: 20 TABLET, DELAYED RELEASE ORAL at 05:24

## 2023-08-03 RX ADMIN — REGADENOSON 0.4 MG: 0.08 INJECTION, SOLUTION INTRAVENOUS at 10:20

## 2023-08-03 ASSESSMENT — ENCOUNTER SYMPTOMS
GASTROINTESTINAL NEGATIVE: 1
DIARRHEA: 0
EYES NEGATIVE: 1
NAUSEA: 0
RESPIRATORY NEGATIVE: 1
VOMITING: 0
SHORTNESS OF BREATH: 0

## 2023-08-03 ASSESSMENT — PAIN SCALES - GENERAL: PAINLEVEL_OUTOF10: 0

## 2023-08-03 NOTE — PLAN OF CARE
Problem: Discharge Planning  Goal: Discharge to home or other facility with appropriate resources  8/3/2023 1028 by Nick Knight RN  Outcome: Progressing  8/3/2023 0400 by Ondina Fletcher, RN  Outcome: Progressing  Flowsheets (Taken 8/2/2023 2204)  Discharge to home or other facility with appropriate resources:   Identify barriers to discharge with patient and caregiver   Identify discharge learning needs (meds, wound care, etc)   Refer to discharge planning if patient needs post-hospital services based on physician order or complex needs related to functional status, cognitive ability or social support system   Arrange for needed discharge resources and transportation as appropriate     Problem: Pain  Goal: Verbalizes/displays adequate comfort level or baseline comfort level  8/3/2023 1028 by Nick Knight RN  Outcome: Progressing  8/3/2023 0400 by Ondina Fletcher, RN  Outcome: Progressing

## 2023-08-03 NOTE — CONSULTS
[facility]  Cardiology Consult      Requesting MD:  Juany Denis MD   Admit Status:         History obtained from:   [] Patient  [] Other (specify):     Patient:  Lance Simmons  [id number]     Chief Complaint:   Chief Complaint   Patient presents with    Chest Pain     Pt presents with L sided chest pain that radiates into L shoulder and jaw. Hx of MI of 2021. Pt states it began this morning. HPI: Mr. Zoie Hale is a 65 y.o. male with a history of coronary artery disease previous stent at [facility] May 2021 no cardiac testing since then was in his usual state of health until yesterday had an episode of squeezing discomfort in his chest lasted all day long resolved today came to the hospital evaluated all troponins are negative. Similar to but not as intense as the pain or discomfort he had previously in May 21 he does have symptoms of heartburn and reflux as well denies medical noncompliance does not recall any eating any unusual mid food in the last 24 to 48 hours. Does not describe recent exertional chest discomfort or limiting dyspnea. Cardiology consulted. Review of Systems:  Review of Systems   Constitutional: Negative. Negative for chills, fever and unexpected weight change. HENT: Negative. Eyes: Negative. Respiratory: Negative. Negative for shortness of breath. Cardiovascular: Negative. Negative for chest pain. Gastrointestinal: Negative. Negative for diarrhea, nausea and vomiting. Endocrine: Negative. Genitourinary: Negative. Musculoskeletal: Negative. Skin: Negative. Neurological: Negative. All other systems reviewed and are negative.     Cardiac Specific Data:  Specialty Problems          Cardiology Problems    Hyperlipidemia        Hypertension        CAD (coronary artery disease)        * (Principal) Substernal chest pain relieved by nitroglycerin           Past Medical History:  Past Medical History:   Diagnosis Date    Bipolar

## 2023-08-03 NOTE — PLAN OF CARE
Problem: Discharge Planning  Goal: Discharge to home or other facility with appropriate resources  Outcome: Progressing  Flowsheets (Taken 8/2/2023 2204)  Discharge to home or other facility with appropriate resources:   Identify barriers to discharge with patient and caregiver   Identify discharge learning needs (meds, wound care, etc)   Refer to discharge planning if patient needs post-hospital services based on physician order or complex needs related to functional status, cognitive ability or social support system   Arrange for needed discharge resources and transportation as appropriate     Problem: Pain  Goal: Verbalizes/displays adequate comfort level or baseline comfort level  Outcome: Progressing

## 2023-08-03 NOTE — PROGRESS NOTES
Social Determinants of Health     Tobacco Use: High Risk    Smoking Tobacco Use: Every Day    Smokeless Tobacco Use: Former    Passive Exposure: Not on file   Alcohol Use: Not At Risk    Frequency of Alcohol Consumption: Monthly or less    Average Number of Drinks: 1 or 2    Frequency of Binge Drinking: Never   Financial Resource Strain: Medium Risk    Difficulty of Paying Living Expenses: Somewhat hard   Food Insecurity: Food Insecurity Present    Worried About Running Out of Food in the Last Year: Sometimes true    Ran Out of Food in the Last Year: Never true   Transportation Needs: Unmet Transportation Needs    Lack of Transportation (Medical): Yes    Lack of Transportation (Non-Medical): No   Physical Activity: Insufficiently Active    Days of Exercise per Week: 7 days    Minutes of Exercise per Session: 20 min   Stress: Stress Concern Present    Feeling of Stress : Rather much   Social Connections: Socially Isolated    Frequency of Communication with Friends and Family: More than three times a week    Frequency of Social Gatherings with Friends and Family: Once a week    Attends Latter-day Services: Never    Active Member of Clubs or Organizations: No    Attends Club or Organization Meetings: Never    Marital Status:    Intimate Partner Violence: Not At Risk    Fear of Current or Ex-Partner: No    Emotionally Abused: No    Physically Abused: No    Sexually Abused: No   Depression: Not on file   Housing Stability: High Risk    Unable to Pay for Housing in the Last Year: No    Number of Places Lived in the Last Year: 3    Unstable Housing in the Last Year: No     Depression screening from [facility] wheel:         PHQ-9 Score (if applicable):         SDOH: Patient Able to answer Social Determinant of Health screening questions.      History    Tobacco Use      Smoking status: Every Day        Packs/day: 1.50        Years: 43.00        Pack years: 64.5        Types: Pipe, Cigarettes      Smokeless tobacco:

## 2023-08-03 NOTE — DISCHARGE SUMMARY
Discharge Summary    NAME: Esthela Andres  :  1965  MRN:  [id number]    Admit date:  2023  Discharge date:      Admitting Physician:  Leilani Chairez MD    Advance Directive: Full Code    Consults: cardiology    Primary Care Physician:  Jhonny Lewis MD    Discharge Diagnoses:  Principal Problem:    Substernal chest pain relieved by nitroglycerin  Active Problems:    Hypertension    Hyperlipidemia    Diabetes mellitus ([address])    CAD (coronary artery disease)  Resolved Problems:    * No resolved hospital problems. *      Significant Diagnostic Studies:   XR CHEST PORTABLE    Result Date: 2023  EXAM:  FRONTAL VIEW OF THE CHEST. HISTORY:  Chest pain  COMPARISON:  2021  FINDINGS:   Cardiac silhouette is normal.  No organized infiltrate/consolidation. Lungs are hyperexpanded. Aeration is improved compared to previous study. No visible effusion or pneumothorax. No acute osseous abnormality. 1. No acute findings. ______________________________________ Electronically signed by: Steph Ocampo M.D. Date:     2023 Time:    18:16       Pertinent Labs:   CBC:   Recent Labs     236 23  0554   WBC 13.8* 13.5*   HGB 14.9 15.1    303     BMP:    Recent Labs     23  0554   * 134*   K 4.1 3.7   CL 95* 97*   CO2 24 26   BUN 8 10   CREATININE 0.7 0.7   GLUCOSE 284* 203*     INR:   Recent Labs     23   INR 1.05     Lipids:   Recent Labs     23  174   CHOL 210*   HDL 31*     ABGs:No results for input(s): PHART, [id number], PO2ART, FCS6KOU, BEART, HGBAE, C6FSRCYC, CARBOXHGBART, 02THERAPY in the last 72 hours. HgBA1c:    Recent Labs     23  174   LABA1C 8.7[facility] Course:   Pt is here with  PMH of diabetes, htn, hld, and CAD. Pt presented with c/o chest pain for 3 days. Pain last longer than previous episodes today. Improved with asa and NTG  in ED.   Troponins neg , EKG neg for acute changes,  Glucose 284, INR 1.05 mag Chol/High Fiber/2 gm Na; No Caffeine     Disposition: Patient is medically stable and will be discharged home. Time spent on discharge 36.     Signed:  ALENA Jacobson CNP  8/3/2023 1:46 PM

## 2023-08-03 NOTE — PROGRESS NOTES
PHARMACY NOTE  Nguyen Cornejonirmalalex Trent was ordered [employer]. Per the [facility] Formulary Committee, this medication is non-formulary and not stocked by pharmacy. It has been discontinued. The medication can be reordered at discharge.      Electronically signed by Alberto Lux [facility] on 8/3/2023 at 2:22 AM

## 2023-08-03 NOTE — H&P
[facility]      Hospitalist - History & Physical      PCP: Nahid Clark MD    Date of Admission: 8/2/2023    Date of Service: 8/2/2023    Chief Complaint:  Chest pain    History Of Present Illness: The patient is a 65 y.o. male who presented to [address] [place] [address] ED for evaluation of chest pain. Pt has history of diabetes, hypertension, hyperlipidemia and CAD with prior stent placement. Pt reports having intermittent episodes of chest pain over past 3 days. He tells me that today chest pain lasted much longer than previous reporting similar pain today as experienced with past MI. He has had increased shortness of breath from baseline today as well. He relates that chest pain was improved with aspirin and nitroglycerin given in ED. He denies fevers as well as recent illness. In ED, initial ekg and troponin were negative for ischemia. Sodium 132, potassium 4.1, creatinine 0.7/bun 8, glucose 284, magnesium 1.8, INR 1.05, cxr-No acute findings. Wbc 13.8k, hgb 14.9, platelets 271T. Pt is admitted observation to hospitalist service. Past Medical History:        Diagnosis Date    Bipolar disorder, current episode mixed, mild ([address])     diagnosed at [facility]    Depression     Diabetes mellitus ([address])     GERD (gastroesophageal reflux disease)     Hyperlipidemia     Hypertension        Past Surgical History:        Procedure Laterality Date    APPENDECTOMY      patient was 17    CHOLECYSTECTOMY  07/06/2006    CORONARY ANGIOPLASTY WITH STENT PLACEMENT      ELBOW SURGERY      left, removed cyst       Home Medications:  Prior to Admission medications    Medication Sig Start Date End Date Taking?  Authorizing Provider   EPINEPHrine (EPIPEN) 0.3 MG/0.3ML SOAJ injection Use as directed for allergic reaction 8/17/22   Dragan Chen MD   lamoTRIgine (LAMICTAL) 100 MG tablet TAKE 1 TABLET BY MOUTH TWO TIMES A DAY 2/24/22   Pineda Aquino MD   omeprazole (PRILOSEC) 20 MG delayed release capsule TAKE 1 CAPSULE BY MOUTH EVERY DAY 2/8/22   Valeria Diaz MD   carvedilol (COREG) 25 MG tablet TAKE 1 TABLET BY MOUTH TWICE DAILY 9/26/21   Valeria Diaz MD   VENTOLIN  (90 Base) MCG/ACT inhaler INHALE 2 PUFFS BY MOUTH EVERY DAY  Patient not taking: Reported on 8/17/2022 7/1/21   Lilian Feliciano MD   empagliflozin (JARDIANCE) 10 MG tablet Take 1 tablet by mouth daily  Patient not taking: Reported on 8/17/2022 7/1/21   Valeria Diaz MD   STEGLATRO 5 MG TABS TAKE 1 TABLET BY MOUTH 1 TIME DAILY 6/14/21   ALENA Bernal   citalopram (CELEXA) 40 MG tablet TAKE 1 TABLET BY MOUTH EVERY DAY 6/14/21   Lilian Feliciano MD   pravastatin (PRAVACHOL) 80 MG tablet Take 1 tablet by mouth nightly 5/19/21   Lilian Feliciano MD   EPINEPHrine (EPIPEN 2-MELISA) 0.3 MG/0.3ML SOAJ injection Inject 0.3 mLs into the skin once for 1 dose Use as directed for allergic reaction 5/19/21 5/19/21  Lilian Feliciano MD   nicotine (NICODERM CQ) 21 MG/24HR Place 1 patch onto the skin daily 5/19/21 6/30/21  Lilian Feliciano MD   buPROPion (WELLBUTRIN XL) 150 MG extended release tablet Take 1 tablet by mouth every morning  Patient not taking: Reported on 8/17/2022 5/19/21   Lilian Feliciano MD   insulin glargine (BASAGLAR KWIKPEN) 100 UNIT/ML injection pen Inject 10 Units into the skin nightly 5/19/21   Lilian Feliciano MD   naproxen (NAPROSYN) 500 MG tablet Take 1 tablet by mouth 2 times daily (with meals) Patient will need appointment for further refills 5/13/21   Lilian Feliciano MD   empagliflozin (JARDIANCE) 10 MG tablet Take 1 tablet by mouth daily 7/27/20 10/25/20  ALENA Pizano   nicotine (NICODERM CQ) 21 MG/24HR Place 1 patch onto the skin every 24 hours 1/8/20 1/7/21  ALENA Bernal - NP       Allergies:    Bee venom, Pcn [penicillins], and Wasp venom    Social History:    The patient currently lives in his   Tobacco:   reports that he has been smoking pipe and cigarettes. He has a 76.00 pack-year smoking history.  He

## 2023-08-04 LAB
LV EF: 58 %
LVEF MODALITY: NORMAL

## 2023-08-04 PROCEDURE — 93016 CV STRESS TEST SUPVJ ONLY: CPT | Performed by: INTERNAL MEDICINE

## 2023-08-04 PROCEDURE — 78452 HT MUSCLE IMAGE SPECT MULT: CPT | Performed by: INTERNAL MEDICINE

## 2023-08-04 PROCEDURE — 93018 CV STRESS TEST I&R ONLY: CPT | Performed by: INTERNAL MEDICINE

## 2023-12-04 ENCOUNTER — HOSPITAL ENCOUNTER (EMERGENCY)
Age: 58
Discharge: HOME | End: 2023-12-04

## 2023-12-04 VITALS
RESPIRATION RATE: 20 BRPM | WEIGHT: 192 LBS | HEART RATE: 96 BPM | SYSTOLIC BLOOD PRESSURE: 132 MMHG | TEMPERATURE: 97.7 F | OXYGEN SATURATION: 98 % | BODY MASS INDEX: 29.63 KG/M2 | DIASTOLIC BLOOD PRESSURE: 93 MMHG

## 2024-02-29 ENCOUNTER — APPOINTMENT (OUTPATIENT)
Dept: GENERAL RADIOLOGY | Age: 59
End: 2024-02-29
Payer: MEDICAID

## 2024-02-29 ENCOUNTER — HOSPITAL ENCOUNTER (EMERGENCY)
Age: 59
Discharge: HOME | End: 2024-02-29
Attending: EMERGENCY MEDICINE
Payer: MEDICAID

## 2024-02-29 VITALS
DIASTOLIC BLOOD PRESSURE: 89 MMHG | TEMPERATURE: 97.9 F | WEIGHT: 193 LBS | SYSTOLIC BLOOD PRESSURE: 122 MMHG | HEART RATE: 79 BPM | RESPIRATION RATE: 17 BRPM | OXYGEN SATURATION: 94 % | BODY MASS INDEX: 29.78 KG/M2

## 2024-02-29 DIAGNOSIS — R07.89 CHEST WALL PAIN: Primary | ICD-10-CM

## 2024-02-29 LAB
ALBUMIN SERPL-MCNC: 3.9 G/DL (ref 3.5–5.2)
ALP SERPL-CCNC: 109 U/L (ref 40–130)
ALT SERPL-CCNC: 32 U/L (ref 5–41)
ANION GAP SERPL CALCULATED.3IONS-SCNC: 14 MMOL/L (ref 7–19)
AST SERPL-CCNC: 20 U/L (ref 5–40)
BASOPHILS # BLD: 0.3 K/UL (ref 0–0.2)
BASOPHILS NFR BLD: 2 % (ref 0–1)
BILIRUB SERPL-MCNC: 0.4 MG/DL (ref 0.2–1.2)
BUN SERPL-MCNC: 9 MG/DL (ref 6–20)
CALCIUM SERPL-MCNC: 9.2 MG/DL (ref 8.6–10)
CHLORIDE SERPL-SCNC: 93 MMOL/L (ref 98–111)
CO2 SERPL-SCNC: 24 MMOL/L (ref 22–29)
CREAT SERPL-MCNC: 0.6 MG/DL (ref 0.5–1.2)
EOSINOPHIL # BLD: 0.3 K/UL (ref 0–0.6)
EOSINOPHIL NFR BLD: 2 % (ref 0–5)
ERYTHROCYTE [DISTWIDTH] IN BLOOD BY AUTOMATED COUNT: 13.2 % (ref 11.5–14.5)
GLUCOSE SERPL-MCNC: 265 MG/DL (ref 74–109)
HCT VFR BLD AUTO: 44.4 % (ref 42–52)
HGB BLD-MCNC: 15.1 G/DL (ref 14–18)
IMM GRANULOCYTES # BLD: 0.1 K/UL
LYMPHOCYTES # BLD: 5.7 K/UL (ref 1.1–4.5)
LYMPHOCYTES NFR BLD: 27 % (ref 20–40)
MCH RBC QN AUTO: 30 PG (ref 27–31)
MCHC RBC AUTO-ENTMCNC: 34 G/DL (ref 33–37)
MCV RBC AUTO: 88.1 FL (ref 80–94)
MONOCYTES # BLD: 0.5 K/UL (ref 0–0.9)
MONOCYTES NFR BLD: 3 % (ref 0–10)
NEUTROPHILS # BLD: 8.3 K/UL (ref 1.5–7.5)
NEUTS SEG NFR BLD: 55 % (ref 50–65)
PLATELET # BLD AUTO: 346 K/UL (ref 130–400)
PLATELET SLIDE REVIEW: ADEQUATE
PMV BLD AUTO: 10.5 FL (ref 9.4–12.4)
POTASSIUM SERPL-SCNC: 4 MMOL/L (ref 3.5–5)
PROT SERPL-MCNC: 7.2 G/DL (ref 6.6–8.7)
RBC # BLD AUTO: 5.04 M/UL (ref 4.7–6.1)
RBC MORPH BLD: NORMAL
SODIUM SERPL-SCNC: 131 MMOL/L (ref 136–145)
TROPONIN, HIGH SENSITIVITY: 7 NG/L (ref 0–22)
TROPONIN, HIGH SENSITIVITY: 7 NG/L (ref 0–22)
VARIANT LYMPHS NFR BLD: 11 % (ref 0–8)
WBC # BLD AUTO: 15 K/UL (ref 4.8–10.8)

## 2024-02-29 PROCEDURE — 36415 COLL VENOUS BLD VENIPUNCTURE: CPT

## 2024-02-29 PROCEDURE — 80053 COMPREHEN METABOLIC PANEL: CPT

## 2024-02-29 PROCEDURE — 6360000002 HC RX W HCPCS: Performed by: EMERGENCY MEDICINE

## 2024-02-29 PROCEDURE — 93005 ELECTROCARDIOGRAM TRACING: CPT | Performed by: EMERGENCY MEDICINE

## 2024-02-29 PROCEDURE — 85025 COMPLETE CBC W/AUTO DIFF WBC: CPT

## 2024-02-29 PROCEDURE — 71045 X-RAY EXAM CHEST 1 VIEW: CPT

## 2024-02-29 PROCEDURE — 99285 EMERGENCY DEPT VISIT HI MDM: CPT

## 2024-02-29 PROCEDURE — 84484 ASSAY OF TROPONIN QUANT: CPT

## 2024-02-29 RX ORDER — KETOROLAC TROMETHAMINE 30 MG/ML
15 INJECTION, SOLUTION INTRAMUSCULAR; INTRAVENOUS ONCE
Status: COMPLETED | OUTPATIENT
Start: 2024-02-29 | End: 2024-02-29

## 2024-02-29 RX ADMIN — KETOROLAC TROMETHAMINE 15 MG: 30 INJECTION, SOLUTION INTRAMUSCULAR; INTRAVENOUS at 13:37

## 2024-02-29 ASSESSMENT — ENCOUNTER SYMPTOMS
VOMITING: 0
ABDOMINAL PAIN: 0
SINUS PRESSURE: 0
SORE THROAT: 0
DIARRHEA: 0
NAUSEA: 0
RHINORRHEA: 0
SHORTNESS OF BREATH: 0

## 2024-02-29 ASSESSMENT — LIFESTYLE VARIABLES: HOW OFTEN DO YOU HAVE A DRINK CONTAINING ALCOHOL: MONTHLY OR LESS

## 2024-02-29 ASSESSMENT — PAIN - FUNCTIONAL ASSESSMENT
PAIN_FUNCTIONAL_ASSESSMENT: NONE - DENIES PAIN
PAIN_FUNCTIONAL_ASSESSMENT: 0-10

## 2024-02-29 ASSESSMENT — PAIN DESCRIPTION - ORIENTATION: ORIENTATION: LEFT

## 2024-02-29 ASSESSMENT — PAIN SCALES - GENERAL: PAINLEVEL_OUTOF10: 10

## 2024-02-29 ASSESSMENT — PAIN DESCRIPTION - LOCATION: LOCATION: CHEST

## 2024-02-29 NOTE — ED PROVIDER NOTES
[facility] EMERGENCY DEPT  eMERGENCY dEPARTMENT eNCOUnter      Pt Name: Nguyen Olivo  MRN: [id number]  Birthdate 1965  Date of evaluation: 2/29/2024  Provider: Kurt Trotter MD    CHIEF COMPLAINT       Chief Complaint   Patient presents with    Chest Pain     Intermittent left sided chest pain 10/10 radiating into neck and arm         HISTORY OF PRESENT ILLNESS   (Location/Symptom, Timing/Onset,Context/Setting, Quality, Duration, Modifying Factors, Severity)  Note limiting factors.   Nguyen Olivo is a 58 y.o. male who presents to the emergency department chest pain     HPI    Patient 58-year-old white male with history of bipolar disorder; CAD status post stent placement in the distant past; hypertension; hyperlipidemia; diabetes mellitus; reflux disease; presents with left-sided chest pain made worse by lifting himself up with his left arm followed a supine position; radiates to his neck; 10 out of 10 pain he reports it has been going on intermittently for a few days.  Mild shortness of breath no diaphoresis; exertional quality; nausea; vomiting; cough; fever; air hunger or sudden onset of symptoms.  He is taking his regular medications including brilenta which has not helped his symptoms.    NursingNotes were reviewed.    REVIEW OF SYSTEMS    (2-9 systems for level 4, 10 or more for level 5)     Review of Systems   Constitutional:  Negative for chills, diaphoresis, fatigue and fever.   HENT:  Negative for rhinorrhea, sinus pressure and sore throat.    Eyes:  Negative for visual disturbance.   Respiratory:  Negative for shortness of breath.    Cardiovascular:  Positive for chest pain.   Gastrointestinal:  Negative for abdominal pain, diarrhea, nausea and vomiting.   Genitourinary:  Negative for difficulty urinating and dysuria.   Musculoskeletal:  Negative for arthralgias and myalgias.   Skin:  Negative for rash.   Neurological:  Negative for weakness.   Psychiatric/Behavioral:  Negative for confusion.    All other

## 2024-02-29 NOTE — DISCHARGE INSTRUCTIONS
Take Tylenol 650 mg every 4 hours and Motrin 600 mg every 6 hours as needed for chest wall discomfort as we discussed.  Follow with your primary care doctor for further treatment and evaluation.  Return immediately to the emergency room for any new or worsening symptoms.

## 2024-03-01 LAB
EKG P AXIS: 80 DEGREES
EKG P-R INTERVAL: 168 MS
EKG Q-T INTERVAL: 360 MS
EKG QRS DURATION: 96 MS
EKG QTC CALCULATION (BAZETT): 416 MS
EKG T AXIS: 79 DEGREES

## 2024-03-01 PROCEDURE — 93010 ELECTROCARDIOGRAM REPORT: CPT | Performed by: INTERNAL MEDICINE

## 2024-06-12 ENCOUNTER — TELEPHONE (OUTPATIENT)
Dept: NEUROSURGERY | Age: 59
End: 2024-06-12

## 2024-06-12 NOTE — TELEPHONE ENCOUNTER
1st attempt to contact patient. I WAS UNABLE TO LEAVE a voicemail instructing patient to call back at [phone] to schedule their new patient appointment     BUSY TONE      low back pain

## 2024-06-13 NOTE — TELEPHONE ENCOUNTER
2ND attempt to contact patient. I WAS UNABLE TO LEAVE a voicemail instructing patient to call back at [phone] to schedule their new patient appointment      BUSY TONE       low back pain

## 2024-11-06 ENCOUNTER — APPOINTMENT (OUTPATIENT)
Dept: GENERAL RADIOLOGY | Age: 59
End: 2024-11-06
Payer: MEDICAID

## 2024-11-06 ENCOUNTER — APPOINTMENT (OUTPATIENT)
Dept: CT IMAGING | Age: 59
End: 2024-11-06
Payer: MEDICAID

## 2024-11-06 ENCOUNTER — HOSPITAL ENCOUNTER (INPATIENT)
Age: 59
LOS: 6 days | Discharge: NURSING, REHAB OR LONG-TERM CARE | End: 2024-11-12
Attending: PEDIATRICS | Admitting: STUDENT IN AN ORGANIZED HEALTH CARE EDUCATION/TRAINING PROGRAM
Payer: MEDICAID

## 2024-11-06 DIAGNOSIS — I63.9 CEREBROVASCULAR ACCIDENT (CVA), UNSPECIFIED MECHANISM (HCC): ICD-10-CM

## 2024-11-06 DIAGNOSIS — R29.90 STROKE-LIKE SYMPTOMS: Primary | ICD-10-CM

## 2024-11-06 LAB
ALBUMIN SERPL-MCNC: 4.2 G/DL (ref 3.5–5.2)
ALP SERPL-CCNC: 98 U/L (ref 40–129)
ALT SERPL-CCNC: 18 U/L (ref 5–41)
ANION GAP SERPL CALCULATED.3IONS-SCNC: 12 MMOL/L (ref 7–19)
AST SERPL-CCNC: 18 U/L (ref 5–40)
BASOPHILS # BLD: 0.3 K/UL (ref 0–0.2)
BASOPHILS NFR BLD: 2 % (ref 0–1)
BILIRUB SERPL-MCNC: 0.5 MG/DL (ref 0.2–1.2)
BILIRUB UR QL STRIP: NEGATIVE
BUN SERPL-MCNC: 5 MG/DL (ref 6–20)
CALCIUM SERPL-MCNC: 9.4 MG/DL (ref 8.6–10)
CHLORIDE SERPL-SCNC: 96 MMOL/L (ref 98–111)
CLARITY UR: CLEAR
CO2 SERPL-SCNC: 25 MMOL/L (ref 22–29)
COLOR UR: YELLOW
CREAT SERPL-MCNC: 0.6 MG/DL (ref 0.7–1.2)
EOSINOPHIL # BLD: 0.58 K/UL (ref 0–0.6)
EOSINOPHIL NFR BLD: 4 % (ref 0–5)
ERYTHROCYTE [DISTWIDTH] IN BLOOD BY AUTOMATED COUNT: 13.8 % (ref 11.5–14.5)
GLUCOSE BLD-MCNC: 150 MG/DL (ref 70–99)
GLUCOSE BLD-MCNC: 153 MG/DL (ref 70–99)
GLUCOSE SERPL-MCNC: 159 MG/DL (ref 70–99)
GLUCOSE UR STRIP.AUTO-MCNC: NEGATIVE MG/DL
HCT VFR BLD AUTO: 42.4 % (ref 42–52)
HGB BLD-MCNC: 13.9 G/DL (ref 14–18)
HGB UR STRIP.AUTO-MCNC: NEGATIVE MG/L
HYPOCHROMIA BLD QL SMEAR: ABNORMAL
IMM GRANULOCYTES # BLD: 0.1 K/UL
INR PPP: 1.01 (ref 0.88–1.18)
KETONES UR STRIP.AUTO-MCNC: NEGATIVE MG/DL
LEUKOCYTE ESTERASE UR QL STRIP.AUTO: NEGATIVE
LYMPHOCYTES # BLD: 7.2 K/UL (ref 1.1–4.5)
LYMPHOCYTES NFR BLD: 48 % (ref 20–40)
MCH RBC QN AUTO: 28.6 PG (ref 27–31)
MCHC RBC AUTO-ENTMCNC: 32.8 G/DL (ref 33–37)
MCV RBC AUTO: 87.2 FL (ref 80–94)
MONOCYTES # BLD: 0.6 K/UL (ref 0–0.9)
MONOCYTES NFR BLD: 4 % (ref 0–10)
NEUTROPHILS # BLD: 6 K/UL (ref 1.5–7.5)
NEUTS SEG NFR BLD: 41 % (ref 50–65)
NITRITE UR QL STRIP.AUTO: NEGATIVE
PERFORMED ON: ABNORMAL
PERFORMED ON: ABNORMAL
PH UR STRIP.AUTO: 8 [PH] (ref 5–8)
PLATELET # BLD AUTO: 422 K/UL (ref 130–400)
PLATELET SLIDE REVIEW: ABNORMAL
PMV BLD AUTO: 10.4 FL (ref 9.4–12.4)
POTASSIUM SERPL-SCNC: 4 MMOL/L (ref 3.5–5)
PROT SERPL-MCNC: 7.5 G/DL (ref 6.4–8.3)
PROT UR STRIP.AUTO-MCNC: NEGATIVE MG/DL
PROTHROMBIN TIME: 13 SEC (ref 12–14.6)
RBC # BLD AUTO: 4.86 M/UL (ref 4.7–6.1)
SODIUM SERPL-SCNC: 133 MMOL/L (ref 136–145)
SP GR UR STRIP.AUTO: 1.03 (ref 1–1.03)
TROPONIN, HIGH SENSITIVITY: 11 NG/L (ref 0–22)
UROBILINOGEN UR STRIP.AUTO-MCNC: 1 E.U./DL
VARIANT LYMPHS NFR BLD: 1 % (ref 0–8)
WBC # BLD AUTO: 14.6 K/UL (ref 4.8–10.8)

## 2024-11-06 PROCEDURE — 70450 CT HEAD/BRAIN W/O DYE: CPT

## 2024-11-06 PROCEDURE — 71045 X-RAY EXAM CHEST 1 VIEW: CPT

## 2024-11-06 PROCEDURE — 82962 GLUCOSE BLOOD TEST: CPT

## 2024-11-06 PROCEDURE — 6360000004 HC RX CONTRAST MEDICATION: Performed by: PEDIATRICS

## 2024-11-06 PROCEDURE — 2700000000 HC OXYGEN THERAPY PER DAY

## 2024-11-06 PROCEDURE — 0042T CT BRAIN PERFUSION: CPT

## 2024-11-06 PROCEDURE — 36415 COLL VENOUS BLD VENIPUNCTURE: CPT

## 2024-11-06 PROCEDURE — 99285 EMERGENCY DEPT VISIT HI MDM: CPT

## 2024-11-06 PROCEDURE — 1200000000 HC SEMI PRIVATE

## 2024-11-06 PROCEDURE — 85025 COMPLETE CBC W/AUTO DIFF WBC: CPT

## 2024-11-06 PROCEDURE — 6370000000 HC RX 637 (ALT 250 FOR IP): Performed by: STUDENT IN AN ORGANIZED HEALTH CARE EDUCATION/TRAINING PROGRAM

## 2024-11-06 PROCEDURE — 70498 CT ANGIOGRAPHY NECK: CPT

## 2024-11-06 PROCEDURE — 80053 COMPREHEN METABOLIC PANEL: CPT

## 2024-11-06 PROCEDURE — 81003 URINALYSIS AUTO W/O SCOPE: CPT

## 2024-11-06 PROCEDURE — 94760 N-INVAS EAR/PLS OXIMETRY 1: CPT

## 2024-11-06 PROCEDURE — 85610 PROTHROMBIN TIME: CPT

## 2024-11-06 PROCEDURE — 93005 ELECTROCARDIOGRAM TRACING: CPT | Performed by: PEDIATRICS

## 2024-11-06 PROCEDURE — 84484 ASSAY OF TROPONIN QUANT: CPT

## 2024-11-06 RX ORDER — ATORVASTATIN CALCIUM 80 MG/1
80 TABLET, FILM COATED ORAL NIGHTLY
Status: DISCONTINUED | OUTPATIENT
Start: 2024-11-06 | End: 2024-11-12 | Stop reason: HOSPADM

## 2024-11-06 RX ORDER — LAMOTRIGINE 100 MG/1
100 TABLET ORAL 2 TIMES DAILY
Status: DISCONTINUED | OUTPATIENT
Start: 2024-11-06 | End: 2024-11-12 | Stop reason: HOSPADM

## 2024-11-06 RX ORDER — NICOTINE 21 MG/24HR
1 PATCH, TRANSDERMAL 24 HOURS TRANSDERMAL EVERY 24 HOURS
Status: DISCONTINUED | OUTPATIENT
Start: 2024-11-06 | End: 2024-11-12 | Stop reason: HOSPADM

## 2024-11-06 RX ORDER — IOPAMIDOL 755 MG/ML
120 INJECTION, SOLUTION INTRAVASCULAR
Status: COMPLETED | OUTPATIENT
Start: 2024-11-06 | End: 2024-11-06

## 2024-11-06 RX ORDER — PANTOPRAZOLE SODIUM 40 MG/1
40 TABLET, DELAYED RELEASE ORAL
Status: DISCONTINUED | OUTPATIENT
Start: 2024-11-07 | End: 2024-11-12 | Stop reason: HOSPADM

## 2024-11-06 RX ORDER — VITAMIN B COMPLEX
2000 TABLET ORAL DAILY
Status: DISCONTINUED | OUTPATIENT
Start: 2024-11-07 | End: 2024-11-12 | Stop reason: HOSPADM

## 2024-11-06 RX ORDER — ONDANSETRON 2 MG/ML
4 INJECTION INTRAMUSCULAR; INTRAVENOUS EVERY 6 HOURS PRN
Status: DISCONTINUED | OUTPATIENT
Start: 2024-11-06 | End: 2024-11-12 | Stop reason: HOSPADM

## 2024-11-06 RX ORDER — CARVEDILOL 25 MG/1
25 TABLET ORAL 2 TIMES DAILY WITH MEALS
Status: DISCONTINUED | OUTPATIENT
Start: 2024-11-07 | End: 2024-11-08

## 2024-11-06 RX ORDER — ACETAMINOPHEN 650 MG/1
650 SUPPOSITORY RECTAL EVERY 4 HOURS PRN
Status: DISCONTINUED | OUTPATIENT
Start: 2024-11-06 | End: 2024-11-12 | Stop reason: HOSPADM

## 2024-11-06 RX ORDER — ASPIRIN 81 MG/1
81 TABLET, CHEWABLE ORAL DAILY
Status: DISCONTINUED | OUTPATIENT
Start: 2024-11-07 | End: 2024-11-12 | Stop reason: HOSPADM

## 2024-11-06 RX ORDER — POLYETHYLENE GLYCOL 3350 17 G/17G
17 POWDER, FOR SOLUTION ORAL DAILY PRN
Status: DISCONTINUED | OUTPATIENT
Start: 2024-11-06 | End: 2024-11-12 | Stop reason: HOSPADM

## 2024-11-06 RX ORDER — SODIUM CHLORIDE 9 MG/ML
INJECTION, SOLUTION INTRAVENOUS PRN
Status: DISCONTINUED | OUTPATIENT
Start: 2024-11-06 | End: 2024-11-12 | Stop reason: HOSPADM

## 2024-11-06 RX ORDER — SODIUM CHLORIDE 0.9 % (FLUSH) 0.9 %
5-40 SYRINGE (ML) INJECTION EVERY 12 HOURS SCHEDULED
Status: DISCONTINUED | OUTPATIENT
Start: 2024-11-06 | End: 2024-11-12 | Stop reason: HOSPADM

## 2024-11-06 RX ORDER — ASPIRIN 300 MG/1
300 SUPPOSITORY RECTAL DAILY
Status: DISCONTINUED | OUTPATIENT
Start: 2024-11-07 | End: 2024-11-12 | Stop reason: HOSPADM

## 2024-11-06 RX ORDER — INSULIN GLARGINE 100 [IU]/ML
10 INJECTION, SOLUTION SUBCUTANEOUS NIGHTLY
Status: DISCONTINUED | OUTPATIENT
Start: 2024-11-06 | End: 2024-11-09

## 2024-11-06 RX ORDER — PRAZOSIN HYDROCHLORIDE 1 MG/1
1 CAPSULE ORAL NIGHTLY
Status: DISCONTINUED | OUTPATIENT
Start: 2024-11-06 | End: 2024-11-12 | Stop reason: HOSPADM

## 2024-11-06 RX ORDER — LABETALOL HYDROCHLORIDE 5 MG/ML
10 INJECTION, SOLUTION INTRAVENOUS EVERY 10 MIN PRN
Status: DISCONTINUED | OUTPATIENT
Start: 2024-11-06 | End: 2024-11-12 | Stop reason: HOSPADM

## 2024-11-06 RX ORDER — ENOXAPARIN SODIUM 100 MG/ML
40 INJECTION SUBCUTANEOUS DAILY
Status: DISCONTINUED | OUTPATIENT
Start: 2024-11-07 | End: 2024-11-12 | Stop reason: HOSPADM

## 2024-11-06 RX ORDER — SODIUM CHLORIDE 0.9 % (FLUSH) 0.9 %
5-40 SYRINGE (ML) INJECTION PRN
Status: DISCONTINUED | OUTPATIENT
Start: 2024-11-06 | End: 2024-11-12 | Stop reason: HOSPADM

## 2024-11-06 RX ORDER — ACETAMINOPHEN 325 MG/1
650 TABLET ORAL EVERY 4 HOURS PRN
Status: DISCONTINUED | OUTPATIENT
Start: 2024-11-06 | End: 2024-11-12 | Stop reason: HOSPADM

## 2024-11-06 RX ORDER — ONDANSETRON 4 MG/1
4 TABLET, ORALLY DISINTEGRATING ORAL EVERY 8 HOURS PRN
Status: DISCONTINUED | OUTPATIENT
Start: 2024-11-06 | End: 2024-11-12 | Stop reason: HOSPADM

## 2024-11-06 RX ADMIN — IOPAMIDOL 120 ML: 755 INJECTION, SOLUTION INTRAVENOUS at 15:46

## 2024-11-06 RX ADMIN — LAMOTRIGINE 100 MG: 100 TABLET ORAL at 23:18

## 2024-11-06 RX ADMIN — TICAGRELOR 90 MG: 90 TABLET ORAL at 23:18

## 2024-11-06 RX ADMIN — INSULIN GLARGINE 10 UNITS: 100 INJECTION, SOLUTION SUBCUTANEOUS at 23:18

## 2024-11-06 RX ADMIN — ATORVASTATIN CALCIUM 80 MG: 80 TABLET, FILM COATED ORAL at 23:18

## 2024-11-06 SDOH — ECONOMIC STABILITY: INCOME INSECURITY: HOW HARD IS IT FOR YOU TO PAY FOR THE VERY BASICS LIKE FOOD, HOUSING, MEDICAL CARE, AND HEATING?: NOT VERY HARD

## 2024-11-06 SDOH — ECONOMIC STABILITY: FOOD INSECURITY: WITHIN THE PAST 12 MONTHS, YOU WORRIED THAT YOUR FOOD WOULD RUN OUT BEFORE YOU GOT MONEY TO BUY MORE.: NEVER TRUE

## 2024-11-06 SDOH — ECONOMIC STABILITY: INCOME INSECURITY: IN THE PAST 12 MONTHS, HAS THE ELECTRIC, GAS, OIL, OR WATER COMPANY THREATENED TO SHUT OFF SERVICE IN YOUR HOME?: NO

## 2024-11-06 ASSESSMENT — PATIENT HEALTH QUESTIONNAIRE - PHQ9
SUM OF ALL RESPONSES TO PHQ QUESTIONS 1-9: 2
SUM OF ALL RESPONSES TO PHQ9 QUESTIONS 1 & 2: 2
SUM OF ALL RESPONSES TO PHQ QUESTIONS 1-9: 2
1. LITTLE INTEREST OR PLEASURE IN DOING THINGS: SEVERAL DAYS
2. FEELING DOWN, DEPRESSED OR HOPELESS: SEVERAL DAYS
SUM OF ALL RESPONSES TO PHQ QUESTIONS 1-9: 2
SUM OF ALL RESPONSES TO PHQ QUESTIONS 1-9: 2

## 2024-11-06 NOTE — ED NOTES
1530 Dr. Tinoco called a code stroke     1531 CT notified     1531 PA announcement     1523 Stroke coordinator notified     1524 Dr. Nance, Neurology, notified     Code stroke called while in triage - walk in    [facility] 0400
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
## 2024-11-07 ENCOUNTER — APPOINTMENT (OUTPATIENT)
Dept: MRI IMAGING | Age: 59
End: 2024-11-07
Payer: MEDICAID

## 2024-11-07 ENCOUNTER — APPOINTMENT (OUTPATIENT)
Age: 59
End: 2024-11-07
Attending: STUDENT IN AN ORGANIZED HEALTH CARE EDUCATION/TRAINING PROGRAM
Payer: MEDICAID

## 2024-11-07 ENCOUNTER — APPOINTMENT (OUTPATIENT)
Dept: GENERAL RADIOLOGY | Age: 59
End: 2024-11-07
Payer: MEDICAID

## 2024-11-07 LAB
ANION GAP SERPL CALCULATED.3IONS-SCNC: 11 MMOL/L (ref 7–19)
BUN SERPL-MCNC: 8 MG/DL (ref 6–20)
CALCIUM SERPL-MCNC: 9.2 MG/DL (ref 8.6–10)
CHLORIDE SERPL-SCNC: 96 MMOL/L (ref 98–111)
CHOLEST SERPL-MCNC: 202 MG/DL (ref 0–199)
CO2 SERPL-SCNC: 27 MMOL/L (ref 22–29)
CREAT SERPL-MCNC: 0.7 MG/DL (ref 0.7–1.2)
ECHO AO ASC DIAM: 2.6 CM
ECHO AO ASCENDING AORTA INDEX: 1.27 CM/M2
ECHO AO ROOT DIAM: 2.6 CM
ECHO AO ROOT INDEX: 1.27 CM/M2
ECHO AV AREA PEAK VELOCITY: 2.9 CM2
ECHO AV AREA VTI: 2.7 CM2
ECHO AV AREA/BSA PEAK VELOCITY: 1.4 CM2/M2
ECHO AV AREA/BSA VTI: 1.3 CM2/M2
ECHO AV MEAN GRADIENT: 3 MMHG
ECHO AV MEAN VELOCITY: 0.9 M/S
ECHO AV PEAK GRADIENT: 6 MMHG
ECHO AV PEAK VELOCITY: 1.3 M/S
ECHO AV VELOCITY RATIO: 0.69
ECHO AV VTI: 25.2 CM
ECHO BSA: 2.1 M2
ECHO IVC PROX: 1.2 CM
ECHO LA AREA 2C: 13.1 CM2
ECHO LA AREA 4C: 12 CM2
ECHO LA DIAMETER INDEX: 1.57 CM/M2
ECHO LA DIAMETER: 3.2 CM
ECHO LA MAJOR AXIS: 4.2 CM
ECHO LA MINOR AXIS: 4.3 CM
ECHO LA TO AORTIC ROOT RATIO: 1.23
ECHO LA VOL BP: 31 ML (ref 18–58)
ECHO LA VOL MOD A2C: 33 ML (ref 18–58)
ECHO LA VOL MOD A4C: 28 ML (ref 18–58)
ECHO LA VOL/BSA BIPLANE: 15 ML/M2 (ref 16–34)
ECHO LA VOLUME INDEX MOD A2C: 16 ML/M2 (ref 16–34)
ECHO LA VOLUME INDEX MOD A4C: 14 ML/M2 (ref 16–34)
ECHO LV E' LATERAL VELOCITY: 7.8 CM/S
ECHO LV E' SEPTAL VELOCITY: 6.4 CM/S
ECHO LV EDV A2C: 75 ML
ECHO LV EDV A4C: 91 ML
ECHO LV EDV INDEX A4C: 45 ML/M2
ECHO LV EDV NDEX A2C: 37 ML/M2
ECHO LV EJECTION FRACTION A2C: 57 %
ECHO LV EJECTION FRACTION A4C: 65 %
ECHO LV EJECTION FRACTION BIPLANE: 61 % (ref 55–100)
ECHO LV ESV A2C: 33 ML
ECHO LV ESV A4C: 32 ML
ECHO LV ESV INDEX A2C: 16 ML/M2
ECHO LV ESV INDEX A4C: 16 ML/M2
ECHO LV FRACTIONAL SHORTENING: 38 % (ref 28–44)
ECHO LV INTERNAL DIMENSION DIASTOLE INDEX: 2.35 CM/M2
ECHO LV INTERNAL DIMENSION DIASTOLIC: 4.8 CM (ref 4.2–5.9)
ECHO LV INTERNAL DIMENSION SYSTOLIC INDEX: 1.47 CM/M2
ECHO LV INTERNAL DIMENSION SYSTOLIC: 3 CM
ECHO LV IVSD: 1 CM (ref 0.6–1)
ECHO LV MASS 2D: 170.2 G (ref 88–224)
ECHO LV MASS INDEX 2D: 83.4 G/M2 (ref 49–115)
ECHO LV POSTERIOR WALL DIASTOLIC: 1 CM (ref 0.6–1)
ECHO LV RELATIVE WALL THICKNESS RATIO: 0.42
ECHO LVOT AREA: 4.2 CM2
ECHO LVOT AV VTI INDEX: 0.65
ECHO LVOT DIAM: 2.3 CM
ECHO LVOT MEAN GRADIENT: 1 MMHG
ECHO LVOT PEAK GRADIENT: 3 MMHG
ECHO LVOT PEAK VELOCITY: 0.9 M/S
ECHO LVOT STROKE VOLUME INDEX: 33.6 ML/M2
ECHO LVOT SV: 68.5 ML
ECHO LVOT VTI: 16.5 CM
ECHO MV A VELOCITY: 0.86 M/S
ECHO MV E DECELERATION TIME (DT): 177 MS
ECHO MV E VELOCITY: 0.74 M/S
ECHO MV E/A RATIO: 0.86
ECHO MV E/E' LATERAL: 9.49
ECHO MV E/E' RATIO (AVERAGED): 10.52
ECHO MV E/E' SEPTAL: 11.56
ECHO RA AREA 4C: 12.4 CM2
ECHO RA END SYSTOLIC VOLUME APICAL 4 CHAMBER INDEX BSA: 14 ML/M2
ECHO RA VOLUME: 29 ML
ECHO RV BASAL DIMENSION: 3.3 CM
ECHO RV LONGITUDINAL DIMENSION: 7.8 CM
ECHO RV MID DIMENSION: 2.1 CM
ECHO RV TAPSE: 2 CM (ref 1.7–?)
EKG P AXIS: 76 DEGREES
EKG P-R INTERVAL: 178 MS
EKG Q-T INTERVAL: 372 MS
EKG QRS DURATION: 98 MS
EKG QTC CALCULATION (BAZETT): 417 MS
EKG T AXIS: 70 DEGREES
ERYTHROCYTE [DISTWIDTH] IN BLOOD BY AUTOMATED COUNT: 13.9 % (ref 11.5–14.5)
GLUCOSE BLD-MCNC: 184 MG/DL (ref 70–99)
GLUCOSE BLD-MCNC: 190 MG/DL (ref 70–99)
GLUCOSE BLD-MCNC: 217 MG/DL (ref 70–99)
GLUCOSE BLD-MCNC: 230 MG/DL (ref 70–99)
GLUCOSE SERPL-MCNC: 183 MG/DL (ref 70–99)
HBA1C MFR BLD: 8.3 % (ref 4–5.6)
HCT VFR BLD AUTO: 40.7 % (ref 42–52)
HDLC SERPL-MCNC: 36 MG/DL (ref 40–60)
HGB BLD-MCNC: 13.2 G/DL (ref 14–18)
LDLC SERPL CALC-MCNC: 134 MG/DL
MCH RBC QN AUTO: 28.1 PG (ref 27–31)
MCHC RBC AUTO-ENTMCNC: 32.4 G/DL (ref 33–37)
MCV RBC AUTO: 86.6 FL (ref 80–94)
PERFORMED ON: ABNORMAL
PLATELET # BLD AUTO: 378 K/UL (ref 130–400)
PMV BLD AUTO: 10.1 FL (ref 9.4–12.4)
POTASSIUM SERPL-SCNC: 4 MMOL/L (ref 3.5–5)
RBC # BLD AUTO: 4.7 M/UL (ref 4.7–6.1)
SODIUM SERPL-SCNC: 134 MMOL/L (ref 136–145)
TRIGL SERPL-MCNC: 159 MG/DL (ref 0–149)
WBC # BLD AUTO: 11.9 K/UL (ref 4.8–10.8)

## 2024-11-07 PROCEDURE — 6360000004 HC RX CONTRAST MEDICATION: Performed by: STUDENT IN AN ORGANIZED HEALTH CARE EDUCATION/TRAINING PROGRAM

## 2024-11-07 PROCEDURE — 97165 OT EVAL LOW COMPLEX 30 MIN: CPT

## 2024-11-07 PROCEDURE — 82962 GLUCOSE BLOOD TEST: CPT

## 2024-11-07 PROCEDURE — 97530 THERAPEUTIC ACTIVITIES: CPT

## 2024-11-07 PROCEDURE — 94760 N-INVAS EAR/PLS OXIMETRY 1: CPT

## 2024-11-07 PROCEDURE — 97535 SELF CARE MNGMENT TRAINING: CPT

## 2024-11-07 PROCEDURE — 73060 X-RAY EXAM OF HUMERUS: CPT

## 2024-11-07 PROCEDURE — 80048 BASIC METABOLIC PNL TOTAL CA: CPT

## 2024-11-07 PROCEDURE — 97161 PT EVAL LOW COMPLEX 20 MIN: CPT

## 2024-11-07 PROCEDURE — 1200000000 HC SEMI PRIVATE

## 2024-11-07 PROCEDURE — 97116 GAIT TRAINING THERAPY: CPT

## 2024-11-07 PROCEDURE — 93010 ELECTROCARDIOGRAM REPORT: CPT | Performed by: INTERNAL MEDICINE

## 2024-11-07 PROCEDURE — 80061 LIPID PANEL: CPT

## 2024-11-07 PROCEDURE — 6360000002 HC RX W HCPCS: Performed by: STUDENT IN AN ORGANIZED HEALTH CARE EDUCATION/TRAINING PROGRAM

## 2024-11-07 PROCEDURE — 83036 HEMOGLOBIN GLYCOSYLATED A1C: CPT

## 2024-11-07 PROCEDURE — 6370000000 HC RX 637 (ALT 250 FOR IP): Performed by: STUDENT IN AN ORGANIZED HEALTH CARE EDUCATION/TRAINING PROGRAM

## 2024-11-07 PROCEDURE — 85027 COMPLETE CBC AUTOMATED: CPT

## 2024-11-07 PROCEDURE — 70551 MRI BRAIN STEM W/O DYE: CPT

## 2024-11-07 PROCEDURE — C8929 TTE W OR WO FOL WCON,DOPPLER: HCPCS

## 2024-11-07 PROCEDURE — 36415 COLL VENOUS BLD VENIPUNCTURE: CPT

## 2024-11-07 PROCEDURE — 99223 1ST HOSP IP/OBS HIGH 75: CPT | Performed by: PSYCHIATRY & NEUROLOGY

## 2024-11-07 PROCEDURE — 93306 TTE W/DOPPLER COMPLETE: CPT | Performed by: INTERNAL MEDICINE

## 2024-11-07 RX ADMIN — LAMOTRIGINE 100 MG: 100 TABLET ORAL at 08:04

## 2024-11-07 RX ADMIN — Medication 2000 UNITS: at 08:04

## 2024-11-07 RX ADMIN — ENOXAPARIN SODIUM 40 MG: 100 INJECTION SUBCUTANEOUS at 08:04

## 2024-11-07 RX ADMIN — PANTOPRAZOLE SODIUM 40 MG: 40 TABLET, DELAYED RELEASE ORAL at 06:09

## 2024-11-07 RX ADMIN — ASPIRIN 81 MG: 81 TABLET, CHEWABLE ORAL at 08:04

## 2024-11-07 RX ADMIN — LAMOTRIGINE 100 MG: 100 TABLET ORAL at 20:35

## 2024-11-07 RX ADMIN — ATORVASTATIN CALCIUM 80 MG: 80 TABLET, FILM COATED ORAL at 20:35

## 2024-11-07 RX ADMIN — TICAGRELOR 90 MG: 90 TABLET ORAL at 08:04

## 2024-11-07 RX ADMIN — ACETAMINOPHEN 650 MG: 325 TABLET ORAL at 20:34

## 2024-11-07 RX ADMIN — PERFLUTREN 1.5 ML: 6.52 INJECTION, SUSPENSION INTRAVENOUS at 07:16

## 2024-11-07 RX ADMIN — TICAGRELOR 90 MG: 90 TABLET ORAL at 20:35

## 2024-11-07 RX ADMIN — INSULIN GLARGINE 10 UNITS: 100 INJECTION, SOLUTION SUBCUTANEOUS at 20:33

## 2024-11-07 ASSESSMENT — PAIN DESCRIPTION - DESCRIPTORS: DESCRIPTORS: ACHING

## 2024-11-07 ASSESSMENT — PAIN DESCRIPTION - ORIENTATION: ORIENTATION: MID

## 2024-11-07 ASSESSMENT — PAIN SCALES - GENERAL: PAINLEVEL_OUTOF10: 3

## 2024-11-07 ASSESSMENT — PAIN DESCRIPTION - LOCATION: LOCATION: BACK

## 2024-11-07 NOTE — PLAN OF CARE
Problem: Chronic Conditions and Co-morbidities  Goal: Patient's chronic conditions and co-morbidity symptoms are monitored and maintained or improved  Outcome: Progressing  Flowsheets (Taken 11/7/2024 0800)  Care Plan - Patient's Chronic Conditions and Co-Morbidity Symptoms are Monitored and Maintained or Improved:   Monitor and assess patient's chronic conditions and comorbid symptoms for stability, deterioration, or improvement   Collaborate with multidisciplinary team to address chronic and comorbid conditions and prevent exacerbation or deterioration   Update acute care plan with appropriate goals if chronic or comorbid symptoms are exacerbated and prevent overall improvement and discharge     Problem: Discharge Planning  Goal: Discharge to home or other facility with appropriate resources  Outcome: Progressing  Flowsheets (Taken 11/7/2024 0800)  Discharge to home or other facility with appropriate resources:   Identify barriers to discharge with patient and caregiver   Arrange for needed discharge resources and transportation as appropriate   Identify discharge learning needs (meds, wound care, etc)   Refer to discharge planning if patient needs post-hospital services based on physician order or complex needs related to functional status, cognitive ability or social support system     Problem: Safety - Adult  Goal: Free from fall injury  Outcome: Progressing

## 2024-11-07 NOTE — DISCHARGE INSTRUCTIONS
[place]  Transportation Resources*      Public Transportation  [facility] Transit System (PATS) - [facility], and [facility]  [address] [address]  [phone] -or- Hearing impaired: [phone]   Monday - Saturday 5 AM - 6 PM  [email or web address]  hospitals offers an American with Disabilities Act (ADA) transport program for a flat rate for each direction of travel. Please ask the PATS  about the program.    [facility] Transit - [facility], [place], and [address] [address] [phone]  Monday - Friday 7AM - 4PM  [email or web address]    [facility] Transit Authority - [place]  [address] [place] [address]  Phone [phone], Monday - Friday 7AM - 5 PM  Phone [phone], Friday 5:30 PM -10:30 PM & Saturday 9 AM - 9PM  [email or web address]-[email or web address]    [facility] Community Service ([facility]) - [place], [place], [place], [place], [place], [place], [place], [place], and [facility]  [address] [place] [address] [phone]  [email or web address]    [place] [address]  [phone]  [email or web address]    Public Transportation provided by [place] Medicaid [place] [facility] Transportation (ITS) - Medicaid- for Transportation Services for [facility], [place], [place], [facility], [place], [place], and [facility]   [email or web address]  [address] [place] [address]  [phone]    The [facility] Transit System (ITS) provides clean, safe, and reliable public transportation at no or low cost for patients who are eligible for [place] Medicaid. Medicaid eligible clients without vehicles are provided transportation to approved Medicaid appointments without cost. You

## 2024-11-07 NOTE — ED NOTES
ED TO INPATIENT SBAR HANDOFF    Patient Name: Nguyen Olivo   : 1965  59 y.o.   Family/Caregiver Present: No  Code Status Order: Prior    C-SSRS: Risk of Suicide: No Risk  Sitter No  Restraints:         Situation  Chief Complaint:   Chief Complaint   Patient presents with    Aphasia    Extremity Weakness     LKW 0400, right arm and leg weakness     Patient Diagnosis: Stroke-like symptoms [R29.90]     Brief Description of Patient's Condition: Nguyen Olivo is a 59 y.o. male who presents to the emergency department right-sided weakness.  Patient presents with right upper and lower extremity weakness and intermittently slurred speech.  Last known well was 0 500 this morning.  Patient has a history of coronary artery disease, diabetes, hyperlipidemia, hypertension, bipolar disorder and depression.  Patient states that he takes Plavix and aspirin for his coronary artery disease.  Patient states that he noted this morning that he was having difficulty lifting his right arm.  Patient has also had difficulty walking with his right leg \"it keeps giving out on me.\"     Pt has not stood up in the ER since being moved from the wheelchair to the CT scan. Pt was up assist x2 then.   Mental Status: oriented and alert  Arrived from: home    Imaging:   XR CHEST PORTABLE   Final Result   No acute findings. The heart size is normal. Mediastinal contours and pulmonary vasculature are within normal limits. The lungs are clear. There is no pleural effusion. There is no pneumothorax.                               ______________________________________    Electronically signed by: URI MATUTE M.D.   Date:     2024   Time:    16:07       CTA HEAD NECK W CONTRAST   Final Result   There is approximately 30-40% stenosis seen within the P2 segment of the left posterior cerebral artery.       There is no other intracranial stenosis or signs of acute vascular occlusion.       There is a small amount of plaque seen within the  94%   Weight:       Height:         Predictive Model Details          31  Factor Value    Calculated 11/6/2024 20:05 32% Age 59 years old    Deterioration Index Model 30% Neurological exam X     11% WBC count abnormal (14.6 K/uL)     8% Respiratory rate 18     7% Sodium abnormal (133 mmol/L)     6% Systolic 143     3% BUN abnormal (5 mg/dL)     2% Platelet count abnormal (422 K/uL)     1% Pulse oximetry 94 %     0% Potassium 4.0 mmol/L     0% Pulse 80     0% Hematocrit 42.4 %     0% Temperature 97.9 °F (36.6 °C)       NPO? yes  O2 Flow Rate: O2 Device: None (Room air)    Cardiac Rhythm: sinus  NIH Score: NIH NIH Stroke Scale  Interval: Baseline  Level of Consciousness (1a): Alert  LOC Questions (1b): Answers both correctly  LOC Commands (1c): Performs both tasks correctly  Best Gaze (2): Normal  Visual (3): No visual loss  Facial Palsy (4): (!) Minor paralysis  Motor Arm, Left (5a): No drift  Motor Arm, Right (5b): Some effort against gravity  Motor Leg, Left (6a): No drift  Motor Leg, Right (6b): Some effort against gravity  Limb Ataxia (7): Absent  Sensory (8): Normal  Best Language (9): Mild to moderate aphasia  Dysarthria (10): Normal  Extinction and Inattention (11): No abnormality  Total: 6   Active LDA's:   Peripheral IV 11/06/24 Right Wrist (Active)   Site Assessment Clean, dry & intact 11/06/24 1540   Line Status Blood return noted;Brisk blood return;Flushed;Specimen collected 11/06/24 1540   Phlebitis Assessment No symptoms 11/06/24 1540   Infiltration Assessment 0 11/06/24 1540   Dressing Status New dressing applied 11/06/24 1540     Pertinent or High Risk Medications/Drips: no   If Yes, please provide details:   Blood Product Administration: no  If Yes, please provide details:   Sepsis Risk Score      Admitted with Sepsis? No    Recommendation  Incomplete orders:   Patient Belongings:   Additional Comments:   If any further questions, please call Sending RN at [phone]    Electronically signed by:  Electronically signed by Jewels Tinoco RN on 11/6/2024 at 8:05 PM

## 2024-11-07 NOTE — PROGRESS NOTES
Physical Therapy  Facility/Department: [facility] SURG SERVICES  Physical Therapy Initial Assessment    Name: Nguyen Olivo  : 1965  MRN: [id number]  Date of Service: 2024    Discharge Recommendations:  Patient would benefit from continued therapy after discharge, Continue to assess pending progress (pt WOULD BE A GOOD CANDIDATE FOR SHORT TERM REHAB SERVICES)          Patient Diagnosis(es): The primary encounter diagnosis was Stroke-like symptoms. A diagnosis of Cerebrovascular accident (CVA), unspecified mechanism (HCC) was also pertinent to this visit.  Past Medical History:  has a past medical history of Bipolar disorder, current episode mixed, mild (HCC), Depression, Diabetes mellitus (HCC), GERD (gastroesophageal reflux disease), Hyperlipidemia, and Hypertension.  Past Surgical History:  has a past surgical history that includes Cholecystectomy (2006); Appendectomy; Elbow surgery; and Coronary angioplasty with stent ().    Assessment  Body Structures, Functions, Activity Limitations Requiring Skilled Therapeutic Intervention: Decreased functional mobility ;Decreased ROM;Decreased strength;Decreased endurance;Increased pain;Decreased coordination;Decreased balance;Decreased sensation  Assessment: pt WOULD BENEFIT FROM SKILLED PT IN THIS SETTING TO PROGRESS HIS MOBILITY AND ASSIST WITH DC PLANNING  Therapy Prognosis: Good  Decision Making: Low Complexity  Requires PT Follow-Up: Yes  Activity Tolerance  Activity Tolerance: Patient tolerated treatment well    Plan  Physical Therapy Plan  General Plan: 5-7 times per week  Therapy Duration: 2 Weeks  Current Treatment Recommendations: Strengthening, Balance training, Functional mobility training, Transfer training, Gait training, Safety education & training, Patient/Caregiver education & training, Therapeutic activities, Positioning, Pain management  Additional Comments: ASSIST OF TWO INITIALLY FOR MOBILITY.  Safety Devices  Type of Devices: Call light

## 2024-11-07 NOTE — CARE COORDINATION
[facility]  Notification of Admission Decision      [] Patient has been accepted for admit to [facility] on :       Please write discharge orders and summary prior to discharge.    [] Patient acceptance to Rehab pending the following :    [x] Eval in progress : await MRI results      [] Patient determined to be ineligible for services at [facility] because :       We recommend you consider        Thank you for your referral, we appreciate you. If you have any questions, please feel   free to contact me at [phone].  Electronically Signed by Kaley Malhotra, Admissions Coordinator 11/7/2024 1:54 PM

## 2024-11-07 NOTE — PROGRESS NOTES
Occupational Therapy Initial Assessment  Date: 2024   Patient Name: Nguyen Olivo  MRN: [id number]     : 1965    Date of Service: 2024    Discharge Recommendations:  24 hour supervision or assist, Patient would benefit from continued therapy after discharge       Assessment   Assessment: OT evaluation completed and tx initiated. Pt may benefit from continued skilled services to address functional deficits. Pt will likely progress with further tx. OT anticipates that impairments related to mobility may keep him from D/C home alone. OT will continue to tx and update POC.  REQUIRES OT FOLLOW-UP: Yes  Activity Tolerance  Activity Tolerance: Patient Tolerated treatment well              Patient Diagnosis(es): The primary encounter diagnosis was Stroke-like symptoms. A diagnosis of Cerebrovascular accident (CVA), unspecified mechanism (HCC) was also pertinent to this visit.    Past Medical History:   Past Medical History:   Diagnosis Date    Bipolar disorder, current episode mixed, mild (HCC)     diagnosed at [facility]    Depression     Diabetes mellitus (HCC)     GERD (gastroesophageal reflux disease)     Hyperlipidemia     Hypertension         Past Surgical History:   Past Surgical History:   Procedure Laterality Date    APPENDECTOMY      patient was 18    CHOLECYSTECTOMY  2006    CORONARY ANGIOPLASTY WITH STENT PLACEMENT      ELBOW SURGERY      left, removed cyst              Restrictions  Restrictions/Precautions  Restrictions/Precautions: Fall Risk  Required Braces or Orthoses?: No    Subjective      Pain Screening  Pain at present: 0  Scale Used: Numeric Score  Intervention List: Patient able to continue with treatment  Vital Signs  Temp: 96.8 °F (36 °C)  Pulse: 84  Respirations: 18  BP: 125/72  MAP (Calculated): 90  MAP (mmHg): 87  Height and Weight  Height: 170.2 cm (5' 7\")  Weight - Scale: 93 kg (205 lb)  BSA (Calculated - sq m): 2.1 sq meters  BMI (Calculated): 32.2  Oxygen  Therapy  SpO2: 97 %    Social/Functional History  Social/Functional History  Lives With: Alone  Type of Home: Trailer  Home Access: Stairs to enter with rails  Entrance Stairs - Number of Steps: 2  Home Equipment: Rollator  ADL Assistance: Independent  Homemaking Assistance: Independent  Ambulation Assistance: Independent  Transfer Assistance: Independent       Objective   Hearing: Within functional limits    Vision  Vision Exceptions: Wears glasses at all times        Toilet Transfers  Toilet - Technique: Ambulating  Equipment Used: Raised toilet seat with rails  Toilet Transfer: Contact guard assistance  ADL  Feeding: Independent  Grooming: Independent  UE Bathing: Stand by assistance  LE Bathing: Minimal assistance  UE Dressing: Stand by assistance  LE Dressing: Minimal assistance  Putting On/Taking Off Footwear: Stand by assistance  Toileting: Moderate assistance  Functional Mobility: Minimal assistance;Moderate assistance        Bed mobility  Rolling to Right: Stand by assistance  Supine to Sit: Contact guard assistance;Minimal assistance  Transfers  Stand Step Transfers: Contact guard assistance  Sit to stand: Contact guard assistance  Stand to sit: Contact guard assistance     Cognition  Overall Cognitive Status: WFL               Gross Assessment  AROM: Within functional limits  Strength: Within functional limits                       Included Treatment  Tx consisted of: bed mobility; pt education; transfer training; DME training; activity tolerance/balance challenges; functional ambulation; toileting.   (Treatment time: 30 min)       Plan   Occupational Therapy Plan  Times Per Week: 3-5       Goals  Short Term Goals  Time Frame for Short Term Goals: 3-4 days  Short Term Goal 1: Transfer with CGA and DME.  Short Term Goal 2: Toileting skills with min A.  Short Term Goal 3: UBD and bathing with sup.  Short Term Goal 4: LBD and bathing with min A and AE/DME.  Short Term Goal 5: Tolerate short, functional

## 2024-11-07 NOTE — PROGRESS NOTES
Daily Progress Note    Date:2024  Patient: Nguyen Olivo  : 1965  MRN:[id number]  CODE:Full Code No additional code details  PCP:No primary care provider on file.    Admit Date: 2024  3:25 PM   LOS: 1 day       Subjective:    He remains with some right-sided weakness and dysarthric speech      Summary: 59-year-old male with CAD s/p stenting  on aspirin and Brilinta, diabetes, hypertension, dyslipidemia, GERD, bipolar disorder brought into the emergency department by family this afternoon due to dysarthric speech and right-sided weakness that he developed this morning at 9 AM when he woke up. Last known normal was 4 AM when he went to bed. Not candidate for tPA as out of time window. He tells me that lately he has been noncompliant with his medications, forgetting to take them. CT head no acute findings, normal CT perfusion, CTA head/neck with 30-40% stenosis within left posterior cerebral artery, no other significant vascular occlusion, otherwise no carotid stenosis.       Objective:      Vital signs in last 24 hours:  Patient Vitals for the past 24 hrs:   BP Temp Temp src Pulse Resp SpO2 Height Weight   24 1119 132/87 96.8 °F (36 °C) Temporal 84 18 96 % -- --   24 0817 125/72 96.8 °F (36 °C) Temporal 84 18 97 % -- --   24 0804 -- -- -- -- -- 97 % -- --   24 0658 137/87 -- -- -- -- -- 1.702 m (5' 7\") 93 kg (205 lb)   24 2130 137/87 98.1 °F (36.7 °C) Temporal 82 18 97 % -- --   24 -- -- -- -- -- 94 % -- --   24 (!) 143/89 97.9 °F (36.6 °C) -- 80 18 94 % -- --   24 1930 (!) 139/92 -- -- 77 -- 95 % -- --   24 190 (!) 141/83 -- -- 83 -- 96 % -- --   24 1830 (!) 141/91 -- -- 83 16 95 % -- --   24 1800 (!) 144/90 -- -- 82 21 95 % -- --   24 1730 (!) 134/91 -- -- 86 15 96 % -- --   24 1700 (!) 144/89 -- -- 82 -- 95 % -- --   24 1630 (!) 130/92 -- -- 84 -- 96 % -- --   24 1600 129/80 -- -- 83 19 95 % -- --

## 2024-11-07 NOTE — CONSULTS
[facility] Consult      Patient:   Nguyen Olivo  MR#:    [id number]  Account Number:                   [id number]      Room:    [facility] [place]/[facility]01   YOB: 1965  Date of Progress Note: 11/7/2024  Time of Note                           10:55 AM  Attending Physician:  Keven Kowalski MD  Consulting Physician:  Fran Nance DO       CHIEF COMPLAINT:  Stroke like symptoms      HISTORY OF PRESENT ILLNESS:   This is a 59 y.o. male who was admitted with right-sided weakness and speech difficulty.  The patient has underlying history of coronary artery disease, diabetes, hypertension and hyperlipidemia.  He was brought into the emergency department after noting speech difficulty and right-sided weakness.  He was not a candidate for TNK given his last known well being outside the window.  CT head with nothing acute.  CTP negative.  CTA of the head and neck was largely negative as well.  No carotid artery disease history.  No history of atrial fibrillation.    REVIEW OF SYSTEMS:  Constitutional - No fever or chills.    HENT -  No Scalp tenderness.  No tinnitus or significant hearing loss.  No nose bleeding, no sore throat.  Eyes - No sudden vision change or eye pain  Respiratory - No significant shortness of breath or cough  Cardiovascular - No chest pain. No palpitations or significant leg swelling  Gastrointestinal - No abdominal swelling or pain.    Genitourinary - No difficulty urinating, dysuria  Musculoskeletal - No back pain or myalgia.  Skin - No color change or rash  Neurologic - No seizures.    Hematologic - No easy bruising or spontaneous bleeding.  Psychiatric - No anxiety.     PAST MEDICAL HISTORY:      Diagnosis Date    Bipolar disorder, current episode mixed, mild (HCC)     diagnosed at [facility]    Depression     Diabetes mellitus (HCC)     GERD (gastroesophageal reflux disease)     Hyperlipidemia     Hypertension        PAST SURGICAL HISTORY:      Procedure Laterality Date     palpation.    No rash, erythema, or pallor  Psychiatric -   Mood, affect, and behavior appear normal    Memory as below see mental status examination in the neurologic exam      NEUROLOGICAL EXAM    Mental status   [x] Awake, alert, oriented   [x] Affect attention and concentration appear appropriate  [x] Recent and remote memory appears unremarkable  [x] Speech normal without dysarthria or aphasia, comprehension and repetition intact.   COMMENTS:   Cranial Nerves [x] No VF deficit to confrontation,  optic discs normal, no papilledema on fundoscopic exam.  [x] PERRLA, EOMI, no nystagmus, conjugate eye movements, no ptosis  [] Face symmetric  [x] Facial sensation intact  [x] Tongue midline no atrophy or fasciculations present  [x] Palate midline, hearing to finger rub normal  [x] Shoulder shrug and SCM testing normal  COMMENTS:Right facial asymmetry    Motor   [] 5/5 strength x 4 extremities  [x] Normal bulk and tone  [x] No tremor present  [x] No rigidity or bradykinesia noted  COMMENTS:4/5 Right side    Sensory  [x] Sensation intact to light touch, pin prick, vibration, and proprioception BLE  [] Sensation intact to light touch, pin prick, vibration, and proprioception BUE  COMMENTS:   Coordination [] FTN normal bilaterally   [] HTS normal bilaterally  [] NAVID normal.   COMMENTS: Limited   Reflexes  [x] Symmetric and non-pathological  [x] Toes downgoing bilaterally  [x] No clonus present  COMMENTS:   Gait                  [] Normal steady gait    [] Ataxic    [] Spastic     [] Magnetic     [] Shuffling  [x] Not assessed  COMMENTS:       LABS/IMAGING:    As below and per HPI    Echo (TTE) complete (PRN contrast/bubble/strain/3D)    Result Date: 11/7/2024    Left Ventricle: Normal left ventricular systolic function with a visually estimated EF of 60 - 65%. EF by 2D Simpsons Biplane is 61%. Left ventricle size is normal. Normal wall thickness. Normal wall motion. Normal diastolic function.   Right Ventricle: Right

## 2024-11-08 ENCOUNTER — APPOINTMENT (OUTPATIENT)
Dept: CT IMAGING | Age: 59
End: 2024-11-08
Payer: MEDICAID

## 2024-11-08 LAB
GLUCOSE BLD-MCNC: 192 MG/DL (ref 70–99)
GLUCOSE BLD-MCNC: 258 MG/DL (ref 70–99)
PERFORMED ON: ABNORMAL
PERFORMED ON: ABNORMAL

## 2024-11-08 PROCEDURE — 94760 N-INVAS EAR/PLS OXIMETRY 1: CPT

## 2024-11-08 PROCEDURE — 92610 EVALUATE SWALLOWING FUNCTION: CPT

## 2024-11-08 PROCEDURE — 6360000002 HC RX W HCPCS: Performed by: STUDENT IN AN ORGANIZED HEALTH CARE EDUCATION/TRAINING PROGRAM

## 2024-11-08 PROCEDURE — 2580000003 HC RX 258: Performed by: STUDENT IN AN ORGANIZED HEALTH CARE EDUCATION/TRAINING PROGRAM

## 2024-11-08 PROCEDURE — 97530 THERAPEUTIC ACTIVITIES: CPT

## 2024-11-08 PROCEDURE — 6370000000 HC RX 637 (ALT 250 FOR IP): Performed by: STUDENT IN AN ORGANIZED HEALTH CARE EDUCATION/TRAINING PROGRAM

## 2024-11-08 PROCEDURE — 1200000000 HC SEMI PRIVATE

## 2024-11-08 PROCEDURE — 82962 GLUCOSE BLOOD TEST: CPT

## 2024-11-08 PROCEDURE — 97110 THERAPEUTIC EXERCISES: CPT

## 2024-11-08 PROCEDURE — 70450 CT HEAD/BRAIN W/O DYE: CPT

## 2024-11-08 PROCEDURE — 99233 SBSQ HOSP IP/OBS HIGH 50: CPT | Performed by: PSYCHIATRY & NEUROLOGY

## 2024-11-08 PROCEDURE — 92523 SPEECH SOUND LANG COMPREHEN: CPT

## 2024-11-08 RX ORDER — CARVEDILOL 25 MG/1
25 TABLET ORAL 2 TIMES DAILY WITH MEALS
Status: DISCONTINUED | OUTPATIENT
Start: 2024-11-09 | End: 2024-11-12 | Stop reason: HOSPADM

## 2024-11-08 RX ADMIN — SODIUM CHLORIDE, PRESERVATIVE FREE 10 ML: 5 INJECTION INTRAVENOUS at 21:34

## 2024-11-08 RX ADMIN — PANTOPRAZOLE SODIUM 40 MG: 40 TABLET, DELAYED RELEASE ORAL at 05:20

## 2024-11-08 RX ADMIN — TICAGRELOR 90 MG: 90 TABLET ORAL at 08:06

## 2024-11-08 RX ADMIN — ATORVASTATIN CALCIUM 80 MG: 80 TABLET, FILM COATED ORAL at 21:33

## 2024-11-08 RX ADMIN — LAMOTRIGINE 100 MG: 100 TABLET ORAL at 21:33

## 2024-11-08 RX ADMIN — SODIUM CHLORIDE, PRESERVATIVE FREE 10 ML: 5 INJECTION INTRAVENOUS at 08:06

## 2024-11-08 RX ADMIN — ACETAMINOPHEN 650 MG: 325 TABLET ORAL at 00:33

## 2024-11-08 RX ADMIN — ASPIRIN 81 MG: 81 TABLET, CHEWABLE ORAL at 08:06

## 2024-11-08 RX ADMIN — ENOXAPARIN SODIUM 40 MG: 100 INJECTION SUBCUTANEOUS at 08:06

## 2024-11-08 RX ADMIN — Medication 2000 UNITS: at 08:06

## 2024-11-08 RX ADMIN — LAMOTRIGINE 100 MG: 100 TABLET ORAL at 08:06

## 2024-11-08 RX ADMIN — PRAZOSIN HYDROCHLORIDE 1 MG: 1 CAPSULE ORAL at 21:33

## 2024-11-08 RX ADMIN — TICAGRELOR 90 MG: 90 TABLET ORAL at 21:33

## 2024-11-08 RX ADMIN — INSULIN GLARGINE 10 UNITS: 100 INJECTION, SOLUTION SUBCUTANEOUS at 21:33

## 2024-11-08 ASSESSMENT — PAIN DESCRIPTION - LOCATION: LOCATION: KNEE

## 2024-11-08 ASSESSMENT — PAIN DESCRIPTION - ORIENTATION: ORIENTATION: RIGHT

## 2024-11-08 ASSESSMENT — PAIN DESCRIPTION - DESCRIPTORS: DESCRIPTORS: ACHING

## 2024-11-08 ASSESSMENT — PAIN SCALES - GENERAL: PAINLEVEL_OUTOF10: 3

## 2024-11-08 NOTE — PROGRESS NOTES
Daily Progress Note    Date:2024  Patient: Nguyen Olivo  : 1965  MRN:[id number]  CODE:Full Code No additional code details  PCP:No primary care provider on file.    Admit Date: 2024  3:25 PM   LOS: 2 days       Subjective:    Remains with some right-sided weakness and dysarthric speech      Summary: 59-year-old male with CAD s/p stenting  on aspirin and Brilinta, diabetes, hypertension, dyslipidemia, GERD, bipolar disorder brought into the emergency department by family this afternoon due to dysarthric speech and right-sided weakness that he developed this morning at 9 AM when he woke up. Last known normal was 4 AM when he went to bed. Not candidate for tPA as out of time window. He tells me that lately he has been noncompliant with his medications, forgetting to take them. CT head no acute findings, normal CT perfusion, CTA head/neck with 30-40% stenosis within left posterior cerebral artery, no other significant vascular occlusion, otherwise no carotid stenosis.       Objective:      Vital signs in last 24 hours:  Patient Vitals for the past 24 hrs:   BP Temp Temp src Pulse Resp SpO2   24 1147 112/74 98.2 °F (36.8 °C) Temporal (!) 110 20 95 %   24 0821 -- -- -- 80 15 94 %   24 0716 133/78 97 °F (36.1 °C) Temporal 79 18 94 %   24 0656 -- -- -- 84 16 94 %   24 0339 135/85 97.3 °F (36.3 °C) Temporal 81 14 95 %   24 1950 133/77 97.9 °F (36.6 °C) Temporal 93 16 95 %   24 1741 133/87 97.5 °F (36.4 °C) Temporal 94 18 96 %     Physical exam    General: No acute distress  Cardiac: Normal rate, regular rhythm  Respiratory: No wheezes or rhonchi  Abdomen: Nondistended  Extremities: Warm and dry  Neurologic: Dysarthria, facial droop present, right upper and right lower extremity weakness present      Lab Review   Recent Results (from the past 24 hour(s))   POCT Glucose    Collection Time: 24  5:00 PM   Result Value Ref Range    POC Glucose 217 (H) 70 - 99  Q6H PRN, Keven Kowalski MD    polyethylene glycol (GLYCOLAX) packet 17 g, 17 g, Oral, Daily PRN, Keven Kowalski MD    enoxaparin (LOVENOX) injection 40 mg, 40 mg, SubCUTAneous, Daily, Keven Kowalski MD, 40 mg at 11/08/24 0806    acetaminophen (TYLENOL) tablet 650 mg, 650 mg, Oral, Q4H PRN, 650 mg at 11/08/24 0033 **OR** acetaminophen (TYLENOL) suppository 650 mg, 650 mg, Rectal, Q4H PRN, Keven Kowalski MD    aspirin chewable tablet 81 mg, 81 mg, Oral, Daily, 81 mg at 11/08/24 0806 **OR** aspirin suppository 300 mg, 300 mg, Rectal, Daily, Keven Kowalski MD    labetalol (NORMODYNE;TRANDATE) injection 10 mg, 10 mg, IntraVENous, Q10 Min PRN, Keven Kowalski MD        Assessment/plan  Principal Problem:    Stroke-like symptoms  Resolved Problems:    * No resolved hospital problems. *      Acute stroke with dysarthria and right-sided weakness  -Follow-up MRI brain reviewed-evidence of acute lacunar infarct in lateral left thalamus, also with chronic small vessel ischemic changes  - Echocardiogram reviewed, negative from stroke standpoint  - Continue neurochecks  -continue antiplatelet therapy and statin  - Neurology following    PT OT speech evaluations    CAD s/p stent 2021  - On aspirin and Brilinta    Hypertension  -- Allowed adequate period of permissive hypertension, will normalize BP goals, resume Coreg and also on prazosin nightly, avoid hypotension    Diabetes mellitus hyperglycemia  - Continue current basal insulin dosing along with sliding scale insulin correction as needed  - Hemoglobin A1c checked-8.3    Dyslipidemia  - Reviewed lipid panel, not at goal, continue high intensity statin    Bipolar disorder  - Continue Lamictal    Dysphagia  - Reviewed speech evaluation, modified diet    DVT prophylaxis Lovenox    Hopeful discharge soon to [facility] inpatient rehab service      Keven Kowalski MD 11/8/2024 4:47 PM

## 2024-11-08 NOTE — CARE COORDINATION
[facility]  Notification of Admission Decision      [] Patient has been accepted for admit to [facility] on :       Please write discharge orders and summary prior to discharge.    [x] Patient acceptance to Rehab pending the following : insurance approval    [] Eval in progress       [] Patient determined to be ineligible for services at [facility] because :       We recommend you consider        Thank you for your referral, we appreciate you. If you have any questions, please feel   free to contact me at [phone].  Electronically Signed by Kaley Malhotra, Admissions Coordinator 11/8/2024 8:44 AM

## 2024-11-08 NOTE — PROGRESS NOTES
[facility] Neurology Progress Note      Patient:   Nguyen Olivo  MR#:    [id number]   Room:    [facility]-   YOB: 1965  Date of Progress Note: 11/8/2024  Time of Note                           1:22 PM  Consulting Physician:  Fran Nance DO  Attending Physician:  Keven Kowalski MD      INTERVAL HISTORY:  No acute events, right sided weakness is a little worse today.     REVIEW OF SYSTEMS:  Constitutional: No fevers No chills  Neck:No stiffness  Respiratory: No shortness of breath  Cardiovascular: No chest pain No palpitations  Gastrointestinal: No abdominal pain    Genitourinary: No Dysuria  Neurological: No headache, no confusion    PHYSICAL EXAM:    Constitutional -   /74   Pulse (!) 110   Temp 98.2 °F (36.8 °C) (Temporal)   Resp 20   Ht 1.702 m (5' 7\")   Wt 93 kg (205 lb)   SpO2 95%   BMI 32.11 kg/m²   General appearance: No acute distress   EYES -   Conjunctiva normal  Pupillary exam as below, see CN exam in the neurologic exam  ENT-    No scars, masses, or lesions over external nose or ears  Oropharynx without erythema, palate midline  Cardiovascular -   No clubbing, cyanosis, or edema   Pulmonary-   Good expansion, normal effort without use of accessory muscles  Musculoskeletal -   No significant wasting of muscles noted  Gait as below, see gait exam in the neurologic exam  Muscle strength, tone, stability as below see the motor exam in the neurologic exam.   No bony deformities  Skin -   Warm, dry, and intact to inspection and palpation.    No rash, erythema, or pallor  Psychiatric -   Mood, affect, and behavior appear normal    Memory as below see mental status examination in the neurologic exam        NEUROLOGICAL EXAM     Mental status    [x] Awake, alert, oriented   [x] Affect attention and concentration appear appropriate  [x] Recent and remote memory appears unremarkable  [] Speech normal without dysarthria or aphasia, comprehension and repetition intact.   COMMENTS:  segment of the left posterior cerebral artery.  There is no other intracranial stenosis or signs of acute vascular occlusion.  There is a small amount of plaque seen within the proximal left internal carotid artery not causing stenosis.  The proximal right internal carotid artery appears patent.  The vertebral arteries and subclavian arteries appear patent.  Limited evaluation of the neck due to motion.  All CT scans are performed using dose optimization techniques as appropriate to the performed exam and include at least one of the following: Automated exposure control, adjustment of the mA and/or kV according to size, and the use of iterative reconstruction technique.  ______________________________________ Electronically signed by: HI VANG M.D. Date:     11/06/2024 Time:    16:07     XR CHEST PORTABLE    Result Date: 11/6/2024  EXAM: CHEST RADIOGRAPH  TECHNIQUE: Single frontal chest radiograph.  HISTORY: Stroke symptoms  COMPARISON: 02/29/2024      No acute findings. The heart size is normal. Mediastinal contours and pulmonary vasculature are within normal limits. The lungs are clear. There is no pleural effusion. There is no pneumothorax.        ______________________________________ Electronically signed by: URI MATUTE M.D. Date:     11/06/2024 Time:    16:07     CT HEAD WO CONTRAST    Result Date: 11/6/2024  EXAM: CT OF THE BRAIN WITHOUT CONTRAST  CLINICAL INDICATION: Stroke symptoms.  COMPARISON: 11/09/2020  PROCEDURE: Contiguous axial tomographic sections were obtained from the skull base to the vertex.  FINDINGS: There is moderate generalized cerebral involutional change. No acute intracranial hemorrhage, extra-axial fluid collection, hydrocephalus, mass effect, or midline shift. The ventricles, cisterns and sulci are normal. Gray-white differentiation is maintained. Normal variant cavum septum pellucidum vergae.  There are moderate patchy areas of hypodensity in the periventricular white matter,  nonspecific but most commonly encountered in the setting of chronic microvascular disease.  The visualized paranasal sinuses and mastoid air cells are clear.  The visualized portions of the globes and orbits appear normal.  No acute calvarial abnormalities are seen.       1. No acute intracranial abnormality. 2. Moderate involutional change of the brain and suspected sequelae of microvascular disease. 3. Notification: Above impression and recommendations were discussed by telephone with Earnest on 11/06/2024 at 15:49 by Sher Blanco MD.     <====== START ADDENDUM 11/06/2024 4:7 p ======> Notification was given to Dr. Childers instead of Dr. Tinoco by telephone.  All CT scans are performed using dose optimization techniques as appropriate to the performed exam and include at least one of the following: Automated exposure control, adjustment of the mA and/or kV according to size, and the use of iterative reconstruction technique.  ______________________________________ Electronically signed by: SHER BLANCO M.D. Date:     11/06/2024 Time:    16:05     CT Brain Perfusion    Result Date: 11/6/2024  EXAMINATION: COMPUTED TOMOGRAPHY PERFUSION IMAGING OF THE HEAD WITH CONTRAST.  HISTORY: Right-sided weakness and slurred speech.  TECHNIQUE: CT perfusion of the brain was performed with intravenous contrast using a separate data acquisition.  The data was transmitted to a separate workstation for processing by RAPID software ([employer]) to produce automated calculations of the estimated cerebral blood flow and Tmax.  CT Dose Reduction Techniques Employed: Yes  COMPARISON: None  FINDINGS:  CT PERFUSION:  Estimated ischemic core volume (rCBF < 0.3): 0 mL Estimated hypoperfusion volume (Tmax > 6 sec): 0 mL Mismatch volume: 0 mL Mismatch ratio: n/a        Normal exam  All CT scans are performed using dose optimization techniques as appropriate to the performed exam and include at least one of the following:

## 2024-11-08 NOTE — PROGRESS NOTES
Facility/Department: [facility]G SERVICES  Initial Speech/Language/Cognitive/Swallow Assessment    NAME: Nguyen Olivo  : 1965   MRN: [id number]  ADMISSION DATE: 2024  ADMITTING DIAGNOSIS: has Depression with suicidal ideation; Cellulitis of left elbow; Hypertension; Hyperlipidemia; GERD (gastroesophageal reflux disease); Diabetes mellitus (HCC); Olecranon bursitis of left elbow; Vitamin D deficiency; Anxiety and depression; Mood disorder (HCC); Bipolar disorder, current episode mixed, mild (HCC); Substernal chest pain relieved by nitroglycerin; CAD (coronary artery disease); and Stroke-like symptoms on their problem list.    Date of Eval: 2024   Evaluating Therapist: EDNA Bowser    Pain:  Pain Assessment  Pain Assessment: None - Denies Pain  Pain Level: 3  Patient's Stated Pain Goal: 0 - No pain  Pain Location: Knee  Pain Orientation: Right  Pain Descriptors: Aching    Assessment:  Completed MINI MENTAL STATE EXAMINATION and patient obtained 25 out of 30 possible points, indicative of borderline mild cognitive-linguistic impairment (patient was 3/5 attention, 0/1 repeat phrase, 0/1 writing of sentence, and 0/1 copy design). Subsequently transitioned to full assessment and patient exhibited decreased select and sustained attention, slow processing, delayed and mildly decreased auditory comprehension of increased complexity, mildly delayed structured responsive speech with mild semantic paraphasis observed, decreased immediate/short-term memory, and delayed simple reasoning/problem solving.    It is noted that during evaluation, patient did not verbalize current PCP independently. Patient verbalized pharmacy at independent level. While delayed, patient verbalized conditions in which he takes medications independently. While delayed, patient demonstrated ability to verbalize appropriate simple solutions to situations that could occur during activities of daily living at independent level (ie  reasoning/problem solving tasks with 80% accuracy and with provision of min cues/prompts.        Subjective:  Chart Reviewed: Yes  Patient assessed for rehabilitation services?: Yes  Family / Caregiver Present: No     Objective   Oral Motor:   Labial ROM: Decreased, bilaterally, during labial retraction trials and labial protrusion trials.  Labial Strength: Decreased during labial compression trials.  Labial Coordination: Slowed movements  Lingual ROM: Adequate during lingual extension trials with full point achieved; adequate during lingual elevation trials without use of accessory jaw movement; adequate movements bilaterally.  Lingual Strength: Decreased   Lingual Coordination: Slowed movements     Auditory Comprehension  Comprehension:  (Patient demonstrated ability to answer simple yes/no questions regarding immediate environment and current state of being at independent level and with adequate processing speed. Patient demonstrated ability to follow simple 1 step commands independently and with adequate processing speed. Delays were noted and mild break down was observed during yes/no questions of increased complexity at independent level. While delayed, patient demonstrated ability to follow simple 2 and simple 3 step commands independently.)     Expression  Primary Mode of Expression:  (Patient was 2/2 confrontation naming on MINI. Structured responsive speech was considered to be mildly delayed with mild semantic paraphasis noted.)    Motor Speech:  (Patient exhibited decreased labial movements and slowed lingual movements during verbalizations.)     Overall Orientation Status:  (Patient demonstrated ability to verbalize name, birthday, age, address, phone number, year, month, season, [place], date, state, county, city, hospital, and floor of hospital independently.)     Memory:  (Patient was 3/3 registration, 3/5 attention, 3/3 recall, and 0/1 repeat phrase on MINI. Patient demonstrated decreased immediate  restrictive diet recommendation would be to continue regular solid consistency with thin liquids. Assist in tray set-up. Recommend meds whole in pudding/applesauce. Will continue to follow.    Electronically signed by EDNA Bowser on 11/8/2024 at 10:21 AM

## 2024-11-08 NOTE — PROGRESS NOTES
Physical Therapy  Name: Nguyen Olivo  MRN:  [id number]  Date of service:  11/8/2024 11/08/24 1400   General   Diagnosis R WEAKNESS   Subjective   Subjective pT states he is ready to work with therapy. RN reports increased weakness in R UELE   Strength RLE   Comment grossly 2 to 3-/5   Bed mobility   Rolling to Right Minimal assistance   Supine to Sit Minimal assistance   Transfers   Sit to Stand Contact guard assistance;Minimal Assistance   Comment due to increased weakness in R side, Pt was TF to recliner with hector janet. able to  hector stedy with cga to work on weight shift .   Balance   Comments able to sit eob with sba/cga and stood in hector stedy with cga   PT Exercises   Exercise Treatment A/AA ex to R UE/LE in sitting and supine   Short Term Goals   Time Frame for Short Term Goals 2 WKS   Short Term Goal 1 SUP<>SIT, IND   Short Term Goal 2 SIT<>STAND, CGA   Short Term Goal 3  FT WITH RW, CGA   Activity Tolerance   Activity Tolerance Patient tolerated treatment well   Assessment   Assessment Pt presents with increased R UE/LE weakness. did not feel like it would be safe to amb with the RW (R UE unable to  RW). pT currently is a good candidate for tf's with hector gonzales   Treatment Initiated  GT TRAINING, SUP<>SIT, SIT<>STAND, BALANCE, TF'S   Discharge Recommendations Patient would benefit from continued therapy after discharge;Continue to assess pending progress   Patient Education   Education Given To Patient   Education Provided Transfer Training;Home Exercise Program   Education Method Demonstration;Verbal   Barriers to Learning None   Education Outcome Verbalized understanding;Demonstrated understanding   Physical Therapy Plan   General Plan 5-7 times per week   Therapy Duration 2 Weeks   Current Treatment Recommendations Strengthening;Balance training;Functional mobility training;Transfer training;Gait training;Safety education & training;Patient/Caregiver education &

## 2024-11-09 LAB
GLUCOSE BLD-MCNC: 183 MG/DL (ref 70–99)
GLUCOSE BLD-MCNC: 206 MG/DL (ref 70–99)
GLUCOSE BLD-MCNC: 225 MG/DL (ref 70–99)
GLUCOSE BLD-MCNC: 234 MG/DL (ref 70–99)
PERFORMED ON: ABNORMAL

## 2024-11-09 PROCEDURE — 99232 SBSQ HOSP IP/OBS MODERATE 35: CPT | Performed by: PSYCHIATRY & NEUROLOGY

## 2024-11-09 PROCEDURE — 1200000000 HC SEMI PRIVATE

## 2024-11-09 PROCEDURE — 6360000002 HC RX W HCPCS: Performed by: STUDENT IN AN ORGANIZED HEALTH CARE EDUCATION/TRAINING PROGRAM

## 2024-11-09 PROCEDURE — 82962 GLUCOSE BLOOD TEST: CPT

## 2024-11-09 PROCEDURE — 94760 N-INVAS EAR/PLS OXIMETRY 1: CPT

## 2024-11-09 PROCEDURE — 2580000003 HC RX 258: Performed by: STUDENT IN AN ORGANIZED HEALTH CARE EDUCATION/TRAINING PROGRAM

## 2024-11-09 PROCEDURE — 6370000000 HC RX 637 (ALT 250 FOR IP): Performed by: STUDENT IN AN ORGANIZED HEALTH CARE EDUCATION/TRAINING PROGRAM

## 2024-11-09 RX ORDER — INSULIN GLARGINE 100 [IU]/ML
15 INJECTION, SOLUTION SUBCUTANEOUS NIGHTLY
Status: DISCONTINUED | OUTPATIENT
Start: 2024-11-09 | End: 2024-11-12 | Stop reason: HOSPADM

## 2024-11-09 RX ORDER — DEXTROSE MONOHYDRATE 100 MG/ML
INJECTION, SOLUTION INTRAVENOUS CONTINUOUS PRN
Status: DISCONTINUED | OUTPATIENT
Start: 2024-11-09 | End: 2024-11-12 | Stop reason: HOSPADM

## 2024-11-09 RX ORDER — FAMOTIDINE 20 MG/1
20 TABLET, FILM COATED ORAL 2 TIMES DAILY
Status: DISCONTINUED | OUTPATIENT
Start: 2024-11-09 | End: 2024-11-12 | Stop reason: HOSPADM

## 2024-11-09 RX ORDER — INSULIN LISPRO 100 [IU]/ML
0-8 INJECTION, SOLUTION INTRAVENOUS; SUBCUTANEOUS
Status: DISCONTINUED | OUTPATIENT
Start: 2024-11-09 | End: 2024-11-12 | Stop reason: HOSPADM

## 2024-11-09 RX ADMIN — SODIUM CHLORIDE, PRESERVATIVE FREE 5 ML: 5 INJECTION INTRAVENOUS at 09:15

## 2024-11-09 RX ADMIN — CARVEDILOL 25 MG: 25 TABLET, FILM COATED ORAL at 09:08

## 2024-11-09 RX ADMIN — PANTOPRAZOLE SODIUM 40 MG: 40 TABLET, DELAYED RELEASE ORAL at 06:22

## 2024-11-09 RX ADMIN — SODIUM CHLORIDE, PRESERVATIVE FREE 10 ML: 5 INJECTION INTRAVENOUS at 20:09

## 2024-11-09 RX ADMIN — TICAGRELOR 90 MG: 90 TABLET ORAL at 20:09

## 2024-11-09 RX ADMIN — LAMOTRIGINE 100 MG: 100 TABLET ORAL at 20:09

## 2024-11-09 RX ADMIN — INSULIN GLARGINE 15 UNITS: 100 INJECTION, SOLUTION SUBCUTANEOUS at 20:08

## 2024-11-09 RX ADMIN — INSULIN LISPRO 2 UNITS: 100 INJECTION, SOLUTION INTRAVENOUS; SUBCUTANEOUS at 17:10

## 2024-11-09 RX ADMIN — CARVEDILOL 25 MG: 25 TABLET, FILM COATED ORAL at 17:14

## 2024-11-09 RX ADMIN — ATORVASTATIN CALCIUM 80 MG: 80 TABLET, FILM COATED ORAL at 20:09

## 2024-11-09 RX ADMIN — Medication 2000 UNITS: at 09:07

## 2024-11-09 RX ADMIN — FAMOTIDINE 20 MG: 20 TABLET ORAL at 12:55

## 2024-11-09 RX ADMIN — INSULIN LISPRO 2 UNITS: 100 INJECTION, SOLUTION INTRAVENOUS; SUBCUTANEOUS at 12:12

## 2024-11-09 RX ADMIN — ASPIRIN 81 MG: 81 TABLET, CHEWABLE ORAL at 09:07

## 2024-11-09 RX ADMIN — FAMOTIDINE 20 MG: 20 TABLET ORAL at 20:12

## 2024-11-09 RX ADMIN — TICAGRELOR 90 MG: 90 TABLET ORAL at 09:08

## 2024-11-09 RX ADMIN — INSULIN LISPRO 2 UNITS: 100 INJECTION, SOLUTION INTRAVENOUS; SUBCUTANEOUS at 20:09

## 2024-11-09 RX ADMIN — ENOXAPARIN SODIUM 40 MG: 100 INJECTION SUBCUTANEOUS at 09:06

## 2024-11-09 RX ADMIN — PRAZOSIN HYDROCHLORIDE 1 MG: 1 CAPSULE ORAL at 20:09

## 2024-11-09 RX ADMIN — LAMOTRIGINE 100 MG: 100 TABLET ORAL at 09:07

## 2024-11-09 NOTE — PLAN OF CARE
Problem: Chronic Conditions and Co-morbidities  Goal: Patient's chronic conditions and co-morbidity symptoms are monitored and maintained or improved  11/9/2024 1335 by Mia Marina LPN  Outcome: Adequate for Discharge  11/9/2024 1232 by Mia Marina LPN  Outcome: Progressing  11/9/2024 1231 by Mia aMrina LPN  Outcome: Progressing  11/9/2024 0745 by Mia Marina LPN  Outcome: Progressing  Flowsheets (Taken 11/8/2024 2130 by Leena Swanson, RN)  Care Plan - Patient's Chronic Conditions and Co-Morbidity Symptoms are Monitored and Maintained or Improved: Monitor and assess patient's chronic conditions and comorbid symptoms for stability, deterioration, or improvement

## 2024-11-09 NOTE — PROGRESS NOTES
[facility]  [address], [address]  [place] [address]  Phone [phone]     Neurology Progress Note  2024 3:40 PM  Information:   Patient Name: Nguyen Olivo  :   1965  Age:   59 y.o.  MRN:   [id number]  Account #:  [id number]  Admit Date:   2024  Today:  24     ADMIT DX:   Stroke-like symptoms    Subjective:     Nguyen Olivo is a 58 YO man with hypertension, hyperlipidemia, diabetes, CAD, smoking who was admitted  with dense right hemiplegia.      Interval History:   He is doing about the same.  He has severe right limb weakness and dysarthria.  He is unable to move the right arm at all.  He is eating a regular diet.  Awaiting insurance decision regarding Rehab.    Objective:     Past Medical History:  Past Medical History:   Diagnosis Date    Bipolar disorder, current episode mixed, mild (HCC)     diagnosed at [facility]    Depression     Diabetes mellitus (HCC)     GERD (gastroesophageal reflux disease)     Hyperlipidemia     Hypertension        Past Surgical History:   Procedure Laterality Date    APPENDECTOMY      patient was 18    CHOLECYSTECTOMY  2006    CORONARY ANGIOPLASTY WITH STENT PLACEMENT      ELBOW SURGERY      left, removed cyst       Family History   Problem Relation Age of Onset    Other Mother         BLOOD DISEASE    Diabetes type 2  Mother     Emphysema Father     Hypertension Father     Depression Sister     Depression Brother     No Known Problems Maternal Grandmother     No Known Problems Maternal Grandfather     No Known Problems Paternal Grandmother     Emphysema Paternal Grandfather        Social History     Socioeconomic History    Marital status:      Spouse name: Not on file    Number of children: 3    Years of education: Not on file    Highest education level: Not on file   Occupational History    Not on file   Tobacco Use    Smoking status: Every Day     Current packs/day: 1.50     Average packs/day: 1.5 packs/day for 43.0  mg Oral Daily    Or    aspirin  300 mg Rectal Daily       Diagnostic Studies:  Reviewed all labs/diagnositcs since last 24hrs:  Recent Results (from the past 24 hour(s))   POCT Glucose    Collection Time: 24  7:12 PM   Result Value Ref Range    POC Glucose 258 (H) 70 - 99 mg/dl    Performed on AccuChek    POCT Glucose    Collection Time: 24  7:34 AM   Result Value Ref Range    POC Glucose 234 (H) 70 - 99 mg/dl    Performed on AccuChek    POCT Glucose    Collection Time: 24 11:03 AM   Result Value Ref Range    POC Glucose 183 (H) 70 - 99 mg/dl    Performed on AccuChek      Echo (TTE) complete with contrast and bubble study    Patient Name: Nguyen Olivo   Patient MRN: [id number]   Patient : 1965 (59 y.o.)   Legal Sex: Male   Height: 1.702 m (5' 7\")   Weight: 93 kg (205 lb)   BSA: 2.1 m²   Blood Pressure: 137/87    Accession Number: [id number]   Date of Study: 2024 ( 7:24 AM)   Ordering Provider: Keven Kowalski MD   Clinical Indications: Acute Ischemic Stroke, CVA       Reading Physicians  Performing Staff   Cardiology: Dung Padgett MD    Tech/Nurse: Juliana Ledbetter        Reason for Exam  Priority: Routine  Acute Ischemic Stroke; CVA   Dx: Stroke-like symptoms [R29.90 (ICD-10-CM)]     PACS Images     Show images for Echo (TTE) complete (PRN contrast/bubble/strain/3D)  Interpretation Summary  Show Result Comparison     Left Ventricle: Normal left ventricular systolic function with a visually estimated EF of 60 - 65%. EF by 2D Simpsons Biplane is 61%. Left ventricle size is normal. Normal wall thickness. Normal wall motion. Normal diastolic function.    Right Ventricle: Right ventricle size is normal. Normal systolic function. TAPSE is 2.0 cm.    Interatrial Septum: Agitated saline study was negative with and without provocation.    Aorta: Normal sized aortic root and ascending aorta.    Pericardium: No pericardial effusion.    IVC/SVC: IVC diameter is less than or equal to 21 mm

## 2024-11-10 PROBLEM — R53.1 RIGHT SIDED WEAKNESS: Status: ACTIVE | Noted: 2024-11-10

## 2024-11-10 PROBLEM — I63.81 ACUTE THALAMIC INFARCTION (HCC): Status: ACTIVE | Noted: 2024-11-10

## 2024-11-10 PROBLEM — R47.1 DYSARTHRIA: Status: ACTIVE | Noted: 2024-11-10

## 2024-11-10 LAB
GLUCOSE BLD-MCNC: 162 MG/DL (ref 70–99)
GLUCOSE BLD-MCNC: 174 MG/DL (ref 70–99)
GLUCOSE BLD-MCNC: 178 MG/DL (ref 70–99)
GLUCOSE BLD-MCNC: 218 MG/DL (ref 70–99)
GLUCOSE BLD-MCNC: 256 MG/DL (ref 70–99)
PERFORMED ON: ABNORMAL

## 2024-11-10 PROCEDURE — 1200000000 HC SEMI PRIVATE

## 2024-11-10 PROCEDURE — 94760 N-INVAS EAR/PLS OXIMETRY 1: CPT

## 2024-11-10 PROCEDURE — 82962 GLUCOSE BLOOD TEST: CPT

## 2024-11-10 PROCEDURE — 6370000000 HC RX 637 (ALT 250 FOR IP): Performed by: STUDENT IN AN ORGANIZED HEALTH CARE EDUCATION/TRAINING PROGRAM

## 2024-11-10 PROCEDURE — 97530 THERAPEUTIC ACTIVITIES: CPT

## 2024-11-10 PROCEDURE — 99232 SBSQ HOSP IP/OBS MODERATE 35: CPT | Performed by: PSYCHIATRY & NEUROLOGY

## 2024-11-10 PROCEDURE — 6360000002 HC RX W HCPCS: Performed by: STUDENT IN AN ORGANIZED HEALTH CARE EDUCATION/TRAINING PROGRAM

## 2024-11-10 PROCEDURE — 2580000003 HC RX 258: Performed by: STUDENT IN AN ORGANIZED HEALTH CARE EDUCATION/TRAINING PROGRAM

## 2024-11-10 RX ADMIN — LAMOTRIGINE 100 MG: 100 TABLET ORAL at 08:22

## 2024-11-10 RX ADMIN — CARVEDILOL 25 MG: 25 TABLET, FILM COATED ORAL at 16:56

## 2024-11-10 RX ADMIN — SODIUM CHLORIDE, PRESERVATIVE FREE 10 ML: 5 INJECTION INTRAVENOUS at 20:40

## 2024-11-10 RX ADMIN — INSULIN LISPRO 2 UNITS: 100 INJECTION, SOLUTION INTRAVENOUS; SUBCUTANEOUS at 11:11

## 2024-11-10 RX ADMIN — FAMOTIDINE 20 MG: 20 TABLET ORAL at 08:22

## 2024-11-10 RX ADMIN — FAMOTIDINE 20 MG: 20 TABLET ORAL at 20:39

## 2024-11-10 RX ADMIN — CARVEDILOL 25 MG: 25 TABLET, FILM COATED ORAL at 08:22

## 2024-11-10 RX ADMIN — PRAZOSIN HYDROCHLORIDE 1 MG: 1 CAPSULE ORAL at 20:39

## 2024-11-10 RX ADMIN — INSULIN GLARGINE 15 UNITS: 100 INJECTION, SOLUTION SUBCUTANEOUS at 20:39

## 2024-11-10 RX ADMIN — ENOXAPARIN SODIUM 40 MG: 100 INJECTION SUBCUTANEOUS at 08:21

## 2024-11-10 RX ADMIN — ASPIRIN 81 MG: 81 TABLET, CHEWABLE ORAL at 08:22

## 2024-11-10 RX ADMIN — INSULIN LISPRO 4 UNITS: 100 INJECTION, SOLUTION INTRAVENOUS; SUBCUTANEOUS at 20:39

## 2024-11-10 RX ADMIN — TICAGRELOR 90 MG: 90 TABLET ORAL at 08:22

## 2024-11-10 RX ADMIN — ACETAMINOPHEN 650 MG: 325 TABLET ORAL at 20:39

## 2024-11-10 RX ADMIN — Medication 2000 UNITS: at 08:22

## 2024-11-10 RX ADMIN — TICAGRELOR 90 MG: 90 TABLET ORAL at 20:39

## 2024-11-10 RX ADMIN — ATORVASTATIN CALCIUM 80 MG: 80 TABLET, FILM COATED ORAL at 20:38

## 2024-11-10 RX ADMIN — LAMOTRIGINE 100 MG: 100 TABLET ORAL at 20:39

## 2024-11-10 RX ADMIN — PANTOPRAZOLE SODIUM 40 MG: 40 TABLET, DELAYED RELEASE ORAL at 06:05

## 2024-11-10 RX ADMIN — SODIUM CHLORIDE, PRESERVATIVE FREE 10 ML: 5 INJECTION INTRAVENOUS at 08:22

## 2024-11-10 ASSESSMENT — PAIN SCALES - GENERAL: PAINLEVEL_OUTOF10: 7

## 2024-11-10 ASSESSMENT — PAIN DESCRIPTION - LOCATION: LOCATION: WRIST;FINGER (COMMENT WHICH ONE)

## 2024-11-10 NOTE — PLAN OF CARE
Problem: Chronic Conditions and Co-morbidities  Goal: Patient's chronic conditions and co-morbidity symptoms are monitored and maintained or improved  Outcome: Progressing  Flowsheets (Taken 11/10/2024 0730)  Care Plan - Patient's Chronic Conditions and Co-Morbidity Symptoms are Monitored and Maintained or Improved:   Monitor and assess patient's chronic conditions and comorbid symptoms for stability, deterioration, or improvement   Update acute care plan with appropriate goals if chronic or comorbid symptoms are exacerbated and prevent overall improvement and discharge   Collaborate with multidisciplinary team to address chronic and comorbid conditions and prevent exacerbation or deterioration     Problem: Discharge Planning  Goal: Discharge to home or other facility with appropriate resources  Outcome: Progressing  Flowsheets (Taken 11/10/2024 0730)  Discharge to home or other facility with appropriate resources:   Identify barriers to discharge with patient and caregiver   Arrange for needed discharge resources and transportation as appropriate   Identify discharge learning needs (meds, wound care, etc)   Arrange for interpreters to assist at discharge as needed   Refer to discharge planning if patient needs post-hospital services based on physician order or complex needs related to functional status, cognitive ability or social support system

## 2024-11-10 NOTE — PROGRESS NOTES
Daily Progress Note    Date:11/10/2024  Patient: Nguyen Olivo  : 1965  MRN:[id number]  CODE:Full Code No additional code details  PCP:No primary care provider on file.    Admit Date: 2024  3:25 PM   LOS: 4 days       Subjective:    No acute events overnight.  Right-sided weakness and dysarthria persist, no interval improvement        Summary: 59-year-old male with CAD s/p stenting  on aspirin and Brilinta, diabetes, hypertension, dyslipidemia, GERD, bipolar disorder brought into the emergency department by family this afternoon due to dysarthric speech and right-sided weakness that he developed this morning at 9 AM when he woke up. Last known normal was 4 AM when he went to bed. Not candidate for tPA as out of time window. He tells me that lately he has been noncompliant with his medications, forgetting to take them. CT head no acute findings, normal CT perfusion, CTA head/neck with 30-40% stenosis within left posterior cerebral artery, no other significant vascular occlusion, otherwise no carotid stenosis.  MRI brain showed evidence of acute left thalamic stroke.  Evaluated by PT OT speech.      Objective:      Vital signs in last 24 hours:  Patient Vitals for the past 24 hrs:   BP Temp Temp src Pulse Resp SpO2   11/10/24 1155 125/81 97.2 °F (36.2 °C) Temporal 79 20 97 %   11/10/24 0739 111/81 97.3 °F (36.3 °C) Oral 72 20 98 %   11/10/24 0731 -- -- -- -- -- 96 %   11/10/24 0356 104/74 97.3 °F (36.3 °C) Temporal 74 14 94 %   11/10/24 0008 108/72 97.6 °F (36.4 °C) Temporal 86 14 94 %   24 2130 -- -- -- 85 -- --   24 1920 105/74 97.5 °F (36.4 °C) Temporal 83 14 95 %   24 1543 116/78 97.7 °F (36.5 °C) Oral 78 18 97 %     Physical exam    General: No acute distress  Cardiac: Normal rate, regular rhythm  Respiratory: No wheezes or rhonchi  Abdomen: Nondistended, nontender  Extremities: Warm and dry, no edema  Neurologic: Dysarthria, facial droop present, right upper and right lower

## 2024-11-10 NOTE — PROGRESS NOTES
Transportation Needs: Not on file (11/6/2024)   Physical Activity: Unknown (11/6/2024)    Physical Activity ([facility] HRSN)     In the last 30 days, other than the activities you did for work, on average, how many days per week did you engage in moderate exercise (like walking fast, running, jogging, dancing, swimming, biking, or other similar activites)?: 0     Number of minutes of exercise per week:: 0   Stress: Stress Concern Present (8/2/2023)    [facility] - Occupational Stress Questionnaire     Feeling of Stress : Rather much   Social Connections: Socially Integrated (11/6/2024)    Social Connections ([facility] HRSN)     If for any reason you need help with day-to-day activities such as bathing, preparing meals, shopping, managing finances, etc., do you get the help you need?: I don't need any help   Intimate Partner Violence: Unknown (10/11/2023)    [facility]    Abuse Screen     Unsafe at Home or Work/School: Not on file     Feels Threatened by Someone?: Not on file     Does Anyone Keep You from Contacting Others or Doint Things Outside the Home?: Not on file     Physical Sign of Abuse Present: Not on file   Housing Stability: Not on file (11/6/2024)       Medications:   dextrose      sodium chloride        insulin glargine  15 Units SubCUTAneous Nightly    insulin lispro  0-8 Units SubCUTAneous 4x Daily AC & HS    famotidine  20 mg Oral BID    carvedilol  25 mg Oral BID WC    atorvastatin  80 mg Oral Nightly    Vitamin D  2,000 Units Oral Daily    lamoTRIgine  100 mg Oral BID    nicotine  1 patch TransDERmal Q24H    pantoprazole  40 mg Oral QAM AC    prazosin  1 mg Oral Nightly    ticagrelor  90 mg Oral BID    sodium chloride flush  5-40 mL IntraVENous 2 times per day    enoxaparin  40 mg SubCUTAneous Daily    aspirin  81 mg Oral Daily    Or    aspirin  300 mg Rectal Daily       Diagnostic Studies:  Reviewed all labs/diagnositcs  since last 24hrs:  Recent Results (from the past 24 hour(s))   POCT Glucose    Collection Time: 11/09/24  4:02 PM   Result Value Ref Range    POC Glucose 206 (H) 70 - 99 mg/dl    Performed on AccuChek    POCT Glucose    Collection Time: 11/09/24  7:21 PM   Result Value Ref Range    POC Glucose 225 (H) 70 - 99 mg/dl    Performed on AccuChek    POCT Glucose    Collection Time: 11/10/24  4:03 AM   Result Value Ref Range    POC Glucose 178 (H) 70 - 99 mg/dl    Performed on AccuChek    POCT Glucose    Collection Time: 11/10/24  7:18 AM   Result Value Ref Range    POC Glucose 162 (H) 70 - 99 mg/dl    Performed on AccuChek    POCT Glucose    Collection Time: 11/10/24 10:51 AM   Result Value Ref Range    POC Glucose 218 (H) 70 - 99 mg/dl    Performed on AccuChek        Diet:  ADULT DIET; Regular    Examination:  Vitals: /81   Pulse 79   Temp 97.2 °F (36.2 °C) (Temporal)   Resp 20   Ht 1.702 m (5' 7\")   Wt 93 kg (205 lb)   SpO2 97%   BMI 32.11 kg/m²    Intake/Output Summary (Last 24 hours) at 11/10/2024 1405  Last data filed at 11/10/2024 1047  Gross per 24 hour   Intake 1750 ml   Output 3250 ml   Net -1500 ml     General appearance: alert and cooperative with exam  HEENT: Exam; heent: Head: Normal, normocephalic, atraumatic.  Lungs: clear to auscultation bilaterally  Heart: regular rate and rhythm, S1, S2 normal, no murmur, click, rub or gallop  Extremities: extremities normal, atraumatic, no cyanosis or edema  Neurologic:  Alert, oriented.  No dysarthria.  Speech is fluent.  EOMI, PERRL, VFF.  No facial weakness.  Limb strength is normal.  No pronator drift.  Deep tendon reflexes and intact and symmetrical.  Toes are downgoing.  Rapid alternating movements are normal.  Finger to nose testing shows no dysmetria.    Assessment:   1.         Left thalamo capsular lacunar infarct with dense right hemiparesis and dysarthria  2.         Hypertension  3.         Hyperlipidemia  4.          Diabetes  5. Smoking  6. CAD    Plan:   1.         ASA, Brilinta, Lipitor  2. PT/OT/ST     Discharge planning:   Rehab    I spent 35 total minutes in face to face interaction with patient and coordination of care.   I spent > 50% of the total time discussing the diagnoses, evaluation, test results, treatment options, plans; counseling and advising with the patient and/or family and/or caregiver as well as with the care team.    Electronically signed by Bimal Castillo M.D.

## 2024-11-10 NOTE — PROGRESS NOTES
Physical Therapy     11/10/24 1000   Restrictions/Precautions   Restrictions/Precautions Fall Risk   Required Braces or Orthoses? No   General   Diagnosis R WEAKNESS   Subjective   Subjective pt agreeable to tx; wants to get out of bed   Bed mobility   Sit to Supine Contact guard assistance   Bed Mobility Comments with HOB elevlated and use of R rail. increased time allowed for task initiation and completion. able to move RLE given time.   Transfers   Sit to Stand Contact guard assistance   Stand to Sit Contact guard assistance   Comment CGA for STS inside SS to chair assist x 1. pt able to weight shift inside SS and take small steps  but unable to maintain  with R hand.   Short Term Goals   Time Frame for Short Term Goals 2 WKS   Short Term Goal 1 SUP<>SIT, IND   Short Term Goal 2 SIT<>STAND, CGA   Short Term Goal 3  FT WITH RW, CGA   Activity Tolerance   Activity Tolerance Patient tolerated treatment well   Assessment   Assessment pt demonstrated ability to weight shift and take small steps inside SS but notable decreased accuracy of foot placement with R foot. could attempt stand pivot with hemiwalker assist x 2 for next tx. pt left in chair with RUE elevated and bilat heels floated.   PT Plan of Care   Sunday X   Safety Devices   Type of Devices Call light within reach;Gait belt;Heels elevated for pressure relief;Left in chair;Nurse notified     Electronically signed by John Oscar PTA on 11/10/2024 at 10:07 AM

## 2024-11-11 LAB
GLUCOSE BLD-MCNC: 170 MG/DL (ref 70–99)
GLUCOSE BLD-MCNC: 181 MG/DL (ref 70–99)
GLUCOSE BLD-MCNC: 215 MG/DL (ref 70–99)
GLUCOSE BLD-MCNC: 230 MG/DL (ref 70–99)
PERFORMED ON: ABNORMAL

## 2024-11-11 PROCEDURE — 82962 GLUCOSE BLOOD TEST: CPT

## 2024-11-11 PROCEDURE — 97116 GAIT TRAINING THERAPY: CPT

## 2024-11-11 PROCEDURE — 99232 SBSQ HOSP IP/OBS MODERATE 35: CPT | Performed by: PSYCHIATRY & NEUROLOGY

## 2024-11-11 PROCEDURE — 97530 THERAPEUTIC ACTIVITIES: CPT

## 2024-11-11 PROCEDURE — 6370000000 HC RX 637 (ALT 250 FOR IP): Performed by: STUDENT IN AN ORGANIZED HEALTH CARE EDUCATION/TRAINING PROGRAM

## 2024-11-11 PROCEDURE — 1200000000 HC SEMI PRIVATE

## 2024-11-11 PROCEDURE — 97110 THERAPEUTIC EXERCISES: CPT

## 2024-11-11 PROCEDURE — 2580000003 HC RX 258: Performed by: STUDENT IN AN ORGANIZED HEALTH CARE EDUCATION/TRAINING PROGRAM

## 2024-11-11 PROCEDURE — 6360000002 HC RX W HCPCS: Performed by: STUDENT IN AN ORGANIZED HEALTH CARE EDUCATION/TRAINING PROGRAM

## 2024-11-11 PROCEDURE — 94760 N-INVAS EAR/PLS OXIMETRY 1: CPT

## 2024-11-11 RX ADMIN — LAMOTRIGINE 100 MG: 100 TABLET ORAL at 08:15

## 2024-11-11 RX ADMIN — TICAGRELOR 90 MG: 90 TABLET ORAL at 08:15

## 2024-11-11 RX ADMIN — PRAZOSIN HYDROCHLORIDE 1 MG: 1 CAPSULE ORAL at 20:08

## 2024-11-11 RX ADMIN — ASPIRIN 81 MG: 81 TABLET, CHEWABLE ORAL at 08:15

## 2024-11-11 RX ADMIN — ATORVASTATIN CALCIUM 80 MG: 80 TABLET, FILM COATED ORAL at 20:08

## 2024-11-11 RX ADMIN — Medication 2000 UNITS: at 08:15

## 2024-11-11 RX ADMIN — INSULIN GLARGINE 15 UNITS: 100 INJECTION, SOLUTION SUBCUTANEOUS at 20:07

## 2024-11-11 RX ADMIN — FAMOTIDINE 20 MG: 20 TABLET ORAL at 20:08

## 2024-11-11 RX ADMIN — PANTOPRAZOLE SODIUM 40 MG: 40 TABLET, DELAYED RELEASE ORAL at 06:11

## 2024-11-11 RX ADMIN — ENOXAPARIN SODIUM 40 MG: 100 INJECTION SUBCUTANEOUS at 08:15

## 2024-11-11 RX ADMIN — FAMOTIDINE 20 MG: 20 TABLET ORAL at 08:15

## 2024-11-11 RX ADMIN — INSULIN LISPRO 2 UNITS: 100 INJECTION, SOLUTION INTRAVENOUS; SUBCUTANEOUS at 08:14

## 2024-11-11 RX ADMIN — ACETAMINOPHEN 650 MG: 325 TABLET ORAL at 22:35

## 2024-11-11 RX ADMIN — LAMOTRIGINE 100 MG: 100 TABLET ORAL at 20:08

## 2024-11-11 RX ADMIN — SODIUM CHLORIDE, PRESERVATIVE FREE 10 ML: 5 INJECTION INTRAVENOUS at 08:16

## 2024-11-11 RX ADMIN — SODIUM CHLORIDE, PRESERVATIVE FREE 10 ML: 5 INJECTION INTRAVENOUS at 20:10

## 2024-11-11 RX ADMIN — CARVEDILOL 25 MG: 25 TABLET, FILM COATED ORAL at 06:11

## 2024-11-11 RX ADMIN — CARVEDILOL 25 MG: 25 TABLET, FILM COATED ORAL at 16:28

## 2024-11-11 RX ADMIN — TICAGRELOR 90 MG: 90 TABLET ORAL at 20:08

## 2024-11-11 RX ADMIN — INSULIN LISPRO 2 UNITS: 100 INJECTION, SOLUTION INTRAVENOUS; SUBCUTANEOUS at 20:08

## 2024-11-11 ASSESSMENT — PAIN SCALES - GENERAL
PAINLEVEL_OUTOF10: 4
PAINLEVEL_OUTOF10: 0

## 2024-11-11 ASSESSMENT — PAIN DESCRIPTION - LOCATION: LOCATION: ARM

## 2024-11-11 ASSESSMENT — PAIN DESCRIPTION - ORIENTATION: ORIENTATION: RIGHT

## 2024-11-11 ASSESSMENT — PAIN DESCRIPTION - DESCRIPTORS: DESCRIPTORS: ACHING;DISCOMFORT
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
## 2024-11-11 NOTE — PROGRESS NOTES
Physical Therapy  Name: Nguyen Olivo  MRN:  [id number]  Date of service:  11/11/2024 11/11/24 1546   Restrictions/Precautions   Restrictions/Precautions Fall Risk   Subjective   Subjective I would love to sit up in the chair   Oxygen Therapy   O2 Device None (Room air)   Bed Mobility   Supine to Sit Minimal assistance   Transfers   Sit to Stand 2 Person Assistance;Minimal Assistance   Stand to Sit 2 Person Assistance;Minimal Assistance   Bed to Chair 2 Person Assistance;Moderate assistance   Comment right leg hyperextends   Ambulation   Surface Level tile   Device Hand-Held Assist   Assistance 2 Person assistance;Minimal assistance   Gait Deviations Slow Kelsi;Decreased step length;Decreased step height   Distance 2 feet   Short Term Goals   Time Frame for Short Term Goals 2 WKS   Short Term Goal 1 SUP<>SIT, IND   Short Term Goal 2 SIT<>STAND, CGA   Short Term Goal 3  FT WITH RW, CGA   Conditions Requiring Skilled Therapeutic Intervention   Body Structures, Functions, Activity Limitations Requiring Skilled Therapeutic Intervention Decreased functional mobility ;Decreased ROM;Decreased strength;Decreased endurance;Increased pain;Decreased coordination;Decreased balance;Decreased sensation   Discharge Recommendations Patient would benefit from continued therapy after discharge;Continue to assess pending progress   Activity Tolerance   Activity Tolerance Patient tolerated treatment well   Physical Therapy Plan   General Plan 5-7 times per week   Therapy Duration 2 Weeks   Current Treatment Recommendations Strengthening;Balance training;Functional mobility training;Transfer training;Gait training;Safety education & training;Patient/Caregiver education & training;Therapeutic activities;Positioning;Pain management   PT Plan of Care   Monday X   Safety Devices   Type of Devices Call light within reach;Gait belt;Heels elevated for pressure relief;Left in chair;Nurse notified         Electronically signed by

## 2024-11-11 NOTE — PROGRESS NOTES
Physical Therapy  Name: Nguyen Olivo  MRN:  [id number]  Date of service:  11/11/2024    Documentation on wrong patient    Electronically signed by Roxane King PTA on 11/11/2024 at 10:25 AM

## 2024-11-11 NOTE — PROGRESS NOTES
Occupational Therapy  Facility/Department: [facility] SURG SERVICES  Daily Treatment Note  NAME: Nguyen Olivo  : 1965  MRN: [id number]    Date of Service: 2024    Discharge Recommendations:  24 hour supervision or assist, Patient would benefit from continued therapy after discharge, IP Rehab       Assessment   Performance deficits / Impairments: Decreased functional mobility ;Decreased ADL status;Decreased ROM;Decreased safe awareness;Decreased sensation;Decreased fine motor control  Assessment: Pt tolerated tx well.  Pt participated with R UE ROM exercises.  Pt sat up in bed into long sitting.  Pt educated on positioning of RUE and being aware of its position to avoid injury due to decreased sensation.  Pt sat EOB with good sitting balance.  Pt transfered from bed to chair and nursing agreed to get pt BTB with Soraya Lopez.  Pt continues to benefit from skilled OT services.  Prognosis: Good  Activity Tolerance  Activity Tolerance: Patient Tolerated treatment well         Patient Diagnosis(es): The primary encounter diagnosis was Stroke-like symptoms. A diagnosis of Cerebrovascular accident (CVA), unspecified mechanism (HCC) was also pertinent to this visit.      has a past medical history of Bipolar disorder, current episode mixed, mild (HCC), Depression, Diabetes mellitus (HCC), GERD (gastroesophageal reflux disease), Hyperlipidemia, and Hypertension.   has a past surgical history that includes Cholecystectomy (2006); Appendectomy; Elbow surgery; and Coronary angioplasty with stent ().    Restrictions  Restrictions/Precautions  Restrictions/Precautions: Fall Risk  Required Braces or Orthoses?: No  Subjective   General  Chart Reviewed: Yes  Patient assessed for rehabilitation services?: Yes  Response to previous treatment: Patient with no complaints from previous session  Family / Caregiver Present: No  Pain Screening  Pain at present: 0  Scale Used: Numeric Score  Intervention List: Patient able to

## 2024-11-11 NOTE — PROGRESS NOTES
Daily Progress Note    Date:2024  Patient: Nguyen Olivo  : 1965  MRN:[id number]  CODE:Full Code No additional code details  PCP:No primary care provider on file.    Admit Date: 2024  3:25 PM   LOS: 5 days       Subjective:    No acute events overnight.  No interval change/improvement in right hemiaplasia or dysarthria.  No other complaints.        Summary: 59-year-old male with CAD s/p stenting  on aspirin and Brilinta, diabetes, hypertension, dyslipidemia, GERD, bipolar disorder brought into the emergency department by family this afternoon due to dysarthric speech and right-sided weakness that he developed this morning at 9 AM when he woke up. Last known normal was 4 AM when he went to bed. Not candidate for tPA as out of time window. He tells me that lately he has been noncompliant with his medications, forgetting to take them. CT head no acute findings, normal CT perfusion, CTA head/neck with 30-40% stenosis within left posterior cerebral artery, no other significant vascular occlusion, otherwise no carotid stenosis.  MRI brain showed evidence of acute left thalamic stroke.  Evaluated by PT OT speech.      Objective:      Vital signs in last 24 hours:  Patient Vitals for the past 24 hrs:   BP Temp Temp src Pulse Resp SpO2   24 1209 117/69 97.3 °F (36.3 °C) -- 73 18 98 %   24 0858 -- -- -- -- -- 98 %   24 0715 115/71 97.1 °F (36.2 °C) Temporal 72 20 98 %   24 0437 106/60 96.8 °F (36 °C) Temporal 77 20 97 %   11/10/24 2358 120/82 (!) 96.6 °F (35.9 °C) Temporal 82 18 96 %   11/10/24 1939 131/77 98.2 °F (36.8 °C) Temporal 79 18 100 %   11/10/24 1600 125/78 97.2 °F (36.2 °C) Temporal 76 20 95 %     Physical exam    General: No acute distress  Cardiac: Normal rate, regular rhythm  Respiratory: No wheezes or rhonchi  Abdomen: Nondistended, nontender  Extremities: Warm and dry, no edema  Neurologic: Dysarthria, facial droop present, right sided hemiplegia      Lab Review  BID, Keven Kowalski MD, 100 mg at 11/11/24 0815    nicotine (NICODERM CQ) 21 MG/24HR 1 patch, 1 patch, TransDERmal, Q24H, Keven Kowalski MD, 1 patch at 11/10/24 2038    pantoprazole (PROTONIX) tablet 40 mg, 40 mg, Oral, QAM AC, Keven Kowalski MD, 40 mg at 11/11/24 0611    prazosin (MINIPRESS) capsule 1 mg, 1 mg, Oral, Nightly, Keven Kowalski MD, 1 mg at 11/10/24 2039    ticagrelor (BRILINTA) tablet 90 mg, 90 mg, Oral, BID, Keven Kowalski MD, 90 mg at 11/11/24 0815    sodium chloride flush 0.9 % injection 5-40 mL, 5-40 mL, IntraVENous, 2 times per day, Keven Kowalski MD, 10 mL at 11/11/24 0816    sodium chloride flush 0.9 % injection 5-40 mL, 5-40 mL, IntraVENous, PRN, Kveen Kowalski MD    0.9 % sodium chloride infusion, , IntraVENous, PRN, Keven Kowalski MD    ondansetron (ZOFRAN-ODT) disintegrating tablet 4 mg, 4 mg, Oral, Q8H PRN **OR** ondansetron (ZOFRAN) injection 4 mg, 4 mg, IntraVENous, Q6H PRN, Keven Kowalski MD    polyethylene glycol (GLYCOLAX) packet 17 g, 17 g, Oral, Daily PRN, Keven Kowalski MD    enoxaparin (LOVENOX) injection 40 mg, 40 mg, SubCUTAneous, Daily, Keven Kowalski MD, 40 mg at 11/11/24 0815    acetaminophen (TYLENOL) tablet 650 mg, 650 mg, Oral, Q4H PRN, 650 mg at 11/10/24 2039 **OR** acetaminophen (TYLENOL) suppository 650 mg, 650 mg, Rectal, Q4H PRN, Keven Kowalski MD    aspirin chewable tablet 81 mg, 81 mg, Oral, Daily, 81 mg at 11/11/24 0815 **OR** aspirin suppository 300 mg, 300 mg, Rectal, Daily, Keven Kowalski MD    labetalol (NORMODYNE;TRANDATE) injection 10 mg, 10 mg, IntraVENous, Q10 Min PRN, Keven Kowalski MD        Assessment/plan  Principal Problem:    Acute thalamic infarction (HCC)  Active Problems:    Hypertension    Hyperlipidemia    GERD (gastroesophageal reflux disease)    Diabetes mellitus (HCC)    Bipolar disorder, current episode mixed, mild (HCC)    CAD (coronary artery disease)    Stroke-like symptoms    Right sided weakness     Dysarthria  Resolved Problems:    * No resolved hospital problems. *      Acute left thalamic stroke with dysarthria, facial droop, right hemiplegia  -MRI brain-evidence of acute lacunar infarct in lateral left thalamus, also with chronic small vessel ischemic changes  - Echocardiogram negative from stroke standpoint  - Continue neurochecks  -continue antiplatelet therapy and statin  - Neurology following, reviewed recommendations  -- Reviewed telemetry    PT OT speech following    CAD s/p stent 2021  - On aspirin and Brilinta    Hypertension  -- Allowed adequate period of permissive hypertension  -- Continue Coreg and prazosin, BP stable on review, continue to follow with further management as warranted    Diabetes mellitus hyperglycemia  - Continue current basal insulin dosing along with sliding scale insulin correction as needed  - Hemoglobin A1c checked-8.3    Dyslipidemia  - Reviewed lipid panel, not at goal, continue high intensity statin    Bipolar disorder  - Continue Lamictal    Dysphagia  - Reviewed speech evaluation, modified diet    DVT prophylaxis Lovenox    discharge soon to [facility] inpatient rehab service pending insurance approval, if not approved then we will need to consider SNF placement  Discuss with social work/case management      Keven Kowalski MD 11/11/2024 12:41 PM     General: no fever, no chills  Eyes: no vision changes, no eye pain  Cardiovascular: no chest pain, no edema  Respiratory: no cough, no shortness of breath  Gastrointestinal: no abdominal pain, no nausea, no vomiting, no diarrhea  Genitourinary: no dysuria, no hematuria  Musculoskeletal: no muscle pain, no joint pain  Skin: no rash, no lesions  Neuro: no numbness, no tingling, +HA, leg weakness  Psych: no depression, no anxiety

## 2024-11-12 ENCOUNTER — HOSPITAL ENCOUNTER (INPATIENT)
Age: 59
LOS: 15 days | Discharge: HOME HEALTH | End: 2024-11-27
Attending: PSYCHIATRY & NEUROLOGY | Admitting: PSYCHIATRY & NEUROLOGY
Payer: MEDICAID

## 2024-11-12 VITALS
HEIGHT: 67 IN | DIASTOLIC BLOOD PRESSURE: 63 MMHG | BODY MASS INDEX: 32.18 KG/M2 | TEMPERATURE: 97.5 F | HEART RATE: 77 BPM | OXYGEN SATURATION: 98 % | RESPIRATION RATE: 16 BRPM | WEIGHT: 205 LBS | SYSTOLIC BLOOD PRESSURE: 108 MMHG

## 2024-11-12 DIAGNOSIS — I25.83 CORONARY ARTERY DISEASE DUE TO LIPID RICH PLAQUE: ICD-10-CM

## 2024-11-12 DIAGNOSIS — I25.10 CORONARY ARTERY DISEASE DUE TO LIPID RICH PLAQUE: ICD-10-CM

## 2024-11-12 DIAGNOSIS — R53.1 WEAKNESS: Primary | ICD-10-CM

## 2024-11-12 DIAGNOSIS — R47.1 DYSARTHRIA: ICD-10-CM

## 2024-11-12 DIAGNOSIS — I63.9 ACUTE ISCHEMIC STROKE (HCC): ICD-10-CM

## 2024-11-12 DIAGNOSIS — R53.1 RIGHT SIDED WEAKNESS: ICD-10-CM

## 2024-11-12 DIAGNOSIS — R29.90 STROKE-LIKE SYMPTOMS: ICD-10-CM

## 2024-11-12 LAB
GLUCOSE BLD-MCNC: 160 MG/DL (ref 70–99)
GLUCOSE BLD-MCNC: 161 MG/DL (ref 70–99)
GLUCOSE BLD-MCNC: 201 MG/DL (ref 70–99)
GLUCOSE BLD-MCNC: 244 MG/DL (ref 70–99)
PERFORMED ON: ABNORMAL

## 2024-11-12 PROCEDURE — 1180000000 HC REHAB R&B

## 2024-11-12 PROCEDURE — 6360000002 HC RX W HCPCS: Performed by: STUDENT IN AN ORGANIZED HEALTH CARE EDUCATION/TRAINING PROGRAM

## 2024-11-12 PROCEDURE — 6370000000 HC RX 637 (ALT 250 FOR IP): Performed by: STUDENT IN AN ORGANIZED HEALTH CARE EDUCATION/TRAINING PROGRAM

## 2024-11-12 PROCEDURE — 82962 GLUCOSE BLOOD TEST: CPT

## 2024-11-12 PROCEDURE — 94760 N-INVAS EAR/PLS OXIMETRY 1: CPT

## 2024-11-12 PROCEDURE — 97110 THERAPEUTIC EXERCISES: CPT

## 2024-11-12 PROCEDURE — 94150 VITAL CAPACITY TEST: CPT

## 2024-11-12 PROCEDURE — 99232 SBSQ HOSP IP/OBS MODERATE 35: CPT | Performed by: PSYCHIATRY & NEUROLOGY

## 2024-11-12 PROCEDURE — 97530 THERAPEUTIC ACTIVITIES: CPT

## 2024-11-12 RX ORDER — ASPIRIN 81 MG/1
81 TABLET, CHEWABLE ORAL DAILY
Status: CANCELLED | OUTPATIENT
Start: 2024-11-13

## 2024-11-12 RX ORDER — ASPIRIN 300 MG/1
300 SUPPOSITORY RECTAL DAILY
Status: CANCELLED | OUTPATIENT
Start: 2024-11-13

## 2024-11-12 RX ORDER — ONDANSETRON 2 MG/ML
4 INJECTION INTRAMUSCULAR; INTRAVENOUS EVERY 6 HOURS PRN
Status: DISCONTINUED | OUTPATIENT
Start: 2024-11-12 | End: 2024-11-27 | Stop reason: HOSPADM

## 2024-11-12 RX ORDER — ASPIRIN 81 MG/1
81 TABLET, CHEWABLE ORAL DAILY
Status: DISCONTINUED | OUTPATIENT
Start: 2024-11-13 | End: 2024-11-27 | Stop reason: HOSPADM

## 2024-11-12 RX ORDER — ACETAMINOPHEN 650 MG/1
650 SUPPOSITORY RECTAL EVERY 4 HOURS PRN
Status: CANCELLED | OUTPATIENT
Start: 2024-11-12

## 2024-11-12 RX ORDER — PRAZOSIN HYDROCHLORIDE 1 MG/1
1 CAPSULE ORAL NIGHTLY
Status: DISCONTINUED | OUTPATIENT
Start: 2024-11-12 | End: 2024-11-27 | Stop reason: HOSPADM

## 2024-11-12 RX ORDER — ACETAMINOPHEN 325 MG/1
650 TABLET ORAL EVERY 4 HOURS PRN
Status: DISCONTINUED | OUTPATIENT
Start: 2024-11-12 | End: 2024-11-12 | Stop reason: SDUPTHER

## 2024-11-12 RX ORDER — SODIUM CHLORIDE 0.9 % (FLUSH) 0.9 %
5-40 SYRINGE (ML) INJECTION EVERY 12 HOURS SCHEDULED
Status: CANCELLED | OUTPATIENT
Start: 2024-11-12

## 2024-11-12 RX ORDER — INSULIN LISPRO 100 [IU]/ML
0-8 INJECTION, SOLUTION INTRAVENOUS; SUBCUTANEOUS
Status: CANCELLED | OUTPATIENT
Start: 2024-11-12

## 2024-11-12 RX ORDER — INSULIN GLARGINE 100 [IU]/ML
15 INJECTION, SOLUTION SUBCUTANEOUS NIGHTLY
Status: DISCONTINUED | OUTPATIENT
Start: 2024-11-12 | End: 2024-11-27 | Stop reason: HOSPADM

## 2024-11-12 RX ORDER — SODIUM CHLORIDE 0.9 % (FLUSH) 0.9 %
5-40 SYRINGE (ML) INJECTION PRN
Status: CANCELLED | OUTPATIENT
Start: 2024-11-12

## 2024-11-12 RX ORDER — ASPIRIN 300 MG/1
300 SUPPOSITORY RECTAL DAILY
Status: DISCONTINUED | OUTPATIENT
Start: 2024-11-13 | End: 2024-11-13

## 2024-11-12 RX ORDER — FAMOTIDINE 20 MG/1
20 TABLET, FILM COATED ORAL 2 TIMES DAILY
Status: DISCONTINUED | OUTPATIENT
Start: 2024-11-12 | End: 2024-11-27 | Stop reason: HOSPADM

## 2024-11-12 RX ORDER — INSULIN LISPRO 100 [IU]/ML
0-8 INJECTION, SOLUTION INTRAVENOUS; SUBCUTANEOUS
Status: DISCONTINUED | OUTPATIENT
Start: 2024-11-12 | End: 2024-11-13 | Stop reason: SDUPTHER

## 2024-11-12 RX ORDER — VITAMIN B COMPLEX
2000 TABLET ORAL DAILY
Status: CANCELLED | OUTPATIENT
Start: 2024-11-13

## 2024-11-12 RX ORDER — ATORVASTATIN CALCIUM 80 MG/1
80 TABLET, FILM COATED ORAL NIGHTLY
Status: CANCELLED | OUTPATIENT
Start: 2024-11-12

## 2024-11-12 RX ORDER — NICOTINE 21 MG/24HR
1 PATCH, TRANSDERMAL 24 HOURS TRANSDERMAL EVERY 24 HOURS
Status: CANCELLED | OUTPATIENT
Start: 2024-11-12

## 2024-11-12 RX ORDER — CARVEDILOL 25 MG/1
25 TABLET ORAL 2 TIMES DAILY WITH MEALS
Status: CANCELLED | OUTPATIENT
Start: 2024-11-12

## 2024-11-12 RX ORDER — ONDANSETRON 4 MG/1
4 TABLET, ORALLY DISINTEGRATING ORAL EVERY 8 HOURS PRN
Status: CANCELLED | OUTPATIENT
Start: 2024-11-12

## 2024-11-12 RX ORDER — ONDANSETRON 2 MG/ML
4 INJECTION INTRAMUSCULAR; INTRAVENOUS EVERY 6 HOURS PRN
Status: CANCELLED | OUTPATIENT
Start: 2024-11-12

## 2024-11-12 RX ORDER — ACETAMINOPHEN 325 MG/1
650 TABLET ORAL EVERY 4 HOURS PRN
Status: DISCONTINUED | OUTPATIENT
Start: 2024-11-12 | End: 2024-11-27 | Stop reason: HOSPADM

## 2024-11-12 RX ORDER — ENOXAPARIN SODIUM 100 MG/ML
40 INJECTION SUBCUTANEOUS DAILY
Status: DISCONTINUED | OUTPATIENT
Start: 2024-11-13 | End: 2024-11-27 | Stop reason: HOSPADM

## 2024-11-12 RX ORDER — SODIUM CHLORIDE 0.9 % (FLUSH) 0.9 %
5-40 SYRINGE (ML) INJECTION EVERY 12 HOURS SCHEDULED
Status: DISCONTINUED | OUTPATIENT
Start: 2024-11-12 | End: 2024-11-14

## 2024-11-12 RX ORDER — ONDANSETRON 4 MG/1
4 TABLET, ORALLY DISINTEGRATING ORAL EVERY 8 HOURS PRN
Status: DISCONTINUED | OUTPATIENT
Start: 2024-11-12 | End: 2024-11-27 | Stop reason: HOSPADM

## 2024-11-12 RX ORDER — PANTOPRAZOLE SODIUM 40 MG/1
40 TABLET, DELAYED RELEASE ORAL
Status: CANCELLED | OUTPATIENT
Start: 2024-11-13

## 2024-11-12 RX ORDER — PANTOPRAZOLE SODIUM 40 MG/1
40 TABLET, DELAYED RELEASE ORAL
Status: DISCONTINUED | OUTPATIENT
Start: 2024-11-13 | End: 2024-11-27 | Stop reason: HOSPADM

## 2024-11-12 RX ORDER — LAMOTRIGINE 200 MG/1
100 TABLET ORAL 2 TIMES DAILY
Status: DISCONTINUED | OUTPATIENT
Start: 2024-11-12 | End: 2024-11-27 | Stop reason: HOSPADM

## 2024-11-12 RX ORDER — PRAZOSIN HYDROCHLORIDE 1 MG/1
1 CAPSULE ORAL NIGHTLY
Status: CANCELLED | OUTPATIENT
Start: 2024-11-12

## 2024-11-12 RX ORDER — LAMOTRIGINE 100 MG/1
100 TABLET ORAL 2 TIMES DAILY
Status: CANCELLED | OUTPATIENT
Start: 2024-11-12

## 2024-11-12 RX ORDER — POLYETHYLENE GLYCOL 3350 17 G/17G
17 POWDER, FOR SOLUTION ORAL DAILY PRN
Status: CANCELLED | OUTPATIENT
Start: 2024-11-12

## 2024-11-12 RX ORDER — SODIUM CHLORIDE 0.9 % (FLUSH) 0.9 %
5-40 SYRINGE (ML) INJECTION PRN
Status: DISCONTINUED | OUTPATIENT
Start: 2024-11-12 | End: 2024-11-27 | Stop reason: HOSPADM

## 2024-11-12 RX ORDER — INSULIN GLARGINE 100 [IU]/ML
15 INJECTION, SOLUTION SUBCUTANEOUS NIGHTLY
Status: CANCELLED | OUTPATIENT
Start: 2024-11-12

## 2024-11-12 RX ORDER — NICOTINE 21 MG/24HR
1 PATCH, TRANSDERMAL 24 HOURS TRANSDERMAL EVERY 24 HOURS
Status: DISCONTINUED | OUTPATIENT
Start: 2024-11-12 | End: 2024-11-27 | Stop reason: HOSPADM

## 2024-11-12 RX ORDER — ATORVASTATIN CALCIUM 80 MG/1
80 TABLET, FILM COATED ORAL NIGHTLY
Status: DISCONTINUED | OUTPATIENT
Start: 2024-11-12 | End: 2024-11-27 | Stop reason: HOSPADM

## 2024-11-12 RX ORDER — LABETALOL HYDROCHLORIDE 5 MG/ML
10 INJECTION, SOLUTION INTRAVENOUS EVERY 10 MIN PRN
Status: DISCONTINUED | OUTPATIENT
Start: 2024-11-12 | End: 2024-11-27 | Stop reason: HOSPADM

## 2024-11-12 RX ORDER — DEXTROSE MONOHYDRATE 100 MG/ML
INJECTION, SOLUTION INTRAVENOUS CONTINUOUS PRN
Status: CANCELLED | OUTPATIENT
Start: 2024-11-12

## 2024-11-12 RX ORDER — POLYETHYLENE GLYCOL 3350 17 G/17G
17 POWDER, FOR SOLUTION ORAL DAILY PRN
Status: DISCONTINUED | OUTPATIENT
Start: 2024-11-12 | End: 2024-11-27 | Stop reason: HOSPADM

## 2024-11-12 RX ORDER — CARVEDILOL 25 MG/1
25 TABLET ORAL 2 TIMES DAILY WITH MEALS
Status: DISCONTINUED | OUTPATIENT
Start: 2024-11-12 | End: 2024-11-27 | Stop reason: HOSPADM

## 2024-11-12 RX ORDER — ACETAMINOPHEN 650 MG/1
650 SUPPOSITORY RECTAL EVERY 4 HOURS PRN
Status: DISCONTINUED | OUTPATIENT
Start: 2024-11-12 | End: 2024-11-27 | Stop reason: HOSPADM

## 2024-11-12 RX ORDER — VITAMIN B COMPLEX
2000 TABLET ORAL DAILY
Status: DISCONTINUED | OUTPATIENT
Start: 2024-11-13 | End: 2024-11-27 | Stop reason: HOSPADM

## 2024-11-12 RX ORDER — ACETAMINOPHEN 325 MG/1
650 TABLET ORAL EVERY 4 HOURS PRN
Status: CANCELLED | OUTPATIENT
Start: 2024-11-12

## 2024-11-12 RX ORDER — ENOXAPARIN SODIUM 100 MG/ML
40 INJECTION SUBCUTANEOUS DAILY
Status: CANCELLED | OUTPATIENT
Start: 2024-11-13

## 2024-11-12 RX ORDER — DEXTROSE MONOHYDRATE 100 MG/ML
INJECTION, SOLUTION INTRAVENOUS CONTINUOUS PRN
Status: DISCONTINUED | OUTPATIENT
Start: 2024-11-12 | End: 2024-11-27 | Stop reason: HOSPADM

## 2024-11-12 RX ORDER — LABETALOL HYDROCHLORIDE 5 MG/ML
10 INJECTION, SOLUTION INTRAVENOUS EVERY 10 MIN PRN
Status: CANCELLED | OUTPATIENT
Start: 2024-11-12

## 2024-11-12 RX ORDER — FAMOTIDINE 20 MG/1
20 TABLET, FILM COATED ORAL 2 TIMES DAILY
Status: CANCELLED | OUTPATIENT
Start: 2024-11-12

## 2024-11-12 RX ADMIN — TICAGRELOR 90 MG: 90 TABLET ORAL at 08:18

## 2024-11-12 RX ADMIN — INSULIN GLARGINE 15 UNITS: 100 INJECTION, SOLUTION SUBCUTANEOUS at 20:47

## 2024-11-12 RX ADMIN — FAMOTIDINE 20 MG: 20 TABLET ORAL at 20:48

## 2024-11-12 RX ADMIN — TICAGRELOR 90 MG: 90 TABLET ORAL at 20:48

## 2024-11-12 RX ADMIN — Medication 2000 UNITS: at 08:17

## 2024-11-12 RX ADMIN — ASPIRIN 81 MG: 81 TABLET, CHEWABLE ORAL at 08:17

## 2024-11-12 RX ADMIN — INSULIN LISPRO 2 UNITS: 100 INJECTION, SOLUTION INTRAVENOUS; SUBCUTANEOUS at 17:25

## 2024-11-12 RX ADMIN — CARVEDILOL 25 MG: 25 TABLET, FILM COATED ORAL at 17:25

## 2024-11-12 RX ADMIN — FAMOTIDINE 20 MG: 20 TABLET ORAL at 08:18

## 2024-11-12 RX ADMIN — PANTOPRAZOLE SODIUM 40 MG: 40 TABLET, DELAYED RELEASE ORAL at 05:01

## 2024-11-12 RX ADMIN — LAMOTRIGINE 100 MG: 200 TABLET ORAL at 20:48

## 2024-11-12 RX ADMIN — INSULIN LISPRO 2 UNITS: 100 INJECTION, SOLUTION INTRAVENOUS; SUBCUTANEOUS at 11:59

## 2024-11-12 RX ADMIN — ENOXAPARIN SODIUM 40 MG: 100 INJECTION SUBCUTANEOUS at 08:18

## 2024-11-12 RX ADMIN — PRAZOSIN HYDROCHLORIDE 1 MG: 1 CAPSULE ORAL at 20:48

## 2024-11-12 RX ADMIN — CARVEDILOL 25 MG: 25 TABLET, FILM COATED ORAL at 08:18

## 2024-11-12 RX ADMIN — LAMOTRIGINE 100 MG: 100 TABLET ORAL at 08:18

## 2024-11-12 RX ADMIN — ATORVASTATIN CALCIUM 80 MG: 80 TABLET, FILM COATED ORAL at 20:48

## 2024-11-12 SDOH — ECONOMIC STABILITY: FOOD INSECURITY: WITHIN THE PAST 12 MONTHS, YOU WORRIED THAT YOUR FOOD WOULD RUN OUT BEFORE YOU GOT MONEY TO BUY MORE.: NEVER TRUE

## 2024-11-12 SDOH — ECONOMIC STABILITY: INCOME INSECURITY: HOW HARD IS IT FOR YOU TO PAY FOR THE VERY BASICS LIKE FOOD, HOUSING, MEDICAL CARE, AND HEATING?: NOT VERY HARD

## 2024-11-12 SDOH — ECONOMIC STABILITY: INCOME INSECURITY: IN THE PAST 12 MONTHS, HAS THE ELECTRIC, GAS, OIL, OR WATER COMPANY THREATENED TO SHUT OFF SERVICE IN YOUR HOME?: NO

## 2024-11-12 ASSESSMENT — PATIENT HEALTH QUESTIONNAIRE - PHQ9
SUM OF ALL RESPONSES TO PHQ9 QUESTIONS 1 & 2: 2
SUM OF ALL RESPONSES TO PHQ QUESTIONS 1-9: 2
2. FEELING DOWN, DEPRESSED OR HOPELESS: SEVERAL DAYS
1. LITTLE INTEREST OR PLEASURE IN DOING THINGS: SEVERAL DAYS

## 2024-11-12 NOTE — PLAN OF CARE
Problem: Chronic Conditions and Co-morbidities  Goal: Patient's chronic conditions and co-morbidity symptoms are monitored and maintained or improved  Outcome: Progressing  Flowsheets (Taken 11/11/2024 0856 by Ashtyn Armstrong, RN)  Care Plan - Patient's Chronic Conditions and Co-Morbidity Symptoms are Monitored and Maintained or Improved:   Monitor and assess patient's chronic conditions and comorbid symptoms for stability, deterioration, or improvement   Collaborate with multidisciplinary team to address chronic and comorbid conditions and prevent exacerbation or deterioration   Update acute care plan with appropriate goals if chronic or comorbid symptoms are exacerbated and prevent overall improvement and discharge     Problem: Discharge Planning  Goal: Discharge to home or other facility with appropriate resources  Outcome: Progressing  Flowsheets (Taken 11/11/2024 0856 by Ashtyn Armstrong, RN)  Discharge to home or other facility with appropriate resources:   Identify barriers to discharge with patient and caregiver   Arrange for needed discharge resources and transportation as appropriate   Identify discharge learning needs (meds, wound care, etc)   Arrange for interpreters to assist at discharge as needed

## 2024-11-12 NOTE — ACP (ADVANCE CARE PLANNING)
Advance Care Planning     Advance Care Planning Inpatient Note  Spiritual Care Department    Today's Date: 11/12/2024  Unit: [facility] SURG SERVICES    Received request from rounding.  Upon review of chart and communication with care team, patient's decision making abilities are not in question.. Patient was/were present in the room during visit.    Goals of ACP Conversation:  Facilitate a discussion related to patient's goals of care as they align with the patient's values and beliefs.    Health Care Decision Makers:       Primary Decision Maker: MACIEJ WHITE Amilcar Child - [phone]  Summary:  Documented Next of Kin, per patient report  The patient lives in this town for many years and raised up in a [employer]ism home and raised his children as well the same way.  He  said he trusts in the Lord for healing and restoration of his strength back and able to continue his life journey.    Advance Care Planning Documents (Patient Wishes):  None     Assessment:  The patient seems to be a Anabaptist member and satisfied with his Voodoo and attends regularly.  He spiritual and emotional wellbeing is taken care of his Voodoo Sabianism. His children come around and help him when ever he needs them.    Interventions:  Encouraged ongoing ACP conversation with future decision makers and loved ones    Care Preferences Communicated:   No    Outcomes/Plan:  The patient would like to keep the same person in his decision making record.    Electronically signed by Haroon Webb on 11/12/2024 at 11:51 AM

## 2024-11-12 NOTE — PROGRESS NOTES
Spiritual Health History and Assessment/Progress Note  [facility]    (P) Loneliness/Social Isolation, (P) Emotional distress, (P) Adjustment to illness,      Name: Nguyen Olivo MRN: [id number]    Age: 59 y.o.     Sex: male   Language: English   Caodaism: Yazidism   Acute thalamic infarction (HCC)     Date: 11/12/2024            Total Time Calculated: (P) 30 min              Spiritual Assessment continued in [facility] SURG SERVICES        Referral/Consult From: (P) Multi-disciplinary team   Encounter Overview/Reason: (P) Loneliness/Social Isolation  Service Provided For: (P) Patient    Meghan, Belief, Meaning:   Patient identifies as spiritual. The patient grew up in a [employer]ism home and continue to hold the same tradition in his life.  Family/Friends are connected with a meghan tradition or spiritual practice      Importance and Influence:  Patient has spiritual/personal beliefs that influence decisions regarding their health  Family/Friends have spiritual/personal beliefs that influence decisions regarding the patient's health    Community:  Patient is connected with a spiritual community  Family/Friends feel well-supported. Support system includes: Children and Meghan Community    Assessment and Plan of Care:  He did not show any stress or anxiety during the conversation, however he was very concerns of his health situation.  He said he trusts in God for healing and health. Invited the  to offer a prayer of healing and strength.    Patient Interventions include: Facilitated expression of thoughts and feelings  Family/Friends Interventions include: Explored spiritual coping/struggle/distress    Patient Plan of Care: No spiritual needs identified for follow-up  Family/Friends Plan of Care: No future visits per patient/family request    Electronically signed by Haroon WebbCaitlin  on 11/12/2024 at 11:47 AM        11/12/24 5902   Encounter Summary   Encounter Overview/Reason

## 2024-11-12 NOTE — PROGRESS NOTES
Physical Therapy  Name: Nguyen Olivo  MRN:  [id number]  Date of service:  11/12/2024 11/12/24 1150   Restrictions/Precautions   Restrictions/Precautions Fall Risk   Required Braces or Orthoses? No   Subjective   Subjective Pt very pleasant and agreeable to therapy.   Pain Assessment   Pain Assessment None - Denies Pain   Bed Mobility   Supine to Sit Contact guard assistance   Scooting Contact guard assistance  (in sitting)   Transfers   Sit to Stand Minimal Assistance;2 Person Assistance   Stand to Sit Minimal Assistance;2 Person Assistance   Bed to Chair Minimal assistance;2 Person Assistance  (stand step HHA on LLE bed to chair)   Short Term Goals   Time Frame for Short Term Goals 2 WKS   Short Term Goal 1 SUP<>SIT, IND   Short Term Goal 2 SIT<>STAND, CGA   Short Term Goal 3  FT WITH RW, CGA   Conditions Requiring Skilled Therapeutic Intervention   Body Structures, Functions, Activity Limitations Requiring Skilled Therapeutic Intervention Decreased functional mobility ;Decreased ROM;Decreased strength;Decreased endurance;Increased pain;Decreased coordination;Decreased balance;Decreased sensation   Assessment Pt able to advance RLE but R knee does hyperextend with weight bearing. Decreased coordination with stepping RLE. Would benefit from HW when one is available. Assisted up to chair with MARCUM working with pt for continued therapy.   Activity Tolerance   Activity Tolerance Patient tolerated treatment well   PT Plan of Care   Tuesday X   Safety Devices   Type of Devices Call light within reach;Chair alarm in place;Left in chair  (MARCUM working with pt in chair)           Electronically signed by Karoline Otto PTA on 11/12/2024 at 11:57 AM

## 2024-11-12 NOTE — PROGRESS NOTES
Nguyen Olivo arrived to room # 828.   Presented with: cva  Mental Status: Patient is oriented and alert.   Vitals:    11/12/24 1508   BP: 115/70   Pulse: 73   Resp: 18   SpO2: 97%     Patient safety contract and falls prevention contract reviewed with patient Yes.  Oriented Patient to room.  Call light within reach. Yes.  Needs, issues or concerns expressed at this time: no.      Electronically signed by Kezia Otto RN on 11/12/2024 at 3:48 PM

## 2024-11-12 NOTE — PROGRESS NOTES
[facility] Neurology Progress Note      Patient:   Nguyen Olivo  MR#:    [id number]   Room:    [facility]511-   YOB: 1965  Date of Progress Note: 11/12/2024  Time of Note                           2:23 PM  Consulting Physician:  Fran Nance DO  Attending Physician:  Keven Kowalski MD      INTERVAL HISTORY:  No acute events, right sided weakness unchanged.     REVIEW OF SYSTEMS:  Constitutional: No fevers No chills  Neck:No stiffness  Respiratory: No shortness of breath  Cardiovascular: No chest pain No palpitations  Gastrointestinal: No abdominal pain    Genitourinary: No Dysuria  Neurological: No headache, no confusion    PHYSICAL EXAM:    Constitutional -   /63   Pulse 77   Temp 97.5 °F (36.4 °C) (Temporal)   Resp 16   Ht 1.702 m (5' 7\")   Wt 93 kg (205 lb)   SpO2 98%   BMI 32.11 kg/m²   General appearance: No acute distress   EYES -   Conjunctiva normal  Pupillary exam as below, see CN exam in the neurologic exam  ENT-    No scars, masses, or lesions over external nose or ears  Oropharynx without erythema, palate midline  Cardiovascular -   No clubbing, cyanosis, or edema   Pulmonary-   Good expansion, normal effort without use of accessory muscles  Musculoskeletal -   No significant wasting of muscles noted  Gait as below, see gait exam in the neurologic exam  Muscle strength, tone, stability as below see the motor exam in the neurologic exam.   No bony deformities  Skin -   Warm, dry, and intact to inspection and palpation.    No rash, erythema, or pallor  Psychiatric -   Mood, affect, and behavior appear normal    Memory as below see mental status examination in the neurologic exam        NEUROLOGICAL EXAM     Mental status    [x] Awake, alert, oriented   [x] Affect attention and concentration appear appropriate  [x] Recent and remote memory appears unremarkable  [] Speech normal without dysarthria or aphasia, comprehension and repetition intact.   COMMENTS: Dysarthria   supratentorial white matter and within the maira.  No acute hemorrhages are seen.  There is no mass effect.  There are no extraaxial collections.  The craniocervical junction and midline structures.  The soft tissues of the skull base and nasopharynx appear normal.  The paranasal sinuses and mastoid air cells are clear.      There is an acute lacunar type infarct seen within the lateral left thalamus measuring up to 9.6 mm AP maximum.  Mild to moderate chronic small vessel ischemic changes seen within the supratentorial white matter.   ______________________________________ Electronically signed by: HI VANG M.D. Date:     11/07/2024 Time:    15:05     XR HUMERUS RIGHT (MIN 2 VIEWS)    Result Date: 11/7/2024  EXAM: RIGHT HUMERUS RADIOGRAPHS.  HISTORY: Rule out foreign body for MRI clearance.  TECHNIQUE: Two views.  Frontal and lateral.  COMPARISON: None.  FINDINGS: 3 mm metallic foreign body in the soft tissues superficially along antrum aspect of the upper arm distally, at the level of the humerus distal metaphysis.  No other metallic foreign bodies are seen.  Diffuse osteopenia.  No visible fractures or dislocations.      1.  3 mm metallic foreign body in the superficial soft tissues along anterior aspect of the upper arm distally.   ______________________________________ Electronically signed by: DAWSON PASTOR M.D. Date:     11/07/2024 Time:    14:04     Echo (TTE) complete (PRN contrast/bubble/strain/3D)    Result Date: 11/7/2024    Left Ventricle: Normal left ventricular systolic function with a visually estimated EF of 60 - 65%. EF by 2D Simpsons Biplane is 61%. Left ventricle size is normal. Normal wall thickness. Normal wall motion. Normal diastolic function.   Right Ventricle: Right ventricle size is normal. Normal systolic function. TAPSE is 2.0 cm.   Interatrial Septum: Agitated saline study was negative with and without provocation.   Aorta: Normal sized aortic root and ascending aorta.   Pericardium: No

## 2024-11-12 NOTE — DISCHARGE SUMMARY
Discharge Summary    NAME: Nguyen Olivo  :  1965  MRN:  [id number]    Admit date:  2024  Discharge date:  2024    Admitting Physician:  Keven Kowalski MD    Advance Directive: Full Code    Consults: neurology    Primary Care Physician:  No primary care provider on file.    Discharge Diagnoses:  Principal Problem:    Acute thalamic infarction (HCC)  Active Problems:    Hypertension    Hyperlipidemia    GERD (gastroesophageal reflux disease)    Diabetes mellitus (HCC)    Bipolar disorder, current episode mixed, mild (HCC)    CAD (coronary artery disease)    Stroke-like symptoms    Right sided weakness    Dysarthria  Resolved Problems:    * No resolved hospital problems. *      Significant Diagnostic Studies:   MRI brain without contrast    Result Date: 2024  EXAM:  MRI OF THE BRAIN WITHOUT CONTRAST  HISTORY:   right-sided weakness  TECHNIQUE:  Multiplanar imaging of the brain was performed using T1, T2, inversion recovery, diffusion, T2 gradient sequences.  Comparison none.  FINDINGS:  There is a focus of restricted diffusion seen within the level lateral left thymus measuring up to 9.6 mm AP, approximately 4 mm transverse in the axial plane.  There is no other restricted diffusion.  The lateral ventricles and cortical sulci are normal.  The basal cisterns are patent.  Normal flow voids are identified within the basal cisterns.  The seventh and eighth cranial nerve complexes are normal.  Normal flow signal is identified within the dural venous sinuses.  T2 high signal foci are seen throughout the supratentorial white matter and within the maira.  No acute hemorrhages are seen.  There is no mass effect.  There are no extraaxial collections.  The craniocervical junction and midline structures.  The soft tissues of the skull base and nasopharynx appear normal.  The paranasal sinuses and mastoid air cells are clear.      There is an acute lacunar type infarct seen within the lateral left thalamus  motion.  All CT scans are performed using dose optimization techniques as appropriate to the performed exam and include at least one of the following: Automated exposure control, adjustment of the mA and/or kV according to size, and the use of iterative reconstruction technique.  ______________________________________ Electronically signed by: HI VANG M.D. Date:     11/06/2024 Time:    16:07     XR CHEST PORTABLE    Result Date: 11/6/2024  EXAM: CHEST RADIOGRAPH  TECHNIQUE: Single frontal chest radiograph.  HISTORY: Stroke symptoms  COMPARISON: 02/29/2024      No acute findings. The heart size is normal. Mediastinal contours and pulmonary vasculature are within normal limits. The lungs are clear. There is no pleural effusion. There is no pneumothorax.        ______________________________________ Electronically signed by: URI MATUTE M.D. Date:     11/06/2024 Time:    16:07     CT HEAD WO CONTRAST    Result Date: 11/6/2024  EXAM: CT OF THE BRAIN WITHOUT CONTRAST  CLINICAL INDICATION: Stroke symptoms.  COMPARISON: 11/09/2020  PROCEDURE: Contiguous axial tomographic sections were obtained from the skull base to the vertex.  FINDINGS: There is moderate generalized cerebral involutional change. No acute intracranial hemorrhage, extra-axial fluid collection, hydrocephalus, mass effect, or midline shift. The ventricles, cisterns and sulci are normal. Gray-white differentiation is maintained. Normal variant cavum septum pellucidum vergae.  There are moderate patchy areas of hypodensity in the periventricular white matter, nonspecific but most commonly encountered in the setting of chronic microvascular disease.  The visualized paranasal sinuses and mastoid air cells are clear.  The visualized portions of the globes and orbits appear normal.  No acute calvarial abnormalities are seen.       1. No acute intracranial abnormality. 2. Moderate involutional change of the brain and suspected sequelae of microvascular

## 2024-11-12 NOTE — PROGRESS NOTES
Occupational Therapy  Facility/Department: [facility] SURG SERVICES  Daily Treatment Note  NAME: Nguyen Olivo  : 1965  MRN: [id number]    Date of Service: 2024    Discharge Recommendations:  24 hour supervision or assist, Patient would benefit from continued therapy after discharge, IP Rehab       Assessment   Performance deficits / Impairments: Decreased functional mobility ;Decreased ADL status;Decreased ROM;Decreased safe awareness;Decreased sensation;Decreased fine motor control  Assessment: Pt tolerated tx well.  Pt demonstrated progress with transfer this date.  Pt participated with RUE exercises.  Pt continues to benefit from skilled OT services.  Prognosis: Good  Activity Tolerance  Activity Tolerance: Patient Tolerated treatment well         Patient Diagnosis(es): The primary encounter diagnosis was Stroke-like symptoms. A diagnosis of Cerebrovascular accident (CVA), unspecified mechanism (HCC) was also pertinent to this visit.      has a past medical history of Bipolar disorder, current episode mixed, mild (HCC), Depression, Diabetes mellitus (HCC), GERD (gastroesophageal reflux disease), Hyperlipidemia, and Hypertension.   has a past surgical history that includes Cholecystectomy (2006); Appendectomy; Elbow surgery; and Coronary angioplasty with stent ().    Restrictions  Restrictions/Precautions  Restrictions/Precautions: Fall Risk  Required Braces or Orthoses?: No  Subjective   General  Chart Reviewed: Yes  Patient assessed for rehabilitation services?: Yes  Response to previous treatment: Patient with no complaints from previous session  Family / Caregiver Present: No  Pain Screening  Pain at present: 0  Scale Used: Numeric Score  Intervention List: Patient able to continue with treatment   Orientation     Objective    ADL  Putting On/Taking Off Footwear: Stand by assistance;Minimal assistance;Setup;Verbal cueing           Bed mobility  Supine to Sit: Contact guard assistance  Scooting:

## 2024-11-12 NOTE — CARE COORDINATION
[facility]  Notification of Admission Decision      [x] [facility]28      Please write discharge orders and summary prior to discharge.    [] Patient acceptance to Rehab pending the following :    [] Eval in progress       [] Patient determined to be ineligible for services at [facility] because :       We recommend you consider        Thank you for your referral, we appreciate you. If you have any questions, please feel   free to contact me at [phone].  Electronically Signed by Kaley Malhotra, Admissions Coordinator 11/12/2024 8:15 AM

## 2024-11-12 NOTE — PROGRESS NOTES
4 Eyes Skin Assessment     NAME:  Nguyen Olivo  YOB: 1965  MEDICAL RECORD NUMBER:  [id number]    The patient is being assessed for  Admission    I agree that at least one RN has performed a thorough Head to Toe Skin Assessment on the patient. ALL assessment sites listed below have been assessed.      Areas assessed by both nurses:    Head, Face, Ears, Shoulders, Back, Chest, Arms, Elbows, Hands, Sacrum. Buttock, Coccyx, Ischium, and Legs. Feet and Heels        Does the Patient have a Wound? No noted wound(s)       Dany Prevention initiated by RN: Yes  Wound Care Orders initiated by RN: No    Pressure Injury (Stage 3,4, Unstageable, DTI, NWPT, and Complex wounds) if present, place Wound referral order by RN under : No    New Ostomies, if present place, Ostomy referral order under : No     Redness noted to carol area, buttocks.    Nurse 1 eSignature: Electronically signed by Kezai Otto RN on 11/12/24 at 4:21 PM CST    **SHARE this note so that the co-signing nurse can place an eSignature**    Nurse 2 eSignature: Electronically signed by Renetta Talbot LPN on 11/12/24 at 4:27 PM CST

## 2024-11-12 NOTE — PLAN OF CARE
[facility] TREATMENT PLAN      Nguyen Olivo    : 1965  [id number]  MRN: [id number]   PHYSICIAN:  Mio Boo MD  Primary Problem    Patient Active Problem List   Diagnosis    Depression with suicidal ideation    Cellulitis of left elbow    Hypertension    Hyperlipidemia    GERD (gastroesophageal reflux disease)    Diabetes mellitus (HCC)    Olecranon bursitis of left elbow    Vitamin D deficiency    Anxiety and depression    Mood disorder (HCC)    Bipolar disorder, current episode mixed, mild (HCC)    Substernal chest pain relieved by nitroglycerin    CAD (coronary artery disease)    Stroke-like symptoms    Acute thalamic infarction (HCC)    Right sided weakness    Dysarthria    Acute ischemic stroke (HCC)    Weakness       Rehabilitation Diagnosis:     Acute ischemic stroke (HCC) [I63.9]       ADMIT DATE:2024   CARE PLAN     NURSING:  Nguyen Olivo while on this unit will:     [] Be continent of bowel and bladder      [x] Have an adequate number of bowel movements   [x] Urinate with no urinary retention >300ml in bladder   [] Complete bladder protocol with dangelo removal   [x] Maintain O2 SATs at >90%   [x] Have pain managed while on ARU        [] Be pain free by discharge    [x] Have no skin breakdown while on ARU   [] Have improved skin integrity via wound measurements   [] Have no signs/symptoms of infection at the wound site   [x] Be free from injury during hospitalization    [x] Complete education with patient/family with understanding demonstrated for:   [] Adjustment    [x] Other:   Nursing interventions may include bowel/bladder training, education for medical assistive devices, medication education, O2 saturation management, energy conservation, stress management techniques, fall prevention, alarms protocol, seating and positioning, skin/wound care, pressure relief instruction,dressing changes,  infection protection, DVT prophylaxis, and/or assistance with in room

## 2024-11-13 PROBLEM — R53.1 WEAKNESS: Status: ACTIVE | Noted: 2024-11-13

## 2024-11-13 LAB
ALBUMIN SERPL-MCNC: 4 G/DL (ref 3.5–5.2)
ALP SERPL-CCNC: 97 U/L (ref 40–129)
ALT SERPL-CCNC: 25 U/L (ref 5–41)
ANION GAP SERPL CALCULATED.3IONS-SCNC: 15 MMOL/L (ref 7–19)
AST SERPL-CCNC: 20 U/L (ref 5–40)
BILIRUB SERPL-MCNC: 0.5 MG/DL (ref 0.2–1.2)
BUN SERPL-MCNC: 14 MG/DL (ref 6–20)
CALCIUM SERPL-MCNC: 9.7 MG/DL (ref 8.6–10)
CHLORIDE SERPL-SCNC: 96 MMOL/L (ref 98–111)
CO2 SERPL-SCNC: 25 MMOL/L (ref 22–29)
CREAT SERPL-MCNC: 0.8 MG/DL (ref 0.7–1.2)
ERYTHROCYTE [DISTWIDTH] IN BLOOD BY AUTOMATED COUNT: 13.7 % (ref 11.5–14.5)
GLUCOSE BLD-MCNC: 138 MG/DL (ref 70–99)
GLUCOSE BLD-MCNC: 150 MG/DL (ref 70–99)
GLUCOSE BLD-MCNC: 209 MG/DL (ref 70–99)
GLUCOSE BLD-MCNC: 228 MG/DL (ref 70–99)
GLUCOSE SERPL-MCNC: 150 MG/DL (ref 70–99)
HCT VFR BLD AUTO: 40.8 % (ref 42–52)
HGB BLD-MCNC: 13 G/DL (ref 14–18)
MCH RBC QN AUTO: 28 PG (ref 27–31)
MCHC RBC AUTO-ENTMCNC: 31.9 G/DL (ref 33–37)
MCV RBC AUTO: 87.9 FL (ref 80–94)
PERFORMED ON: ABNORMAL
PLATELET # BLD AUTO: 384 K/UL (ref 130–400)
PMV BLD AUTO: 10.5 FL (ref 9.4–12.4)
POTASSIUM SERPL-SCNC: 3.8 MMOL/L (ref 3.5–5)
PREALB SERPL-MCNC: 20 MG/DL (ref 20–40)
PROT SERPL-MCNC: 7.6 G/DL (ref 6.4–8.3)
RBC # BLD AUTO: 4.64 M/UL (ref 4.7–6.1)
SODIUM SERPL-SCNC: 136 MMOL/L (ref 136–145)
T4 FREE SERPL-MCNC: 1.28 NG/DL (ref 0.93–1.7)
TSH SERPL DL<=0.005 MIU/L-ACNC: 5.39 UIU/ML (ref 0.27–4.2)
VIT B12 SERPL-MCNC: 470 PG/ML (ref 232–1245)
WBC # BLD AUTO: 14.3 K/UL (ref 4.8–10.8)

## 2024-11-13 PROCEDURE — 97110 THERAPEUTIC EXERCISES: CPT

## 2024-11-13 PROCEDURE — 82607 VITAMIN B-12: CPT

## 2024-11-13 PROCEDURE — 97116 GAIT TRAINING THERAPY: CPT

## 2024-11-13 PROCEDURE — 1180000000 HC REHAB R&B

## 2024-11-13 PROCEDURE — 97161 PT EVAL LOW COMPLEX 20 MIN: CPT

## 2024-11-13 PROCEDURE — 84443 ASSAY THYROID STIM HORMONE: CPT

## 2024-11-13 PROCEDURE — 82962 GLUCOSE BLOOD TEST: CPT

## 2024-11-13 PROCEDURE — 80053 COMPREHEN METABOLIC PANEL: CPT

## 2024-11-13 PROCEDURE — 92610 EVALUATE SWALLOWING FUNCTION: CPT

## 2024-11-13 PROCEDURE — 92522 EVALUATE SPEECH PRODUCTION: CPT

## 2024-11-13 PROCEDURE — 6360000002 HC RX W HCPCS: Performed by: STUDENT IN AN ORGANIZED HEALTH CARE EDUCATION/TRAINING PROGRAM

## 2024-11-13 PROCEDURE — 6370000000 HC RX 637 (ALT 250 FOR IP): Performed by: PSYCHIATRY & NEUROLOGY

## 2024-11-13 PROCEDURE — 84134 ASSAY OF PREALBUMIN: CPT

## 2024-11-13 PROCEDURE — 36415 COLL VENOUS BLD VENIPUNCTURE: CPT

## 2024-11-13 PROCEDURE — 97535 SELF CARE MNGMENT TRAINING: CPT

## 2024-11-13 PROCEDURE — 84439 ASSAY OF FREE THYROXINE: CPT

## 2024-11-13 PROCEDURE — 94760 N-INVAS EAR/PLS OXIMETRY 1: CPT

## 2024-11-13 PROCEDURE — 6370000000 HC RX 637 (ALT 250 FOR IP): Performed by: STUDENT IN AN ORGANIZED HEALTH CARE EDUCATION/TRAINING PROGRAM

## 2024-11-13 PROCEDURE — 85027 COMPLETE CBC AUTOMATED: CPT

## 2024-11-13 PROCEDURE — 99222 1ST HOSP IP/OBS MODERATE 55: CPT | Performed by: PSYCHIATRY & NEUROLOGY

## 2024-11-13 PROCEDURE — 97166 OT EVAL MOD COMPLEX 45 MIN: CPT

## 2024-11-13 RX ORDER — INSULIN LISPRO 100 [IU]/ML
0-4 INJECTION, SOLUTION INTRAVENOUS; SUBCUTANEOUS
Status: DISCONTINUED | OUTPATIENT
Start: 2024-11-13 | End: 2024-11-27 | Stop reason: HOSPADM

## 2024-11-13 RX ORDER — QUETIAPINE FUMARATE 50 MG/1
50 TABLET, FILM COATED ORAL NIGHTLY
Status: DISCONTINUED | OUTPATIENT
Start: 2024-11-13 | End: 2024-11-18

## 2024-11-13 RX ADMIN — ACETAMINOPHEN 650 MG: 325 TABLET ORAL at 20:07

## 2024-11-13 RX ADMIN — FAMOTIDINE 20 MG: 20 TABLET ORAL at 08:22

## 2024-11-13 RX ADMIN — INSULIN LISPRO 1 UNITS: 100 INJECTION, SOLUTION INTRAVENOUS; SUBCUTANEOUS at 12:39

## 2024-11-13 RX ADMIN — TICAGRELOR 90 MG: 90 TABLET ORAL at 08:22

## 2024-11-13 RX ADMIN — LAMOTRIGINE 100 MG: 200 TABLET ORAL at 08:22

## 2024-11-13 RX ADMIN — FAMOTIDINE 20 MG: 20 TABLET ORAL at 20:07

## 2024-11-13 RX ADMIN — CARVEDILOL 25 MG: 25 TABLET, FILM COATED ORAL at 17:00

## 2024-11-13 RX ADMIN — PANTOPRAZOLE SODIUM 40 MG: 40 TABLET, DELAYED RELEASE ORAL at 05:54

## 2024-11-13 RX ADMIN — INSULIN GLARGINE 15 UNITS: 100 INJECTION, SOLUTION SUBCUTANEOUS at 20:08

## 2024-11-13 RX ADMIN — CARVEDILOL 25 MG: 25 TABLET, FILM COATED ORAL at 08:23

## 2024-11-13 RX ADMIN — TICAGRELOR 90 MG: 90 TABLET ORAL at 20:08

## 2024-11-13 RX ADMIN — QUETIAPINE FUMARATE 50 MG: 50 TABLET ORAL at 20:08

## 2024-11-13 RX ADMIN — ACETAMINOPHEN 650 MG: 325 TABLET ORAL at 02:19

## 2024-11-13 RX ADMIN — ATORVASTATIN CALCIUM 80 MG: 80 TABLET, FILM COATED ORAL at 20:07

## 2024-11-13 RX ADMIN — ASPIRIN 81 MG: 81 TABLET, CHEWABLE ORAL at 08:23

## 2024-11-13 RX ADMIN — Medication 2000 UNITS: at 08:23

## 2024-11-13 RX ADMIN — LAMOTRIGINE 100 MG: 200 TABLET ORAL at 20:07

## 2024-11-13 RX ADMIN — INSULIN LISPRO 1 UNITS: 100 INJECTION, SOLUTION INTRAVENOUS; SUBCUTANEOUS at 20:23

## 2024-11-13 RX ADMIN — ENOXAPARIN SODIUM 40 MG: 100 INJECTION SUBCUTANEOUS at 08:22

## 2024-11-13 RX ADMIN — PRAZOSIN HYDROCHLORIDE 1 MG: 1 CAPSULE ORAL at 20:07

## 2024-11-13 ASSESSMENT — PAIN DESCRIPTION - ORIENTATION
ORIENTATION: RIGHT
ORIENTATION: LEFT

## 2024-11-13 ASSESSMENT — PAIN - FUNCTIONAL ASSESSMENT
PAIN_FUNCTIONAL_ASSESSMENT: ACTIVITIES ARE NOT PREVENTED
PAIN_FUNCTIONAL_ASSESSMENT: ACTIVITIES ARE NOT PREVENTED

## 2024-11-13 ASSESSMENT — PAIN SCALES - GENERAL
PAINLEVEL_OUTOF10: 0
PAINLEVEL_OUTOF10: 4
PAINLEVEL_OUTOF10: 3
PAINLEVEL_OUTOF10: 3
PAINLEVEL_OUTOF10: 8
PAINLEVEL_OUTOF10: 0

## 2024-11-13 ASSESSMENT — PAIN DESCRIPTION - DESCRIPTORS
DESCRIPTORS: ACHING
DESCRIPTORS: ACHING

## 2024-11-13 ASSESSMENT — PAIN DESCRIPTION - LOCATION
LOCATION: SHOULDER;ARM
LOCATION: ARM;SHOULDER

## 2024-11-13 NOTE — PROGRESS NOTES
Facility/Department: [facility] 8 REHAB UNIT  Occupational Therapy Initial Assessment     Name: Nguyen Olivo  : 1965  MRN: [id number]  Date of Service: 2024    Discharge Recommendations:  Continue to assess pending progress          Past Medical History:  has a past medical history of Bipolar disorder, current episode mixed, mild (HCC), Depression, Diabetes mellitus (HCC), GERD (gastroesophageal reflux disease), Hyperlipidemia, and Hypertension.  Past Surgical History:  has a past surgical history that includes Cholecystectomy (2006); Appendectomy; Elbow surgery; and Coronary angioplasty with stent ().    Treatment Diagnosis: L CVA    Assessment   Performance deficits / Impairments: Decreased functional mobility ;Decreased ADL status;Decreased ROM;Decreased safe awareness;Decreased sensation;Decreased fine motor control;Decreased strength;Decreased balance;Decreased endurance;Decreased high-level IADLs  Assessment: Patient requires increased assistance with ADLs and IADLs due to R hemiparesis. He requires skilled OT to address the above deficits and return the patient home independently.  Treatment Diagnosis: L CVA  Prognosis: Good  Decision Making: Low Complexity  Activity Tolerance  Activity Tolerance: Patient Tolerated treatment well              Plan   Occupational Therapy Plan  Current Treatment Recommendations: Strengthening, ROM, Balance training, Functional mobility training, Endurance training, Patient/Caregiver education & training, Equipment evaluation, education, & procurement, Safety education & training, Self-Care / ADL, Home management training, Neuromuscular re-education     Restrictions  Restrictions/Precautions  Restrictions/Precautions: Fall Risk    Social/Functional History  Social/Functional History  Lives With: Alone  Type of Home: Mobile home ([place] on his daughter's property)  Home Layout: One level  Home Access: Stairs to enter with rails  Entrance Stairs - Number of Steps:

## 2024-11-13 NOTE — PROGRESS NOTES
Patient reports that got choked on robles from breakfast this morning. Reported to EDNA Gordon for further evaluation.

## 2024-11-13 NOTE — THERAPY EVALUATION
Physical Therapy EVALUATION Note  DATE:  2024  NAME:  Nguyen Olivo  :  1965  (59 y.o.,male)  MRN:  [id number]    HEIGHT:  Height: 170.2 cm (5' 7.01\")  WEIGHT:  Weight - Scale: 81.9 kg (180 lb 8 oz)    PATIENT DIAGNOSIS(ES):    Diagnosis: LEFT LATERAL THALAMIC STROKE W/RIGHT SIDED WEAKNESS    Additional Pertinent Hx: CAD WITH STENTING, BIPOLAR, DM2, GRD  RESTRICTIONS/PRECAUTIONS:    Restrictions/Precautions  Restrictions/Precautions: Fall Risk     OVERALL  ORIENTATION STATUS:     PAIN:  Pain Level: 0       Pain Location: Shoulder, Arm     Pain Orientation: Left      GROSS ASSESSMENT        POSTURE/BALANCE  Balance  Posture:  (LEFT LATERAL TRUNK SHIFT)       ACTIVITY TOLERANCE         BED MOBILITY  Bed mobility  Rolling to Left: Minimal assistance, Contact guard assistance (with rail)  Rolling to Right: Stand by assistance (with rail)  Supine to Sit: Moderate assistance, Minimal assistance (scoot turn,a ssist required for right le)  Sit to Supine: Moderate assistance (scoot turn,a ssist required for right le)  Bed Mobility Comments: instructed triplanar supine to/from sit with left le under right        TRANSFERS  Transfers  Sit to Stand: Moderate Assistance, Minimal Assistance  Stand to Sit: Moderate Assistance, Minimal Assistance  Bed to Chair: Moderate assistance, Minimal assistance (squat pivot to left)  Comment: wc to/from commode, pull to stand to right to commode       AMBULATION 1  Ambulation  Surface: Level tile  Device: Parallel Bars  Assistance: Moderate assistance, Minimal assistance  Quality of Gait: DIFFICULTY ADVANCING RIGHT UE ON BAR.  TRACE , BUT HAND WOULD RAISE WHEN ATTEMPTING TO ADVANCE.  3 POINT GAIT. PATIENT ABLE TO ADVANCE RIGHT LE, HOWEVER WHEN HE PERFORMS THIS RIGHT HIP EXTERNALLY ROTATES AND ADDUCTS.  RECOMMEND ASSIST WITH ADVANCING LE, POSITIONING FOOT.  ABLE TO CONTRACT RIGHT QUAD DURING LEFT SWING.  INTERMITTENT DECREASED RIGHT STANCE PHASE.  TENDENCY TO FFP, TRUNK  WEIGHT SHIFTS. ABLE TO DORSIFLEX SLIGHTLY DURING SWING WITHOUT TOE DRAG WITH EXCESSIVE HIP FLEXION.  POSSIBLY TRIAL AFO.  Performance Deficits/Impairments: Decreased functional mobility , Decreased ADL status, Decreased ROM, Decreased strength, Decreased cognition, Decreased safe awareness, Decreased endurance, Decreased sensation, Decreased balance, Vestibular Impairment, Decreased posture, Decreased fine motor control, Decreased coordination  Treatment Diagnosis: INTERFERENCE WITH ACTIVITY  Therapy Prognosis: Good  Decision Making: Low Complexity  History: CAD WITH STENTING, BIPOLAR, DM2, GRD  Exam: IMPAIRED STRENGTH, BALANCE, ENDURANCE, ROM  Clinical Presentation: STABLE  Discharge Recommendations: Continue to assess pending progress    PLAN:  Physical Therapy Plan  General Plan:  minutes of therapy at least 5 out of 7 days a week  Therapy Duration: 4 Weeks  Current Treatment Recommendations: ROM, Strengthening, Balance training, Functional mobility training, Transfer training, Endurance training, Wheelchair mobility training, Gait training, Stair training, Return to work related activity, Home exercise program, Safety education & training, Patient/Caregiver education & training, Equipment evaluation, education, & procurement, Modalities, Therapeutic activities  Discharge Recommendations: Continue to assess pending progress  PATIENT REQUIRES AND IS REASONABLY EXPECTED TO ACTIVELY PARTICIPATE IN AT LEAST 3 HOURS OF INTENSIVE THERAPY PER DAY AT LEAST 5 DAYS PER WEEK, AND BE EXPECTED TO MAKE MEASURABLE IMPROVEMENT THAT WILL BE OF PRACTICAL VALUE TO IMPROVE THE PATIENT'S FUNCTIONAL CAPACITY OR ADAPTATION TO IMPAIRMENTS.   PATIENT GOAL FOR REHAB:  RETURN TO PRIOR LEVEL OF FUNCTION       IRF/NENA  Roll Left and Right  Assistance Needed: Supervision or touching assistance  CARE Score: 4  Discharge Goal: Independent    Sit to Lying  Assistance Needed: Partial/moderate assistance  CARE Score: 3  Discharge Goal:

## 2024-11-13 NOTE — PROGRESS NOTES
Comprehensive Nutrition Assessment    Type and Reason for Visit:  Initial    Nutrition Recommendations/Plan:   Continue current diet     Malnutrition Assessment:  Malnutrition Status:  At risk for malnutrition (11/13/24 1238)    Context:  Acute Illness     Findings of the 6 clinical characteristics of malnutrition:  Energy Intake:  Mild decrease in energy intake  Weight Loss:  No weight loss     Body Fat Loss:  No body fat loss     Muscle Mass Loss:  No muscle mass loss    Fluid Accumulation:  Mild Extremities   Strength:  Not Performed    Nutrition Assessment:    Pt evaluated related to admission to rehab. Pt noted to have varied intake with c/o chewing/swallowing. SLP to monitor. Denies significant weight changes. States UBW ~182 lbs.    Nutrition Related Findings:    Edema RUE, BM 11-11, Glu 150-256 Wound Type: None (Red buttocks)       Current Nutrition Intake & Therapies:    Average Meal Intake: 51-75%, %  Average Supplements Intake: None Ordered  ADULT DIET; Regular; 4 carb choices (60 gm/meal); Low Fat/Low Chol/High Fiber/[place]    Anthropometric Measures:  Height: 170.2 cm (5' 7.01\")  Ideal Body Weight (IBW): 148 lbs (67 kg)    Admission Body Weight: 82.2 kg (181 lb 3.5 oz)  Current Body Weight: 81.9 kg (180 lb 8.9 oz), 122 % IBW. Weight Source: Bed scale  Current BMI (kg/m2): 28.3  Usual Body Weight: 82.6 kg (182 lb 1.6 oz) (Stated)   % Weight Change (Calculated): -0.8  Weight Adjustment For: No Adjustment  BMI Categories: Overweight (BMI 25.0-29.9)    Estimated Daily Nutrient Needs:  Energy Requirements Based On: Kcal/kg  Weight Used for Energy Requirements: Current  Energy (kcal/day): 0778-0477 (20-25/kg)  Weight Used for Protein Requirements: Ideal  Protein (g/day):  (1.3-2/kg)  Method Used for Fluid Requirements: 1 ml/kcal  Fluid (ml/day): 6064-8419 (20-25/kg)    Nutrition Diagnosis:   Overweight/obese related to excessive energy intake as evidenced by BMI    Nutrition Interventions:   Food

## 2024-11-13 NOTE — PLAN OF CARE
Problem: Chronic Conditions and Co-morbidities  Goal: Patient's chronic conditions and co-morbidity symptoms are monitored and maintained or improved  11/13/2024 1013 by Renetta Lee LPN  Outcome: Progressing  Flowsheets (Taken 11/13/2024 0826)  Care Plan - Patient's Chronic Conditions and Co-Morbidity Symptoms are Monitored and Maintained or Improved: Monitor and assess patient's chronic conditions and comorbid symptoms for stability, deterioration, or improvement  11/13/2024 0037 by Diana Serra LPN  Outcome: Progressing     Problem: Discharge Planning  Goal: Discharge to home or other facility with appropriate resources  11/13/2024 1013 by Renetta Lee LPN  Outcome: Progressing  Flowsheets (Taken 11/13/2024 0826)  Discharge to home or other facility with appropriate resources: Refer to discharge planning if patient needs post-hospital services based on physician order or complex needs related to functional status, cognitive ability or social support system  11/13/2024 0037 by Diana Serra LPN  Outcome: Progressing     Problem: Skin/Tissue Integrity  Goal: Absence of new skin breakdown  Description: 1.  Monitor for areas of redness and/or skin breakdown  2.  Assess vascular access sites hourly  3.  Every 4-6 hours minimum:  Change oxygen saturation probe site  4.  Every 4-6 hours:  If on nasal continuous positive airway pressure, respiratory therapy assess nares and determine need for appliance change or resting period.  11/13/2024 1013 by Renetta Lee LPN  Outcome: Progressing  11/13/2024 0037 by Diana Serra LPN  Outcome: Progressing     Problem: Safety - Adult  Goal: Free from fall injury  11/13/2024 1013 by Renetta Lee LPN  Outcome: Progressing  11/13/2024 0037 by Diana Serra LPN  Outcome: Progressing     Problem: Pain  Goal: Verbalizes/displays adequate comfort level or baseline comfort level  11/13/2024 1013 by Renetta Lee LPN  Outcome: Progressing  11/13/2024  range  Outcome: Progressing  Flowsheets (Taken 11/13/2024 0683)  Glucose maintained within prescribed range: Monitor blood glucose as ordered

## 2024-11-13 NOTE — H&P
[facility]   History and Physical        Patient:   Nguyen Olivo  MR#:    [id number]  Account Number:                   [id number]      Room:    [facility]/828-02   YOB: 1965  Date of Progress Note: 11/13/2024  Time of Note                           7:59 AM  Attending Physician:  Mio Boo MD        Admitting diagnosis:Cerebral infarction, unspecified    Secondary diagnoses:Hemiplegia and hemiparesis following cerebral infarction affecting right non-dominant side,Dysarthria following cerebral infarction,Essential (primary) hypertension,Hyperlipidemia, unspecified,Gastro-esophageal reflux disease without esophagitis,Type 2 diabetes mellitus with hyperglycemia,Nicotine dependence, cigarettes, uncomplicated,Atherosclerotic heart disease of native coronary artery without angina pectoris,Bipolar disorder, unspecified    CHIEF COMPLAINT: Acute ischemic stroke      HISTORY OF PRESENT ILLNESS:   This 59 y.o. male  left-handed male with history of CAD with stenting on 2021 on ASA and Brillinta, HTN, Bipolar disorder, type II DM, dyslipidemia, and GERD. He presented to [facility] ER on 11/6/24 with slurred speech and right-sided weakness that developed when he woke up around 9 a.m. Last know normal as 4 a.m. when he went to bed. Work-up in ER CT head no acute findings, normal CT perfusion, CTA head/neck with 30-40% stenosis within left posterior cerebral artery, no other significant vascular occlusion, otherwise no carotid stenosis. He was not a candidate for TNK d/t being out of the time window. He was admitted to the Hospitalist service with consult for neurology. He was seen by Dr. Rian Nance, who suspected stroke. Dr. Nance recommended ASA, statin, and permissive hypertension. Echo was negative. MRI done revealed an acute lacunar type infarct seen within the lateral left thalamus measuring up to 9.6 mm AP maximum. Patient continues to have right-sided weakness and dysarthria. He is participating in PT/OT/SPT. He  appears unremarkable  Speech mild dysarthria  No clear issues with language of fund of knowledge    Cranial Nerve Exam   CN II- Visual fields grossly unremarkable  CN III, IV,VI-EOMI, No nystagmus, conjugate eye movements, no ptosis  CN V-sensation intact to LT over face  CN VII-right lower facial droop  CN VIII-Hearing intact to finger rub  CN IX and X- Palate not tested  CN XI-not test shoulder shrug  CN XII-Tongue midline with no fasciculations or fibrillations    Motor Exam  No significant movement of the right arm, some movement of the right leg no cogwheeling, normal tone    Sensory Exam  Sensation intact to light touch and temperature upper and lower extremities bilaterally    Reflexes   Not tested    Tremors- no tremors in hands or head noted    Gait  Not tested    Coordination  Finger to nose-unable to do on the right        CBC:   Recent Labs     11/13/24  0741   WBC 14.3*   HGB 13.0*          BMP:  No results for input(s): \"NA\", \"K\", \"CL\", \"CO2\", \"BUN\", \"CREATININE\", \"GLUCOSE\" in the last 72 hours.    Hepatic: No results for input(s): \"AST\", \"ALT\", \"BILITOT\", \"ALKPHOS\" in the last 72 hours.    Invalid input(s): \"ALB\"    Lipids: No results for input(s): \"CHOL\", \"HDL\" in the last 72 hours.    Invalid input(s): \"LDLCALCU\"    INR: No results for input(s): \"INR\" in the last 72 hours.        Assessment and Plan     1.  Acute ischemic stroke-on aspirin/statin  2.  Hyperlipidemia-on statin  3.  DVT prophylaxis-Lovenox  4.  Hypertension-on meds monitor  5.  GI/GERD-Pepcid/Protonix/bowel regimen  6.  Diabetes-on insulin monitor blood sugar  7.  Mood disorder-on meds monitor  8.  Smoker-NicoDerm patch  9.  Coronary artery disease-on aspirin/Brilinta/statin  10.  Vitamin D deficiency-on vitamin D  11.  PT/OT/speech        Patient's functional assessment  Prior to hospitalization the patient was continent of bowel and continent of bladder    Current therapy  Requires PT, OT and/or speech therapy    Anticipated

## 2024-11-13 NOTE — PROGRESS NOTES
Facility/Department: [facility] REHAB UNIT  Initial Speech/Language/Cognitive Assessment    NAME: Nguyen Olivo  : 1965   MRN: [id number]  ADMISSION DATE: 2024  Date of Eval: 2024   Evaluating Therapist: Kerline Alejandro MS CCC-SLP        ADMITTING DIAGNOSIS:   has Depression with suicidal ideation; Cellulitis of left elbow; Hypertension; Hyperlipidemia; GERD (gastroesophageal reflux disease); Diabetes mellitus (HCC); Olecranon bursitis of left elbow; Vitamin D deficiency; Anxiety and depression; Mood disorder (HCC); Bipolar disorder, current episode mixed, mild (HCC); Substernal chest pain relieved by nitroglycerin; CAD (coronary artery disease); Stroke-like symptoms; Acute thalamic infarction (HCC); Right sided weakness; Dysarthria; Acute ischemic stroke (HCC); and Weakness on their problem list.        Admitting diagnosis:Cerebral infarction, unspecified     Secondary diagnoses:Hemiplegia and hemiparesis following cerebral infarction affecting right non-dominant side,Dysarthria following cerebral infarction,Essential (primary) hypertension,Hyperlipidemia, unspecified,Gastro-esophageal reflux disease without esophagitis,Type 2 diabetes mellitus with hyperglycemia,Nicotine dependence, cigarettes, uncomplicated,Atherosclerotic heart disease of native coronary artery without angina pectoris,Bipolar disorder, unspecified     CHIEF COMPLAINT: Acute ischemic stroke          Pain:  Pain Assessment  Pain Assessment: None - Denies Pain  Pain Level: 0  Patient's Stated Pain Goal: 0 - No pain  Pain Location: Shoulder, Arm  Pain Orientation: Left  Pain Descriptors: Aching  Functional Pain Assessment: Activities are not prevented  Response to Pain Intervention: Patient satisfied    Vision/ Hearing  Vision  Vision Exceptions: Wears glasses at all times  Hearing  Hearing: Within functional limits    Assessment:  He exhibits mild to moderate dysarthria (blended word boundaries, imprecise productions, and decreased

## 2024-11-13 NOTE — DISCHARGE INSTRUCTIONS
Stress and Coping: Mindfulness Can Help (02:29)  Your health professional recommends that you watch this short online health video.  Learn how two people practice mindfulness to help them cope with stress or a health problem.   Purpose: Learn how to use mindfulness to reduce stress or live with a chronic condition.  Goal: Learn how to use mindfulness to reduce stress or live with a chronic condition.    Watch: Scan the QR code or visit the link to view video       [email or web address]  Current as of: June 24, 2023  Content Version: 14.2  © 2024 [employer].   Care instructions adapted under license by [email or web address]. If you have questions about a medical condition or this instruction, always ask your healthcare professional. [employer] disclaims any warranty or liability for your use of this information.          Tobacco: 5 Ways to Fit Quitting Into Your Social Life (01:58)  Your health professional recommends that you watch this short online health video.  Learn how to fit your new tobacco-free life into social settings.   Purpose: Helps tobacco users find positive ways to manage the social and identity challenges of quitting tobacco.  Goal: The user will identify how to manage the social and identity challenges of quitting tobacco.    Watch: Scan the QR code or visit the link to view video       [email or web address]  Current as of: November 15, 2023  Content Version: 14.2  © 2024 [employer].   Care instructions adapted under license by [email or web address]. If you have questions about a medical condition or this instruction, always ask your healthcare professional. [employer] disclaims any warranty or liability for your use of this information.  Discharge Instructions      Patient Instructions:   Home  Therapy orders: PT, OT, SLP, and Nursing     Discharge lab work: none  Code status: Full Code   Activity: activity as tolerated  Diet: ADULT DIET;

## 2024-11-13 NOTE — PROGRESS NOTES
prevented  Response to Pain Intervention: Patient satisfied    Reason for Referral  Nguyen Olivo was referred for a bedside swallow evaluation to assess the efficiency of his swallow function, identify signs and symptoms of aspiration and make recommendations regarding safe dietary consistencies, effective compensatory strategies, and safe eating environment.    Impression  Mild to moderate oropharyngeal dysphagia. Decreased lingual and labial strength and range of motion.  Today he got choked on robles and thin liquids via cup. Later today he tolerated consecutive sips of thin liquids via straw, pureed foods and solids without any overt s/s of aspiration. No oral residue was observed after pureed foods or solids. SLP will continue to reassess his swallow function to ensure safety with oral intake. He is recommended to continue a regular diet with thin liquids. Medications whole in applesauce or pudding.          Recommended Diet and Intervention  Regular diet   Thin liquids  Via cup or straw    Meds whole with applesauce or pudding    Compensatory Swallowing Strategies  Small bites and sips  Upright for all oral intake  Remain upright for 30 minutes after all meals  Use a lingual search/sweep during and after meals          Treatment/Goals  Short-term Goals  Goal 1: The patient will regular diet with thin liquids wtih minimal overt s/s of aspiration.  Goal 2: The patient will participate in swallowing reassessments to ensure safety with oral intake  Goal 3: The patient will follow swallowing precautions with minimal cues/reminders.  Goal 4: The patient will complete oral hygiene daily to prevent aspiration of oral bacteria.  Goal 5: The patient will complete oral motor exercises, pharyngeal exercises, and breath support exercises.  Long-term Goals  Goal 1: Patient will maintain adequate hydration/nutrition with optimum safety and efficiency of  swallowing function on P.O. intake without overt signs and symptoms of  aspiration for the  highest appropriate diet level    General  Subjective  Subjective: He was able to drive before but has a suspended license. He was managing his own meds and used [facility] in [facility].  He manages his own finances/bills. His hobbies include repairing equipment. He likes to listen to [employer]. He used to farm and work in tobacco.  Behavior/Cognition: Alert;Cooperative;Pleasant mood  Temperature Spikes Noted: No  Respiratory Status: Room air  O2 Device: None (Room air)  Communication Observation: Dysarthria  Follows Directions: Simple  Dentition: Edentulous (He can chew up most foods without difficulty)  Patient Positioning: Upright in chair  Volitional Cough: Strong    Prior Dysphagia History:   He did get choked on robles and coffed today. He reports occasional difficulty with liquids and foods at home. He denies any pneumonia. He does have a history of acid reflux and takes prilosec. He denies any history of esophageal dilation. He states he has the most difficulty swallowing water. No Hx of MBSS.         Oral Motor Deficits  Labial: Decreased rate;Decreased seal;Right droop  Dentition: Edentulous (He did have U/L dentures, but these broke)  Lingual: Left deviation;Decreased rate;Decreased strength    P.O Trials:  Thin liquids via straw (consecutive sips), puree and solids were tolerated without overt s/s of aspiration.Mildly delayed pharyngeal swallow responses were noted. He did become choked on coffee and robles today. No oral residue was noted after foods. No labial spillage observed. Suspect some degree of premature loss of the bolus over the tongue base.        Prognosis  Good    Education  Reviewed the plan of care with him and agreed.           Therapy Time  SLP Individual Minutes  Time In: 1000  Time Out: 1100  Minutes: 60              11/13/2024 10:36 AM      Electronically Signed By:  Kerline Alejandro M.S., CCC-SLP  11/13/2024,10:41 AM.

## 2024-11-14 LAB
ALBUMIN SERPL-MCNC: 3.8 G/DL (ref 3.5–5.2)
ALP SERPL-CCNC: 91 U/L (ref 40–129)
ALT SERPL-CCNC: 21 U/L (ref 5–41)
ANION GAP SERPL CALCULATED.3IONS-SCNC: 14 MMOL/L (ref 7–19)
AST SERPL-CCNC: 17 U/L (ref 5–40)
BASOPHILS # BLD: 0.3 K/UL (ref 0–0.2)
BASOPHILS NFR BLD: 2 % (ref 0–1)
BILIRUB SERPL-MCNC: 0.4 MG/DL (ref 0.2–1.2)
BUN SERPL-MCNC: 14 MG/DL (ref 6–20)
CALCIUM SERPL-MCNC: 9.3 MG/DL (ref 8.6–10)
CHLORIDE SERPL-SCNC: 98 MMOL/L (ref 98–111)
CO2 SERPL-SCNC: 24 MMOL/L (ref 22–29)
CREAT SERPL-MCNC: 0.8 MG/DL (ref 0.7–1.2)
EOSINOPHIL # BLD: 0.13 K/UL (ref 0–0.6)
EOSINOPHIL NFR BLD: 1 % (ref 0–5)
ERYTHROCYTE [DISTWIDTH] IN BLOOD BY AUTOMATED COUNT: 13.5 % (ref 11.5–14.5)
GLUCOSE BLD-MCNC: 154 MG/DL (ref 70–99)
GLUCOSE BLD-MCNC: 164 MG/DL (ref 70–99)
GLUCOSE BLD-MCNC: 174 MG/DL (ref 70–99)
GLUCOSE BLD-MCNC: 199 MG/DL (ref 70–99)
GLUCOSE SERPL-MCNC: 173 MG/DL (ref 70–99)
HCT VFR BLD AUTO: 38.7 % (ref 42–52)
HGB BLD-MCNC: 12.6 G/DL (ref 14–18)
HYPOCHROMIA BLD QL SMEAR: ABNORMAL
IMM GRANULOCYTES # BLD: 0.1 K/UL
LYMPHOCYTES # BLD: 4 K/UL (ref 1.1–4.5)
LYMPHOCYTES NFR BLD: 32 % (ref 20–40)
MCH RBC QN AUTO: 28.3 PG (ref 27–31)
MCHC RBC AUTO-ENTMCNC: 32.6 G/DL (ref 33–37)
MCV RBC AUTO: 87 FL (ref 80–94)
MONOCYTES # BLD: 0.5 K/UL (ref 0–0.9)
MONOCYTES NFR BLD: 4 % (ref 0–10)
NEUTROPHILS # BLD: 7.7 K/UL (ref 1.5–7.5)
NEUTS SEG NFR BLD: 61 % (ref 50–65)
PERFORMED ON: ABNORMAL
PLATELET # BLD AUTO: 384 K/UL (ref 130–400)
PLATELET SLIDE REVIEW: ADEQUATE
PMV BLD AUTO: 10.8 FL (ref 9.4–12.4)
POTASSIUM SERPL-SCNC: 4.1 MMOL/L (ref 3.5–5)
PROT SERPL-MCNC: 7.1 G/DL (ref 6.4–8.3)
RBC # BLD AUTO: 4.45 M/UL (ref 4.7–6.1)
SODIUM SERPL-SCNC: 136 MMOL/L (ref 136–145)
WBC # BLD AUTO: 12.6 K/UL (ref 4.8–10.8)

## 2024-11-14 PROCEDURE — 97116 GAIT TRAINING THERAPY: CPT

## 2024-11-14 PROCEDURE — 6370000000 HC RX 637 (ALT 250 FOR IP): Performed by: PSYCHIATRY & NEUROLOGY

## 2024-11-14 PROCEDURE — 92526 ORAL FUNCTION THERAPY: CPT

## 2024-11-14 PROCEDURE — 36415 COLL VENOUS BLD VENIPUNCTURE: CPT

## 2024-11-14 PROCEDURE — 6360000002 HC RX W HCPCS: Performed by: STUDENT IN AN ORGANIZED HEALTH CARE EDUCATION/TRAINING PROGRAM

## 2024-11-14 PROCEDURE — 82962 GLUCOSE BLOOD TEST: CPT

## 2024-11-14 PROCEDURE — 97110 THERAPEUTIC EXERCISES: CPT

## 2024-11-14 PROCEDURE — 80053 COMPREHEN METABOLIC PANEL: CPT

## 2024-11-14 PROCEDURE — 97530 THERAPEUTIC ACTIVITIES: CPT

## 2024-11-14 PROCEDURE — 85025 COMPLETE CBC W/AUTO DIFF WBC: CPT

## 2024-11-14 PROCEDURE — 1180000000 HC REHAB R&B

## 2024-11-14 PROCEDURE — 97129 THER IVNTJ 1ST 15 MIN: CPT

## 2024-11-14 PROCEDURE — 97130 THER IVNTJ EA ADDL 15 MIN: CPT

## 2024-11-14 PROCEDURE — 99232 SBSQ HOSP IP/OBS MODERATE 35: CPT | Performed by: PSYCHIATRY & NEUROLOGY

## 2024-11-14 PROCEDURE — 6370000000 HC RX 637 (ALT 250 FOR IP): Performed by: STUDENT IN AN ORGANIZED HEALTH CARE EDUCATION/TRAINING PROGRAM

## 2024-11-14 RX ORDER — GUAIFENESIN/DEXTROMETHORPHAN 100-10MG/5
5 SYRUP ORAL EVERY 6 HOURS PRN
Status: DISCONTINUED | OUTPATIENT
Start: 2024-11-14 | End: 2024-11-27 | Stop reason: HOSPADM

## 2024-11-14 RX ADMIN — ATORVASTATIN CALCIUM 80 MG: 80 TABLET, FILM COATED ORAL at 20:19

## 2024-11-14 RX ADMIN — PANTOPRAZOLE SODIUM 40 MG: 40 TABLET, DELAYED RELEASE ORAL at 05:42

## 2024-11-14 RX ADMIN — Medication 2000 UNITS: at 07:27

## 2024-11-14 RX ADMIN — INSULIN LISPRO 1 UNITS: 100 INJECTION, SOLUTION INTRAVENOUS; SUBCUTANEOUS at 20:25

## 2024-11-14 RX ADMIN — TICAGRELOR 90 MG: 90 TABLET ORAL at 07:27

## 2024-11-14 RX ADMIN — INSULIN GLARGINE 15 UNITS: 100 INJECTION, SOLUTION SUBCUTANEOUS at 20:25

## 2024-11-14 RX ADMIN — TICAGRELOR 90 MG: 90 TABLET ORAL at 20:19

## 2024-11-14 RX ADMIN — GUAIFENESIN SYRUP AND DEXTROMETHORPHAN 5 ML: 100; 10 SYRUP ORAL at 22:36

## 2024-11-14 RX ADMIN — LAMOTRIGINE 100 MG: 200 TABLET ORAL at 07:27

## 2024-11-14 RX ADMIN — CARVEDILOL 25 MG: 25 TABLET, FILM COATED ORAL at 07:28

## 2024-11-14 RX ADMIN — FAMOTIDINE 20 MG: 20 TABLET ORAL at 07:27

## 2024-11-14 RX ADMIN — ASPIRIN 81 MG: 81 TABLET, CHEWABLE ORAL at 07:27

## 2024-11-14 RX ADMIN — LAMOTRIGINE 100 MG: 200 TABLET ORAL at 20:18

## 2024-11-14 RX ADMIN — PRAZOSIN HYDROCHLORIDE 1 MG: 1 CAPSULE ORAL at 20:18

## 2024-11-14 RX ADMIN — FAMOTIDINE 20 MG: 20 TABLET ORAL at 20:19

## 2024-11-14 RX ADMIN — CARVEDILOL 25 MG: 25 TABLET, FILM COATED ORAL at 17:06

## 2024-11-14 RX ADMIN — ENOXAPARIN SODIUM 40 MG: 100 INJECTION SUBCUTANEOUS at 07:26

## 2024-11-14 ASSESSMENT — PAIN SCALES - GENERAL: PAINLEVEL_OUTOF10: 0

## 2024-11-14 NOTE — PROGRESS NOTES
Occupational Therapy  Facility/Department: [facility] 8 REHAB UNIT  Rehabilitation Occupational Therapy Daily Treatment Note    Date: 24  Patient Name: Nguyen Olivo       Room: 0828/828-02  MRN: [id number]  Account: [id number]   : 1965  (59 y.o.) Gender: male                Treatment Diagnosis: (P) L CVA   Past Medical History:  has a past medical history of Bipolar disorder, current episode mixed, mild (HCC), Depression, Diabetes mellitus (HCC), GERD (gastroesophageal reflux disease), Hyperlipidemia, and Hypertension.  Past Surgical History:   has a past surgical history that includes Cholecystectomy (2006); Appendectomy; Elbow surgery; and Coronary angioplasty with stent ().     24 1530   Restrictions/Precautions   Restrictions/Precautions Fall Risk   OT Exercises   Exercise Treatment tx focus on distal reeducation, with and without facilitation for wrist, minimal finger extension but able to complete a functional grasp   Activity Tolerance   Activity Tolerance Patient Tolerated treatment well   Assessment   Performance deficits / Impairments Decreased functional mobility ;Decreased ADL status;Decreased ROM;Decreased safe awareness;Decreased sensation;Decreased fine motor control;Decreased strength;Decreased balance;Decreased endurance;Decreased high-level IADLs   Treatment Diagnosis L CVA   Time Code Minutes    Timed Code Treatment Minutes 15 Minutes   OT Individual Minutes   Time In 1530   Time Out 1545   Minutes 15

## 2024-11-14 NOTE — PROGRESS NOTES
11/14/24 1400   Bed mobility   Supine to Sit Minimal assistance;Contact guard assistance  (Triplanner technique with left LE under right LE to assist.)   Transfers   Bed to Chair Moderate assistance;Minimal assistance  (Squat pivot to the left, proximal arm rest was removed, patient was able to assist with placement of walker in correct position)   Ambulation   Surface Level tile   Device Rolling Walker   Other Apparatus Wheelchair follow   Assistance Moderate assistance;Minimal assistance   Quality of Gait Needed cueing at start of gait on proper sequence of 3 point gait pattern. Therapist assisted with proper foot placement since without had hip ER and ADD. Needed cues to correct FFP. Decreased right stance phase with weight shift posteral and lateral to right side.   Gait Deviations Slow Kelsi;Decreased step length;Decreased step height   Distance 5 FT   More Ambulation? Yes   Ambulation 2   Surface - 2 level tile   Device 2 Rolling Walker   Other Apparatus 2 Wheelchair follow   Assistance 2 Moderate assistance;Minimal assistance   Quality of Gait 2 Therapist assisted with proper foot placment since without had hip ER and ADD. FFP and 3 point gait pattern. Decreased right stance phase with weight shift posteral and lateral to right side.   Distance 20 FT   Ambulation 3   Surface - 3 level tile   Device 3 Rolling Walker   Other Apparatus 3 Wheelchair follow   Assistance 3 Moderate assistance;Minimal assistance   Quality of Gait 3 Therapist assisted with proper foot placment since without had hip ER and ADD. Toward middle of gait session patient had tendency to get walker to far in front and take to large of steps. Was corrected toward end of walk. FFP and 3 point gait pattern. Decreased right stance phase with weight shift posteral and lateral to right side.   Distance 25 FT   Wheelchair Activities   Propulsion Yes   Propulsion 1   Propulsion Manual   Method LLE;LUE   Level of Assistance Contact guard

## 2024-11-14 NOTE — PROGRESS NOTES
[facility]  [facility]CH [facility]  [facility] 8 REHAB UNIT  TIME   1100  1200        [x]Daily Note  []Progress Note    Date: 2024  Patient Name: Nguyen Olivo        MRN: [id number]    Account #: [id number]  : 1965  (59 y.o.)  Gender: male   Primary Provider: Mio Boo MD  Swallowing Status/Diet:  Liquid Consistency Recommendation: Thin  Diet Solids Recommendation: Regular      PATIENT DIAGNOSIS(ES):    Diagnosis: LEFT LATERAL THALAMIC STROKE W/RIGHT SIDED WEAKNESS     Additional Pertinent Hx: CAD WITH STENTING, BIPOLAR, DM2, GRD    RESTRICTIONS/PRECAUTIONS:    Restrictions/Precautions  Restrictions/Precautions: Fall Risk    Previous level of function and limitations:   He lives alone. His daughter lives next to him. He has a good friend that lives in [place], [place]. He also has other children that live in [facility], one in [place] and one in [place]. He was able to drive before but has a suspended license. He was managing his own meds and used [facility] in [facility]. He manages his own finances/bills. His hobbies include repairing equipment. He likes to listen to [employer]. 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 He does wear bifocals.    Labial: Decreased rate;Decreased seal;Right droop  Dentition: Edentulous (He did have U/L dentures, but these are broken)  Lingual: Left deviation;Decreased rate;Decreased strength        Subjective:   He is highly motivated to improve. He is recalling rules that therapy has reviewed with him. He has a good appetite.    Objective:  He completed the CLQT today. The Cognitive Linguistic Quick Test (CLQT) was developed for use with adults with acquired neurological dysfunction. This individually administered test is designed to gain information about cognitive-linguistic domains, a total composite severity rating and a clock drawing severity rating. The CLQT consists of ten tasks: Personal facts, Symbol cancellation, Confrontation

## 2024-11-14 NOTE — PLAN OF CARE
Problem: Chronic Conditions and Co-morbidities  Goal: Patient's chronic conditions and co-morbidity symptoms are monitored and maintained or improved  11/14/2024 1149 by Renetta Lee LPN  Outcome: Progressing  Flowsheets (Taken 11/14/2024 0736)  Care Plan - Patient's Chronic Conditions and Co-Morbidity Symptoms are Monitored and Maintained or Improved: Monitor and assess patient's chronic conditions and comorbid symptoms for stability, deterioration, or improvement  11/13/2024 2311 by Diana Serra LPN  Outcome: Progressing  11/13/2024 2310 by Diana Serra LPN  Outcome: Progressing     Problem: Discharge Planning  Goal: Discharge to home or other facility with appropriate resources  11/14/2024 1149 by Renetta Lee LPN  Outcome: Progressing  Flowsheets (Taken 11/14/2024 0736)  Discharge to home or other facility with appropriate resources: Refer to discharge planning if patient needs post-hospital services based on physician order or complex needs related to functional status, cognitive ability or social support system  11/13/2024 2311 by Diana Serra LPN  Outcome: Progressing  11/13/2024 2310 by Diana Serra LPN  Outcome: Progressing     Problem: Skin/Tissue Integrity  Goal: Absence of new skin breakdown  Description: 1.  Monitor for areas of redness and/or skin breakdown  2.  Assess vascular access sites hourly  3.  Every 4-6 hours minimum:  Change oxygen saturation probe site  4.  Every 4-6 hours:  If on nasal continuous positive airway pressure, respiratory therapy assess nares and determine need for appliance change or resting period.  11/14/2024 1149 by Renetta Lee LPN  Outcome: Progressing  11/13/2024 2311 by Diana Serra LPN  Outcome: Progressing  11/13/2024 2310 by Diana Serra LPN  Outcome: Progressing     Problem: Safety - Adult  Goal: Free from fall injury  11/14/2024 1149 by Renetta Lee LPN  Outcome: Progressing  11/13/2024 2311 by Diana Serra LPN  Outcome:  assistive devices  11/13/2024 2311 by Diana Serra LPN  Outcome: Progressing  11/13/2024 2310 by Diana Serra LPN  Outcome: Progressing  Goal: Return ADL status to a safe level of function  11/14/2024 1149 by Renetta Lee LPN  Outcome: Progressing  Flowsheets (Taken 11/14/2024 0736)  Return ADL Status to a Safe Level of Function: Assist and instruct patient to increase activity and self care as tolerated  11/13/2024 2311 by Diana Serra LPN  Outcome: Progressing  11/13/2024 2310 by Diana Serra LPN  Outcome: Progressing     Problem: Metabolic/Fluid and Electrolytes - Adult  Goal: Electrolytes maintained within normal limits  11/14/2024 1149 by Renetta Lee LPN  Outcome: Progressing  Flowsheets (Taken 11/14/2024 0736)  Electrolytes maintained within normal limits: Monitor labs and assess patient for signs and symptoms of electrolyte imbalances  11/13/2024 2311 by Diana Serra LPN  Outcome: Progressing  11/13/2024 2310 by Diana Serra LPN  Outcome: Progressing  Goal: Hemodynamic stability and optimal renal function maintained  11/14/2024 1149 by Renetta Lee LPN  Outcome: Progressing  Flowsheets (Taken 11/14/2024 0736)  Hemodynamic stability and optimal renal function maintained: Monitor labs and assess for signs and symptoms of volume excess or deficit  11/13/2024 2311 by Diana Serra LPN  Outcome: Progressing  11/13/2024 2310 by Diana Serra LPN  Outcome: Progressing  Goal: Glucose maintained within prescribed range  11/14/2024 1149 by Renetta Lee LPN  Outcome: Progressing  Flowsheets (Taken 11/14/2024 0736)  Glucose maintained within prescribed range: Monitor blood glucose as ordered  11/13/2024 2311 by Diana Serra LPN  Outcome: Progressing  11/13/2024 2310 by Diana Serra LPN  Outcome: Progressing     Problem: Genitourinary - Adult  Goal: Absence of urinary retention  Outcome: Progressing  Flowsheets (Taken 11/14/2024 0736)  Absence of urinary retention:   Assess

## 2024-11-14 NOTE — PROGRESS NOTES
Occupational Therapy  Facility/Department: [facility] 8 REHAB UNIT  Rehabilitation Occupational Therapy Daily Treatment Note    Date: 24  Patient Name: Nguyen Olivo       Room: 0828/828-02  MRN: [id number]  Account: [id number]   : 1965  (59 y.o.) Gender: male                Treatment Diagnosis: (P) L CVA   Past Medical History:  has a past medical history of Bipolar disorder, current episode mixed, mild (HCC), Depression, Diabetes mellitus (HCC), GERD (gastroesophageal reflux disease), Hyperlipidemia, and Hypertension.  Past Surgical History:   has a past surgical history that includes Cholecystectomy (2006); Appendectomy; Elbow surgery; and Coronary angioplasty with stent ().     24 0900   Restrictions/Precautions   Restrictions/Precautions Fall Risk   ADL   UE Dressing Minimal assistance   UE Dressing Skilled Clinical Factors assist with technique   Balance   Standing Balance Minimal assistance   Standing Balance   Time 2 mins   Activity 2 hand act (handwashing)   Comment stood at sink, max cues for foot placement d/t narrow [id number]   Functional Mobility   Functional - Mobility Device Wheelchair   Activity To/From therapy gym   Assist Level Stand by assistance   Functional Mobility Comments cues for technique at times but then able to demonstrate throughout the unit   Bed mobility   Supine to Sit Contact guard assistance   Sit to Supine Contact guard assistance;Minimal assistance   Bed Mobility Comments on mat table, tactil cues for tchnique   Transfers   Stand Step Transfers Contact guard assistance;Minimal assistance   Stand Pivot Transfers Contact guard assistance   Sit to stand Minimal assistance;Contact guard assistance   Stand to sit Contact guard assistance   Transfer Comments w/c <-> mat   OT Exercises   Exercise Treatment Comprehensive R UE in AG, PG, and GE planes with main focus on sh and elbow in reps of 10, multiple sets   Dynamic Sitting Balance Exercises EOM   Activity Tolerance

## 2024-11-14 NOTE — PLAN OF CARE
Problem: Chronic Conditions and Co-morbidities  Goal: Patient's chronic conditions and co-morbidity symptoms are monitored and maintained or improved  11/13/2024 2310 by Diana Serra LPN  Outcome: Progressing  11/13/2024 1013 by Renetta Lee LPN  Outcome: Progressing  Flowsheets (Taken 11/13/2024 0826)  Care Plan - Patient's Chronic Conditions and Co-Morbidity Symptoms are Monitored and Maintained or Improved: Monitor and assess patient's chronic conditions and comorbid symptoms for stability, deterioration, or improvement     Problem: Discharge Planning  Goal: Discharge to home or other facility with appropriate resources  11/13/2024 2310 by Diana Serra LPN  Outcome: Progressing  11/13/2024 1013 by Renetta Lee LPN  Outcome: Progressing  Flowsheets (Taken 11/13/2024 0826)  Discharge to home or other facility with appropriate resources: Refer to discharge planning if patient needs post-hospital services based on physician order or complex needs related to functional status, cognitive ability or social support system     Problem: Skin/Tissue Integrity  Goal: Absence of new skin breakdown  Description: 1.  Monitor for areas of redness and/or skin breakdown  2.  Assess vascular access sites hourly  3.  Every 4-6 hours minimum:  Change oxygen saturation probe site  4.  Every 4-6 hours:  If on nasal continuous positive airway pressure, respiratory therapy assess nares and determine need for appliance change or resting period.  11/13/2024 2310 by Diana Serra LPN  Outcome: Progressing  11/13/2024 1013 by Renetta Lee LPN  Outcome: Progressing     Problem: Safety - Adult  Goal: Free from fall injury  11/13/2024 2310 by Diana Serra LPN  Outcome: Progressing  11/13/2024 1013 by Renetta Lee LPN  Outcome: Progressing     Problem: Pain  Goal: Verbalizes/displays adequate comfort level or baseline comfort level  11/13/2024 2310 by Diana Serra LPN  Outcome: Progressing  11/13/2024 1013

## 2024-11-14 NOTE — PROGRESS NOTES
Patient:   Nguyen Olivo  MR#:    [id number]   Room:    0828/828-02   YOB: 1965  Date of Progress Note: 11/14/2024  Time of Note                           7:10 AM  Consulting Physician:   Mio Boo M.D.  Attending Physician:  Mio Boo MD     Chief complaint Acute ischemic stroke     S:This 59 y.o. male  eft-handed male with history of CAD with stenting on 2021 on ASA and Brillinta, HTN, Bipolar disorder, type II DM, dyslipidemia, and GERD. He presented to [facility] ER on 11/6/24 with slurred speech and right-sided weakness that developed when he woke up around 9 a.m. Last know normal as 4 a.m. when he went to bed. Work-up in ER CT head no acute findings, normal CT perfusion, CTA head/neck with 30-40% stenosis within left posterior cerebral artery, no other significant vascular occlusion, otherwise no carotid stenosis. He was not a candidate for TNK d/t being out of the time window. He was admitted to the Hospitalist service with consult for neurology. He was seen by Dr. Rian Nance, who suspected stroke. Dr. Nance recommended ASA, statin, and permissive hypertension. Echo was negative. MRI done revealed an acute lacunar type infarct seen within the lateral left thalamus measuring up to 9.6 mm AP maximum. Patient continues to have right-sided weakness and dysarthria. He is participating in PT/OT/SPT. He is felt to need a stay on Rehab to work towards his goal of returning home with assist of his daughter. He is now felt ready to start the Rehab program.  No other complaints.  Some improved movement of the right arm.    REVIEW OF SYSTEMS:  Constitutional: No fevers No chills  Neck:No stiffness  Respiratory: No shortness of breath  Cardiovascular: No chest pain No palpitations  Gastrointestinal: No abdominal pain    Genitourinary: No Dysuria  Neurological: No headache, no confusion    Past Medical History:      Diagnosis Date    Bipolar disorder, current episode mixed, mild (HCC)     diagnosed  Gait: DIFFICULTY ADVANCING RIGHT UE ON BAR.  TRACE , BUT HAND WOULD RAISE WHEN ATTEMPTING TO ADVANCE.  3 POINT GAIT. PATIENT ABLE TO ADVANCE RIGHT LE, HOWEVER WHEN HE PERFORMS THIS RIGHT HIP EXTERNALLY ROTATES AND ADDUCTS.  RECOMMEND ASSIST WITH ADVANCING LE, POSITIONING FOOT.  ABLE TO CONTRACT RIGHT QUAD DURING LEFT SWING.  INTERMITTENT DECREASED RIGHT STANCE PHASE.  TENDENCY TO FFP, TRUNK LATERALLY SHIFTED LEFT TOWARDS NON AFFECTED SIDE.  THEREFORE, UNCHARACTERISTICALLY, PATIENT ABLE TO PERFORM PROPER LEFT WEIGHT SHIF TO ADVANCE RIGHT.  Distance: 12 FT  AMBULATION 2  STAIRS  WHEELCHAIR PROPULSION 1  Propulsion 1  Propulsion: Manual  Method: LLE, LUE  Level of Assistance: Moderate assistance, Minimal assistance, Contact guard assistance, Stand by assistance  Description/ Details: INITIALLY MOD/ MIN IN SOCKS WITH PATIETN ATTEMPTING TO PROPEL WITH LEFT FOOT AND UE SIMULTANEOUSLY.  WHEN CUED TO PROPEL WITH UE, STEER WITH LE, AND GIVEN SHOES, PATIENT CGA/SBA  Distance: 50 FT  WHEELCHAIR PROPULSION 2     GOALS:  Short Term Goals  Time Frame for Short Term Goals: 2 WEEKS  Short Term Goal 1: BED MOBILITY CGA WITH RAILS  Short Term Goal 2: TF SURFACE TO SURFACE CGA  Short Term Goal 3: AMBULATE 25 FT WITH RW MOD A  Short Term Goal 4: PROPEL  FT SBA  Short Term Goal 5: UP/DOWN 4 STEPS 1-2 RAILS MOD A     Long Term Goals  Time Frame for Long Term Goals : 4 WEEKS  Long Term Goal 1: INDEP BED MOBILITY WITH OR WITHOUT RAILS  Long Term Goal 2: TF SURFACE TO SURFACE INDEP  Long Term Goal 3: AMBULATE 25 FT WITH A.D. CGA  Long Term Goal 4: PROPEL  FT INDEP  Long Term Goal 5: UP/DOWN 4 STEPS 1-2 RAILS MIN A  HOME LIVING:  Type of Home: Mobile home ([place] on his daughter's property)  Home Layout: One level  Home Access: Stairs to enter with rails  Entrance Stairs - Number of Steps: 2  ASSESSMENT (IMPAIRMENTS/BARRIERS):  Assessment  Assessment: PATIENT HIGHLY MOTIVATED.  SLIGHTLY IMPULSIVE.  BY END OF SESSION PATIENT

## 2024-11-15 LAB
GLUCOSE BLD-MCNC: 160 MG/DL (ref 70–99)
GLUCOSE BLD-MCNC: 162 MG/DL (ref 70–99)
GLUCOSE BLD-MCNC: 164 MG/DL (ref 70–99)
GLUCOSE BLD-MCNC: 173 MG/DL (ref 70–99)
PERFORMED ON: ABNORMAL

## 2024-11-15 PROCEDURE — 97110 THERAPEUTIC EXERCISES: CPT

## 2024-11-15 PROCEDURE — 6360000002 HC RX W HCPCS: Performed by: STUDENT IN AN ORGANIZED HEALTH CARE EDUCATION/TRAINING PROGRAM

## 2024-11-15 PROCEDURE — 82962 GLUCOSE BLOOD TEST: CPT

## 2024-11-15 PROCEDURE — 6370000000 HC RX 637 (ALT 250 FOR IP): Performed by: PSYCHIATRY & NEUROLOGY

## 2024-11-15 PROCEDURE — 1180000000 HC REHAB R&B

## 2024-11-15 PROCEDURE — 92526 ORAL FUNCTION THERAPY: CPT

## 2024-11-15 PROCEDURE — 97130 THER IVNTJ EA ADDL 15 MIN: CPT

## 2024-11-15 PROCEDURE — 97129 THER IVNTJ 1ST 15 MIN: CPT

## 2024-11-15 PROCEDURE — 94760 N-INVAS EAR/PLS OXIMETRY 1: CPT

## 2024-11-15 PROCEDURE — 97530 THERAPEUTIC ACTIVITIES: CPT

## 2024-11-15 PROCEDURE — 97535 SELF CARE MNGMENT TRAINING: CPT

## 2024-11-15 PROCEDURE — 6370000000 HC RX 637 (ALT 250 FOR IP): Performed by: STUDENT IN AN ORGANIZED HEALTH CARE EDUCATION/TRAINING PROGRAM

## 2024-11-15 PROCEDURE — 99232 SBSQ HOSP IP/OBS MODERATE 35: CPT | Performed by: PSYCHIATRY & NEUROLOGY

## 2024-11-15 PROCEDURE — 97116 GAIT TRAINING THERAPY: CPT

## 2024-11-15 RX ADMIN — CARVEDILOL 25 MG: 25 TABLET, FILM COATED ORAL at 08:26

## 2024-11-15 RX ADMIN — CARVEDILOL 25 MG: 25 TABLET, FILM COATED ORAL at 17:22

## 2024-11-15 RX ADMIN — PANTOPRAZOLE SODIUM 40 MG: 40 TABLET, DELAYED RELEASE ORAL at 05:46

## 2024-11-15 RX ADMIN — TICAGRELOR 90 MG: 90 TABLET ORAL at 21:13

## 2024-11-15 RX ADMIN — Medication 2000 UNITS: at 08:21

## 2024-11-15 RX ADMIN — ENOXAPARIN SODIUM 40 MG: 100 INJECTION SUBCUTANEOUS at 08:21

## 2024-11-15 RX ADMIN — GUAIFENESIN SYRUP AND DEXTROMETHORPHAN 5 ML: 100; 10 SYRUP ORAL at 21:12

## 2024-11-15 RX ADMIN — FAMOTIDINE 20 MG: 20 TABLET ORAL at 21:13

## 2024-11-15 RX ADMIN — LAMOTRIGINE 100 MG: 200 TABLET ORAL at 21:13

## 2024-11-15 RX ADMIN — ATORVASTATIN CALCIUM 80 MG: 80 TABLET, FILM COATED ORAL at 21:13

## 2024-11-15 RX ADMIN — FAMOTIDINE 20 MG: 20 TABLET ORAL at 08:21

## 2024-11-15 RX ADMIN — INSULIN GLARGINE 15 UNITS: 100 INJECTION, SOLUTION SUBCUTANEOUS at 21:13

## 2024-11-15 RX ADMIN — LAMOTRIGINE 100 MG: 200 TABLET ORAL at 08:21

## 2024-11-15 RX ADMIN — PRAZOSIN HYDROCHLORIDE 1 MG: 1 CAPSULE ORAL at 21:13

## 2024-11-15 RX ADMIN — ASPIRIN 81 MG: 81 TABLET, CHEWABLE ORAL at 08:22

## 2024-11-15 RX ADMIN — TICAGRELOR 90 MG: 90 TABLET ORAL at 08:21

## 2024-11-15 NOTE — PROGRESS NOTES
11/15/24 1300   Bed mobility   Sit to Supine Contact guard assistance  (Left side of bed, triplanner technique, LLE assisted with RLE)   Transfers   Bed to Chair Moderate assistance;Minimal assistance  (WC to bed, to left side)   Squat Pivot Transfers Moderate Assistance;Minimal Assistance  (WC to toilet (to right) than toilet to WC (to left))   Comment Assisted patient to bathroom   Ambulation   Surface Level tile   Device Rolling Walker   Other Apparatus Wheelchair follow   Assistance Moderate assistance;Minimal assistance   Quality of Gait Needed cueing on proper sequence for 3 point gait. Therapist assisted with R foot placement to prevent hip ER and ADD. Cued on FFP. Improved right stance phase compared to last treatment. Weight shifted to left side.   Gait Deviations Decreased step height;Slow Kelsi   Distance 15 FT   More Ambulation? Yes   Ambulation 2   Surface - 2 level tile   Device 2 Rolling Walker   Other Apparatus 2 Wheelchair follow   Assistance 2 Moderate assistance;Minimal assistance   Quality of Gait 2 Needed cueing on proper sequence for 3 point gait. Therapist assisted with R foot placement to prevent hip ER and ADD. Cued on FFP. Improved right stance phase compared to last treatment. Weight shifted to left side.   Distance 5 FT   Comments walk stopped to address weight shifts in // bars by shifting weight to right side away from left side were patient wants to shift   Ambulation 3   Surface - 3 level tile   Device 3 Parallel Bars   Other Apparatus 3 Wheelchair follow   Assistance 3 Moderate assistance;Minimal assistance   Quality of Gait 3 Preformed in front of mirror that improved truck position in more midline postion with decreased weight shift to left side. Therapist assisted in proper foot placement. Cued on proper 3 point gait in // bars. Improved right stance phase time.   Distance 12 FT   PT Exercises   Exercise Treatment Weight shits to right side in // bars x 20.  (Patient could

## 2024-11-15 NOTE — PROGRESS NOTES
Occupational Therapy  Facility/Department: [facility] 8 REHAB UNIT  Rehabilitation Occupational Therapy Daily Treatment Note    Date: 11/15/24  Patient Name: Nguyen Olivo       Room: 0828/828-02  MRN: [id number]  Account: [id number]   : 1965  (59 y.o.) Gender: male                Treatment Diagnosis: L CVA   Past Medical History:  has a past medical history of Bipolar disorder, current episode mixed, mild (HCC), Depression, Diabetes mellitus (HCC), GERD (gastroesophageal reflux disease), Hyperlipidemia, and Hypertension.  Past Surgical History:   has a past surgical history that includes Cholecystectomy (2006); Appendectomy; Elbow surgery; and Coronary angioplasty with stent ().     11/15/24 1100   Restrictions/Precautions   Restrictions/Precautions Fall Risk   OT Exercises   Exercise Treatment wrist AG with and then without facilitation, supination/pronation PG,  5# R hand Rickshaw   RUE Strength   RUE Strength Comment sh 3-, elb 3+, wrist 3-   Assessment   Performance deficits / Impairments Decreased functional mobility ;Decreased ADL status;Decreased ROM;Decreased safe awareness;Decreased sensation;Decreased fine motor control;Decreased strength;Decreased balance;Decreased endurance;Decreased high-level IADLs   Time Code Minutes    Timed Code Treatment Minutes 30 Minutes   OT Individual Minutes   Time In 1110   Time Out 1140   Minutes 30

## 2024-11-15 NOTE — PROGRESS NOTES
Clinical updates faxed to [facility] at [phone] for continued stay request. Will update chart when further dates are approved.     Electronically signed by ANGELA Awad on 11/15/24 at 2:28 PM CST

## 2024-11-15 NOTE — PROGRESS NOTES
Patient:   Nguyen Olivo  MR#:    [id number]   Room:    0828/828-02   YOB: 1965  Date of Progress Note: 11/15/2024  Time of Note                           7:24 AM  Consulting Physician:   Mio Boo M.D.  Attending Physician:  Mio Boo MD     Chief complaint Acute ischemic stroke     S:This 59 y.o. male  eft-handed male with history of CAD with stenting on 2021 on ASA and Brillinta, HTN, Bipolar disorder, type II DM, dyslipidemia, and GERD. He presented to [facility] ER on 11/6/24 with slurred speech and right-sided weakness that developed when he woke up around 9 a.m. Last know normal as 4 a.m. when he went to bed. Work-up in ER CT head no acute findings, normal CT perfusion, CTA head/neck with 30-40% stenosis within left posterior cerebral artery, no other significant vascular occlusion, otherwise no carotid stenosis. He was not a candidate for TNK d/t being out of the time window. He was admitted to the Hospitalist service with consult for neurology. He was seen by Dr. Rian Nance, who suspected stroke. Dr. Nance recommended ASA, statin, and permissive hypertension. Echo was negative. MRI done revealed an acute lacunar type infarct seen within the lateral left thalamus measuring up to 9.6 mm AP maximum. Patient continues to have right-sided weakness and dysarthria. He is participating in PT/OT/SPT. He is felt to need a stay on Rehab to work towards his goal of returning home with assist of his daughter. He is now felt ready to start the Rehab program.  No new complaints.  Feeling better.    REVIEW OF SYSTEMS:  Constitutional: No fevers No chills  Neck:No stiffness  Respiratory: No shortness of breath  Cardiovascular: No chest pain No palpitations  Gastrointestinal: No abdominal pain    Genitourinary: No Dysuria  Neurological: No headache, no confusion    Past Medical History:      Diagnosis Date    Bipolar disorder, current episode mixed, mild (HCC)     diagnosed at [facility]

## 2024-11-15 NOTE — PLAN OF CARE
Problem: Safety - Adult  Goal: Free from fall injury  11/14/2024 1911 by Jesus Moreau RN  Outcome: Progressing  11/14/2024 1149 by Renetta Lee LPN  Outcome: Progressing

## 2024-11-15 NOTE — PROGRESS NOTES
Occupational Therapy  Facility/Department: [facility] 8 REHAB UNIT  Rehabilitation Occupational Therapy Daily Treatment Note    Date: 11/15/24  Patient Name: Nguyen Olivo       Room: 0828/828-02  MRN: [id number]  Account: [id number]   : 1965  (59 y.o.) Gender: male                Treatment Diagnosis: (P) L CVA   Past Medical History:  has a past medical history of Bipolar disorder, current episode mixed, mild (HCC), Depression, Diabetes mellitus (HCC), GERD (gastroesophageal reflux disease), Hyperlipidemia, and Hypertension.  Past Surgical History:   has a past surgical history that includes Cholecystectomy (2006); Appendectomy; Elbow surgery; and Coronary angioplasty with stent ().     11/15/24 0900   Restrictions/Precautions   Restrictions/Precautions Fall Risk   ADL   Additional Comments see CARE scores   Balance   Standing Balance Minimal assistance   Standing Balance   Activity LB clothing management   Comment CGA at EOB, min A at toilet   Functional Mobility   Functional - Mobility Device Wheelchair   Activity To/from bathroom;To/From therapy gym   Assist Level Supervision   Bed mobility   Supine to Sit Contact guard assistance   Transfers   Sit to stand Contact guard assistance   Stand to sit Contact guard assistance   Toilet Transfers   Toilet - Technique Stand pivot   Toilet Transfer Contact guard assistance;Minimal assistance   Toilet Transfers Comments Min A for affected side, CGA to sound side   Wheelchair Bed Transfers   Wheelchair/Bed - Technique Stand pivot   Equipment Used Wheelchair   Level of Asssistance Contact guard assistance   OT Exercises   Exercise Treatment sh and elb flex/ext  PG planes in reps of 10 supine on mat, sh abduction full range GE   Assessment   Performance deficits / Impairments Decreased functional mobility ;Decreased ADL status;Decreased ROM;Decreased safe awareness;Decreased sensation;Decreased fine motor control;Decreased strength;Decreased balance;Decreased

## 2024-11-15 NOTE — PROGRESS NOTES
[facility]  INPATIENT SPEECH THERAPY  [facility] 8 REHAB UNIT  TIME   08:30  09:00  30 minutes     [x]Daily Note  []Progress Note     Date: 11/15/24  Patient Name: Nguyen Olivo        MRN: [id number]    Account #: [id number]  : [birth year]  (59 y.o.)  Gender: Male   Primary Provider: Rajeev MAY  Diet Recommendation: Regular solid consistency with thin liquids     Subjective:   Patient alert, in bed, upon entry. No pain reported during treatment session.      Objective:  Targeted patient's swallowing function. With regular solid consistency presentations administered independently, patient exhibited adequate oral prep. Oral transit of regular solid consistency primarily varied from 1-3 seconds in length and min oral cavity residue was noted post swallows; residue cleared from the mouth with additional dry swallows. Oral transit of thin liquid presentations, administered independently via straw, was considered inconsistently fast. Laryngeal elevation during swallow initiation was considered to be sluggish and inconsistently mildly decreased for swallow airway protection. Even so, no outward S/S penetration/aspiration was observed with any regular solid consistency presentation or thin liquid presentation administered during treatment session.     At this time, would recommend continuation regular solid consistency with thin liquids.     In structured activity, patient was 90% visual word scramble of single words at independent level.    In structured activity, patient was 7/9 sequencing 9 steps in familiar activity independently.    Diet Solids Recommendation: Regular  Liquid Consistency Recommendation: Thin  Compensatory Swallowing Strategies : Check for pocketing of food on the Left, Small bites/sips     SHORT TERM GOAL #1:  Goal 1: The patient will utilize dysarthria strategies with minimal cues to improve speech intelligibility.     SHORT TERM GOAL #2:  Goal 2: The patient will complete tasks for problem solving, safety

## 2024-11-16 LAB
GLUCOSE BLD-MCNC: 151 MG/DL (ref 70–99)
GLUCOSE BLD-MCNC: 159 MG/DL (ref 70–99)
GLUCOSE BLD-MCNC: 211 MG/DL (ref 70–99)
GLUCOSE BLD-MCNC: 217 MG/DL (ref 70–99)
PERFORMED ON: ABNORMAL

## 2024-11-16 PROCEDURE — 97535 SELF CARE MNGMENT TRAINING: CPT

## 2024-11-16 PROCEDURE — 97116 GAIT TRAINING THERAPY: CPT

## 2024-11-16 PROCEDURE — 6370000000 HC RX 637 (ALT 250 FOR IP): Performed by: STUDENT IN AN ORGANIZED HEALTH CARE EDUCATION/TRAINING PROGRAM

## 2024-11-16 PROCEDURE — 97530 THERAPEUTIC ACTIVITIES: CPT

## 2024-11-16 PROCEDURE — 6370000000 HC RX 637 (ALT 250 FOR IP): Performed by: PSYCHIATRY & NEUROLOGY

## 2024-11-16 PROCEDURE — 82962 GLUCOSE BLOOD TEST: CPT

## 2024-11-16 PROCEDURE — 1180000000 HC REHAB R&B

## 2024-11-16 PROCEDURE — 6360000002 HC RX W HCPCS: Performed by: STUDENT IN AN ORGANIZED HEALTH CARE EDUCATION/TRAINING PROGRAM

## 2024-11-16 PROCEDURE — 97110 THERAPEUTIC EXERCISES: CPT

## 2024-11-16 PROCEDURE — 92526 ORAL FUNCTION THERAPY: CPT

## 2024-11-16 PROCEDURE — 92507 TX SP LANG VOICE COMM INDIV: CPT

## 2024-11-16 PROCEDURE — 99232 SBSQ HOSP IP/OBS MODERATE 35: CPT | Performed by: PSYCHIATRY & NEUROLOGY

## 2024-11-16 RX ADMIN — CARVEDILOL 25 MG: 25 TABLET, FILM COATED ORAL at 08:31

## 2024-11-16 RX ADMIN — ACETAMINOPHEN 650 MG: 325 TABLET ORAL at 01:56

## 2024-11-16 RX ADMIN — Medication 2000 UNITS: at 08:32

## 2024-11-16 RX ADMIN — ASPIRIN 81 MG: 81 TABLET, CHEWABLE ORAL at 08:32

## 2024-11-16 RX ADMIN — LAMOTRIGINE 100 MG: 200 TABLET ORAL at 20:53

## 2024-11-16 RX ADMIN — TICAGRELOR 90 MG: 90 TABLET ORAL at 20:54

## 2024-11-16 RX ADMIN — FAMOTIDINE 20 MG: 20 TABLET ORAL at 08:31

## 2024-11-16 RX ADMIN — INSULIN LISPRO 1 UNITS: 100 INJECTION, SOLUTION INTRAVENOUS; SUBCUTANEOUS at 20:54

## 2024-11-16 RX ADMIN — PANTOPRAZOLE SODIUM 40 MG: 40 TABLET, DELAYED RELEASE ORAL at 06:02

## 2024-11-16 RX ADMIN — LAMOTRIGINE 100 MG: 200 TABLET ORAL at 08:31

## 2024-11-16 RX ADMIN — ATORVASTATIN CALCIUM 80 MG: 80 TABLET, FILM COATED ORAL at 20:53

## 2024-11-16 RX ADMIN — INSULIN GLARGINE 15 UNITS: 100 INJECTION, SOLUTION SUBCUTANEOUS at 20:54

## 2024-11-16 RX ADMIN — ENOXAPARIN SODIUM 40 MG: 100 INJECTION SUBCUTANEOUS at 08:31

## 2024-11-16 RX ADMIN — ACETAMINOPHEN 650 MG: 325 TABLET ORAL at 22:43

## 2024-11-16 RX ADMIN — CARVEDILOL 25 MG: 25 TABLET, FILM COATED ORAL at 17:04

## 2024-11-16 RX ADMIN — PRAZOSIN HYDROCHLORIDE 1 MG: 1 CAPSULE ORAL at 20:53

## 2024-11-16 RX ADMIN — GUAIFENESIN SYRUP AND DEXTROMETHORPHAN 5 ML: 100; 10 SYRUP ORAL at 20:55

## 2024-11-16 RX ADMIN — INSULIN LISPRO 1 UNITS: 100 INJECTION, SOLUTION INTRAVENOUS; SUBCUTANEOUS at 17:04

## 2024-11-16 RX ADMIN — TICAGRELOR 90 MG: 90 TABLET ORAL at 08:31

## 2024-11-16 ASSESSMENT — PAIN - FUNCTIONAL ASSESSMENT: PAIN_FUNCTIONAL_ASSESSMENT: ACTIVITIES ARE NOT PREVENTED

## 2024-11-16 ASSESSMENT — PAIN SCALES - GENERAL
PAINLEVEL_OUTOF10: 0
PAINLEVEL_OUTOF10: 0
PAINLEVEL_OUTOF10: 3
PAINLEVEL_OUTOF10: 5
PAINLEVEL_OUTOF10: 0

## 2024-11-16 ASSESSMENT — PAIN DESCRIPTION - DESCRIPTORS: DESCRIPTORS: ACHING

## 2024-11-16 ASSESSMENT — PAIN DESCRIPTION - LOCATION
LOCATION: ARM;SHOULDER
LOCATION: ARM;SHOULDER

## 2024-11-16 ASSESSMENT — PAIN DESCRIPTION - PAIN TYPE: TYPE: CHRONIC PAIN

## 2024-11-16 ASSESSMENT — PAIN DESCRIPTION - ORIENTATION: ORIENTATION: RIGHT

## 2024-11-16 NOTE — PLAN OF CARE
Problem: Safety - Adult  Goal: Free from fall injury  11/15/2024 1859 by Jesus Moreau, RN  Outcome: Progressing  11/15/2024 1008 by Bekah Kate, RN  Outcome: Progressing

## 2024-11-16 NOTE — PLAN OF CARE
Problem: Chronic Conditions and Co-morbidities  Goal: Patient's chronic conditions and co-morbidity symptoms are monitored and maintained or improved  Outcome: Progressing  Flowsheets (Taken 11/16/2024 0854)  Care Plan - Patient's Chronic Conditions and Co-Morbidity Symptoms are Monitored and Maintained or Improved: Monitor and assess patient's chronic conditions and comorbid symptoms for stability, deterioration, or improvement     Problem: Discharge Planning  Goal: Discharge to home or other facility with appropriate resources  Outcome: Progressing  Flowsheets (Taken 11/16/2024 0854)  Discharge to home or other facility with appropriate resources: Refer to discharge planning if patient needs post-hospital services based on physician order or complex needs related to functional status, cognitive ability or social support system     Problem: Skin/Tissue Integrity  Goal: Absence of new skin breakdown  Description: 1.  Monitor for areas of redness and/or skin breakdown  2.  Assess vascular access sites hourly  3.  Every 4-6 hours minimum:  Change oxygen saturation probe site  4.  Every 4-6 hours:  If on nasal continuous positive airway pressure, respiratory therapy assess nares and determine need for appliance change or resting period.  Outcome: Progressing     Problem: Safety - Adult  Goal: Free from fall injury  Outcome: Progressing     Problem: Pain  Goal: Verbalizes/displays adequate comfort level or baseline comfort level  Outcome: Progressing     Problem: Neurosensory - Adult  Goal: Achieves stable or improved neurological status  Outcome: Progressing  Flowsheets (Taken 11/16/2024 0854)  Achieves stable or improved neurological status: Assess for and report changes in neurological status  Goal: Achieves maximal functionality and self care  Outcome: Progressing  Flowsheets (Taken 11/16/2024 0854)  Achieves maximal functionality and self care: Encourage and assist patient to increase activity and self care with

## 2024-11-16 NOTE — PROGRESS NOTES
Name: Nguyen Olivo  MRN: [id number]  Date of Service:  11/16/2024 11/16/24 0900   Restrictions/Precautions   Restrictions/Precautions Fall Risk   Required Braces or Orthoses? No   General   Chart Reviewed Yes   Patient assessed for rehabilitation services? Yes   Additional Pertinent Hx CAD WITH STENTING, BIPOLAR, DM2, GRD   Diagnosis LEFT LATERAL THALAMIC STROKE W/RIGHT SIDED WEAKNESS   Follows Commands WFL   General Comment   Comments Pt required Max A for personal hygiene post BM and Mod A for clothing management in BR, pt able to stand using grab bar in BR.   Subjective   Subjective Pt sitting on toilet and requests assistance in personal hgiene/dressing in BR, agrees to participate in therapy.   Transfers   Sit to Stand Minimal Assistance   Stand to Sit Minimal Assistance   Ambulation   Surface Level tile   Device Rolling Walker  (+ dycem on R handle of RW)   Other Apparatus Wheelchair follow   Assistance Minimal assistance   Quality of Gait no vc's required for correct 3 point gait leading w/ RLE, does require intermittent assist to maintain R hand on RW, hyperextension of RLE   Gait Deviations Slow Kelsi;Decreased step length;Decreased step height;Staggers   Distance 8', 10'  (~1 min sitting rest break in between)   Comments Straight path   Activity Tolerance   Activity Tolerance Patient tolerated treatment well   Assessment   Assessment First part of session used to assist pt in personal hygiene/dressing in BR. Pt then able to amb. up to 8', then 10' w/ RW + dycem on R handle requiring Min A.   Discharge Recommendations Continue to assess pending progress;Home with Home health PT;Home with assist PRN   Safety Devices   Type of Devices Patient at risk for falls;Gait belt;Left in chair  (W/ OT)           Electronically signed by Anne Marie Harding PTA on 11/16/2024 at 3:34 PM

## 2024-11-16 NOTE — PROGRESS NOTES
Occupational Therapy  Facility/Department: [facility] 8 REHAB UNIT  Rehabilitation Occupational Therapy Daily Treatment Note    Date: 24  Patient Name: Nguyen Olivo       Room: 0828/828-02  MRN: [id number]  Account: [id number]   : 1965  (59 y.o.) Gender: male                Treatment Diagnosis: (P) L CVA   Past Medical History:  has a past medical history of Bipolar disorder, current episode mixed, mild (HCC), Depression, Diabetes mellitus (HCC), GERD (gastroesophageal reflux disease), Hyperlipidemia, and Hypertension.  Past Surgical History:   has a past surgical history that includes Cholecystectomy (2006); Appendectomy; Elbow surgery; and Coronary angioplasty with stent ().     24 1000   Restrictions/Precautions   Restrictions/Precautions Fall Risk   Balance   Standing Balance Contact guard assistance   Standing Balance   Activity clothing management   Functional Mobility   Functional - Mobility Device Rolling Walker   Activity To/from bathroom;Other   Assist Level Minimal assistance   Functional Mobility Comments x 2 occ, does well sequencing until fatigued   Bed mobility   Supine to Sit Stand by assistance   Sit to Supine Stand by assistance   Transfers   Sit to stand Contact guard assistance   Stand to sit Contact guard assistance   Toilet Transfers   Toilet - Technique Ambulating   Toilet Transfer Minimal assistance   Wheelchair Bed Transfers   Wheelchair/Bed - Technique Stand pivot   Equipment Used Wheelchair   Level of Asssistance Stand by assistance   OT Exercises   Exercise Treatment comprehensive RUE , 1# Tbar sh prot/ret flex/extension, LEX Mendes 7#   Right Hand AROM (degrees)   Right Hand General AROM able to complete partial extension of all but thumb   Short Term Goals   Short Term Goal 1 MET   Short Term Goal 2 MET   Activity Tolerance   Activity Tolerance Patient Tolerated treatment well   Assessment   Performance deficits / Impairments Decreased functional mobility  ;Decreased ADL status;Decreased ROM;Decreased safe awareness;Decreased sensation;Decreased fine motor control;Decreased strength;Decreased balance;Decreased endurance;Decreased high-level IADLs   Treatment Diagnosis L CVA   Time Code Minutes    Timed Code Treatment Minutes 60 Minutes   OT Individual Minutes   Time In 1000   Time Out 1100   Minutes 60

## 2024-11-16 NOTE — PROGRESS NOTES
SLP ALL NOTES  Facility/Department: [facility] REHAB UN[facility]  Daily Treatment Note  NAME: Nguyen Olivo  : 1965  MRN: [id number]    Date of Eval: 2024  Evaluating Therapist: EDNA Flowers    Patient Diagnosis(es): has Depression with suicidal ideation; Cellulitis of left elbow; Hypertension; Hyperlipidemia; GERD (gastroesophageal reflux disease); Diabetes mellitus (HCC); Olecranon bursitis of left elbow; Vitamin D deficiency; Anxiety and depression; Mood disorder (HCC); Bipolar disorder, current episode mixed, mild (HCC); Substernal chest pain relieved by nitroglycerin; CAD (coronary artery disease); Stroke-like symptoms; Acute thalamic infarction (HCC); Right sided weakness; Dysarthria; Acute ischemic stroke (HCC); and Weakness on their problem list.     Pt was seated in his wheelchair with no family or caregiver present. Pt was pleasant and cooperative with all tasks.     Pt was oriented to self, place, [place], year and month but not date.    Pt's connected speech intelligibility was judged to be 100%.  Pt did complete some dysarthria exercises.     Pt completed OMEs with significant R facial droop noted.  Pt was observed consuming his noon meal. Pt tolerated regular diet with thin liquids with no outward S/S of aspiration/penetration.    Pt recalled breakfast items and therapy activities from earlier this date.     Pt independently completed maze activity. Pt did make an error but did not require cuing to correctly complete the maze.     Pt complete crossword puzzle with 78% accuracy.             Plan:  Continued daily Speech/Language treatment with goals per patient's plan of care.                              Silvina Marcos, EDNA

## 2024-11-16 NOTE — PROGRESS NOTES
Patient:   Nguyen Olivo  MR#:    [id number]   Room:    0828/828-02   YOB: 1965  Date of Progress Note: 11/16/2024  Time of Note                           7:22 AM  Consulting Physician:   Mio Boo M.D.  Attending Physician:  Mio Boo MD     Chief complaint Acute ischemic stroke     S:This 59 y.o. male  eft-handed male with history of CAD with stenting on 2021 on ASA and Brillinta, HTN, Bipolar disorder, type II DM, dyslipidemia, and GERD. He presented to [facility] ER on 11/6/24 with slurred speech and right-sided weakness that developed when he woke up around 9 a.m. Last know normal as 4 a.m. when he went to bed. Work-up in ER CT head no acute findings, normal CT perfusion, CTA head/neck with 30-40% stenosis within left posterior cerebral artery, no other significant vascular occlusion, otherwise no carotid stenosis. He was not a candidate for TNK d/t being out of the time window. He was admitted to the Hospitalist service with consult for neurology. He was seen by Dr. Rian Nance, who suspected stroke. Dr. Nance recommended ASA, statin, and permissive hypertension. Echo was negative. MRI done revealed an acute lacunar type infarct seen within the lateral left thalamus measuring up to 9.6 mm AP maximum. Patient continues to have right-sided weakness and dysarthria. He is participating in PT/OT/SPT. He is felt to need a stay on Rehab to work towards his goal of returning home with assist of his daughter. He is now felt ready to start the Rehab program.  Overall doing well this morning.  Some right shoulder and wrist pain overnight.    REVIEW OF SYSTEMS:  Constitutional: No fevers No chills  Neck:No stiffness  Respiratory: No shortness of breath  Cardiovascular: No chest pain No palpitations  Gastrointestinal: No abdominal pain    Genitourinary: No Dysuria  Neurological: No headache, no confusion    Past Medical History:      Diagnosis Date    Bipolar disorder, current episode mixed, mild  of Gait 3 Therapist assisted with proper foot placment since without had hip ER and ADD. Toward middle of gait session patient had tendency to get walker to far in front and take to large of steps. Was corrected toward end of walk. FFP and 3 point gait pattern. Decreased right stance phase with weight shift posteral and lateral to right side.   Distance 25 FT   Wheelchair Activities   Propulsion Yes   Propulsion 1   Propulsion Manual   Method LLE;LUE   Level of Assistance Contact guard assistance;Stand by assistance   Description/ Details Able to propel with multiple turns   Distance 100 FT   PT Exercises   Exercise Treatment Bilateral sitting exercies hip flexion, hip extension, hip ABD, hip ADD, knee extension, DF, PF x 10  (Manual resistance on left and AAROM on right)   Assessment   Assessment Patient tolerated treatment well with improvments in walking since last session. Patient is a slighly impulsive and started to initiated movements before therapist was ready. Patient tolerated walking with a RW without issues. Would recommend futher work to improve gait using RW. Therapist helped with right foot placement during gait to prevent hip ER and ADD. Patient had FFP and needed cues to correct posture during gait. Session was ended with sitting exercies to help improve AROM and strength of right LE.                              RECORD REVIEW: Previous medical records, medications were reviewed at today's visit    IMPRESSION:   1.  Acute ischemic stroke-on aspirin/statin  2.  Hyperlipidemia-on statin  3.  DVT prophylaxis-Lovenox  4.  Hypertension-on meds monitor  5.  GI/GERD-Pepcid/Protonix/bowel regimen  6.  Diabetes-on insulin monitor blood sugar  7.  Mood disorder-on meds monitor  8.  Smoker-NicoDerm patch  9.  Coronary artery disease-on aspirin/Brilinta/statin  10.  Vitamin D deficiency-on vitamin D  11.  PT/OT/speech       Continue current care as noted      Expected duration and frequency therapy: 180 minutes

## 2024-11-17 LAB
GLUCOSE BLD-MCNC: 138 MG/DL (ref 70–99)
GLUCOSE BLD-MCNC: 159 MG/DL (ref 70–99)
GLUCOSE BLD-MCNC: 231 MG/DL (ref 70–99)
GLUCOSE BLD-MCNC: 254 MG/DL (ref 70–99)
PERFORMED ON: ABNORMAL

## 2024-11-17 PROCEDURE — 6370000000 HC RX 637 (ALT 250 FOR IP): Performed by: STUDENT IN AN ORGANIZED HEALTH CARE EDUCATION/TRAINING PROGRAM

## 2024-11-17 PROCEDURE — 6360000002 HC RX W HCPCS: Performed by: STUDENT IN AN ORGANIZED HEALTH CARE EDUCATION/TRAINING PROGRAM

## 2024-11-17 PROCEDURE — 6370000000 HC RX 637 (ALT 250 FOR IP): Performed by: PSYCHIATRY & NEUROLOGY

## 2024-11-17 PROCEDURE — 82962 GLUCOSE BLOOD TEST: CPT

## 2024-11-17 PROCEDURE — 1180000000 HC REHAB R&B

## 2024-11-17 PROCEDURE — 99232 SBSQ HOSP IP/OBS MODERATE 35: CPT | Performed by: PSYCHIATRY & NEUROLOGY

## 2024-11-17 RX ADMIN — INSULIN GLARGINE 15 UNITS: 100 INJECTION, SOLUTION SUBCUTANEOUS at 20:32

## 2024-11-17 RX ADMIN — TICAGRELOR 90 MG: 90 TABLET ORAL at 20:32

## 2024-11-17 RX ADMIN — PANTOPRAZOLE SODIUM 40 MG: 40 TABLET, DELAYED RELEASE ORAL at 06:02

## 2024-11-17 RX ADMIN — ASPIRIN 81 MG: 81 TABLET, CHEWABLE ORAL at 08:36

## 2024-11-17 RX ADMIN — FAMOTIDINE 20 MG: 20 TABLET ORAL at 20:32

## 2024-11-17 RX ADMIN — LAMOTRIGINE 100 MG: 200 TABLET ORAL at 20:32

## 2024-11-17 RX ADMIN — INSULIN LISPRO 1 UNITS: 100 INJECTION, SOLUTION INTRAVENOUS; SUBCUTANEOUS at 20:32

## 2024-11-17 RX ADMIN — Medication 2000 UNITS: at 08:37

## 2024-11-17 RX ADMIN — PRAZOSIN HYDROCHLORIDE 1 MG: 1 CAPSULE ORAL at 20:34

## 2024-11-17 RX ADMIN — CARVEDILOL 25 MG: 25 TABLET, FILM COATED ORAL at 16:26

## 2024-11-17 RX ADMIN — ACETAMINOPHEN 650 MG: 325 TABLET ORAL at 20:45

## 2024-11-17 RX ADMIN — TICAGRELOR 90 MG: 90 TABLET ORAL at 08:37

## 2024-11-17 RX ADMIN — ENOXAPARIN SODIUM 40 MG: 100 INJECTION SUBCUTANEOUS at 08:37

## 2024-11-17 RX ADMIN — GUAIFENESIN SYRUP AND DEXTROMETHORPHAN 5 ML: 100; 10 SYRUP ORAL at 20:45

## 2024-11-17 RX ADMIN — FAMOTIDINE 20 MG: 20 TABLET ORAL at 08:37

## 2024-11-17 RX ADMIN — ATORVASTATIN CALCIUM 80 MG: 80 TABLET, FILM COATED ORAL at 20:32

## 2024-11-17 RX ADMIN — LAMOTRIGINE 100 MG: 200 TABLET ORAL at 08:37

## 2024-11-17 RX ADMIN — CARVEDILOL 25 MG: 25 TABLET, FILM COATED ORAL at 08:37

## 2024-11-17 ASSESSMENT — PAIN DESCRIPTION - DESCRIPTORS: DESCRIPTORS: ACHING

## 2024-11-17 ASSESSMENT — PAIN SCALES - GENERAL
PAINLEVEL_OUTOF10: 0
PAINLEVEL_OUTOF10: 7

## 2024-11-17 ASSESSMENT — PAIN - FUNCTIONAL ASSESSMENT: PAIN_FUNCTIONAL_ASSESSMENT: ACTIVITIES ARE NOT PREVENTED

## 2024-11-17 ASSESSMENT — PAIN DESCRIPTION - LOCATION: LOCATION: ARM;SHOULDER

## 2024-11-17 ASSESSMENT — PAIN DESCRIPTION - ORIENTATION: ORIENTATION: RIGHT

## 2024-11-17 ASSESSMENT — PAIN DESCRIPTION - PAIN TYPE: TYPE: CHRONIC PAIN

## 2024-11-17 NOTE — PLAN OF CARE
Problem: Chronic Conditions and Co-morbidities  Goal: Patient's chronic conditions and co-morbidity symptoms are monitored and maintained or improved  Outcome: Progressing  Flowsheets (Taken 11/17/2024 0840)  Care Plan - Patient's Chronic Conditions and Co-Morbidity Symptoms are Monitored and Maintained or Improved: Monitor and assess patient's chronic conditions and comorbid symptoms for stability, deterioration, or improvement     Problem: Discharge Planning  Goal: Discharge to home or other facility with appropriate resources  Outcome: Progressing  Flowsheets (Taken 11/17/2024 0840)  Discharge to home or other facility with appropriate resources: Refer to discharge planning if patient needs post-hospital services based on physician order or complex needs related to functional status, cognitive ability or social support system     Problem: Skin/Tissue Integrity  Goal: Absence of new skin breakdown  Description: 1.  Monitor for areas of redness and/or skin breakdown  2.  Assess vascular access sites hourly  3.  Every 4-6 hours minimum:  Change oxygen saturation probe site  4.  Every 4-6 hours:  If on nasal continuous positive airway pressure, respiratory therapy assess nares and determine need for appliance change or resting period.  Outcome: Progressing     Problem: Safety - Adult  Goal: Free from fall injury  11/17/2024 0843 by Sera Florez, RN  Outcome: Progressing  11/16/2024 1951 by Jesus Moreau, RN  Outcome: Progressing     Problem: Pain  Goal: Verbalizes/displays adequate comfort level or baseline comfort level  Outcome: Progressing     Problem: Neurosensory - Adult  Goal: Achieves stable or improved neurological status  Outcome: Progressing  Flowsheets (Taken 11/17/2024 0840)  Achieves stable or improved neurological status: Assess for and report changes in neurological status  Goal: Achieves maximal functionality and self care  Outcome: Progressing  Flowsheets (Taken 11/17/2024 0840)  Achieves

## 2024-11-17 NOTE — PLAN OF CARE
Problem: Safety - Adult  Goal: Free from fall injury  11/16/2024 1951 by Jesus Moreau RN  Outcome: Progressing  11/16/2024 1320 by Renetta Lee LPN  Outcome: Progressing

## 2024-11-17 NOTE — PROGRESS NOTES
Patient:   Nguyen Olivo  MR#:    [id number]   Room:    0828/828-02   YOB: 1965  Date of Progress Note: 11/17/2024  Time of Note                           7:50 AM  Consulting Physician:   Mio Boo M.D.  Attending Physician:  Mio Boo MD     Chief complaint Acute ischemic stroke     S:This 59 y.o. male  eft-handed male with history of CAD with stenting on 2021 on ASA and Brillinta, HTN, Bipolar disorder, type II DM, dyslipidemia, and GERD. He presented to [facility] ER on 11/6/24 with slurred speech and right-sided weakness that developed when he woke up around 9 a.m. Last know normal as 4 a.m. when he went to bed. Work-up in ER CT head no acute findings, normal CT perfusion, CTA head/neck with 30-40% stenosis within left posterior cerebral artery, no other significant vascular occlusion, otherwise no carotid stenosis. He was not a candidate for TNK d/t being out of the time window. He was admitted to the Hospitalist service with consult for neurology. He was seen by Dr. Rian Nance, who suspected stroke. Dr. Nance recommended ASA, statin, and permissive hypertension. Echo was negative. MRI done revealed an acute lacunar type infarct seen within the lateral left thalamus measuring up to 9.6 mm AP maximum. Patient continues to have right-sided weakness and dysarthria. He is participating in PT/OT/SPT. He is felt to need a stay on Rehab to work towards his goal of returning home with assist of his daughter. He is now felt ready to start the Rehab program.  Overall doing well this morning.  No new issues. Feels well.    REVIEW OF SYSTEMS:  Constitutional: No fevers No chills  Neck:No stiffness  Respiratory: No shortness of breath  Cardiovascular: No chest pain No palpitations  Gastrointestinal: No abdominal pain    Genitourinary: No Dysuria  Neurological: No headache, no confusion    Past Medical History:      Diagnosis Date    Bipolar disorder, current episode mixed, mild (HCC)     diagnosed at

## 2024-11-18 LAB
ALBUMIN SERPL-MCNC: 3.8 G/DL (ref 3.5–5.2)
ALP SERPL-CCNC: 90 U/L (ref 40–129)
ALT SERPL-CCNC: 18 U/L (ref 5–41)
ANION GAP SERPL CALCULATED.3IONS-SCNC: 11 MMOL/L (ref 7–19)
AST SERPL-CCNC: 17 U/L (ref 5–40)
BASOPHILS # BLD: 0 K/UL (ref 0–0.2)
BASOPHILS NFR BLD: 0 % (ref 0–1)
BILIRUB SERPL-MCNC: 0.5 MG/DL (ref 0.2–1.2)
BUN SERPL-MCNC: 15 MG/DL (ref 6–20)
CALCIUM SERPL-MCNC: 9.4 MG/DL (ref 8.6–10)
CHLORIDE SERPL-SCNC: 99 MMOL/L (ref 98–111)
CO2 SERPL-SCNC: 26 MMOL/L (ref 22–29)
CREAT SERPL-MCNC: 0.7 MG/DL (ref 0.7–1.2)
EOSINOPHIL # BLD: 0.14 K/UL (ref 0–0.6)
EOSINOPHIL NFR BLD: 1 % (ref 0–5)
ERYTHROCYTE [DISTWIDTH] IN BLOOD BY AUTOMATED COUNT: 13.5 % (ref 11.5–14.5)
GLUCOSE BLD-MCNC: 122 MG/DL (ref 70–99)
GLUCOSE BLD-MCNC: 135 MG/DL (ref 70–99)
GLUCOSE BLD-MCNC: 154 MG/DL (ref 70–99)
GLUCOSE BLD-MCNC: 211 MG/DL (ref 70–99)
GLUCOSE SERPL-MCNC: 143 MG/DL (ref 70–99)
HCT VFR BLD AUTO: 36.9 % (ref 42–52)
HGB BLD-MCNC: 12 G/DL (ref 14–18)
IMM GRANULOCYTES # BLD: 0.1 K/UL
LYMPHOCYTES # BLD: 7.8 K/UL (ref 1.1–4.5)
LYMPHOCYTES NFR BLD: 55 % (ref 20–40)
MCH RBC QN AUTO: 28.6 PG (ref 27–31)
MCHC RBC AUTO-ENTMCNC: 32.5 G/DL (ref 33–37)
MCV RBC AUTO: 88.1 FL (ref 80–94)
MONOCYTES # BLD: 0.4 K/UL (ref 0–0.9)
MONOCYTES NFR BLD: 3 % (ref 0–10)
NEUTROPHILS # BLD: 5.8 K/UL (ref 1.5–7.5)
NEUTS BAND NFR BLD MANUAL: 1 % (ref 0–5)
NEUTS SEG NFR BLD: 40 % (ref 50–65)
PERFORMED ON: ABNORMAL
PLATELET # BLD AUTO: 386 K/UL (ref 130–400)
PLATELET SLIDE REVIEW: ADEQUATE
PMV BLD AUTO: 11.2 FL (ref 9.4–12.4)
POTASSIUM SERPL-SCNC: 3.9 MMOL/L (ref 3.5–5)
PROT SERPL-MCNC: 7.2 G/DL (ref 6.4–8.3)
RBC # BLD AUTO: 4.19 M/UL (ref 4.7–6.1)
SODIUM SERPL-SCNC: 136 MMOL/L (ref 136–145)
WBC # BLD AUTO: 14.2 K/UL (ref 4.8–10.8)

## 2024-11-18 PROCEDURE — 6370000000 HC RX 637 (ALT 250 FOR IP): Performed by: PSYCHIATRY & NEUROLOGY

## 2024-11-18 PROCEDURE — 97130 THER IVNTJ EA ADDL 15 MIN: CPT

## 2024-11-18 PROCEDURE — 1180000000 HC REHAB R&B

## 2024-11-18 PROCEDURE — 36415 COLL VENOUS BLD VENIPUNCTURE: CPT

## 2024-11-18 PROCEDURE — 97110 THERAPEUTIC EXERCISES: CPT

## 2024-11-18 PROCEDURE — 94760 N-INVAS EAR/PLS OXIMETRY 1: CPT

## 2024-11-18 PROCEDURE — 6360000002 HC RX W HCPCS: Performed by: STUDENT IN AN ORGANIZED HEALTH CARE EDUCATION/TRAINING PROGRAM

## 2024-11-18 PROCEDURE — 82962 GLUCOSE BLOOD TEST: CPT

## 2024-11-18 PROCEDURE — 80053 COMPREHEN METABOLIC PANEL: CPT

## 2024-11-18 PROCEDURE — 97129 THER IVNTJ 1ST 15 MIN: CPT

## 2024-11-18 PROCEDURE — 97535 SELF CARE MNGMENT TRAINING: CPT

## 2024-11-18 PROCEDURE — 97530 THERAPEUTIC ACTIVITIES: CPT

## 2024-11-18 PROCEDURE — 97116 GAIT TRAINING THERAPY: CPT

## 2024-11-18 PROCEDURE — 85025 COMPLETE CBC W/AUTO DIFF WBC: CPT

## 2024-11-18 PROCEDURE — 6370000000 HC RX 637 (ALT 250 FOR IP): Performed by: STUDENT IN AN ORGANIZED HEALTH CARE EDUCATION/TRAINING PROGRAM

## 2024-11-18 PROCEDURE — 99232 SBSQ HOSP IP/OBS MODERATE 35: CPT | Performed by: PSYCHIATRY & NEUROLOGY

## 2024-11-18 RX ORDER — CALCIUM CARBONATE 500 MG/1
500 TABLET, CHEWABLE ORAL 3 TIMES DAILY PRN
Status: DISCONTINUED | OUTPATIENT
Start: 2024-11-18 | End: 2024-11-27 | Stop reason: HOSPADM

## 2024-11-18 RX ADMIN — TICAGRELOR 90 MG: 90 TABLET ORAL at 07:37

## 2024-11-18 RX ADMIN — ASPIRIN 81 MG: 81 TABLET, CHEWABLE ORAL at 07:38

## 2024-11-18 RX ADMIN — LAMOTRIGINE 100 MG: 200 TABLET ORAL at 21:33

## 2024-11-18 RX ADMIN — GUAIFENESIN SYRUP AND DEXTROMETHORPHAN 5 ML: 100; 10 SYRUP ORAL at 21:37

## 2024-11-18 RX ADMIN — ACETAMINOPHEN 650 MG: 325 TABLET ORAL at 21:36

## 2024-11-18 RX ADMIN — ENOXAPARIN SODIUM 40 MG: 100 INJECTION SUBCUTANEOUS at 07:38

## 2024-11-18 RX ADMIN — INSULIN GLARGINE 15 UNITS: 100 INJECTION, SOLUTION SUBCUTANEOUS at 21:37

## 2024-11-18 RX ADMIN — TICAGRELOR 90 MG: 90 TABLET ORAL at 21:33

## 2024-11-18 RX ADMIN — INSULIN LISPRO 1 UNITS: 100 INJECTION, SOLUTION INTRAVENOUS; SUBCUTANEOUS at 17:10

## 2024-11-18 RX ADMIN — PANTOPRAZOLE SODIUM 40 MG: 40 TABLET, DELAYED RELEASE ORAL at 06:08

## 2024-11-18 RX ADMIN — CARVEDILOL 25 MG: 25 TABLET, FILM COATED ORAL at 17:10

## 2024-11-18 RX ADMIN — PRAZOSIN HYDROCHLORIDE 1 MG: 1 CAPSULE ORAL at 21:33

## 2024-11-18 RX ADMIN — ANTACID TABLETS 500 MG: 500 TABLET, CHEWABLE ORAL at 07:36

## 2024-11-18 RX ADMIN — LAMOTRIGINE 100 MG: 200 TABLET ORAL at 07:38

## 2024-11-18 RX ADMIN — ATORVASTATIN CALCIUM 80 MG: 80 TABLET, FILM COATED ORAL at 21:33

## 2024-11-18 RX ADMIN — FAMOTIDINE 20 MG: 20 TABLET ORAL at 21:33

## 2024-11-18 RX ADMIN — CARVEDILOL 25 MG: 25 TABLET, FILM COATED ORAL at 07:37

## 2024-11-18 RX ADMIN — FAMOTIDINE 20 MG: 20 TABLET ORAL at 07:37

## 2024-11-18 RX ADMIN — Medication 2000 UNITS: at 07:38

## 2024-11-18 ASSESSMENT — PAIN DESCRIPTION - DESCRIPTORS: DESCRIPTORS: ACHING

## 2024-11-18 ASSESSMENT — PAIN DESCRIPTION - ORIENTATION: ORIENTATION: RIGHT

## 2024-11-18 ASSESSMENT — PAIN DESCRIPTION - LOCATION: LOCATION: ARM;SHOULDER

## 2024-11-18 ASSESSMENT — PAIN SCALES - GENERAL
PAINLEVEL_OUTOF10: 0
PAINLEVEL_OUTOF10: 0
PAINLEVEL_OUTOF10: 5

## 2024-11-18 ASSESSMENT — PAIN - FUNCTIONAL ASSESSMENT: PAIN_FUNCTIONAL_ASSESSMENT: ACTIVITIES ARE NOT PREVENTED

## 2024-11-18 ASSESSMENT — PAIN DESCRIPTION - PAIN TYPE: TYPE: CHRONIC PAIN

## 2024-11-18 NOTE — PROGRESS NOTES
Occupational Therapy  Facility/Department: [facility] 8 REHAB UNIT  Rehabilitation Occupational Therapy Daily Treatment Note    Date: 24  Patient Name: Nguyen Olivo       Room: 0828/828-02  MRN: [id number]  Account: [id number]   : 1965  (59 y.o.) Gender: male                Treatment Diagnosis: (P) L CVA   Past Medical History:  has a past medical history of Bipolar disorder, current episode mixed, mild (HCC), Depression, Diabetes mellitus (HCC), GERD (gastroesophageal reflux disease), Hyperlipidemia, and Hypertension.  Past Surgical History:   has a past surgical history that includes Cholecystectomy (2006); Appendectomy; Elbow surgery; and Coronary angioplasty with stent ().     24 1100   Restrictions/Precautions   Restrictions/Precautions Fall Risk   Balance   Standing Balance Contact guard assistance   Functional Mobility   Functional - Mobility Device Wheelchair   Activity Retrieve items;Transport items;To/From therapy gym   Assist Level Supervision   Transfers   Sit to stand Contact guard assistance   Stand to sit Contact guard assistance   OT Exercises   A/AROM Exercises RUE with and without facilitation   Motor Control/Coordination RUE grasp release. isolated finger extension   Activity Tolerance   Activity Tolerance Patient limited by fatigue   Assessment   Performance deficits / Impairments Decreased functional mobility ;Decreased ADL status;Decreased ROM;Decreased safe awareness;Decreased sensation;Decreased fine motor control;Decreased strength;Decreased balance;Decreased endurance;Decreased high-level IADLs   Assessment Discussed potential d/c plans. Pt lives in a camper on his daughter's property. States d/t family conflict he is unable to reside in her home or his other local daughter's home (lives with pt's stepfather). If unable to manage the camper when he d/cs he states he can stay with a friend for a while. Reports having difficulty with his R knee prior to stroke and that

## 2024-11-18 NOTE — PATIENT CARE CONFERENCE
[facility] ACUTE INPATIENT REHABILITATION  TEAM CONFERENCE NOTE    Date: 2024  Patient Name: Nguyen Olivo        MRN: [id number]    : 1965  (59 y.o.)  Gender: male      Diagnosis: LEFT LATERAL THALAMIC STROKE W/RIGHT SIDED WEAKNESS      PHYSICAL THERAPY    GROSS ASSESSMENT       BED MOBILITY  Bed mobility  Rolling to Left: Minimal assistance, Contact guard assistance (with rail)  Rolling to Right: Stand by assistance (with rail)  Supine to Sit: Stand by assistance  Sit to Supine: Stand by assistance  Bed Mobility Comments: on mat table, tactil cues for tchnique       TRANSFERS  Transfers  Sit to Stand: Contact guard assistance, Minimal Assistance  Stand to Sit: Contact guard assistance, Minimal Assistance  Bed to Chair: Moderate assistance, Minimal assistance (WC to bed, to left side)  Squat Pivot Transfers: Minimal Assistance, Contact guard assistance (WC to recliner than recliner to WC to assist nuring getting weight on patient,)  Comment: cues to use proper technique (hand placement) with STS  WHEELCHAIR PROPULSION  Propulsion 1  Propulsion: Manual  Method: LLE, LUE  Level of Assistance: Contact guard assistance, Stand by assistance  Description/ Details: Able to propel with multiple turns  Distance: 150 FT  AMBULATION  Ambulation  Surface: Level tile  Device: Rolling Walker (+ dycem on R handle of RW)  Other Apparatus: AFO, Wheelchair follow  Assistance: Contact guard assistance, Minimal assistance  Quality of Gait: Slight ER of hip, hyperextension of knee on RLE, pt maintained  R side of walker unassisted, occassional cues need for sequencing of gait; downward gaze  Gait Deviations: Slow Kelsi, Decreased step length, Decreased step height, Staggers  Distance: 25 FT  Comments: Instictually takes too large of step with L LE with reminders to decrease  More Ambulation?: No  STAIRS     GOALS:  Short Term Goals  Time Frame for Short Term Goals: 2 WEEKS  Short Term Goal 1: BED MOBILITY CGA WITH  hygiene.  Short Term Goal 4: CGA with  LB dressing.  Short Term Goal 5: Supervision with donning/doffing socks and shoes.  Additional Goals?: Yes    LTGs:  Long Term Goals  Time Frame for Long Term Goals : 3-4 weeks  Long Term Goal 1: Mod I with toileting and toilet transfers.  Long Term Goal 2: Mod I with bathing hygiene.  Long Term Goal 3: Mod I with overall dressing.  Long Term Goal 4: Mod I with homemaking tasks.  Long Term Goal 5: Patient verbalize DME needs.    Assessment:  Performance deficits / Impairments: Decreased functional mobility , Decreased ADL status, Decreased ROM, Decreased safe awareness, Decreased sensation, Decreased fine motor control, Decreased strength, Decreased balance, Decreased endurance, Decreased high-level IADLs                 NUTRITION  Current Wt: 84 kg (185 lb 3 oz)/ BMI 29  Admission Wt: Admission Body Weight: 82.2 kg (181 lb 3.5 oz)  Oral Diet Orders: Regular  4 carb choices (60 gm/meal)    Oral Nutrition Supplement (ONS) Orders:  None ordered  Pt has been eating well for most meals with good appetite. Intakes usually range from %. Weight noted to be up in past week. Working with SLP for some dysphagia.   Please see nutrition note for details.      NURSING    Past Medical History:        Diagnosis Date    Bipolar disorder, current episode mixed, mild (HCC)     diagnosed at [facility]    Depression     Diabetes mellitus (HCC)     GERD (gastroesophageal reflux disease)     Hyperlipidemia     Hypertension        Vitals:    11/18/24 1549 11/18/24 1710 11/19/24 0530 11/19/24 0744   BP: 128/73 130/76 119/77 108/72   Pulse: 87 88 74 80   Resp: 16  18    Temp: 97.5 °F (36.4 °C)  97.2 °F (36.2 °C)    TempSrc:   Temporal    SpO2: 97%  100%    Weight:       Height:            SpO2: 100 %    On Room Air    Wounds/Incisions/Ulcers:  Rhonda area, inner buttocks red. Face scab bleeding/B/A.      Dany Scale Score: 18    Pain: Patient's pain is currently controlled with Tylenol prn.

## 2024-11-18 NOTE — PROGRESS NOTES
Physical Therapy  Name: Nguyen Olivo  MRN:  [id number]  Date of service:  11/18/2024 11/18/24 1415   Restrictions/Precautions   Restrictions/Precautions Fall Risk   General   Additional Pertinent Hx CAD WITH STENTING, BIPOLAR, DM2, GRD   Diagnosis LEFT LATERAL THALAMIC STROKE W/RIGHT SIDED WEAKNESS   General Comment   Comments sitting in WC, OT reported pt was circling floor carrying AFO>apparently he though he had more PT today   Subjective   Subjective Pt eager to participate. States he is feeling better after taking a nap.   Subjective   Subjective L low back and R heel cord   Pain 3-4/10   Transfers   Sit to Stand Contact guard assistance;Minimal Assistance   Stand to Sit Contact guard assistance;Minimal Assistance   Comment cues to use proper technique (hand placement) with STS   Ambulation   Surface Level tile   Device Rolling Walker  (+ dycem on R handle of RW)   Other Apparatus AFO;Wheelchair follow   Assistance Contact guard assistance;Minimal assistance   Quality of Gait Slight ER of hip, hyperextension of knee on RLE, pt maintained  R side of walker unassisted, occassional cues need for sequencing of gait; downward gaze   Gait Deviations Slow Kelsi;Decreased step length;Decreased step height;Staggers   Distance 25 FT   Comments Instictually takes too large of step with L LE with reminders to decrease   Ambulation 2   Surface - 2 level tile   Device 2 Rolling Walker   Other Apparatus 2 Wheelchair follow   Assistance 2 Minimal assistance   Quality of Gait 2 as above though needed assist to reposition R hand x 1 and having more difficulty wt shifting and positioning R foot (sometimes after placing R foot it returns to near starting position as he never truly plants it and bears wt   Distance 25'   PT Exercises   Motor Control/Coordination // bars with dycem under R hand: x 10 R hip flex (knee to therapist hand), x 10 diagonal wt shifts (R foot fwd), x 10 R foot to targets (2 positioned ant to each

## 2024-11-18 NOTE — PROGRESS NOTES
Patient:   Nguyen Olivo  MR#:    [id number]   Room:    0828/828-02   YOB: 1965  Date of Progress Note: 11/18/2024  Time of Note                           7:09 AM  Consulting Physician:   Mio Boo M.D.  Attending Physician:  Mio Boo MD     Chief complaint Acute ischemic stroke     S:This 59 y.o. male  eft-handed male with history of CAD with stenting on 2021 on ASA and Brillinta, HTN, Bipolar disorder, type II DM, dyslipidemia, and GERD. He presented to [facility] ER on 11/6/24 with slurred speech and right-sided weakness that developed when he woke up around 9 a.m. Last know normal as 4 a.m. when he went to bed. Work-up in ER CT head no acute findings, normal CT perfusion, CTA head/neck with 30-40% stenosis within left posterior cerebral artery, no other significant vascular occlusion, otherwise no carotid stenosis. He was not a candidate for TNK d/t being out of the time window. He was admitted to the Hospitalist service with consult for neurology. He was seen by Dr. Rian Nance, who suspected stroke. Dr. Nance recommended ASA, statin, and permissive hypertension. Echo was negative. MRI done revealed an acute lacunar type infarct seen within the lateral left thalamus measuring up to 9.6 mm AP maximum. Patient continues to have right-sided weakness and dysarthria. He is participating in PT/OT/SPT. He is felt to need a stay on Rehab to work towards his goal of returning home with assist of his daughter. He is now felt ready to start the Rehab program.  No new issues.  Overall feeling well.    REVIEW OF SYSTEMS:  Constitutional: No fevers No chills  Neck:No stiffness  Respiratory: No shortness of breath  Cardiovascular: No chest pain No palpitations  Gastrointestinal: No abdominal pain    Genitourinary: No Dysuria  Neurological: No headache, no confusion    Past Medical History:      Diagnosis Date    Bipolar disorder, current episode mixed, mild (HCC)     diagnosed at [facility]     requiring Min A.   Discharge Recommendations Continue to assess pending progress;Home with Home health PT;Home with assist PRN   Safety Devices   Type of Devices Patient at risk for falls;Gait belt;Left in chair  (W/ OT)               Electronically signed by Anne Marie Harding PTA on 11/16/2024 at 3:34 PM                     RECORD REVIEW: Previous medical records, medications were reviewed at today's visit    IMPRESSION:   1.  Acute ischemic stroke-on aspirin/statin  2.  Hyperlipidemia-on statin  3.  DVT prophylaxis-Lovenox  4.  Hypertension-on meds monitor  5.  GI/GERD-Pepcid/Protonix/bowel regimen  6.  Diabetes-on insulin monitor blood sugar  7.  Mood disorder-on meds monitor  8.  Smoker-NicoDerm patch  9.  Coronary artery disease-on aspirin/Brilinta/statin  10.  Vitamin D deficiency-on vitamin D  11.  PT/OT/speech       Continue current care      Expected duration and frequency therapy: 180 minutes per day, 5 days per week    CALL WITH ANY QUESTIONS  [phone] CELL  Dr Mio Boo

## 2024-11-18 NOTE — PATIENT CARE CONFERENCE
Transfers: CARE Score: 4  Comment: max cues for safety       Picking Up Object:  CARE Score: 1          UE Functionin/13/24 0900   LUE AROM (degrees)   LUE AROM  WNL   Left Hand AROM (degrees)   Left Hand AROM WNL   RUE AROM (degrees)   RUE AROM  Exceptions   R Shoulder Flexion 0-180 20 degrees AG with compensated movements. Will need to assess in GE plane   R Elbow Flexion 0-145 Minimal movement in GE plane   R Elbow Extension 145-0 Minimal movement in GE plane   R Wrist Flexion 0-80 Minimal in GE plane   Right Hand AROM (degrees)   Right Hand AROM Exceptions   Right Hand General AROM Minimal finger flexion, able to complete partial extension of all but thumb.        Pain Assessment:   No pain        STGs:  Short Term Goals  Time Frame for Short Term Goals: 1-2 weeks  Short Term Goal 1: MET  Short Term Goal 2: MET  Short Term Goal 3: Supervision with bathing hygiene.  Short Term Goal 4: CGA with  LB dressing.  Short Term Goal 5: Supervision with donning/doffing socks and shoes.  Additional Goals?: Yes    LTGs:  Long Term Goals  Time Frame for Long Term Goals : 3-4 weeks  Long Term Goal 1: Mod I with toileting and toilet transfers.  Long Term Goal 2: Mod I with bathing hygiene.  Long Term Goal 3: Mod I with overall dressing.  Long Term Goal 4: Mod I with homemaking tasks.  Long Term Goal 5: Patient verbalize DME needs.    Assessment:  Performance deficits / Impairments: Decreased functional mobility , Decreased ADL status, Decreased ROM, Decreased safe awareness, Decreased sensation, Decreased fine motor control, Decreased strength, Decreased balance, Decreased endurance, Decreased high-level IADLs                 NUTRITION  Current Wt: 84 kg (185 lb 3 oz)/ BMI 29  Admission Wt: Admission Body Weight: 82.2 kg (181 lb 3.5 oz)  Oral Diet Orders: Regular  4 carb choices (60 gm/meal)    Oral Nutrition Supplement (ONS) Orders:  None ordered  Pt has been eating well for most meals with good appetite. Intakes  usually range from %. Weight noted to be up in past week. Working with SLP for some dysphagia.   Please see nutrition note for details.      NURSING    Past Medical History:        Diagnosis Date    Bipolar disorder, current episode mixed, mild (HCC)     diagnosed at [facility]    Depression     Diabetes mellitus (HCC)     GERD (gastroesophageal reflux disease)     Hyperlipidemia     Hypertension        Vitals:    11/18/24 1549 11/18/24 1710 11/19/24 0530 11/19/24 0744   BP: 128/73 130/76 119/77 108/72   Pulse: 87 88 74 80   Resp: 16  18    Temp: 97.5 °F (36.4 °C)  97.2 °F (36.2 °C)    TempSrc:   Temporal    SpO2: 97%  100%    Weight:       Height:            SpO2: 100 %    On Room Air    Wounds/Incisions/Ulcers:  Rhonda area, inner buttocks red. Face scab bleeding/B/A.      Dany Scale Score: 18    Pain: Patient's pain is currently controlled with Tylenol prn.     Consultations/Labs/X-rays:     Family Education: Family available and participating in education    Fall Risk:  Maria L Lisa Total Score: 35    Fall in the last week? No    Sleep Concerns: \"No concerns to address    Last BM: Last BM (including prior to admit): 11/18/24    Other Nursing Issues: Pills whole. Accuchek qid. Lantus/SSI. Lovenox. Cont B/B. Urinal. Up x1 assist. R facial droop/weakness. A/O x4. Ziopatch on d/c .,        SOCIAL WORK/CASE MANAGEMENT  Assessment: Patient lives in a camper on his daughter's property. Definite dc needs unclear at this time. Will follow closely for progress / dc needs.     Discharge Plan   Estimated Length of Stay: CM[facility] 11/28  Destination: undetermined at this time    Pass: No    Services at Discharge: Other - TBD    Equipment at Discharge: Determine closer to discharge.     Progress made in the prior week:  CGA with LB dressing.   2. Improved ambulation with rw  3.  4.  5.      Goals for following week:  CGA with toileting.   2. Indep bed mobility  3.   4.   5.     Factors facilitating achievement of predicted

## 2024-11-18 NOTE — PROGRESS NOTES
[facility]  INPATIENT SPEECH THERAPY  [facility] 8 REHAB UNIT  TIME   08:30  09:00  30 minutes     [x]Daily Note  []Progress Note     Date: 24  Patient Name: Nguyen Olivo        MRN: [id number]    Account #: [id number]  : [birth year]  (59 y.o.)  Gender: Male   Primary Provider: Rajeev MAY  Diet Recommendation: Regular solid consistency with thin liquids     Subjective:   Patient alert, in wheelchair, upon entry. No pain reported during treatment session.      Objective:  In structured activity, patient averaged 12 items per concrete divergent category with 1 minute time constraints present at independent level.     In structured activity, patient was 100% verbalizing appropriate single solutions to situations that could occur independently.    In structured activity, patient appropriately organized items into 4 categories at independent level.    Diet Solids Recommendation: Regular  Liquid Consistency Recommendation: Thin  Compensatory Swallowing Strategies : Check for pocketing of food on the Left, Small bites/sips     SHORT TERM GOAL #1:  Goal 1: The patient will utilize dysarthria strategies with minimal cues to improve speech intelligibility.     SHORT TERM GOAL #2:  Goal 2: The patient will complete tasks for problem solving, safety awareness, and verbal reasoning with minimal cues and 100% accuracy.     SHORT TERM GOAL #3:  Goal 3: The patient will complete higher level expressive language tasks with minimal cues and 100% accuracy.     Goal 4: The patient will complete tasks for recall with minimal cues and 100% accuracy.     Swallowing Short Term Goals  Short-term Goals  Goal 1: The patient will regular diet with thin liquids with minimal overt s/s of aspiration.  Goal 2: The patient will participate in swallowing reassessments to ensure safety with oral intake  Goal 3: The patient will follow swallowing precautions with minimal cues/reminders.  Goal 4: The patient will complete oral hygiene daily to prevent

## 2024-11-18 NOTE — PLAN OF CARE
Problem: Chronic Conditions and Co-morbidities  Goal: Patient's chronic conditions and co-morbidity symptoms are monitored and maintained or improved  Outcome: Progressing  Flowsheets (Taken 11/18/2024 0748)  Care Plan - Patient's Chronic Conditions and Co-Morbidity Symptoms are Monitored and Maintained or Improved: Monitor and assess patient's chronic conditions and comorbid symptoms for stability, deterioration, or improvement     Problem: Discharge Planning  Goal: Discharge to home or other facility with appropriate resources  Outcome: Progressing  Flowsheets (Taken 11/18/2024 0748)  Discharge to home or other facility with appropriate resources: Refer to discharge planning if patient needs post-hospital services based on physician order or complex needs related to functional status, cognitive ability or social support system     Problem: Skin/Tissue Integrity  Goal: Absence of new skin breakdown  Description: 1.  Monitor for areas of redness and/or skin breakdown  2.  Assess vascular access sites hourly  3.  Every 4-6 hours minimum:  Change oxygen saturation probe site  4.  Every 4-6 hours:  If on nasal continuous positive airway pressure, respiratory therapy assess nares and determine need for appliance change or resting period.  Outcome: Progressing     Problem: Safety - Adult  Goal: Free from fall injury  Outcome: Progressing     Problem: Pain  Goal: Verbalizes/displays adequate comfort level or baseline comfort level  Outcome: Progressing     Problem: Neurosensory - Adult  Goal: Achieves stable or improved neurological status  Outcome: Progressing  Flowsheets (Taken 11/18/2024 0748)  Achieves stable or improved neurological status: Assess for and report changes in neurological status  Goal: Achieves maximal functionality and self care  Outcome: Progressing  Flowsheets (Taken 11/18/2024 0748)  Achieves maximal functionality and self care: Encourage and assist patient to increase activity and self care with

## 2024-11-18 NOTE — PROGRESS NOTES
Nutrition Assessment     Type and Reason for Visit: Reassess    Nutrition Recommendations/Plan:   Continue current interventions     Malnutrition Assessment:  Malnutrition Status: At risk for malnutrition    Nutrition Assessment:  Pt has been eating well for most meals with good appetite. Intakes usually range from %. Weight noted to be up in past week. Working with SLP for some dysphagia.    Estimated Daily Nutrient Needs:  Energy (kcal):  4590-3667 (20-25/kg) Weight Used for Energy Requirements: Current     Protein (g):   (1.3-2/kg) Weight Used for Protein Requirements: Ideal        Fluid (ml/day):  9556-8943 (20-25/kg) Method Used for Fluid Requirements: 1 ml/kcal    Nutrition Related Findings:   BM 11-17, Glu 138-254 Wound Type: None    Current Nutrition Therapies:    ADULT DIET; Regular; 4 carb choices (60 gm/meal); Low Fat/Low Chol/High Fiber/[place]    Anthropometric Measures:  Height: 170.2 cm (5' 7\")  Current Body Wt: 84 kg (185 lb 3 oz)   BMI: 29    Nutrition Diagnosis:   Overweight/obese related to excessive energy intake as evidenced by BMI    Nutrition Interventions:   Food and/or Nutrient Delivery: Continue Current Diet  Nutrition Education/Counseling: No recommendation at this time  Coordination of Nutrition Care: Continue to monitor while inpatient  Plan of Care discussed with: Pt    Goals:  Goals: PO intake 75% or greater  Type of Goal: Continue current goal  Previous Goal Met: Progressing toward Goal(s)    Nutrition Monitoring and Evaluation:   Behavioral-Environmental Outcomes: None Identified  Food/Nutrient Intake Outcomes: Food and Nutrient Intake  Physical Signs/Symptoms Outcomes: Biochemical Data, Chewing or Swallowing, Fluid Status or Edema, Skin, Weight    Discharge Planning:    Continue current diet     Ayana Benavides RD  Contact: [phone]

## 2024-11-18 NOTE — PROGRESS NOTES
11/18/24 1000   Vitals   SpO2 99 %   Transfers   Squat Pivot Transfers Minimal Assistance;Contact guard assistance  (WC to recliner than recliner to WC to assist nuring getting weight on patient,)   Ambulation   Surface Level tile   Device Rolling Walker  (+ dycem on R handle of RW)   Other Apparatus AFO;Wheelchair follow   Assistance Minimal assistance   Quality of Gait Slight ER of hip, hyperextension of knee on RLE, intermittent assisted to help maintain RUE on walker is required, occassional cues need for sequencing of gait.   Distance 25 FT   Comments Starting out taking to large of steps on RLE but improved to correct step length after a few steps.   More Ambulation? No   Propulsion 1   Propulsion Manual   Method LLE;LUE   Level of Assistance Contact guard assistance;Stand by assistance   Distance 150 FT   PT Exercises   Exercise Treatment Bilateral sitting exercies hip flexion, hip extension, hip ADD, hip ABD, knee flexion, PF, DF x 20  (Manual resistance on LLE and AAROM on RLE)   Assessment   Assessment Patient reported not sleeping well last night and was fatiqued. Due to fatique only one walk was preformed with the rest of the session being focoused on bilateral sitting exercies. Patient reported SOA after walk. Oxygen saturation was check at 99%. An AFO was initiated during gait today. Would recommend  using AFO in future to help with foot position during gait. No need for therapist to assist with foot placement with only slight ER of hip. Session was finished with sitting exercies to not fatique patient to much before OT right after session.     Electronically signed by REJI Maravilla on 11/18/2024 at 12:29 PM

## 2024-11-18 NOTE — PLAN OF CARE
Problem: Safety - Adult  Goal: Free from fall injury  11/17/2024 1909 by Jesus Moreau, RN  Outcome: Progressing  11/17/2024 0843 by Sear Florez, RN  Outcome: Progressing

## 2024-11-18 NOTE — PROGRESS NOTES
Occupational Therapy  Facility/Department: [facility] 8 REHAB UNIT  Rehabilitation Occupational Therapy Daily Treatment Note    Date: 24  Patient Name: Nguyen Olivo       Room: 0828/828-02  MRN: [id number]  Account: [id number]   : 1965  (59 y.o.) Gender: male                Treatment Diagnosis: (P) L CVA   Past Medical History:  has a past medical history of Bipolar disorder, current episode mixed, mild (HCC), Depression, Diabetes mellitus (HCC), GERD (gastroesophageal reflux disease), Hyperlipidemia, and Hypertension.  Past Surgical History:   has a past surgical history that includes Cholecystectomy (2006); Appendectomy; Elbow surgery; and Coronary angioplasty with stent ().     24 0900   Restrictions/Precautions   Restrictions/Precautions Fall Risk   Subjective   Subjective Pt states he did not sleep well last night. See assessment.   ADL   Grooming Verbal cueing;Increased time to complete   UE Bathing Stand by assistance   UE Dressing Minimal assistance   UE Dressing Skilled Clinical Factors max cues for technique   Balance   Standing Balance Contact guard assistance   Standing Balance   Time 2 mins, 3 mins   Activity clthing management, RUE tabletop task   Functional Mobility   Functional - Mobility Device Rolling Walker   Activity Other   Assist Level Moderate assistance   Functional Mobility Comments Mod vs Min A this session   Transfers   Sit to stand Contact guard assistance   Stand to sit Contact guard assistance   Toilet Transfers   Toilet - Technique Stand pivot   Toilet Transfer Contact guard assistance   Wheelchair Bed Transfers   Wheelchair/Bed - Technique Stand pivot   Equipment Used Wheelchair   Level of Asssistance Stand by assistance   OT Exercises   Exercise Treatment supine and seated EOM, PG GE and AG, less control vs Sat session. Able to complete more reps with Cinthya on RUE at 5#. LUE 20#   A/AROM Exercises 1# Tbar sh pro/ret and sh flex/ext   Motor

## 2024-11-19 LAB
GLUCOSE BLD-MCNC: 130 MG/DL (ref 70–99)
GLUCOSE BLD-MCNC: 132 MG/DL (ref 70–99)
GLUCOSE BLD-MCNC: 158 MG/DL (ref 70–99)
GLUCOSE BLD-MCNC: 181 MG/DL (ref 70–99)
PERFORMED ON: ABNORMAL

## 2024-11-19 PROCEDURE — 97116 GAIT TRAINING THERAPY: CPT

## 2024-11-19 PROCEDURE — 97530 THERAPEUTIC ACTIVITIES: CPT

## 2024-11-19 PROCEDURE — 6360000002 HC RX W HCPCS: Performed by: STUDENT IN AN ORGANIZED HEALTH CARE EDUCATION/TRAINING PROGRAM

## 2024-11-19 PROCEDURE — 92526 ORAL FUNCTION THERAPY: CPT

## 2024-11-19 PROCEDURE — 99232 SBSQ HOSP IP/OBS MODERATE 35: CPT | Performed by: PSYCHIATRY & NEUROLOGY

## 2024-11-19 PROCEDURE — 1180000000 HC REHAB R&B

## 2024-11-19 PROCEDURE — 6370000000 HC RX 637 (ALT 250 FOR IP): Performed by: PSYCHIATRY & NEUROLOGY

## 2024-11-19 PROCEDURE — 6370000000 HC RX 637 (ALT 250 FOR IP): Performed by: STUDENT IN AN ORGANIZED HEALTH CARE EDUCATION/TRAINING PROGRAM

## 2024-11-19 PROCEDURE — 97110 THERAPEUTIC EXERCISES: CPT

## 2024-11-19 PROCEDURE — 97535 SELF CARE MNGMENT TRAINING: CPT

## 2024-11-19 PROCEDURE — 97130 THER IVNTJ EA ADDL 15 MIN: CPT

## 2024-11-19 PROCEDURE — 82962 GLUCOSE BLOOD TEST: CPT

## 2024-11-19 PROCEDURE — 97129 THER IVNTJ 1ST 15 MIN: CPT

## 2024-11-19 RX ADMIN — TICAGRELOR 90 MG: 90 TABLET ORAL at 07:43

## 2024-11-19 RX ADMIN — LAMOTRIGINE 100 MG: 200 TABLET ORAL at 20:15

## 2024-11-19 RX ADMIN — TICAGRELOR 90 MG: 90 TABLET ORAL at 20:15

## 2024-11-19 RX ADMIN — ENOXAPARIN SODIUM 40 MG: 100 INJECTION SUBCUTANEOUS at 07:43

## 2024-11-19 RX ADMIN — INSULIN GLARGINE 15 UNITS: 100 INJECTION, SOLUTION SUBCUTANEOUS at 20:16

## 2024-11-19 RX ADMIN — ATORVASTATIN CALCIUM 80 MG: 80 TABLET, FILM COATED ORAL at 20:16

## 2024-11-19 RX ADMIN — INSULIN LISPRO 1 UNITS: 100 INJECTION, SOLUTION INTRAVENOUS; SUBCUTANEOUS at 17:09

## 2024-11-19 RX ADMIN — PANTOPRAZOLE SODIUM 40 MG: 40 TABLET, DELAYED RELEASE ORAL at 05:58

## 2024-11-19 RX ADMIN — ACETAMINOPHEN 650 MG: 325 TABLET ORAL at 10:36

## 2024-11-19 RX ADMIN — GUAIFENESIN SYRUP AND DEXTROMETHORPHAN 5 ML: 100; 10 SYRUP ORAL at 20:22

## 2024-11-19 RX ADMIN — ASPIRIN 81 MG: 81 TABLET, CHEWABLE ORAL at 07:44

## 2024-11-19 RX ADMIN — Medication 2000 UNITS: at 07:43

## 2024-11-19 RX ADMIN — CARVEDILOL 25 MG: 25 TABLET, FILM COATED ORAL at 07:44

## 2024-11-19 RX ADMIN — LAMOTRIGINE 100 MG: 200 TABLET ORAL at 07:43

## 2024-11-19 RX ADMIN — FAMOTIDINE 20 MG: 20 TABLET ORAL at 07:44

## 2024-11-19 RX ADMIN — ACETAMINOPHEN 650 MG: 325 TABLET ORAL at 20:15

## 2024-11-19 RX ADMIN — CARVEDILOL 25 MG: 25 TABLET, FILM COATED ORAL at 17:09

## 2024-11-19 RX ADMIN — PRAZOSIN HYDROCHLORIDE 1 MG: 1 CAPSULE ORAL at 20:15

## 2024-11-19 RX ADMIN — FAMOTIDINE 20 MG: 20 TABLET ORAL at 20:15

## 2024-11-19 ASSESSMENT — PAIN DESCRIPTION - DESCRIPTORS
DESCRIPTORS: ACHING
DESCRIPTORS: ACHING

## 2024-11-19 ASSESSMENT — PAIN SCALES - GENERAL
PAINLEVEL_OUTOF10: 3
PAINLEVEL_OUTOF10: 0
PAINLEVEL_OUTOF10: 4
PAINLEVEL_OUTOF10: 0

## 2024-11-19 ASSESSMENT — PAIN DESCRIPTION - LOCATION
LOCATION: BACK
LOCATION: BACK;KNEE;HIP

## 2024-11-19 ASSESSMENT — PAIN DESCRIPTION - ORIENTATION
ORIENTATION: MID;RIGHT;LEFT
ORIENTATION: LOWER

## 2024-11-19 NOTE — PLAN OF CARE
Problem: Safety - Adult  Goal: Free from fall injury  11/18/2024 1912 by Jesus Moreau RN  Outcome: Progressing  11/18/2024 1049 by Renetta Lee LPN  Outcome: Progressing

## 2024-11-19 NOTE — PROGRESS NOTES
Facility/Department: [facility] 8 REHAB UNIT  Occupational Therapy     Name: Nguyen Olivo  : 1965  MRN: [id number]  Date of Service: 2024    Discharge Recommendations:  Continue to assess pending progress    Past Medical History:  has a past medical history of Bipolar disorder, current episode mixed, mild (HCC), Depression, Diabetes mellitus (HCC), GERD (gastroesophageal reflux disease), Hyperlipidemia, and Hypertension.  Past Surgical History:  has a past surgical history that includes Cholecystectomy (2006); Appendectomy; Elbow surgery; and Coronary angioplasty with stent ().    Treatment Diagnosis: L CVA    Assessment   Performance deficits / Impairments: Decreased functional mobility ;Decreased ADL status;Decreased ROM;Decreased safe awareness;Decreased sensation;Decreased fine motor control;Decreased strength;Decreased balance;Decreased endurance;Decreased high-level IADLs  Treatment Diagnosis: L CVA  Prognosis: Good  Activity Tolerance  Activity Tolerance: Patient Tolerated treatment well     Plan   Occupational Therapy Plan  Current Treatment Recommendations: Strengthening, ROM, Balance training, Functional mobility training, Endurance training, Patient/Caregiver education & training, Equipment evaluation, education, & procurement, Safety education & training, Self-Care / ADL, Home management training, Neuromuscular re-education     Restrictions  Restrictions/Precautions  Restrictions/Precautions: Fall Risk  Required Braces or Orthoses?: No    Subjective   General  Patient assessed for rehabilitation services?: Yes    Social/Functional History  Social/Functional History  Lives With: Alone  Type of Home: Mobile home ([place] on his daughter's property)  Home Layout: One level  Home Access: Stairs to enter with rails  Entrance Stairs - Number of Steps: 2  Home Equipment: Rollator  Has the patient had two or more falls in the past year or any fall with injury in the past year?: Yes  ADL Assistance:  dressing.  Short Term Goal 5: Supervision with donning/doffing socks and shoes.  Additional Goals?: Yes  Long Term Goals  Time Frame for Long Term Goals : 3-4 weeks  Long Term Goal 1: Mod I with toileting and toilet transfers.  Long Term Goal 2: Mod I with bathing hygiene.  Long Term Goal 3: Mod I with overall dressing.  Long Term Goal 4: Mod I with homemaking tasks.  Long Term Goal 5: Patient verbalize DME needs.  Patient Goals   Patient goals : Return home independently.       Therapy Time   Individual Concurrent Group Co-treatment   Time In       1000   Time Out       1100   Minutes       60   Timed Code Treatment Minutes: 60 Minutes      Electronically signed by DIMPLE William on 11/19/2024 at 3:36 PM

## 2024-11-19 NOTE — PLAN OF CARE
Problem: Chronic Conditions and Co-morbidities  Goal: Patient's chronic conditions and co-morbidity symptoms are monitored and maintained or improved  Outcome: Progressing  Flowsheets (Taken 11/19/2024 0758)  Care Plan - Patient's Chronic Conditions and Co-Morbidity Symptoms are Monitored and Maintained or Improved: Monitor and assess patient's chronic conditions and comorbid symptoms for stability, deterioration, or improvement     Problem: Discharge Planning  Goal: Discharge to home or other facility with appropriate resources  Outcome: Progressing  Flowsheets (Taken 11/19/2024 0758)  Discharge to home or other facility with appropriate resources: Refer to discharge planning if patient needs post-hospital services based on physician order or complex needs related to functional status, cognitive ability or social support system     Problem: Skin/Tissue Integrity  Goal: Absence of new skin breakdown  Description: 1.  Monitor for areas of redness and/or skin breakdown  2.  Assess vascular access sites hourly  3.  Every 4-6 hours minimum:  Change oxygen saturation probe site  4.  Every 4-6 hours:  If on nasal continuous positive airway pressure, respiratory therapy assess nares and determine need for appliance change or resting period.  Outcome: Progressing     Problem: Safety - Adult  Goal: Free from fall injury  Outcome: Progressing     Problem: Pain  Goal: Verbalizes/displays adequate comfort level or baseline comfort level  Outcome: Progressing     Problem: Neurosensory - Adult  Goal: Achieves stable or improved neurological status  Outcome: Progressing  Flowsheets (Taken 11/19/2024 0758)  Achieves stable or improved neurological status: Assess for and report changes in neurological status  Goal: Achieves maximal functionality and self care  Outcome: Progressing  Flowsheets (Taken 11/19/2024 0758)  Achieves maximal functionality and self care: Encourage and assist patient to increase activity and self care with

## 2024-11-19 NOTE — PROGRESS NOTES
[facility]  INPATIENT SPEECH THERAPY  [facility] 8 REHAB UNIT        TIME   1100  1200      [x]Daily Note  []Progress Note    Date: 2024  Patient Name: Nguyen Olivo        MRN: [id number]    Account #: [id number]  : 1965  (59 y.o.)  Gender: male   Primary Provider: Mio Boo MD  Liquid Consistency Recommendation: Thin  Diet Solids Recommendation: Regular       PATIENT DIAGNOSIS(ES):    Diagnosis: LEFT LATERAL THALAMIC STROKE W/RIGHT SIDED WEAKNESS     Additional Pertinent Hx: CAD WITH STENTING, BIPOLAR, DM2, GRD     RESTRICTIONS/PRECAUTIONS:    Restrictions/Precautions  Restrictions/Precautions: Fall Risk     Previous level of function and limitations:   He lives alone. His daughter lives next to him. He has a good friend that lives in [facility]. He also has other children that live in [facility] [place], one in [place] and one in [place]. He was able to drive before but has a suspended license. He was managing his own meds and used [facility] in [facility]. He manages his own finances/bills. His hobbies include repairing equipment. He likes to listen to [employer]. 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 He does wear bifocals.     Labial: Decreased rate;Decreased seal;Right droop  Dentition: Edentulous (He did have U/L dentures, but these are broken)  Lingual: Left deviation;Decreased rate;Decreased strength         Subjective:  He reports a good appetite. He loves to cook and is hoping to be able to grill again at home.         Objective:  He followed written directions at 80%.    He was asked to answer questions after reading receipts. He scored at 70% with moderate cues.    Mild dysarthria is noted this morning. He exhibits imprecise productions and slow rate.   Right labial asymmetry is noted.     Did discuss previous choking episodes with robles and thin liquids. He understands that he needs to take small sips and not take consecutive sips. He exhibited a few episodes  patient will complete oral motor exercises, pharyngeal exercises, and breath support exercises.         ASSESSMENT:  Assessment: [x]Progressing towards goals          []Not Progressing towards goals    Patient Tolerance of Treatment:   [x]Tolerated well []Tolerated fair []Required rest breaks []Fatigued    Education:  Learner:  [x]Patient          []Significant Other          []Other  Education provided regarding:  [x]Goals and POC   []Diet and swallowing precautions    []Home Exercise Program  []Progress and/or discharge information  Method of Education:  [x]Discussion          []Demonstration          []Handout          []Other  Evaluation of Education:   [x]Verbalized understanding   []Demonstrates without assistance  []Demonstrates with assistance  []Needs further instruction     []No evidence of learning                  []No family present      Plan: [x]Continue with current plan of care    []Modify current plan of care as follows:    []Discharge patient    Discharge Location:    Services/Supervision Recommended:      [x]Patient continues to require treatment by a licensed therapist to address functional deficits as outlined in the established plan of care.    Electronically Signed By:  Kerline Alejandro M.S., CCC-SLP  11/19/2024,7:44 AM.

## 2024-11-19 NOTE — PROGRESS NOTES
Name: Nguyen Olivo  MRN: [id number]  Date of Service:  11/19/2024 11/19/24 1000   Restrictions/Precautions   Restrictions/Precautions Fall Risk   Required Braces or Orthoses? No   General   Chart Reviewed Yes   Patient assessed for rehabilitation services? Yes   Additional Pertinent Hx CAD WITH STENTING, BIPOLAR, DM2, GRD   Diagnosis LEFT LATERAL THALAMIC STROKE W/RIGHT SIDED WEAKNESS   Follows Commands WFL   General Comment   Comments Co-Tx w/ OT   Subjective   Subjective Pt laying in bed, agrees to participate in therapy.   Subjective   Subjective Back, R hip, L wrist   Pain Verbal:   Transfers   Sit to Stand Contact guard assistance;Minimal Assistance   Stand to Sit Contact guard assistance;Minimal Assistance   Bed to Chair Minimal assistance;Contact guard assistance;2 Person Assistance  ((stand pivot) EOB<w/c from pt R and w/c<recliner from pt L w/o AD (w/ and w/o L AFO donned))   Ambulation   Surface Level tile   Device Rolling Walker  (+ dycem on R handle)   Other Apparatus AFO  (R AFO)   Assistance Contact guard assistance;Minimal assistance   Quality of Gait Slight ER of hip, hyperextension of knee on RLE, pt maintained  R side of walker unassisted, pt able to self correct and perform correct sequencing of gait, intermittent downward gaze, intermittent NBOS   Gait Deviations Slow Kelsi;Decreased step length;Decreased step height;Staggers   Distance 15' X 2   Comments 2 L turns- destination amb. of w/c<>w/c   Wheelchair Activities   Propulsion Yes   Propulsion 1   Propulsion Manual   Method LLE;LUE   Level of Assistance Supervision   Description/ Details Multiple L/R turns   Distance 150', 10', 150'   Activity Tolerance   Activity Tolerance Patient tolerated treatment well;Patient limited by endurance   Assessment   Assessment Co-Tx w/ OT. Pt able to amb. up to 15' X 2 including 2 L turns w/ RW + dycem on R handle requiring CGA/Min A.   Discharge Recommendations Continue to assess pending

## 2024-11-19 NOTE — PROGRESS NOTES
Patient:   Nguyen Olivo  MR#:    [id number]   Room:    0828/828-02   YOB: 1965  Date of Progress Note: 11/19/2024  Time of Note                           8:11 AM  Consulting Physician:   Mio Boo M.D.  Attending Physician:  Mio Boo MD     Chief complaint Acute ischemic stroke     S:This 59 y.o. male  eft-handed male with history of CAD with stenting on 2021 on ASA and Brillinta, HTN, Bipolar disorder, type II DM, dyslipidemia, and GERD. He presented to [facility] ER on 11/6/24 with slurred speech and right-sided weakness that developed when he woke up around 9 a.m. Last know normal as 4 a.m. when he went to bed. Work-up in ER CT head no acute findings, normal CT perfusion, CTA head/neck with 30-40% stenosis within left posterior cerebral artery, no other significant vascular occlusion, otherwise no carotid stenosis. He was not a candidate for TNK d/t being out of the time window. He was admitted to the Hospitalist service with consult for neurology. He was seen by Dr. Rian Nance, who suspected stroke. Dr. Nance recommended ASA, statin, and permissive hypertension. Echo was negative. MRI done revealed an acute lacunar type infarct seen within the lateral left thalamus measuring up to 9.6 mm AP maximum. Patient continues to have right-sided weakness and dysarthria. He is participating in PT/OT/SPT. He is felt to need a stay on Rehab to work towards his goal of returning home with assist of his daughter. He is now felt ready to start the Rehab program.  Feeling well overall. No new complaints.     REVIEW OF SYSTEMS:  Constitutional: No fevers No chills  Neck:No stiffness  Respiratory: No shortness of breath  Cardiovascular: No chest pain No palpitations  Gastrointestinal: No abdominal pain    Genitourinary: No Dysuria  Neurological: No headache, no confusion    Past Medical History:      Diagnosis Date    Bipolar disorder, current episode mixed, mild (HCC)     diagnosed at [facility]     + dycem on R handle requiring Min A.   Discharge Recommendations Continue to assess pending progress;Home with Home health PT;Home with assist PRN   Safety Devices   Type of Devices Patient at risk for falls;Gait belt;Left in chair  (W/ OT)               Electronically signed by Anne Marie Harding PTA on 11/16/2024 at 3:34 PM                     RECORD REVIEW: Previous medical records, medications were reviewed at today's visit    IMPRESSION:   1.  Acute ischemic stroke-on aspirin/statin  2.  Hyperlipidemia-on statin  3.  DVT prophylaxis-Lovenox  4.  Hypertension-on meds monitor  5.  GI/GERD-Pepcid/Protonix/bowel regimen  6.  Diabetes-on insulin monitor blood sugar  7.  Mood disorder-on meds monitor  8.  Smoker-NicoDerm patch  9.  Coronary artery disease-on aspirin/Brilinta/statin  10.  Vitamin D deficiency-on vitamin D  11.  PT/OT/speech       Team conference with PT/OT/speech/nursing/Care coordinator to review in depth patients care and discharge planning. At least 35 minutes spent coordinating care for patient >50% of time spent in coordination of care.       ft CGA  Ambulation 25 ft RW     Continue current care    ELOS November 28 th      Expected duration and frequency therapy: 180 minutes per day, 5 days per week    CALL WITH ANY QUESTIONS  [phone] CELL  Dr Mio Boo

## 2024-11-20 LAB
GLUCOSE BLD-MCNC: 135 MG/DL (ref 70–99)
GLUCOSE BLD-MCNC: 148 MG/DL (ref 70–99)
GLUCOSE BLD-MCNC: 161 MG/DL (ref 70–99)
GLUCOSE BLD-MCNC: 197 MG/DL (ref 70–99)
PERFORMED ON: ABNORMAL

## 2024-11-20 PROCEDURE — 6370000000 HC RX 637 (ALT 250 FOR IP): Performed by: STUDENT IN AN ORGANIZED HEALTH CARE EDUCATION/TRAINING PROGRAM

## 2024-11-20 PROCEDURE — 97130 THER IVNTJ EA ADDL 15 MIN: CPT

## 2024-11-20 PROCEDURE — 97530 THERAPEUTIC ACTIVITIES: CPT

## 2024-11-20 PROCEDURE — 99232 SBSQ HOSP IP/OBS MODERATE 35: CPT | Performed by: PSYCHIATRY & NEUROLOGY

## 2024-11-20 PROCEDURE — 97129 THER IVNTJ 1ST 15 MIN: CPT

## 2024-11-20 PROCEDURE — 97535 SELF CARE MNGMENT TRAINING: CPT

## 2024-11-20 PROCEDURE — 92526 ORAL FUNCTION THERAPY: CPT

## 2024-11-20 PROCEDURE — 97110 THERAPEUTIC EXERCISES: CPT

## 2024-11-20 PROCEDURE — 6370000000 HC RX 637 (ALT 250 FOR IP): Performed by: PSYCHIATRY & NEUROLOGY

## 2024-11-20 PROCEDURE — 82962 GLUCOSE BLOOD TEST: CPT

## 2024-11-20 PROCEDURE — 1180000000 HC REHAB R&B

## 2024-11-20 PROCEDURE — 6360000002 HC RX W HCPCS: Performed by: STUDENT IN AN ORGANIZED HEALTH CARE EDUCATION/TRAINING PROGRAM

## 2024-11-20 RX ADMIN — ATORVASTATIN CALCIUM 80 MG: 80 TABLET, FILM COATED ORAL at 20:10

## 2024-11-20 RX ADMIN — GUAIFENESIN SYRUP AND DEXTROMETHORPHAN 5 ML: 100; 10 SYRUP ORAL at 22:45

## 2024-11-20 RX ADMIN — Medication 2000 UNITS: at 08:24

## 2024-11-20 RX ADMIN — FAMOTIDINE 20 MG: 20 TABLET ORAL at 20:10

## 2024-11-20 RX ADMIN — ACETAMINOPHEN 650 MG: 325 TABLET ORAL at 05:04

## 2024-11-20 RX ADMIN — INSULIN GLARGINE 15 UNITS: 100 INJECTION, SOLUTION SUBCUTANEOUS at 20:09

## 2024-11-20 RX ADMIN — TICAGRELOR 90 MG: 90 TABLET ORAL at 08:24

## 2024-11-20 RX ADMIN — PANTOPRAZOLE SODIUM 40 MG: 40 TABLET, DELAYED RELEASE ORAL at 05:04

## 2024-11-20 RX ADMIN — LAMOTRIGINE 100 MG: 200 TABLET ORAL at 20:10

## 2024-11-20 RX ADMIN — CARVEDILOL 25 MG: 25 TABLET, FILM COATED ORAL at 08:23

## 2024-11-20 RX ADMIN — TICAGRELOR 90 MG: 90 TABLET ORAL at 20:10

## 2024-11-20 RX ADMIN — FAMOTIDINE 20 MG: 20 TABLET ORAL at 08:23

## 2024-11-20 RX ADMIN — ENOXAPARIN SODIUM 40 MG: 100 INJECTION SUBCUTANEOUS at 08:24

## 2024-11-20 RX ADMIN — CARVEDILOL 25 MG: 25 TABLET, FILM COATED ORAL at 17:18

## 2024-11-20 RX ADMIN — PRAZOSIN HYDROCHLORIDE 1 MG: 1 CAPSULE ORAL at 20:10

## 2024-11-20 RX ADMIN — ASPIRIN 81 MG: 81 TABLET, CHEWABLE ORAL at 08:24

## 2024-11-20 RX ADMIN — LAMOTRIGINE 100 MG: 200 TABLET ORAL at 08:23

## 2024-11-20 ASSESSMENT — PAIN DESCRIPTION - DESCRIPTORS: DESCRIPTORS: ACHING

## 2024-11-20 ASSESSMENT — PAIN SCALES - GENERAL: PAINLEVEL_OUTOF10: 7

## 2024-11-20 ASSESSMENT — PAIN DESCRIPTION - LOCATION: LOCATION: SHOULDER

## 2024-11-20 ASSESSMENT — PAIN - FUNCTIONAL ASSESSMENT: PAIN_FUNCTIONAL_ASSESSMENT: ACTIVITIES ARE NOT PREVENTED

## 2024-11-20 ASSESSMENT — PAIN DESCRIPTION - ORIENTATION: ORIENTATION: RIGHT

## 2024-11-20 NOTE — PROGRESS NOTES
Name: Nguyen Olivo  MRN: [id number]  Date of Service:  11/20/2024 11/20/24 1020   Restrictions/Precautions   Restrictions/Precautions Fall Risk   General   Chart Reviewed Yes   Patient assessed for rehabilitation services? Yes   Additional Pertinent Hx CAD WITH STENTING, BIPOLAR, DM2, GRD   Diagnosis LEFT LATERAL THALAMIC STROKE W/RIGHT SIDED WEAKNESS   General Comment   Comments Pt able to doff pants and perform personal hygiene post urination sitting on toilet independently. Pt then required Min A to alva pants standing at toilet.   Subjective   Subjective Pt sitting in w/c in room, agrees to participate in therapy.   Subjective   Subjective General   Pain Faces: 2/10   Transfers   Sit to Stand Contact guard assistance   Stand to Sit Contact guard assistance   Car Transfer Contact guard assistance;Minimal Assistance  (Minivan height- no AD, w/shoes donned (no AFO))   Stairs/Curb   Stairs? Yes   Stairs   # Steps  4   Stairs Height 4\", Use of L handrail   Assistance Contact guard assistance;Minimal assistance;Moderate assistance  (CGA during asc, Min/Mod A during desc)   Comment step to step pattern, 1X v/c's + demonstration for correct technique (up w/ LLE leading and down leading w/ RLE)  (W/ shoes donned (no AFO))   Wheelchair Activities   Propulsion Yes   Propulsion 1   Propulsion Manual   Method LLE;LUE   Level of Assistance Modified independent   Description/ Details Multiple L/R turns   Distance 150' X 3   Activity Tolerance   Activity Tolerance Patient tolerated treatment well   Assessment   Assessment Pt able to propel multiple longer distances in w/c using LUE/LLE w/ MI. Pt able to perform a car transfer to avg. height w/o an AD requiring CGA/Min A. Pt also able to asc/desc 4 4\" stairs w/ only L asc rail requiring CGA-Mod A. Pt reports he is still deciding on solid d/c plans, his first choice would be to d/c home to [facility] (where he lives alone in daughter's yard). Pt reports the [facility] does have a  couple steps in which there is a grab bar on only the L side. Pt also reports daughter or friend would be with hime to help him often (PTA unsure if 24/7), pt states he is willing to d/c to friend's house if he needs to (has no stairs). Pt does report he also has 2 dogs.   Discharge Recommendations Continue to assess pending progress;Home with Home health PT;Home with assist PRN   Safety Devices   Type of Devices Patient at risk for falls;Gait belt;Left in chair;Call light within reach  (Pt declines use of chair alarm, reports understanding to not get up w/o assistance)             Electronically signed by Anne Marie Harding PTA on 11/20/2024 at 12:35 PM

## 2024-11-20 NOTE — PROGRESS NOTES
[facility]  INPATIENT SPEECH THERAPY  [facility] 8 REHAB UNIT  TIME   Time In: 0915  Time Out: 1000  Minutes: 45  Time In: 1145  Time Out: 1200  Minutes: 15      [x]Daily Note  []Progress Note    Date: 2024  Patient Name: Nguyen Olivo        MRN: [id number]    Account #: [id number]  : 1965  (59 y.o.)  Gender: male   Primary Provider: Mio Boo MD  Liquid Consistency Recommendation: Thin  Diet Solids Recommendation: Regular       PATIENT DIAGNOSIS(ES):    Diagnosis: LEFT LATERAL THALAMIC STROKE W/RIGHT SIDED WEAKNESS     Additional Pertinent Hx: CAD WITH STENTING, BIPOLAR, DM2, GRD     RESTRICTIONS/PRECAUTIONS:    Restrictions/Precautions  Restrictions/Precautions: Fall Risk     Previous level of function and limitations:   He lives alone. His daughter lives next to him. He has a good friend that lives in [facility]. He also has other children that live in [facility], one in [place] and one in [place]. He was able to drive before but has a suspended license. He was managing his own meds and used [facility] in [facility]. He manages his own finances/bills. His hobbies include repairing equipment. He likes to listen to [employer]. 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 He does wear bifocals.     Labial: Decreased rate;Decreased seal;Right droop  Dentition: Edentulous (He did have U/L dentures, but these are broken)  Lingual: Left deviation;Decreased rate;Decreased strength             Subjective:   The patient was upright in his wheelchair. He is nervous about returning home on 24.      Objective:  Today he tried standing on his own without assistance. He stated he wanted to see if he was able to do this. He did exhibit some wet voicing at times (at rest). He also exhibited a cough response following large uncontrolled sips of thin liquids via cup. With controlled small sips he tolerated thin liquids without difficulty. He did exhibit some labial spillage with

## 2024-11-20 NOTE — PROGRESS NOTES
Spoke to Nell with [facility] and patient is accepted for HH services at [place]. Will notify [facility] at time of GA. [facility] information added to AVS.    Electronically signed by ANGELA Awad on 11/20/24 at 12:11 PM CST

## 2024-11-20 NOTE — PROGRESS NOTES
Spoke to patient in room re discharge planning. Patient is planning to dc on [facility] date 11/28 and says he will be going to his daughter's home. Patient agrees to home health. Only HH provider for [facility] is [facility]. MSW sent referral to Nell Benavides with [facility]. Acceptance will be pending staffing and insurance clearance. Will await update from Nell.     Addendum: MSW called [facility] [phone] and spoke to Mayra re update on continued stay request. Patient is approved through 11/29 with next review due on 11/28.     Electronically signed by ANGELA Awad on 11/20/2024 at 10:04 AM

## 2024-11-20 NOTE — PROGRESS NOTES
Patient:   Nguyen Olivo  MR#:    [id number]   Room:    0828/828-02   YOB: 1965  Date of Progress Note: 11/20/2024  Time of Note                           7:21 AM  Consulting Physician:   Mio Boo M.D.  Attending Physician:  Mio Boo MD     Chief complaint Acute ischemic stroke     S:This 59 y.o. male  eft-handed male with history of CAD with stenting on 2021 on ASA and Brillinta, HTN, Bipolar disorder, type II DM, dyslipidemia, and GERD. He presented to [facility] ER on 11/6/24 with slurred speech and right-sided weakness that developed when he woke up around 9 a.m. Last know normal as 4 a.m. when he went to bed. Work-up in ER CT head no acute findings, normal CT perfusion, CTA head/neck with 30-40% stenosis within left posterior cerebral artery, no other significant vascular occlusion, otherwise no carotid stenosis. He was not a candidate for TNK d/t being out of the time window. He was admitted to the Hospitalist service with consult for neurology. He was seen by Dr. Rian Nance, who suspected stroke. Dr. Nance recommended ASA, statin, and permissive hypertension. Echo was negative. MRI done revealed an acute lacunar type infarct seen within the lateral left thalamus measuring up to 9.6 mm AP maximum. Patient continues to have right-sided weakness and dysarthria. He is participating in PT/OT/SPT. He is felt to need a stay on Rehab to work towards his goal of returning home with assist of his daughter. He is now felt ready to start the Rehab program.  Some soreness in the right shoulder but otherwise doing well.    REVIEW OF SYSTEMS:  Constitutional: No fevers No chills  Neck:No stiffness  Respiratory: No shortness of breath  Cardiovascular: No chest pain No palpitations  Gastrointestinal: No abdominal pain    Genitourinary: No Dysuria  Neurological: No headache, no confusion    Past Medical History:      Diagnosis Date    Bipolar disorder, current episode mixed, mild (HCC)     diagnosed at

## 2024-11-20 NOTE — PLAN OF CARE
Problem: Chronic Conditions and Co-morbidities  Goal: Patient's chronic conditions and co-morbidity symptoms are monitored and maintained or improved  11/19/2024 2325 by Diana Serra LPN  Outcome: Progressing  11/19/2024 1108 by Renetta Lee LPN  Outcome: Progressing  Flowsheets (Taken 11/19/2024 0758)  Care Plan - Patient's Chronic Conditions and Co-Morbidity Symptoms are Monitored and Maintained or Improved: Monitor and assess patient's chronic conditions and comorbid symptoms for stability, deterioration, or improvement     Problem: Discharge Planning  Goal: Discharge to home or other facility with appropriate resources  11/19/2024 2325 by Diana Serra LPN  Outcome: Progressing  11/19/2024 1108 by Renetta Lee LPN  Outcome: Progressing  Flowsheets (Taken 11/19/2024 0758)  Discharge to home or other facility with appropriate resources: Refer to discharge planning if patient needs post-hospital services based on physician order or complex needs related to functional status, cognitive ability or social support system     Problem: Skin/Tissue Integrity  Goal: Absence of new skin breakdown  Description: 1.  Monitor for areas of redness and/or skin breakdown  2.  Assess vascular access sites hourly  3.  Every 4-6 hours minimum:  Change oxygen saturation probe site  4.  Every 4-6 hours:  If on nasal continuous positive airway pressure, respiratory therapy assess nares and determine need for appliance change or resting period.  11/19/2024 2325 by Diana Serra LPN  Outcome: Progressing  11/19/2024 1108 by Renetta Lee LPN  Outcome: Progressing     Problem: Safety - Adult  Goal: Free from fall injury  11/19/2024 2325 by Diana Serra LPN  Outcome: Progressing  11/19/2024 1108 by Renetta Lee LPN  Outcome: Progressing     Problem: Pain  Goal: Verbalizes/displays adequate comfort level or baseline comfort level  11/19/2024 2325 by Diana Serra LPN  Outcome: Progressing  11/19/2024 1108

## 2024-11-21 LAB
ALBUMIN SERPL-MCNC: 3.7 G/DL (ref 3.5–5.2)
ALP SERPL-CCNC: 90 U/L (ref 40–129)
ALT SERPL-CCNC: 17 U/L (ref 5–41)
ANION GAP SERPL CALCULATED.3IONS-SCNC: 13 MMOL/L (ref 7–19)
AST SERPL-CCNC: 20 U/L (ref 5–40)
BASOPHILS # BLD: 0.2 K/UL (ref 0–0.2)
BASOPHILS NFR BLD: 1.3 % (ref 0–1)
BILIRUB SERPL-MCNC: 0.3 MG/DL (ref 0.2–1.2)
BUN SERPL-MCNC: 13 MG/DL (ref 6–20)
CALCIUM SERPL-MCNC: 9.3 MG/DL (ref 8.6–10)
CHLORIDE SERPL-SCNC: 97 MMOL/L (ref 98–111)
CO2 SERPL-SCNC: 25 MMOL/L (ref 22–29)
CREAT SERPL-MCNC: 0.6 MG/DL (ref 0.7–1.2)
EOSINOPHIL # BLD: 0.3 K/UL (ref 0–0.6)
EOSINOPHIL NFR BLD: 2.3 % (ref 0–5)
ERYTHROCYTE [DISTWIDTH] IN BLOOD BY AUTOMATED COUNT: 13.5 % (ref 11.5–14.5)
GLUCOSE BLD-MCNC: 142 MG/DL (ref 70–99)
GLUCOSE BLD-MCNC: 152 MG/DL (ref 70–99)
GLUCOSE BLD-MCNC: 193 MG/DL (ref 70–99)
GLUCOSE BLD-MCNC: 213 MG/DL (ref 70–99)
GLUCOSE SERPL-MCNC: 161 MG/DL (ref 70–99)
HCT VFR BLD AUTO: 34.8 % (ref 42–52)
HGB BLD-MCNC: 11.3 G/DL (ref 14–18)
IMM GRANULOCYTES # BLD: 0.1 K/UL
LYMPHOCYTES # BLD: 4.9 K/UL (ref 1.1–4.5)
LYMPHOCYTES NFR BLD: 34.7 % (ref 20–40)
MCH RBC QN AUTO: 28.3 PG (ref 27–31)
MCHC RBC AUTO-ENTMCNC: 32.5 G/DL (ref 33–37)
MCV RBC AUTO: 87 FL (ref 80–94)
MONOCYTES # BLD: 0.9 K/UL (ref 0–0.9)
MONOCYTES NFR BLD: 6.6 % (ref 0–10)
NEUTROPHILS # BLD: 7.8 K/UL (ref 1.5–7.5)
NEUTS SEG NFR BLD: 54.7 % (ref 50–65)
PERFORMED ON: ABNORMAL
PLATELET # BLD AUTO: 400 K/UL (ref 130–400)
PMV BLD AUTO: 11.3 FL (ref 9.4–12.4)
POTASSIUM SERPL-SCNC: 3.9 MMOL/L (ref 3.5–5)
PROT SERPL-MCNC: 6.9 G/DL (ref 6.4–8.3)
RBC # BLD AUTO: 4 M/UL (ref 4.7–6.1)
SODIUM SERPL-SCNC: 135 MMOL/L (ref 136–145)
WBC # BLD AUTO: 14.2 K/UL (ref 4.8–10.8)

## 2024-11-21 PROCEDURE — 85025 COMPLETE CBC W/AUTO DIFF WBC: CPT

## 2024-11-21 PROCEDURE — 80053 COMPREHEN METABOLIC PANEL: CPT

## 2024-11-21 PROCEDURE — 99232 SBSQ HOSP IP/OBS MODERATE 35: CPT | Performed by: PSYCHIATRY & NEUROLOGY

## 2024-11-21 PROCEDURE — 94760 N-INVAS EAR/PLS OXIMETRY 1: CPT

## 2024-11-21 PROCEDURE — 97530 THERAPEUTIC ACTIVITIES: CPT

## 2024-11-21 PROCEDURE — 97535 SELF CARE MNGMENT TRAINING: CPT

## 2024-11-21 PROCEDURE — 1180000000 HC REHAB R&B

## 2024-11-21 PROCEDURE — 97130 THER IVNTJ EA ADDL 15 MIN: CPT

## 2024-11-21 PROCEDURE — 6360000002 HC RX W HCPCS: Performed by: STUDENT IN AN ORGANIZED HEALTH CARE EDUCATION/TRAINING PROGRAM

## 2024-11-21 PROCEDURE — 97129 THER IVNTJ 1ST 15 MIN: CPT

## 2024-11-21 PROCEDURE — 6370000000 HC RX 637 (ALT 250 FOR IP): Performed by: PSYCHIATRY & NEUROLOGY

## 2024-11-21 PROCEDURE — 97116 GAIT TRAINING THERAPY: CPT

## 2024-11-21 PROCEDURE — 82962 GLUCOSE BLOOD TEST: CPT

## 2024-11-21 PROCEDURE — 92526 ORAL FUNCTION THERAPY: CPT

## 2024-11-21 PROCEDURE — 6370000000 HC RX 637 (ALT 250 FOR IP): Performed by: STUDENT IN AN ORGANIZED HEALTH CARE EDUCATION/TRAINING PROGRAM

## 2024-11-21 PROCEDURE — 36415 COLL VENOUS BLD VENIPUNCTURE: CPT

## 2024-11-21 PROCEDURE — 97110 THERAPEUTIC EXERCISES: CPT

## 2024-11-21 RX ORDER — CITALOPRAM HYDROBROMIDE 10 MG/1
40 TABLET ORAL DAILY
Status: DISCONTINUED | OUTPATIENT
Start: 2024-11-21 | End: 2024-11-27 | Stop reason: HOSPADM

## 2024-11-21 RX ADMIN — FAMOTIDINE 20 MG: 20 TABLET ORAL at 20:48

## 2024-11-21 RX ADMIN — CARVEDILOL 25 MG: 25 TABLET, FILM COATED ORAL at 17:42

## 2024-11-21 RX ADMIN — PRAZOSIN HYDROCHLORIDE 1 MG: 1 CAPSULE ORAL at 20:48

## 2024-11-21 RX ADMIN — CARVEDILOL 25 MG: 25 TABLET, FILM COATED ORAL at 09:16

## 2024-11-21 RX ADMIN — Medication 2000 UNITS: at 09:16

## 2024-11-21 RX ADMIN — LAMOTRIGINE 100 MG: 200 TABLET ORAL at 09:16

## 2024-11-21 RX ADMIN — FAMOTIDINE 20 MG: 20 TABLET ORAL at 09:17

## 2024-11-21 RX ADMIN — ASPIRIN 81 MG: 81 TABLET, CHEWABLE ORAL at 09:17

## 2024-11-21 RX ADMIN — ENOXAPARIN SODIUM 40 MG: 100 INJECTION SUBCUTANEOUS at 09:17

## 2024-11-21 RX ADMIN — LAMOTRIGINE 100 MG: 200 TABLET ORAL at 20:48

## 2024-11-21 RX ADMIN — ATORVASTATIN CALCIUM 80 MG: 80 TABLET, FILM COATED ORAL at 20:48

## 2024-11-21 RX ADMIN — INSULIN GLARGINE 15 UNITS: 100 INJECTION, SOLUTION SUBCUTANEOUS at 20:47

## 2024-11-21 RX ADMIN — INSULIN LISPRO 1 UNITS: 100 INJECTION, SOLUTION INTRAVENOUS; SUBCUTANEOUS at 20:47

## 2024-11-21 RX ADMIN — CITALOPRAM 40 MG: 10 TABLET ORAL at 09:19

## 2024-11-21 RX ADMIN — PANTOPRAZOLE SODIUM 40 MG: 40 TABLET, DELAYED RELEASE ORAL at 05:53

## 2024-11-21 RX ADMIN — TICAGRELOR 90 MG: 90 TABLET ORAL at 21:00

## 2024-11-21 RX ADMIN — TICAGRELOR 90 MG: 90 TABLET ORAL at 09:16

## 2024-11-21 NOTE — PROGRESS NOTES
Name: Nguyen Olivo  MRN: [id number]  Date of Service:  11/21/2024 11/21/24 1330   Restrictions/Precautions   Restrictions/Precautions Fall Risk   Required Braces or Orthoses? No   General   Chart Reviewed Yes   Patient assessed for rehabilitation services? Yes   Additional Pertinent Hx CAD WITH STENTING, BIPOLAR, DM2, GRD   Diagnosis LEFT LATERAL THALAMIC STROKE W/RIGHT SIDED WEAKNESS   Follows Commands WFL   General Comment   Comments Pt able to doff pants and perform personal hygiene post urination sitting on toilet independently. Pt then required very Min A to alva pants.   Subjective   Subjective Pt brought into gym by OT, agrees to participate in therapy.   Subjective   Subjective R calf, R shoulder , R wrist   Pain Faces: 3/10   Bed mobility   Bridging Minimal assistance  (To hold knees together)   Supine to Sit Supervision   Sit to Supine Supervision   Scooting Supervision   Bed Mobility Comments On R side of mat table- w/o bedrails   Transfers   Sit to Stand Stand by assistance;Contact guard assistance   Stand to Sit Stand by assistance;Contact guard assistance   Bed to Chair Contact guard assistance  (w/c<>toilet from pt R/F w/ grab bar)   Ambulation   Surface Level tile   Device Rolling Walker  (+ dycem on R handle)   Other Apparatus AFO  (R)   Assistance Contact guard assistance;Minimal assistance   Quality of Gait Slight ER of hip, hyperextension of knee on RLE, pt maintained  R side of walker unassisted, pt able to self correct and perform correct sequencing of gait, intermittent downward gaze, intermittent NBOS   Gait Deviations Slow Kelsi;Decreased step length;Decreased step height;Staggers   Distance 15' X 2  (15' w/ R AFO and 15' w/o R AFO)   Comments Multiple L/R turns- from w/c<>mat table   Wheelchair Activities   Propulsion Yes   Propulsion 1   Propulsion Manual   Method LLE;LUE   Level of Assistance Modified independent   Description/ Details Multiple L/R turns   Distance 150'   PT

## 2024-11-21 NOTE — PROGRESS NOTES
[facility]  INPATIENT SPEECH THERAPY  [facility] 8 REHAB UNIT        TIME   1100  1200    [x]Daily Note  []Progress Note    Date: 2024  Patient Name: Nguyen Olivo        MRN: [id number]    Account #: [id number]  : 1965  (59 y.o.)  Gender: male   Primary Provider: Mio Boo MD  Liquid Consistency Recommendation: Thin  Diet Solids Recommendation: Regular       PATIENT DIAGNOSIS(ES):    Diagnosis: LEFT LATERAL THALAMIC STROKE W/RIGHT SIDED WEAKNESS     Additional Pertinent Hx: CAD WITH STENTING, BIPOLAR, DM2, GRD     RESTRICTIONS/PRECAUTIONS:    Restrictions/Precautions  Restrictions/Precautions: Fall Risk     Previous level of function and limitations:   He lives alone. His daughter lives next to him. He has a good friend that lives in [facility]. He also has other children that live in [facility], one in [place] and one in [place]. He was able to drive before but has a suspended license. He was managing his own meds and used [facility] in [facility]. He manages his own finances/bills. His hobbies include repairing equipment. He likes to listen to [employer]. 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 He does wear bifocals.     Labial: Decreased rate;Decreased seal;Right droop  Dentition: Edentulous (He did have U/L dentures, but these are broken)  Lingual: Left deviation;Decreased rate;Decreased strength           Subjective:   He was upright in his wheelchair. He stated his room is too cold.    Objective:  He was asked to answer questions after reading short paragraphs. He scored at 90%.    He did exhibit coughing after uncontrolled, large sips of thin liquids via cup. With controlled small sips he did not exhibit any overt s/s of aspiration. Clear voicing was noted after all sips.    He was asked to produce several complex designs with patterns and blocks. He scored at 100%.    Oral motor and pharyngeal exercises were completed.    Education was provided on using a

## 2024-11-21 NOTE — PROGRESS NOTES
Emergency Department Provider Note                   PCP:                Vikki Gould MD               Age: 36 y.o. Sex: female     DISPOSITION Decision To Discharge 03/18/2023 04:55:05 PM       ICD-10-CM    1. Acute left-sided low back pain with left-sided sciatica  M54.42           MEDICAL DECISION MAKING  Complexity of Problems Addressed:  Acute illness    Data Reviewed and Analyzed:  Category 1:     I ordered each unique test.  I interpreted the results of each unique test.        Category 2:   I independently ordered and interpreted the X-rays. I agree with radiology    Category 3: Discussion of management or test interpretation. In summary this is a 80-year-old female patient presenting with symptoms most consistent with acute left-sided lumbar sprain. Based on my evaluation I feel the patient is at low risk for alternative causes such as cauda equina, acute fracture, epidural abscess, pyelonephritis, aortic pathology. The reasoning behind my decision process is that the patient is overall well appearing and in no acute distress noted. Vital signs are stable without hypoxia, tachycardia, tachypnea, hypotension, fever. There is no presence of lower extremity weakness, saddle paresthesia, loss of bowel/bladder functioning, urinary retention, decreased rectal tone. There was no associated blunt force trauma. No history of IV drug abuse. Using shared medical decision making process, lumbar x-rays were ordered. My independent interpretation of initial x-rays are grossly unremarkable. Negative CVA tenderness. There is no evidence of acute fracture. No palpable mass felt in abdomen. The plan for this patient is outpatient management. Pain control given. Patient was instructed to apply heat, rest and avoid strenuous activity. The follow up for this patient will be in two weeks with PCP.  I have specifically counseled the patient on warning signs to return immediately for including but not limited to Patient:   Nguyen Olivo  MR#:    [id number]   Room:    0828/828-02   YOB: 1965  Date of Progress Note: 11/21/2024  Time of Note                           7:41 AM  Consulting Physician:   Mio Boo M.D.  Attending Physician:  Mio Boo MD     Chief complaint Acute ischemic stroke     S:This 59 y.o. male  eft-handed male with history of CAD with stenting on 2021 on ASA and Brillinta, HTN, Bipolar disorder, type II DM, dyslipidemia, and GERD. He presented to [facility] ER on 11/6/24 with slurred speech and right-sided weakness that developed when he woke up around 9 a.m. Last know normal as 4 a.m. when he went to bed. Work-up in ER CT head no acute findings, normal CT perfusion, CTA head/neck with 30-40% stenosis within left posterior cerebral artery, no other significant vascular occlusion, otherwise no carotid stenosis. He was not a candidate for TNK d/t being out of the time window. He was admitted to the Hospitalist service with consult for neurology. He was seen by Dr. Rian Nance, who suspected stroke. Dr. Nance recommended ASA, statin, and permissive hypertension. Echo was negative. MRI done revealed an acute lacunar type infarct seen within the lateral left thalamus measuring up to 9.6 mm AP maximum. Patient continues to have right-sided weakness and dysarthria. He is participating in PT/OT/SPT. He is felt to need a stay on Rehab to work towards his goal of returning home with assist of his daughter. He is now felt ready to start the Rehab program.  No new complaints.  Overall doing well.    REVIEW OF SYSTEMS:  Constitutional: No fevers No chills  Neck:No stiffness  Respiratory: No shortness of breath  Cardiovascular: No chest pain No palpitations  Gastrointestinal: No abdominal pain    Genitourinary: No Dysuria  Neurological: No headache, no confusion    Past Medical History:      Diagnosis Date    Bipolar disorder, current episode mixed, mild (HCC)     diagnosed at [facility]     saddle anesthesia, urinary retention, urinary incontinence, fever. The patient has verbalized understanding and is in agreement with the treatment plan. ED Course as of 03/18/23 1730   Sat Mar 18, 2023   1651 4:51 PM  Discussed with patient that I did not feel that lumbar x-ray was indicated. Using shared medical decision-making process, patient chose to have an x-ray. Has been ordered. [NR]   1832 4:52 PM  Pregnancy negative [NR]   6397 5:08 PM  Independent to rotation of lumbar x-ray at without signs of bony injury but there are signs of straightening of the spine consistent with spasm. [NR]      ED Course User Index  [NR] [place] Saavedra       Risk of Complications and/or Morbidity of Patient Management:  Prescription drug management performed, Patient was discharged risks and benefits of hospitalization were considered, Shared medical decision making was utilized in creating the patients health plan today. , and Considerations: The following items were considered but not ordered: Lab work      Spencer Vieyra is a 36 y.o. female who presents to the Emergency Department with chief complaint of    Chief Complaint   Patient presents with    Back Pain      52-year-old female patient with history of migraines presents today complaining of left-sided low back pain that began about 2 to 3 hours ago. 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 She reports since then she had constant throbbing pain that intermittently radiates down her left leg. States it is worsened by movements and better with rest.  She reports has never had this happen before. No other associated symptoms. Denies fevers, vomiting, dysuria, hematuria. Denies abdominal pain. Denies urinary retention, urinary incontinence, saddle anesthesia, leg weakness, fecal incontinence.   Denies injecting drugs or recent procedures in her back. Patient is primary historian and quality seems reliable. The history is provided by the patient. No  was used. Review of Systems   Constitutional:  Negative for fatigue and fever. HENT:  Negative for congestion, ear pain, facial swelling, hearing loss, rhinorrhea, sore throat, trouble swallowing and voice change. Eyes:  Negative for photophobia, pain, discharge, redness and visual disturbance. Respiratory:  Negative for apnea, cough, chest tightness, shortness of breath and wheezing. Cardiovascular:  Negative for chest pain and palpitations. Gastrointestinal:  Negative for abdominal pain, diarrhea and vomiting. Endocrine: Negative for polydipsia, polyphagia and polyuria. Genitourinary:  Negative for decreased urine volume, dysuria, flank pain, frequency and hematuria. Musculoskeletal:  Positive for back pain. Negative for arthralgias, joint swelling and neck stiffness. Skin:  Negative for rash and wound. Neurological:  Negative for dizziness, syncope, speech difficulty, weakness, light-headedness and headaches. Hematological:  Does not bruise/bleed easily. Psychiatric/Behavioral:  Negative for self-injury and suicidal ideas. All other systems reviewed and are negative. Vitals signs and nursing note reviewed. Patient Vitals for the past 4 hrs:   Temp Pulse Resp BP SpO2   03/18/23 1623 98.1 °F (36.7 °C) 88 18 105/70 100 %          Physical Exam  Vitals and nursing note reviewed. Constitutional:       General: She is not in acute distress. Appearance: Normal appearance. She is not ill-appearing, toxic-appearing or diaphoretic. Comments: Overall well-appearing. Pleasant and cooperative. Refuses to lay down as she states this makes it hurt more. Alert and oriented x4. Normal mentation. Speaks in full sentences. HENT:      Head: Normocephalic and atraumatic.       Right Ear: Tympanic membrane, ear canal and external ear normal.      Left Ear: Tympanic membrane, ear canal and external ear normal.      Nose: Nose normal.      Mouth/Throat:      Mouth: Mucous membranes are moist.      Pharynx: Oropharynx is clear. Eyes:      Extraocular Movements: Extraocular movements intact. Pupils: Pupils are equal, round, and reactive to light. Cardiovascular:      Rate and Rhythm: Normal rate and regular rhythm. Pulses: Normal pulses. Heart sounds: Normal heart sounds. No murmur heard. Comments: No pulse deficits. Pulmonary:      Effort: Pulmonary effort is normal. No respiratory distress. Breath sounds: Normal breath sounds. No wheezing, rhonchi or rales. Abdominal:      General: Abdomen is flat. Bowel sounds are normal. There is no distension. Palpations: Abdomen is soft. There is no mass. Tenderness: There is no abdominal tenderness. There is no right CVA tenderness, left CVA tenderness, guarding or rebound. Hernia: No hernia is present. Comments: No pulsating masses. Genitourinary:     Comments: Declined by patient  Musculoskeletal:      Cervical back: Normal, normal range of motion and neck supple. No rigidity, tenderness or bony tenderness. Thoracic back: Normal. No tenderness. Lumbar back: Spasms and tenderness present. No edema, deformity, lacerations or bony tenderness. Normal range of motion. Positive left straight leg raise test. Negative right straight leg raise test.      Right lower leg: No edema. Left lower leg: No edema. Comments: No midline tenderness. No stepoffs or deformities. Paraspinal muscle tenderness in area outlined above. Lymphadenopathy:      Cervical: No cervical adenopathy. Skin:     General: Skin is warm and dry. Capillary Refill: Capillary refill takes less than 2 seconds. Findings: No rash. Neurological:      General: No focal deficit present.       Mental Status: She is alert and oriented to person, place, and time. Mental status is at baseline. Cranial Nerves: No cranial nerve deficit. Sensory: No sensory deficit. Motor: No weakness. Coordination: Coordination normal.      Gait: Gait normal.      Deep Tendon Reflexes: Reflexes normal.      Comments: Normal neuro exam. No saddle anesthesia. No leg weakness. Good reflexes. Psychiatric:         Mood and Affect: Mood normal.         Behavior: Behavior normal.         Thought Content: Thought content normal.         Judgment: Judgment normal.        Procedures     Orders Placed This Encounter   Procedures    XR LUMBAR SPINE (2-3 VIEWS)    POC Pregnancy Urine Qual    POC Pregnancy Urine Qual        Medications   acetaminophen (TYLENOL) tablet 1,000 mg (has no administration in time range)       New Prescriptions    CHLORZOXAZONE (PARAFON FORTE) 500 MG TABLET    Take 1 tablet by mouth 3 times daily as needed for Muscle spasms    LIDOCAINE (LIDODERM) 5 %    Place 1 patch onto the skin daily for 10 days 12 hours on, 12 hours off. MELOXICAM (MOBIC) 15 MG TABLET    Take 1 tablet by mouth daily        Past Medical History:   Diagnosis Date    Migraine         History reviewed. No pertinent surgical history. History reviewed. No pertinent family history.      Social History     Socioeconomic History    Marital status: Single     Spouse name: None    Number of children: None    Years of education: None    Highest education level: None   Tobacco Use    Smoking status: Never   Substance and Sexual Activity    Alcohol use: No    Drug use: No        Allergies: Latex, Iodine, Other, Peanut oil, Povidone-iodine, Seasonal, Shellfish-derived products, and Sulfa antibiotics    Previous Medications    No medications on file        Results for orders placed or performed during the hospital encounter of 03/18/23   XR LUMBAR SPINE (2-3 VIEWS)    Narrative    XR LUMBAR SPINE (2-3 VIEWS)  3/18/2023 4:58 PM  Number of views: 2    History: Low back pain  Comparison: None    Findings:    1. No acute fracture. 2.  No malalignment. 3.  No radiographic evidence of significant degenerative change. 4.  Regional soft tissues are unremarkable. Impression    Impression:     No acute fracture or malalignment. Thank you for the referral of this patient. This exam was interpreted by an   American Board of Radiology certified radiologist with subspecialty fellowship   in [place] [id number]. If there are any questions regarding this exam please feel   free to contact us directly at [phone]. [address]        Melanie Palacios   3/18/2023 5:26:00 PM   POC Pregnancy Urine Qual   Result Value Ref Range    Preg Test, Ur Negative NEG          XR LUMBAR SPINE (2-3 VIEWS)   Final Result   Impression:       No acute fracture or malalignment. Thank you for the referral of this patient. This exam was interpreted by an    American Board of Radiology certified radiologist with subspecialty fellowship    in [place] [id number]. If there are any questions regarding this exam please feel    free to contact us directly at [phone]. [address] Gonzales Valdivia    3/18/2023 5:26:00 PM                        Voice dictation software was used during the making of this note. This software is not perfect and grammatical and other typographical errors may be present. This note has not been completely proofread for errors.      Rome Shepherdma  03/18/23 1738

## 2024-11-21 NOTE — PLAN OF CARE
Problem: Chronic Conditions and Co-morbidities  Goal: Patient's chronic conditions and co-morbidity symptoms are monitored and maintained or improved  Outcome: Progressing     Problem: Discharge Planning  Goal: Discharge to home or other facility with appropriate resources  Outcome: Progressing     Problem: Skin/Tissue Integrity  Goal: Absence of new skin breakdown  Description: 1.  Monitor for areas of redness and/or skin breakdown  2.  Assess vascular access sites hourly  3.  Every 4-6 hours minimum:  Change oxygen saturation probe site  4.  Every 4-6 hours:  If on nasal continuous positive airway pressure, respiratory therapy assess nares and determine need for appliance change or resting period.  Outcome: Progressing     Problem: Safety - Adult  Goal: Free from fall injury  Outcome: Progressing     Problem: Pain  Goal: Verbalizes/displays adequate comfort level or baseline comfort level  Outcome: Progressing     Problem: Neurosensory - Adult  Goal: Achieves stable or improved neurological status  Outcome: Progressing  Goal: Achieves maximal functionality and self care  Outcome: Progressing     Problem: Skin/Tissue Integrity - Adult  Goal: Skin integrity remains intact  Outcome: Progressing     Problem: Musculoskeletal - Adult  Goal: Return mobility to safest level of function  Outcome: Progressing  Goal: Return ADL status to a safe level of function  Outcome: Progressing     Problem: Metabolic/Fluid and Electrolytes - Adult  Goal: Electrolytes maintained within normal limits  Outcome: Progressing  Goal: Hemodynamic stability and optimal renal function maintained  Outcome: Progressing  Goal: Glucose maintained within prescribed range  Outcome: Progressing     Problem: Nutrition Deficit:  Goal: Optimize nutritional status  Outcome: Progressing     Problem: Genitourinary - Adult  Goal: Absence of urinary retention  Outcome: Progressing

## 2024-11-21 NOTE — PROGRESS NOTES
Occupational Therapy  Facility/Department: [facility] 8 REHAB UNIT  Rehabilitation Occupational Therapy Daily Treatment Note    Date: 24  Patient Name: Nguyen Olivo       Room: 0828/828-02  MRN: [id number]  Account: [id number]   : 1965  (59 y.o.) Gender: male                    Past Medical History:  has a past medical history of Bipolar disorder, current episode mixed, mild (HCC), Depression, Diabetes mellitus (HCC), GERD (gastroesophageal reflux disease), Hyperlipidemia, and Hypertension.  Past Surgical History:   has a past surgical history that includes Cholecystectomy (2006); Appendectomy; Elbow surgery; and Coronary angioplasty with stent ().     24 1000   Restrictions/Precautions   Restrictions/Precautions Fall Risk   Balance   Standing Balance Contact guard assistance   Standing Balance   Activity clothing management   Functional Mobility   Functional - Mobility Device Rolling Walker   Activity   (short distances in OT)   Assist Level Contact guard assistance   Functional Mobility Comments improved technique   Transfers   Sit to stand Contact guard assistance   Stand to sit Contact guard assistance   Toilet Transfers   Toilet - Technique Stand pivot   Toilet Transfer Contact guard assistance   OT Exercises   Exercise Treatment Marckvan 15# Left, 7#  RUE. RUE 1# FW elbow flex, RUE comprehensive without resistance   A/AROM Exercises RUE with 1# Tbar   Dynamic Sitting Balance Exercises EOM, scooting acts   Motor Control/Coordination RUE , hand exerciser 10 reps x 3 sets   Time Code Minutes    Timed Code Treatment Minutes 60 Minutes   OT Individual Minutes   Time In 1000   Time Out 1100   Minutes 60

## 2024-11-21 NOTE — PROGRESS NOTES
Occupational Therapy  Facility/Department: [facility] 8 REHAB UNIT  Rehabilitation Occupational Therapy Daily Treatment Note    Date: 24  Patient Name: Nguyen Olivo       Room: 0828/828-02  MRN: [id number]  Account: [id number]   : 1965  (59 y.o.) Gender: male                Treatment Diagnosis: (P) L CVA   Past Medical History:  has a past medical history of Bipolar disorder, current episode mixed, mild (HCC), Depression, Diabetes mellitus (HCC), GERD (gastroesophageal reflux disease), Hyperlipidemia, and Hypertension.  Past Surgical History:   has a past surgical history that includes Cholecystectomy (2006); Appendectomy; Elbow surgery; and Coronary angioplasty with stent ().     24 1300   Restrictions/Precautions   Restrictions/Precautions Fall Risk   Balance   Standing Balance Contact guard assistance   Standing Balance   Time 2 mins x 2 occ   Activity 2 hand act   Functional Mobility   Functional - Mobility Device Wheelchair   Assist Level Modified independent    Transfers   Sit to stand Contact guard assistance   Stand to sit Contact guard assistance   OT Exercises   Exercise Treatment RUE distal ex;s   A/AROM Exercises BUE mateo box   Motor Control/Coordination Hand exercises   Activity Tolerance   Activity Tolerance Patient Tolerated treatment well   Assessment   Performance deficits / Impairments Decreased functional mobility ;Decreased ADL status;Decreased ROM;Decreased safe awareness;Decreased sensation;Decreased fine motor control;Decreased strength;Decreased balance;Decreased endurance;Decreased high-level IADLs   Treatment Diagnosis L CVA   Time Code Minutes    Timed Code Treatment Minutes 30 Minutes   OT Individual Minutes   Time In 1300   Time Out 1330   Minutes 30

## 2024-11-22 LAB
GLUCOSE BLD-MCNC: 165 MG/DL (ref 70–99)
GLUCOSE BLD-MCNC: 165 MG/DL (ref 70–99)
GLUCOSE BLD-MCNC: 208 MG/DL (ref 70–99)
GLUCOSE BLD-MCNC: 214 MG/DL (ref 70–99)
PERFORMED ON: ABNORMAL

## 2024-11-22 PROCEDURE — 97530 THERAPEUTIC ACTIVITIES: CPT

## 2024-11-22 PROCEDURE — 97129 THER IVNTJ 1ST 15 MIN: CPT

## 2024-11-22 PROCEDURE — 6360000002 HC RX W HCPCS: Performed by: STUDENT IN AN ORGANIZED HEALTH CARE EDUCATION/TRAINING PROGRAM

## 2024-11-22 PROCEDURE — 97535 SELF CARE MNGMENT TRAINING: CPT

## 2024-11-22 PROCEDURE — 99232 SBSQ HOSP IP/OBS MODERATE 35: CPT | Performed by: PSYCHIATRY & NEUROLOGY

## 2024-11-22 PROCEDURE — 92526 ORAL FUNCTION THERAPY: CPT

## 2024-11-22 PROCEDURE — 82962 GLUCOSE BLOOD TEST: CPT

## 2024-11-22 PROCEDURE — 6370000000 HC RX 637 (ALT 250 FOR IP): Performed by: STUDENT IN AN ORGANIZED HEALTH CARE EDUCATION/TRAINING PROGRAM

## 2024-11-22 PROCEDURE — 1180000000 HC REHAB R&B

## 2024-11-22 PROCEDURE — 6370000000 HC RX 637 (ALT 250 FOR IP): Performed by: PSYCHIATRY & NEUROLOGY

## 2024-11-22 PROCEDURE — 97110 THERAPEUTIC EXERCISES: CPT

## 2024-11-22 RX ORDER — TRAZODONE HYDROCHLORIDE 50 MG/1
50 TABLET, FILM COATED ORAL NIGHTLY PRN
Status: DISCONTINUED | OUTPATIENT
Start: 2024-11-22 | End: 2024-11-27 | Stop reason: HOSPADM

## 2024-11-22 RX ADMIN — Medication 2000 UNITS: at 07:31

## 2024-11-22 RX ADMIN — INSULIN GLARGINE 15 UNITS: 100 INJECTION, SOLUTION SUBCUTANEOUS at 20:40

## 2024-11-22 RX ADMIN — CARVEDILOL 25 MG: 25 TABLET, FILM COATED ORAL at 17:36

## 2024-11-22 RX ADMIN — FAMOTIDINE 20 MG: 20 TABLET ORAL at 20:39

## 2024-11-22 RX ADMIN — GUAIFENESIN SYRUP AND DEXTROMETHORPHAN 5 ML: 100; 10 SYRUP ORAL at 20:39

## 2024-11-22 RX ADMIN — FAMOTIDINE 20 MG: 20 TABLET ORAL at 07:31

## 2024-11-22 RX ADMIN — CITALOPRAM 40 MG: 10 TABLET ORAL at 07:31

## 2024-11-22 RX ADMIN — TRAZODONE HYDROCHLORIDE 50 MG: 50 TABLET ORAL at 20:39

## 2024-11-22 RX ADMIN — ENOXAPARIN SODIUM 40 MG: 100 INJECTION SUBCUTANEOUS at 07:31

## 2024-11-22 RX ADMIN — ATORVASTATIN CALCIUM 80 MG: 80 TABLET, FILM COATED ORAL at 20:39

## 2024-11-22 RX ADMIN — ASPIRIN 81 MG: 81 TABLET, CHEWABLE ORAL at 07:31

## 2024-11-22 RX ADMIN — CARVEDILOL 25 MG: 25 TABLET, FILM COATED ORAL at 07:31

## 2024-11-22 RX ADMIN — TICAGRELOR 90 MG: 90 TABLET ORAL at 07:31

## 2024-11-22 RX ADMIN — PANTOPRAZOLE SODIUM 40 MG: 40 TABLET, DELAYED RELEASE ORAL at 05:18

## 2024-11-22 RX ADMIN — TICAGRELOR 90 MG: 90 TABLET ORAL at 20:39

## 2024-11-22 RX ADMIN — GUAIFENESIN SYRUP AND DEXTROMETHORPHAN 5 ML: 100; 10 SYRUP ORAL at 01:48

## 2024-11-22 RX ADMIN — INSULIN LISPRO 1 UNITS: 100 INJECTION, SOLUTION INTRAVENOUS; SUBCUTANEOUS at 16:15

## 2024-11-22 RX ADMIN — LAMOTRIGINE 100 MG: 200 TABLET ORAL at 07:31

## 2024-11-22 RX ADMIN — LAMOTRIGINE 100 MG: 200 TABLET ORAL at 20:40

## 2024-11-22 RX ADMIN — PRAZOSIN HYDROCHLORIDE 1 MG: 1 CAPSULE ORAL at 20:40

## 2024-11-22 NOTE — PROGRESS NOTES
Occupational Therapy  Facility/Department: [facility] 8 REHAB UNIT  Rehabilitation Occupational Therapy Daily Treatment Note    Date: 24  Patient Name: Nguyen Olivo       Room: 0828/828-02  MRN: [id number]  Account: [id number]   : 1965  (59 y.o.) Gender: male                Treatment Diagnosis: (P) L CVA   Past Medical History:  has a past medical history of Bipolar disorder, current episode mixed, mild (HCC), Depression, Diabetes mellitus (HCC), GERD (gastroesophageal reflux disease), Hyperlipidemia, and Hypertension.  Past Surgical History:   has a past surgical history that includes Cholecystectomy (2006); Appendectomy; Elbow surgery; and Coronary angioplasty with stent ().     24 1400   Restrictions/Precautions   Restrictions/Precautions Fall Risk   Balance   Standing Balance Contact guard assistance   Standing Balance   Activity RUE grasp release act   Transfers   Sit to stand Contact guard assistance   Stand to sit Contact guard assistance   OT Exercises   Exercise Treatment 1# TBar against gravity with  assist mitt up 90*, Cinthya 20# LUE and 10# RUE   Resistive Exercises 2 band resistance hand exerciser   Static Standing Balance Exercises sidestepping up and down mat table, able to complete to R with assist at first to land foot in correct position, then able to complete I'ly   Motor Control/Coordination able to remove 1# pegs, [place] to improve pincer grasp vs utilizing gross grasp   Activity Tolerance   Activity Tolerance Patient Tolerated treatment well   Assessment   Performance deficits / Impairments Decreased functional mobility ;Decreased ADL status;Decreased ROM;Decreased safe awareness;Decreased sensation;Decreased fine motor control;Decreased strength;Decreased balance;Decreased endurance;Decreased high-level IADLs   Assessment Continues to improve with UE function, t/fs,  and self care   Treatment Diagnosis L CVA   Time Code Minutes    Timed Code Treatment Minutes 40

## 2024-11-22 NOTE — PROGRESS NOTES
[facility]  INPATIENT SPEECH THERAPY  [facility] 8 REHAB UNIT  TIME   Time In: 1330  Time Out: 1400  Minutes: 30       [x]Daily Note  []Progress Note    Date: 2024  Patient Name: Nguyen Olivo        MRN: [id number]    Account #: [id number]  : 1965  (59 y.o.)  Gender: male   Primary Provider: Mio Boo MD  Swallowing Status/Diet:  Liquid Consistency Recommendation: Thin  Diet Solids Recommendation: Regular          PATIENT DIAGNOSIS(ES):    Diagnosis: LEFT LATERAL THALAMIC STROKE W/RIGHT SIDED WEAKNESS     Additional Pertinent Hx: CAD WITH STENTING, BIPOLAR, DM2, GRD     RESTRICTIONS/PRECAUTIONS:    Restrictions/Precautions  Restrictions/Precautions: Fall Risk     Previous level of function and limitations:   He lives alone. His daughter lives next to him. He has a good friend that lives in [facility] [place] [facility]. He also has other children that live in [facility], one in [place] and one in [place]. He was able to drive before but has a suspended license. He was managing his own meds and used [facility] in [facility]. He manages his own finances/bills. His hobbies include repairing equipment. He likes to listen to [employer]. 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 He does wear bifocals.     Labial: Decreased rate;Decreased seal;Right droop  Dentition: Edentulous (He did have U/L dentures, but these are broken)  Lingual: Left deviation;Decreased rate;Decreased strength              Subjective:   The patient was cooperative with all tasks.       Objective:  He completed tasks for thought organization without cueing and at 100%.    He completed a check writing task at 100%.    He was asked to answer several questions after reading a complex paragraph. He scored at 100%.    No overt s/s of aspiration observed with controlled sips of thin liquids via straw.     Oral motor and pharyngeal exercises were completed.    Education was provided on using a lingual search/sweep during and

## 2024-11-22 NOTE — PROGRESS NOTES
Name: Nguyen Olivo  MRN: [id number]  Date of Service:  11/22/2024 11/22/24 1100   Restrictions/Precautions   Restrictions/Precautions Fall Risk   Required Braces or Orthoses? No   General   Chart Reviewed Yes   Patient assessed for rehabilitation services? Yes   Additional Pertinent Hx CAD WITH STENTING, BIPOLAR, DM2, GRD   Diagnosis LEFT LATERAL THALAMIC STROKE W/RIGHT SIDED WEAKNESS   Follows Commands WFL   Subjective   Subjective Pt brought into gym by OT, agrees to participate in therapy.   Subjective   Subjective Pt reports no pain at moment, reports R heel was hurting earlier (from propelling in w/c so much)   Pain Verbal: 0/10   Transfers   Sit to Stand Contact guard assistance  (Pushing from w/c)   Stand to Sit Contact guard assistance;Stand by assistance   PT Exercises   Exercise Treatment Pt participated in aspects of sitting BLE ther-ex and STS HEP (able to perform AROM w/ RLE, w/o R AFO during sitting ther-ex and w/ R AFO during STS) and standing at // w/ BUE support: heel raises X 10 and LLE hip abd/add X 5 standing on LLE (standing ther-ex w/ R AFO) (required CGA/Min A to ensure balance during some standing ther-ex)  (Required intermittent sitting rest breaks throughout)   Activity Tolerance   Activity Tolerance Patient tolerated treatment well   Assessment   Assessment Pt able to tolerate tx w/o c/o of increased pain, does present w/ some RLE fatigue during ther-ex (mostly standing). pt has no LOB performing ther-ex standing in //.   Discharge Recommendations Continue to assess pending progress;Home with Home health PT;Home with assist PRN   Education   Education Given To Patient   Education Provided Home Exercise Program   Education Provided Comments Sitting BLE ther-ex HEP   Education Method Verbal;Printed Information/Hand-outs   Barriers to Learning None   Education Outcome Verbalized understanding;Demonstrated understanding   Safety Devices   Type of Devices Patient at risk for falls;Gait  belt;Left in chair;Call light within reach           Electronically signed by Anne Marie Harding PTA on 11/22/2024 at 12:23 PM

## 2024-11-22 NOTE — PROGRESS NOTES
Occupational Therapy  Facility/Department: [facility] 8 REHAB UNIT  Rehabilitation Occupational Therapy Daily Treatment Note    Date: 24  Patient Name: Nguyen Olivo       Room: 0828/828-02  MRN: [id number]  Account: [id number]   : 1965  (59 y.o.) Gender: male                Treatment Diagnosis: (P) L CVA   Past Medical History:  has a past medical history of Bipolar disorder, current episode mixed, mild (HCC), Depression, Diabetes mellitus (HCC), GERD (gastroesophageal reflux disease), Hyperlipidemia, and Hypertension.  Past Surgical History:   has a past surgical history that includes Cholecystectomy (2006); Appendectomy; Elbow surgery; and Coronary angioplasty with stent ().     24 1000   Restrictions/Precautions   Restrictions/Precautions Fall Risk   ADL   Toileting Stand by assistance;Contact guard assistance   Balance   Standing Balance Contact guard assistance   Standing Balance   Activity 1 hand clothing management act   Functional Mobility   Functional - Mobility Device Rolling Walker   Assist Level Contact guard assistance   Functional Mobility Comments short distance, cues only for turns and backing up   Transfers   Sit to stand Contact guard assistance   Stand to sit Contact guard assistance   Toilet Transfers   Toilet - Technique Stand pivot   Equipment Used Grab bars   Toilet Transfer Stand by assistance   OT Exercises   Exercise Treatment PG 2# Tbar (not able to complete full range sh flexion fully AG ), comprehensive RUE PG exercises with increased reps   Resistive Exercises Min resistance Tband R elbow   Left Hand Strength -  (lbs)   Handle Setting 2 70#   Right Hand Strength -  (lbs)   Handle Setting 2 11#   Short Term Goals   Short Term Goal 5 MET   Activity Tolerance   Activity Tolerance Patient Tolerated treatment well   Assessment   Performance deficits / Impairments Decreased functional mobility ;Decreased ADL status;Decreased ROM;Decreased safe awareness;Decreased  sensation;Decreased fine motor control;Decreased strength;Decreased balance;Decreased endurance;Decreased high-level IADLs   Assessment Pt has not had access to a toilet or sink at his camper. Has been having to utilize his daughter's bathroom for toileting but does have a shower in his camper that works.   Treatment Diagnosis L CVA   Time Code Minutes    Timed Code Treatment Minutes 60 Minutes   OT Individual Minutes   Time In 1000   Time Out 1100   Minutes 60

## 2024-11-22 NOTE — PROGRESS NOTES
Name: Nguyen Olivo  MRN: [id number]  Date of Service:  11/22/2024 11/22/24 0815   Restrictions/Precautions   Restrictions/Precautions Fall Risk   Required Braces or Orthoses? No   General   Chart Reviewed Yes   Patient assessed for rehabilitation services? Yes   Additional Pertinent Hx CAD WITH STENTING, BIPOLAR, DM2, GRD   Diagnosis LEFT LATERAL THALAMIC STROKE W/RIGHT SIDED WEAKNESS   Follows Commands WFL   General Comment   Comments Pt able to doff pants and perform personal hygiene post BM sitting on toilet/partially standing at toilet independently (no setup required).  Pt then required very Min A to alva pants.   Subjective   Subjective Pt sitting in w/c, agrees to participate in therapy- requests to go to BR first.   Transfers   Sit to Stand Contact guard assistance;Stand by assistance  (Pulling on grab bar in BR w/ LUE)   Stand to Sit Contact guard assistance;Stand by assistance   Bed to Chair Stand by assistance;Contact guard assistance  (w/c<>toilet from pt R/L using grab bar, w/o R AFO)   Wheelchair Activities   Propulsion Yes   Propulsion 1   Propulsion Manual   Method LLE;LUE   Level of Assistance Modified independent;Independent   Description/ Details Multiple L/R turns   Distance 20', 150' X 2   PT Exercises   Exercise Treatment BLE omnicycle X 10 min. on neuro setting aganist 0 madden of resistance w/ total activity of 98% for 2,6 k;m  (W/o R AFO)   Activity Tolerance   Activity Tolerance Patient tolerated treatment well   Assessment   Assessment P able to tolerate tx w/o c/o of increased pain or excessive fatigue. Pt requires less overall assist for functional mobility vs previous txs.   Discharge Recommendations Continue to assess pending progress;Home with Home health PT;Home with assist PRN   Safety Devices   Type of Devices Patient at risk for falls;Gait belt;Left in chair;Call light within reach           Electronically signed by Anne Marie Harding PTA on 11/22/2024 at 12:23 PM

## 2024-11-22 NOTE — PLAN OF CARE
Problem: Chronic Conditions and Co-morbidities  Goal: Patient's chronic conditions and co-morbidity symptoms are monitored and maintained or improved  11/21/2024 2257 by Jo Ann Narayanan RN  Outcome: Progressing  11/21/2024 1412 by Kezia Otto RN  Outcome: Progressing     Problem: Discharge Planning  Goal: Discharge to home or other facility with appropriate resources  11/21/2024 2257 by Jo Ann Narayanan RN  Outcome: Progressing  11/21/2024 1412 by Kezia Otto RN  Outcome: Progressing     Problem: Skin/Tissue Integrity  Goal: Absence of new skin breakdown  Description: 1.  Monitor for areas of redness and/or skin breakdown  2.  Assess vascular access sites hourly  3.  Every 4-6 hours minimum:  Change oxygen saturation probe site  4.  Every 4-6 hours:  If on nasal continuous positive airway pressure, respiratory therapy assess nares and determine need for appliance change or resting period.  11/21/2024 2257 by Jo Ann Narayanan RN  Outcome: Progressing  11/21/2024 1412 by Kezia Otto RN  Outcome: Progressing

## 2024-11-22 NOTE — PROGRESS NOTES
Patient:   Nguyen Olivo  MR#:    [id number]   Room:    0828/828-02   YOB: 1965  Date of Progress Note: 11/22/2024  Time of Note                           7:34 AM  Consulting Physician:   Mio Boo M.D.  Attending Physician:  Mio Boo MD     Chief complaint Acute ischemic stroke     S:This 59 y.o. male  eft-handed male with history of CAD with stenting on 2021 on ASA and Brillinta, HTN, Bipolar disorder, type II DM, dyslipidemia, and GERD. He presented to [facility] ER on 11/6/24 with slurred speech and right-sided weakness that developed when he woke up around 9 a.m. Last know normal as 4 a.m. when he went to bed. Work-up in ER CT head no acute findings, normal CT perfusion, CTA head/neck with 30-40% stenosis within left posterior cerebral artery, no other significant vascular occlusion, otherwise no carotid stenosis. He was not a candidate for TNK d/t being out of the time window. He was admitted to the Hospitalist service with consult for neurology. He was seen by Dr. Rian Nance, who suspected stroke. Dr. Nance recommended ASA, statin, and permissive hypertension. Echo was negative. MRI done revealed an acute lacunar type infarct seen within the lateral left thalamus measuring up to 9.6 mm AP maximum. Patient continues to have right-sided weakness and dysarthria. He is participating in PT/OT/SPT. He is felt to need a stay on Rehab to work towards his goal of returning home with assist of his daughter. He is now felt ready to start the Rehab program.  No new complaints overnight.  Feeling well this morning.    REVIEW OF SYSTEMS:  Constitutional: No fevers No chills  Neck:No stiffness  Respiratory: No shortness of breath  Cardiovascular: No chest pain No palpitations  Gastrointestinal: No abdominal pain    Genitourinary: No Dysuria  Neurological: No headache, no confusion    Past Medical History:      Diagnosis Date    Bipolar disorder, current episode mixed, mild (HCC)     diagnosed at [age]

## 2024-11-23 LAB
GLUCOSE BLD-MCNC: 152 MG/DL (ref 70–99)
GLUCOSE BLD-MCNC: 172 MG/DL (ref 70–99)
GLUCOSE BLD-MCNC: 228 MG/DL (ref 70–99)
GLUCOSE BLD-MCNC: 263 MG/DL (ref 70–99)
PERFORMED ON: ABNORMAL

## 2024-11-23 PROCEDURE — 6370000000 HC RX 637 (ALT 250 FOR IP): Performed by: PSYCHIATRY & NEUROLOGY

## 2024-11-23 PROCEDURE — 97530 THERAPEUTIC ACTIVITIES: CPT

## 2024-11-23 PROCEDURE — 99232 SBSQ HOSP IP/OBS MODERATE 35: CPT | Performed by: PSYCHIATRY & NEUROLOGY

## 2024-11-23 PROCEDURE — 6370000000 HC RX 637 (ALT 250 FOR IP): Performed by: STUDENT IN AN ORGANIZED HEALTH CARE EDUCATION/TRAINING PROGRAM

## 2024-11-23 PROCEDURE — 82962 GLUCOSE BLOOD TEST: CPT

## 2024-11-23 PROCEDURE — 1180000000 HC REHAB R&B

## 2024-11-23 PROCEDURE — 6360000002 HC RX W HCPCS: Performed by: STUDENT IN AN ORGANIZED HEALTH CARE EDUCATION/TRAINING PROGRAM

## 2024-11-23 RX ADMIN — CITALOPRAM 40 MG: 10 TABLET ORAL at 08:58

## 2024-11-23 RX ADMIN — INSULIN LISPRO 2 UNITS: 100 INJECTION, SOLUTION INTRAVENOUS; SUBCUTANEOUS at 23:10

## 2024-11-23 RX ADMIN — FAMOTIDINE 20 MG: 20 TABLET ORAL at 08:58

## 2024-11-23 RX ADMIN — TICAGRELOR 90 MG: 90 TABLET ORAL at 20:30

## 2024-11-23 RX ADMIN — CARVEDILOL 25 MG: 25 TABLET, FILM COATED ORAL at 08:58

## 2024-11-23 RX ADMIN — FAMOTIDINE 20 MG: 20 TABLET ORAL at 20:30

## 2024-11-23 RX ADMIN — CARVEDILOL 25 MG: 25 TABLET, FILM COATED ORAL at 17:14

## 2024-11-23 RX ADMIN — PANTOPRAZOLE SODIUM 40 MG: 40 TABLET, DELAYED RELEASE ORAL at 05:30

## 2024-11-23 RX ADMIN — INSULIN LISPRO 2 UNITS: 100 INJECTION, SOLUTION INTRAVENOUS; SUBCUTANEOUS at 12:17

## 2024-11-23 RX ADMIN — ASPIRIN 81 MG: 81 TABLET, CHEWABLE ORAL at 08:58

## 2024-11-23 RX ADMIN — INSULIN GLARGINE 15 UNITS: 100 INJECTION, SOLUTION SUBCUTANEOUS at 20:31

## 2024-11-23 RX ADMIN — TICAGRELOR 90 MG: 90 TABLET ORAL at 08:58

## 2024-11-23 RX ADMIN — LAMOTRIGINE 100 MG: 200 TABLET ORAL at 08:58

## 2024-11-23 RX ADMIN — PRAZOSIN HYDROCHLORIDE 1 MG: 1 CAPSULE ORAL at 21:00

## 2024-11-23 RX ADMIN — ATORVASTATIN CALCIUM 80 MG: 80 TABLET, FILM COATED ORAL at 20:30

## 2024-11-23 RX ADMIN — LAMOTRIGINE 100 MG: 200 TABLET ORAL at 20:30

## 2024-11-23 RX ADMIN — Medication 2000 UNITS: at 08:58

## 2024-11-23 RX ADMIN — ENOXAPARIN SODIUM 40 MG: 100 INJECTION SUBCUTANEOUS at 08:57

## 2024-11-23 ASSESSMENT — PAIN SCALES - GENERAL: PAINLEVEL_OUTOF10: 0

## 2024-11-23 NOTE — PROGRESS NOTES
Patient:   Nguyen Olivo  MR#:    [id number]   Room:    0828/828-02   YOB: 1965  Date of Progress Note: 11/23/2024  Time of Note                           7:23 AM  Consulting Physician:   Mio Boo M.D.  Attending Physician:  Mio Boo MD     Chief complaint Acute ischemic stroke     S:This 59 y.o. male  eft-handed male with history of CAD with stenting on 2021 on ASA and Brillinta, HTN, Bipolar disorder, type II DM, dyslipidemia, and GERD. He presented to [facility] ER on 11/6/24 with slurred speech and right-sided weakness that developed when he woke up around 9 a.m. Last know normal as 4 a.m. when he went to bed. Work-up in ER CT head no acute findings, normal CT perfusion, CTA head/neck with 30-40% stenosis within left posterior cerebral artery, no other significant vascular occlusion, otherwise no carotid stenosis. He was not a candidate for TNK d/t being out of the time window. He was admitted to the Hospitalist service with consult for neurology. He was seen by Dr. Rian Nance, who suspected stroke. Dr. Nance recommended ASA, statin, and permissive hypertension. Echo was negative. MRI done revealed an acute lacunar type infarct seen within the lateral left thalamus measuring up to 9.6 mm AP maximum. Patient continues to have right-sided weakness and dysarthria. He is participating in PT/OT/SPT. He is felt to need a stay on Rehab to work towards his goal of returning home with assist of his daughter. He is now felt ready to start the Rehab program.  Overall doing well without complaint.  Slept better.    REVIEW OF SYSTEMS:  Constitutional: No fevers No chills  Neck:No stiffness  Respiratory: No shortness of breath  Cardiovascular: No chest pain No palpitations  Gastrointestinal: No abdominal pain    Genitourinary: No Dysuria  Neurological: No headache, no confusion    Past Medical History:      Diagnosis Date    Bipolar disorder, current episode mixed, mild (HCC)     diagnosed at [age]  AFO  (R)   Assistance Contact guard assistance;Minimal assistance   Quality of Gait Slight ER of hip, hyperextension of knee on RLE, pt maintained  R side of walker unassisted, pt able to self correct and perform correct sequencing of gait, intermittent downward gaze, intermittent NBOS   Gait Deviations Slow Kelsi;Decreased step length;Decreased step height;Staggers   Distance 15' X 2  (15' w/ R AFO and 15' w/o R AFO)   Comments Multiple L/R turns- from w/c<>mat table   Wheelchair Activities   Propulsion Yes   Propulsion 1   Propulsion Manual   Method LLE;LUE   Level of Assistance Modified independent   Description/ Details Multiple L/R turns   Distance 150'   PT Exercises   Exercise Treatment Supine BLE ther-ex on mat table: QS X 20, GS X 20, LLE DF/PF X 20 and RLE DF/PF X AAROM, LLE hip abd/add X 20 and RLE hip abd/add X 20 AROM/AAROM, RLE SAQ X 20 and LLE SAQ X 20 AROM/AAROM, bridging X 5  (W/o shoes or R AFO)   Activity Tolerance   Activity Tolerance Patient tolerated treatment well   Assessment   Assessment P able to tolerate tx w/o c/o of increased pain or excessive fatigue. Pt gait quality is slighty improved w/ use of R AFO (better placement of R foot), but is able to amb. w/o LOB w/o it.   Discharge Recommendations Continue to assess pending progress;Home with Home health PT;Home with assist PRN   Safety Devices   Type of Devices Patient at risk for falls;Gait belt;Left in chair;Call light within reach            Electronically signed by Anne Marie Harding PTA on 11/21/2024 at 4:38 PM                          RECORD REVIEW: Previous medical records, medications were reviewed at today's visit    IMPRESSION:   1.  Acute ischemic stroke-on aspirin/statin  2.  Hyperlipidemia-on statin  3.  DVT prophylaxis-Lovenox  4.  Hypertension-on meds monitor  5.  GI/GERD-Pepcid/Protonix/bowel regimen  6.  Diabetes-on insulin monitor blood sugar  7.  Mood disorder-on meds monitor  8.  Smoker-NicoDerm patch  9.  Coronary

## 2024-11-23 NOTE — PLAN OF CARE
Problem: Chronic Conditions and Co-morbidities  Goal: Patient's chronic conditions and co-morbidity symptoms are monitored and maintained or improved  11/22/2024 2318 by Jo Ann Narayanan RN  Outcome: Progressing  11/22/2024 1401 by Kezia Otto RN  Outcome: Progressing     Problem: Discharge Planning  Goal: Discharge to home or other facility with appropriate resources  11/22/2024 2318 by Jo Ann Narayanan RN  Outcome: Progressing  11/22/2024 1401 by Kezia Otto RN  Outcome: Progressing     Problem: Skin/Tissue Integrity  Goal: Absence of new skin breakdown  Description: 1.  Monitor for areas of redness and/or skin breakdown  2.  Assess vascular access sites hourly  3.  Every 4-6 hours minimum:  Change oxygen saturation probe site  4.  Every 4-6 hours:  If on nasal continuous positive airway pressure, respiratory therapy assess nares and determine need for appliance change or resting period.  11/22/2024 2318 by Jo Ann Narayanan RN  Outcome: Progressing  11/22/2024 1401 by Kezia Otto RN  Outcome: Progressing     Problem: Safety - Adult  Goal: Free from fall injury  11/22/2024 2318 by Jo Ann Narayanan RN  Outcome: Progressing  11/22/2024 1401 by Kezia Otto RN  Outcome: Progressing

## 2024-11-23 NOTE — PLAN OF CARE
Problem: Chronic Conditions and Co-morbidities  Goal: Patient's chronic conditions and co-morbidity symptoms are monitored and maintained or improved  11/23/2024 1158 by Renetta Lee LPN  Outcome: Progressing  Flowsheets (Taken 11/23/2024 0901)  Care Plan - Patient's Chronic Conditions and Co-Morbidity Symptoms are Monitored and Maintained or Improved: Monitor and assess patient's chronic conditions and comorbid symptoms for stability, deterioration, or improvement  11/22/2024 2318 by Jo Ann Narayanan, RN  Outcome: Progressing     Problem: Discharge Planning  Goal: Discharge to home or other facility with appropriate resources  11/23/2024 1158 by Renetta Lee LPN  Outcome: Progressing  Flowsheets (Taken 11/23/2024 0901)  Discharge to home or other facility with appropriate resources: Refer to discharge planning if patient needs post-hospital services based on physician order or complex needs related to functional status, cognitive ability or social support system  11/22/2024 2318 by Jo Ann Narayanan, RN  Outcome: Progressing     Problem: Skin/Tissue Integrity  Goal: Absence of new skin breakdown  Description: 1.  Monitor for areas of redness and/or skin breakdown  2.  Assess vascular access sites hourly  3.  Every 4-6 hours minimum:  Change oxygen saturation probe site  4.  Every 4-6 hours:  If on nasal continuous positive airway pressure, respiratory therapy assess nares and determine need for appliance change or resting period.  11/23/2024 1158 by Renetta Lee LPN  Outcome: Progressing  11/22/2024 2318 by Jo Ann Narayanan, RN  Outcome: Progressing     Problem: Safety - Adult  Goal: Free from fall injury  11/23/2024 1158 by Renetta Lee LPN  Outcome: Progressing  11/22/2024 2318 by Jo Ann Narayanan, RN  Outcome: Progressing     Problem: Pain  Goal: Verbalizes/displays adequate comfort level or baseline comfort level  11/23/2024 1158 by Renetta Lee LPN  Outcome:  Metabolic/Fluid and Electrolytes - Adult  Goal: Electrolytes maintained within normal limits  11/23/2024 1158 by Renetta Lee LPN  Outcome: Progressing  Flowsheets (Taken 11/23/2024 0901)  Electrolytes maintained within normal limits: Monitor labs and assess patient for signs and symptoms of electrolyte imbalances  11/22/2024 2318 by Jo Ann Narayanan RN  Outcome: Progressing  Goal: Hemodynamic stability and optimal renal function maintained  11/23/2024 1158 by Renetta Lee LPN  Outcome: Progressing  Flowsheets (Taken 11/23/2024 0901)  Hemodynamic stability and optimal renal function maintained: Monitor labs and assess for signs and symptoms of volume excess or deficit  11/22/2024 2318 by Jo Ann Narayanan RN  Outcome: Progressing  Goal: Glucose maintained within prescribed range  11/23/2024 1158 by Renetta Lee LPN  Outcome: Progressing  Flowsheets (Taken 11/23/2024 0901)  Glucose maintained within prescribed range: Monitor blood glucose as ordered  11/22/2024 2318 by Jo Ann Narayanan RN  Outcome: Progressing     Problem: Genitourinary - Adult  Goal: Absence of urinary retention  11/23/2024 1158 by Renetta Lee LPN  Outcome: Progressing  Flowsheets (Taken 11/23/2024 0901)  Absence of urinary retention: Assess patient’s ability to void and empty bladder  11/22/2024 2318 by Jo Ann Narayanan RN  Outcome: Progressing

## 2024-11-23 NOTE — PROGRESS NOTES
Facility/Department: [facility] 8 REHAB UNIT  Occupational Therapy Treatment Note    Name: Nguyen Olivo  : 1965  MRN: [id number]  Date of Service: 2024    Discharge Recommendations:  Continue to assess pending progress          Patient Diagnosis(es): The primary encounter diagnosis was Weakness. Diagnoses of Acute ischemic stroke (HCC) and Dysarthria were also pertinent to this visit.  Past Medical History:  has a past medical history of Bipolar disorder, current episode mixed, mild (HCC), Depression, Diabetes mellitus (HCC), GERD (gastroesophageal reflux disease), Hyperlipidemia, and Hypertension.  Past Surgical History:  has a past surgical history that includes Cholecystectomy (2006); Appendectomy; Elbow surgery; and Coronary angioplasty with stent ().    Treatment Diagnosis: L CVA    Assessment   Performance deficits / Impairments: Decreased functional mobility ;Decreased ADL status;Decreased ROM;Decreased safe awareness;Decreased sensation;Decreased fine motor control;Decreased strength;Decreased balance;Decreased endurance;Decreased high-level IADLs  Treatment Diagnosis: L CVA  Activity Tolerance  Activity Tolerance: Patient Tolerated treatment well              Plan   Occupational Therapy Plan  Current Treatment Recommendations: Strengthening, ROM, Balance training, Functional mobility training, Endurance training, Patient/Caregiver education & training, Equipment evaluation, education, & procurement, Safety education & training, Self-Care / ADL, Home management training, Neuromuscular re-education     Restrictions  Restrictions/Precautions  Restrictions/Precautions: Fall Risk  Required Braces or Orthoses?: No    Subjective   General  Patient assessed for rehabilitation services?: Yes  Subjective  Subjective: Pt states he did not sleep well last night. See assessment.     Social/Functional History  Social/Functional History  Lives With: Alone  Type of Home: Mobile home (Camper on his daughter's

## 2024-11-24 LAB
GLUCOSE BLD-MCNC: 165 MG/DL (ref 70–99)
GLUCOSE BLD-MCNC: 190 MG/DL (ref 70–99)
GLUCOSE BLD-MCNC: 253 MG/DL (ref 70–99)
GLUCOSE BLD-MCNC: 288 MG/DL (ref 70–99)
PERFORMED ON: ABNORMAL

## 2024-11-24 PROCEDURE — 6370000000 HC RX 637 (ALT 250 FOR IP): Performed by: STUDENT IN AN ORGANIZED HEALTH CARE EDUCATION/TRAINING PROGRAM

## 2024-11-24 PROCEDURE — 94760 N-INVAS EAR/PLS OXIMETRY 1: CPT

## 2024-11-24 PROCEDURE — 6370000000 HC RX 637 (ALT 250 FOR IP): Performed by: PSYCHIATRY & NEUROLOGY

## 2024-11-24 PROCEDURE — 1180000000 HC REHAB R&B

## 2024-11-24 PROCEDURE — 6360000002 HC RX W HCPCS: Performed by: STUDENT IN AN ORGANIZED HEALTH CARE EDUCATION/TRAINING PROGRAM

## 2024-11-24 PROCEDURE — 82962 GLUCOSE BLOOD TEST: CPT

## 2024-11-24 RX ADMIN — ATORVASTATIN CALCIUM 80 MG: 80 TABLET, FILM COATED ORAL at 20:30

## 2024-11-24 RX ADMIN — Medication 2000 UNITS: at 07:43

## 2024-11-24 RX ADMIN — FAMOTIDINE 20 MG: 20 TABLET ORAL at 07:43

## 2024-11-24 RX ADMIN — CARVEDILOL 25 MG: 25 TABLET, FILM COATED ORAL at 07:44

## 2024-11-24 RX ADMIN — ASPIRIN 81 MG: 81 TABLET, CHEWABLE ORAL at 07:43

## 2024-11-24 RX ADMIN — PANTOPRAZOLE SODIUM 40 MG: 40 TABLET, DELAYED RELEASE ORAL at 05:30

## 2024-11-24 RX ADMIN — INSULIN LISPRO 2 UNITS: 100 INJECTION, SOLUTION INTRAVENOUS; SUBCUTANEOUS at 12:21

## 2024-11-24 RX ADMIN — CITALOPRAM 40 MG: 10 TABLET ORAL at 07:43

## 2024-11-24 RX ADMIN — PRAZOSIN HYDROCHLORIDE 1 MG: 1 CAPSULE ORAL at 20:30

## 2024-11-24 RX ADMIN — INSULIN LISPRO 1 UNITS: 100 INJECTION, SOLUTION INTRAVENOUS; SUBCUTANEOUS at 16:40

## 2024-11-24 RX ADMIN — GUAIFENESIN SYRUP AND DEXTROMETHORPHAN 5 ML: 100; 10 SYRUP ORAL at 20:29

## 2024-11-24 RX ADMIN — CARVEDILOL 25 MG: 25 TABLET, FILM COATED ORAL at 16:40

## 2024-11-24 RX ADMIN — LAMOTRIGINE 100 MG: 200 TABLET ORAL at 07:43

## 2024-11-24 RX ADMIN — INSULIN GLARGINE 15 UNITS: 100 INJECTION, SOLUTION SUBCUTANEOUS at 20:29

## 2024-11-24 RX ADMIN — ENOXAPARIN SODIUM 40 MG: 100 INJECTION SUBCUTANEOUS at 07:43

## 2024-11-24 RX ADMIN — TICAGRELOR 90 MG: 90 TABLET ORAL at 07:44

## 2024-11-24 RX ADMIN — TICAGRELOR 90 MG: 90 TABLET ORAL at 20:30

## 2024-11-24 RX ADMIN — FAMOTIDINE 20 MG: 20 TABLET ORAL at 20:29

## 2024-11-24 RX ADMIN — LAMOTRIGINE 100 MG: 200 TABLET ORAL at 20:30

## 2024-11-24 RX ADMIN — INSULIN LISPRO 2 UNITS: 100 INJECTION, SOLUTION INTRAVENOUS; SUBCUTANEOUS at 20:29

## 2024-11-24 RX ADMIN — ACETAMINOPHEN 650 MG: 325 TABLET ORAL at 00:31

## 2024-11-24 ASSESSMENT — PAIN SCALES - GENERAL
PAINLEVEL_OUTOF10: 0
PAINLEVEL_OUTOF10: 1
PAINLEVEL_OUTOF10: 3

## 2024-11-24 ASSESSMENT — PAIN DESCRIPTION - DESCRIPTORS: DESCRIPTORS: ACHING

## 2024-11-24 ASSESSMENT — PAIN DESCRIPTION - ORIENTATION: ORIENTATION: RIGHT

## 2024-11-24 ASSESSMENT — PAIN DESCRIPTION - LOCATION: LOCATION: ELBOW

## 2024-11-24 NOTE — PLAN OF CARE
Problem: Chronic Conditions and Co-morbidities  Goal: Patient's chronic conditions and co-morbidity symptoms are monitored and maintained or improved  11/23/2024 2229 by Parisa Shaikh, RN  Outcome: Progressing  Flowsheets (Taken 11/23/2024 2224)  Care Plan - Patient's Chronic Conditions and Co-Morbidity Symptoms are Monitored and Maintained or Improved: Monitor and assess patient's chronic conditions and comorbid symptoms for stability, deterioration, or improvement  11/23/2024 1158 by Renetta Lee LPN  Outcome: Progressing  Flowsheets (Taken 11/23/2024 0901)  Care Plan - Patient's Chronic Conditions and Co-Morbidity Symptoms are Monitored and Maintained or Improved: Monitor and assess patient's chronic conditions and comorbid symptoms for stability, deterioration, or improvement

## 2024-11-24 NOTE — PLAN OF CARE
Problem: Chronic Conditions and Co-morbidities  Goal: Patient's chronic conditions and co-morbidity symptoms are monitored and maintained or improved  11/24/2024 1051 by Renetta Lee LPN  Outcome: Progressing  Flowsheets (Taken 11/24/2024 0751)  Care Plan - Patient's Chronic Conditions and Co-Morbidity Symptoms are Monitored and Maintained or Improved: Monitor and assess patient's chronic conditions and comorbid symptoms for stability, deterioration, or improvement  11/23/2024 2229 by Parisa Shaikh, RN  Outcome: Progressing  Flowsheets (Taken 11/23/2024 2224)  Care Plan - Patient's Chronic Conditions and Co-Morbidity Symptoms are Monitored and Maintained or Improved: Monitor and assess patient's chronic conditions and comorbid symptoms for stability, deterioration, or improvement     Problem: Discharge Planning  Goal: Discharge to home or other facility with appropriate resources  11/24/2024 1051 by Renetta Lee LPN  Outcome: Progressing  Flowsheets (Taken 11/24/2024 0751)  Discharge to home or other facility with appropriate resources: Refer to discharge planning if patient needs post-hospital services based on physician order or complex needs related to functional status, cognitive ability or social support system  11/23/2024 2229 by Parisa Shaikh, RN  Outcome: Progressing  Flowsheets (Taken 11/23/2024 2224)  Discharge to home or other facility with appropriate resources: Identify barriers to discharge with patient and caregiver     Problem: Skin/Tissue Integrity  Goal: Absence of new skin breakdown  Description: 1.  Monitor for areas of redness and/or skin breakdown  2.  Assess vascular access sites hourly  3.  Every 4-6 hours minimum:  Change oxygen saturation probe site  4.  Every 4-6 hours:  If on nasal continuous positive airway pressure, respiratory therapy assess nares and determine need for appliance change or resting period.  11/24/2024 1051 by Renetta Lee LPN  Outcome:

## 2024-11-25 LAB
ALBUMIN SERPL-MCNC: 3.6 G/DL (ref 3.5–5.2)
ALP SERPL-CCNC: 85 U/L (ref 40–129)
ALT SERPL-CCNC: 17 U/L (ref 5–41)
ANION GAP SERPL CALCULATED.3IONS-SCNC: 9 MMOL/L (ref 7–19)
AST SERPL-CCNC: 14 U/L (ref 5–40)
BASOPHILS # BLD: 0.1 K/UL (ref 0–0.2)
BASOPHILS NFR BLD: 1 % (ref 0–1)
BILIRUB SERPL-MCNC: 0.3 MG/DL (ref 0.2–1.2)
BUN SERPL-MCNC: 10 MG/DL (ref 6–20)
CALCIUM SERPL-MCNC: 9.2 MG/DL (ref 8.6–10)
CHLORIDE SERPL-SCNC: 100 MMOL/L (ref 98–111)
CO2 SERPL-SCNC: 28 MMOL/L (ref 22–29)
CREAT SERPL-MCNC: 0.8 MG/DL (ref 0.7–1.2)
EOSINOPHIL # BLD: 0 K/UL (ref 0–0.6)
EOSINOPHIL NFR BLD: 0 % (ref 0–5)
ERYTHROCYTE [DISTWIDTH] IN BLOOD BY AUTOMATED COUNT: 13.5 % (ref 11.5–14.5)
GLUCOSE BLD-MCNC: 141 MG/DL (ref 70–99)
GLUCOSE BLD-MCNC: 154 MG/DL (ref 70–99)
GLUCOSE BLD-MCNC: 168 MG/DL (ref 70–99)
GLUCOSE BLD-MCNC: 236 MG/DL (ref 70–99)
GLUCOSE SERPL-MCNC: 146 MG/DL (ref 70–99)
HCT VFR BLD AUTO: 34.7 % (ref 42–52)
HGB BLD-MCNC: 11.1 G/DL (ref 14–18)
IMM GRANULOCYTES # BLD: 0.1 K/UL
LYMPHOCYTES # BLD: 6.8 K/UL (ref 1.1–4.5)
LYMPHOCYTES NFR BLD: 49 % (ref 20–40)
MCH RBC QN AUTO: 28 PG (ref 27–31)
MCHC RBC AUTO-ENTMCNC: 32 G/DL (ref 33–37)
MCV RBC AUTO: 87.4 FL (ref 80–94)
MONOCYTES # BLD: 0.3 K/UL (ref 0–0.9)
MONOCYTES NFR BLD: 2 % (ref 0–10)
NEUTROPHILS # BLD: 6.7 K/UL (ref 1.5–7.5)
NEUTS SEG NFR BLD: 48 % (ref 50–65)
PERFORMED ON: ABNORMAL
PLATELET # BLD AUTO: 402 K/UL (ref 130–400)
PLATELET SLIDE REVIEW: ADEQUATE
PMV BLD AUTO: 10.8 FL (ref 9.4–12.4)
POTASSIUM SERPL-SCNC: 4 MMOL/L (ref 3.5–5)
PROT SERPL-MCNC: 6.7 G/DL (ref 6.4–8.3)
RBC # BLD AUTO: 3.97 M/UL (ref 4.7–6.1)
RBC MORPH BLD: NORMAL
SODIUM SERPL-SCNC: 137 MMOL/L (ref 136–145)
WBC # BLD AUTO: 13.9 K/UL (ref 4.8–10.8)

## 2024-11-25 PROCEDURE — 99232 SBSQ HOSP IP/OBS MODERATE 35: CPT | Performed by: PSYCHIATRY & NEUROLOGY

## 2024-11-25 PROCEDURE — 85025 COMPLETE CBC W/AUTO DIFF WBC: CPT

## 2024-11-25 PROCEDURE — 97129 THER IVNTJ 1ST 15 MIN: CPT

## 2024-11-25 PROCEDURE — 97110 THERAPEUTIC EXERCISES: CPT

## 2024-11-25 PROCEDURE — 97535 SELF CARE MNGMENT TRAINING: CPT

## 2024-11-25 PROCEDURE — 1180000000 HC REHAB R&B

## 2024-11-25 PROCEDURE — 6360000002 HC RX W HCPCS: Performed by: STUDENT IN AN ORGANIZED HEALTH CARE EDUCATION/TRAINING PROGRAM

## 2024-11-25 PROCEDURE — 80053 COMPREHEN METABOLIC PANEL: CPT

## 2024-11-25 PROCEDURE — 6370000000 HC RX 637 (ALT 250 FOR IP): Performed by: STUDENT IN AN ORGANIZED HEALTH CARE EDUCATION/TRAINING PROGRAM

## 2024-11-25 PROCEDURE — 94760 N-INVAS EAR/PLS OXIMETRY 1: CPT

## 2024-11-25 PROCEDURE — 6370000000 HC RX 637 (ALT 250 FOR IP): Performed by: PSYCHIATRY & NEUROLOGY

## 2024-11-25 PROCEDURE — 97116 GAIT TRAINING THERAPY: CPT

## 2024-11-25 PROCEDURE — 97530 THERAPEUTIC ACTIVITIES: CPT

## 2024-11-25 PROCEDURE — 92526 ORAL FUNCTION THERAPY: CPT

## 2024-11-25 PROCEDURE — 82962 GLUCOSE BLOOD TEST: CPT

## 2024-11-25 PROCEDURE — 36415 COLL VENOUS BLD VENIPUNCTURE: CPT

## 2024-11-25 RX ADMIN — LAMOTRIGINE 100 MG: 200 TABLET ORAL at 07:48

## 2024-11-25 RX ADMIN — FAMOTIDINE 20 MG: 20 TABLET ORAL at 20:24

## 2024-11-25 RX ADMIN — FAMOTIDINE 20 MG: 20 TABLET ORAL at 07:48

## 2024-11-25 RX ADMIN — PANTOPRAZOLE SODIUM 40 MG: 40 TABLET, DELAYED RELEASE ORAL at 05:43

## 2024-11-25 RX ADMIN — INSULIN LISPRO 1 UNITS: 100 INJECTION, SOLUTION INTRAVENOUS; SUBCUTANEOUS at 16:53

## 2024-11-25 RX ADMIN — TICAGRELOR 90 MG: 90 TABLET ORAL at 20:23

## 2024-11-25 RX ADMIN — ATORVASTATIN CALCIUM 80 MG: 80 TABLET, FILM COATED ORAL at 20:23

## 2024-11-25 RX ADMIN — PRAZOSIN HYDROCHLORIDE 1 MG: 1 CAPSULE ORAL at 20:23

## 2024-11-25 RX ADMIN — Medication 2000 UNITS: at 07:48

## 2024-11-25 RX ADMIN — ENOXAPARIN SODIUM 40 MG: 100 INJECTION SUBCUTANEOUS at 07:48

## 2024-11-25 RX ADMIN — LAMOTRIGINE 100 MG: 200 TABLET ORAL at 20:23

## 2024-11-25 RX ADMIN — TICAGRELOR 90 MG: 90 TABLET ORAL at 07:49

## 2024-11-25 RX ADMIN — CARVEDILOL 25 MG: 25 TABLET, FILM COATED ORAL at 07:48

## 2024-11-25 RX ADMIN — GUAIFENESIN SYRUP AND DEXTROMETHORPHAN 5 ML: 100; 10 SYRUP ORAL at 20:28

## 2024-11-25 RX ADMIN — CITALOPRAM 40 MG: 10 TABLET ORAL at 07:48

## 2024-11-25 RX ADMIN — CARVEDILOL 25 MG: 25 TABLET, FILM COATED ORAL at 16:52

## 2024-11-25 RX ADMIN — ASPIRIN 81 MG: 81 TABLET, CHEWABLE ORAL at 07:48

## 2024-11-25 RX ADMIN — INSULIN GLARGINE 15 UNITS: 100 INJECTION, SOLUTION SUBCUTANEOUS at 20:24

## 2024-11-25 ASSESSMENT — PAIN SCALES - GENERAL: PAINLEVEL_OUTOF10: 0

## 2024-11-25 NOTE — PROGRESS NOTES
Nutrition Assessment     Type and Reason for Visit: Reassess    Nutrition Recommendations/Plan:   Food preferences updated     Malnutrition Assessment:  Malnutrition Status: At risk for malnutrition    Nutrition Assessment:  Pt continues to eat well for meals with good appetite. Intakes ranging from %. Food preferences updated. Pt requesting double protein portions at breakfast.    Estimated Daily Nutrient Needs:  Energy (kcal):  4509-0579 (20-25/kg) Weight Used for Energy Requirements: Current     Protein (g):   (1.3-2/kg) Weight Used for Protein Requirements: Ideal        Fluid (ml/day):  5937-5964 (20-25/kg) Method Used for Fluid Requirements: 1 ml/kcal    Nutrition Related Findings:   BM 11-24, Glu 141-288 Wound Type: None    Current Nutrition Therapies:    ADULT DIET; Regular; 4 carb choices (60 gm/meal); Low Fat/Low Chol/High Fiber/[place]    Anthropometric Measures:  Height: 170.2 cm (5' 7\")  Current Body Wt: 84 kg (185 lb 3 oz)   BMI: 29    Nutrition Diagnosis:   Overweight/obese related to excessive energy intake as evidenced by BMI    Nutrition Interventions:   Food and/or Nutrient Delivery: Modify Current Diet  Nutrition Education/Counseling: No recommendation at this time  Coordination of Nutrition Care: Continue to monitor while inpatient  Plan of Care discussed with: Pt    Goals:  Goals: PO intake 75% or greater  Type of Goal: Continue current goal  Previous Goal Met: Progressing toward Goal(s)    Nutrition Monitoring and Evaluation:   Behavioral-Environmental Outcomes: None Identified  Food/Nutrient Intake Outcomes: Food and Nutrient Intake  Physical Signs/Symptoms Outcomes: Biochemical Data, Chewing or Swallowing, Fluid Status or Edema, Skin, Weight    Discharge Planning:    Continue current diet     Ayana Benavides RD  Contact: [phone]

## 2024-11-25 NOTE — PLAN OF CARE
Problem: Chronic Conditions and Co-morbidities  Goal: Patient's chronic conditions and co-morbidity symptoms are monitored and maintained or improved  11/24/2024 2228 by Jo Ann Narayanan RN  Outcome: Progressing  11/24/2024 1051 by Renetta Lee LPN  Outcome: Progressing  Flowsheets (Taken 11/24/2024 0751)  Care Plan - Patient's Chronic Conditions and Co-Morbidity Symptoms are Monitored and Maintained or Improved: Monitor and assess patient's chronic conditions and comorbid symptoms for stability, deterioration, or improvement     Problem: Discharge Planning  Goal: Discharge to home or other facility with appropriate resources  11/24/2024 2228 by Jo Ann Narayanan RN  Outcome: Progressing  11/24/2024 1051 by Renetta Lee LPN  Outcome: Progressing  Flowsheets (Taken 11/24/2024 0751)  Discharge to home or other facility with appropriate resources: Refer to discharge planning if patient needs post-hospital services based on physician order or complex needs related to functional status, cognitive ability or social support system     Problem: Skin/Tissue Integrity  Goal: Absence of new skin breakdown  Description: 1.  Monitor for areas of redness and/or skin breakdown  2.  Assess vascular access sites hourly  3.  Every 4-6 hours minimum:  Change oxygen saturation probe site  4.  Every 4-6 hours:  If on nasal continuous positive airway pressure, respiratory therapy assess nares and determine need for appliance change or resting period.  11/24/2024 2228 by Jo Ann Narayanan RN  Outcome: Progressing  11/24/2024 1051 by Renetta Lee LPN  Outcome: Progressing     Problem: Safety - Adult  Goal: Free from fall injury  11/24/2024 2228 by Jo Ann Narayanan RN  Outcome: Progressing  11/24/2024 1051 by Renetta Lee LPN  Outcome: Progressing

## 2024-11-25 NOTE — PROGRESS NOTES
[facility]  INPATIENT SPEECH THERAPY  [facility] 8 REHAB UNIT        TIME   [id number]    [x]Daily Note  []Progress Note    Date: 2024  Patient Name: Nguyen Olivo        MRN: [id number]    Account #: [id number]  : 1965  (59 y.o.)  Gender: male   Primary Provider: Mio Boo MD  Swallowing Status/Diet:  Liquid Consistency Recommendation: Thin  Diet Solids Recommendation: Regular          PATIENT DIAGNOSIS(ES):    Diagnosis: LEFT LATERAL THALAMIC STROKE W/RIGHT SIDED WEAKNESS     Additional Pertinent Hx: CAD WITH STENTING, BIPOLAR, DM2, GRD     RESTRICTIONS/PRECAUTIONS:    Restrictions/Precautions  Restrictions/Precautions: Fall Risk     Previous level of function and limitations:   He lives alone. His daughter lives next to him. He has a good friend that lives in [facility]. He also has other children that live in [facility], one in [place] and one in [place]. He was able to drive before but has a suspended license. He was managing his own meds and used [facility] in [facility]. He manages his own finances/bills. His hobbies include repairing equipment. He likes to listen to [employer]. 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 He does wear bifocals.     Labial: Decreased rate;Decreased seal;Right droop  Dentition: Edentulous (He did have U/L dentures, but these are broken)  Lingual: Left deviation;Decreased rate;Decreased strength              Subjective:   He was pleasant and cooperative. He states he slept well last night. He denied pain this afternoon.      Objective:  He completed tasks for thought organization and recall. He scored at 80% with minimal cues.    He followed complex written directions at 80% with minimal cues.      Mild dysarthria is still noted due to slow rate, imprecise productions and blended word boundaries.      Diadochokinetic tasks revealed mildly decreased rate and accuracy.        No overt s/s of aspiration observed with thin liquids via cup.

## 2024-11-25 NOTE — PROGRESS NOTES
Patient:   Nguyen Olivo  MR#:    [id number]   Room:    0828/828-02   YOB: 1965  Date of Progress Note: 11/25/2024  Time of Note                           7:39 AM  Consulting Physician:   Mio Boo M.D.  Attending Physician:  Mio Boo MD     Chief complaint Acute ischemic stroke     S:This 59 y.o. male  eft-handed male with history of CAD with stenting on 2021 on ASA and Brillinta, HTN, Bipolar disorder, type II DM, dyslipidemia, and GERD. He presented to [facility] ER on 11/6/24 with slurred speech and right-sided weakness that developed when he woke up around 9 a.m. Last know normal as 4 a.m. when he went to bed. Work-up in ER CT head no acute findings, normal CT perfusion, CTA head/neck with 30-40% stenosis within left posterior cerebral artery, no other significant vascular occlusion, otherwise no carotid stenosis. He was not a candidate for TNK d/t being out of the time window. He was admitted to the Hospitalist service with consult for neurology. He was seen by Dr. Rian Nance, who suspected stroke. Dr. Nance recommended ASA, statin, and permissive hypertension. Echo was negative. MRI done revealed an acute lacunar type infarct seen within the lateral left thalamus measuring up to 9.6 mm AP maximum. Patient continues to have right-sided weakness and dysarthria. He is participating in PT/OT/SPT. He is felt to need a stay on Rehab to work towards his goal of returning home with assist of his daughter. He is now felt ready to start the Rehab program.  No new complaints this morning.  Overall feeling well.    REVIEW OF SYSTEMS:  Constitutional: No fevers No chills  Neck:No stiffness  Respiratory: No shortness of breath  Cardiovascular: No chest pain No palpitations  Gastrointestinal: No abdominal pain    Genitourinary: No Dysuria  Neurological: No headache, no confusion    Past Medical History:      Diagnosis Date    Bipolar disorder, current episode mixed, mild (HCC)     diagnosed at [age]    Assessment Pt able to tolerate tx w/o c/o of increased pain, does present w/ some RLE fatigue during ther-ex (mostly standing). pt has no LOB performing ther-ex standing in //.   Discharge Recommendations Continue to assess pending progress;Home with Home health PT;Home with assist PRN   Education   Education Given To Patient   Education Provided Home Exercise Program   Education Provided Comments Sitting BLE ther-ex HEP   Education Method Verbal;Printed Information/Hand-outs   Barriers to Learning None   Education Outcome Verbalized understanding;Demonstrated understanding   Safety Devices   Type of Devices Patient at risk for falls;Gait belt;Left in chair;Call light within reach               Electronically signed by Anne Marie Harding, PTA on 11/22/2024 at 12:23 PM             Cosigned by: Trey Portillo, PT at 11/22/2024  2:36 PM     Revision History                  RECORD REVIEW: Previous medical records, medications were reviewed at today's visit    IMPRESSION:   1.  Acute ischemic stroke-on aspirin/statin  2.  Hyperlipidemia-on statin  3.  DVT prophylaxis-Lovenox  4.  Hypertension-on meds monitor  5.  GI/GERD-Pepcid/Protonix/bowel regimen  6.  Diabetes-on insulin monitor blood sugar  7.  Mood disorder-on meds monitor  8.  Smoker-NicoDerm patch  9.  Coronary artery disease-on aspirin/Brilinta/statin  10.  Vitamin D deficiency-on vitamin D  11.  PT/OT/speech     Continue present care      [facility] November 28 th      Expected duration and frequency therapy: 180 minutes per day, 5 days per week    CALL WITH ANY QUESTIONS  [phone] CELL  Dr Mio Boo

## 2024-11-25 NOTE — PATIENT CARE CONFERENCE
[facility] ACUTE INPATIENT REHABILITATION  TEAM CONFERENCE NOTE    Date: 2024  Patient Name: Nguyen Olivo        MRN: [id number]    : 1965  (59 y.o.)  Gender: male      Diagnosis: LEFT LATERAL THALAMIC STROKE W/RIGHT SIDED WEAKNESS      PHYSICAL THERAPY  GROSS ASSESSMENT       BED MOBILITY  Bed mobility  Bridging: Minimal assistance (To hold knees together)  Rolling to Left: Minimal assistance, Contact guard assistance (with rail)  Rolling to Right: Stand by assistance (with rail)  Supine to Sit: Supervision  Sit to Supine: Supervision  Scooting: Supervision  Bed Mobility Comments: in standard bed.       TRANSFERS  Transfers  Sit to Stand: Stand by assistance (cues for hand placement)  Stand to Sit: Contact guard assistance, Stand by assistance (vc's for optimal position prior to sitting down)  Bed to Chair: Stand by assistance, Contact guard assistance (w/c<>toilet from pt R/L using grab bar, w/o R AFO)  Squat Pivot Transfers: Minimal Assistance, Contact guard assistance (WC to recliner than recliner to WC to assist nuring getting weight on patient,)  Car Transfer: Contact guard assistance, Minimal Assistance (Minivan height- no AD, w/shoes donned (no AFO))  Comment: cues to use proper technique (hand placement) with STS  WHEELCHAIR PROPULSION  Propulsion 1  Propulsion: Manual  Method: KORI VALERO  Level of Assistance: Modified independent, Independent  Description/ Details: Multiple L/R turns  Distance: 150'  AMBULATION  Ambulation  Surface: Level tile  Device: Rolling Walker (+ dycem on R handle)  Other Apparatus: AFO (R)  Assistance: Contact guard assistance  Quality of Gait: Slight ER of hip, hyperextension R kneewith wbing and wide abdulkadir, pt maintained  R side of walker, repositioning on occasion,intermittent downward gaze with vc's to inc distance he advances RW before stepping, improved wt shifting  Gait Deviations: Decreased step height, Increased ABDULKADIR, Slow Kelsi  Distance: 35'  Comments:

## 2024-11-25 NOTE — PROGRESS NOTES
Physical Therapy  Name: Nguyen Olivo  MRN:  [id number]  Date of service:  11/25/2024 11/25/24 0815   Restrictions/Precautions   Restrictions/Precautions Fall Risk   General   Additional Pertinent Hx CAD WITH STENTING, BIPOLAR, DM2, GRD   Diagnosis LEFT LATERAL THALAMIC STROKE W/RIGHT SIDED WEAKNESS   General Comment   Comments sitting in recliner, awake   Subjective   Subjective Pt agreeable and quick to get ready to go.   Subjective   Subjective R knee discomfort   Pain 5/10   Transfers   Sit to Stand Stand by assistance  (cues for hand placement)   Stand to Sit Contact guard assistance;Stand by assistance  (vc's for optimal position prior to sitting down)   Ambulation   Surface Level tile   Device Rolling Walker  (+ dycem on R handle)   Other Apparatus AFO  (R)   Assistance Contact guard assistance   Quality of Gait Slight ER of hip, hyperextension R kneewith wbing and wide abdulkadir, pt maintained  R side of walker, repositioning on occasion,intermittent downward gaze with vc's to inc distance he advances RW before stepping, improved wt shifting   Gait Deviations Decreased step height;Increased ABDULKADIR;Slow Kelsi   Distance 35'   Comments pt able to make turn around at 1/2 way and return to WC, wide turns   Ambulation 2   Surface - 2 level tile   Device 2 Rolling Walker   Other Apparatus 2 Wheelchair follow  (for distance gains)   Assistance 2 Contact guard assistance   Quality of Gait 2 as above though R UE fatigued quicker and needing more breaks to reposition R hand on handle   Distance 75'   PT Exercises   Motor Control/Coordination // bars with dycem under R hand: x 10 diagonal wt shifts (R foot fwd to meet dot, wt shifted, then returned foot to starting position), x 10 R foot to targets (2 positioned ant to each foot, alternating sequence), x 10 alternating heel taps on 4\" aerobic step   Standing Open/Closed Kinetic Chain Exercises // bars x 15: B marching, B hip abd  (R knee hyperextends with single leg  wbing)   Assessment   Assessment Pt shows improved wt shift and overall improved quality of mobility. Cont with need for reminders with safety during STS (hand placment). Pt able to navigate turns with amb though requires extra space to navigate. Will cont to benefit from further practice with controlling position R LE for mobility and refining walker advancment and step length.   PT Individual Minutes   Time In 0815   Time Out 0905   Minutes 50         Electronically signed by Tiffani Marcos PTA on 11/25/2024 at 10:31 AM

## 2024-11-25 NOTE — PROGRESS NOTES
Discharge planned for Thursday, 11/28. MSW asked Dr. Boo to send med orders Wednesday, 11/27, d/t holiday on Thursday. MSW spoke to patient and advised to have family  prescriptions on Wednesday. Patient voiced understanding. [facility] arranged and contact info is on AVS.       Electronically signed by ANGELA Awad on 11/25/24 at 2:32 PM CST

## 2024-11-25 NOTE — PLAN OF CARE
Problem: Chronic Conditions and Co-morbidities  Goal: Patient's chronic conditions and co-morbidity symptoms are monitored and maintained or improved  11/25/2024 1012 by Renetta Lee LPN  Outcome: Progressing  Flowsheets (Taken 11/25/2024 0759)  Care Plan - Patient's Chronic Conditions and Co-Morbidity Symptoms are Monitored and Maintained or Improved: Monitor and assess patient's chronic conditions and comorbid symptoms for stability, deterioration, or improvement  11/24/2024 2228 by Jo Ann Narayanan, RN  Outcome: Progressing     Problem: Discharge Planning  Goal: Discharge to home or other facility with appropriate resources  11/25/2024 1012 by Renetat Lee LPN  Outcome: Progressing  Flowsheets (Taken 11/25/2024 0759)  Discharge to home or other facility with appropriate resources: Refer to discharge planning if patient needs post-hospital services based on physician order or complex needs related to functional status, cognitive ability or social support system  11/24/2024 2228 by Jo Ann Narayanan, RN  Outcome: Progressing     Problem: Skin/Tissue Integrity  Goal: Absence of new skin breakdown  Description: 1.  Monitor for areas of redness and/or skin breakdown  2.  Assess vascular access sites hourly  3.  Every 4-6 hours minimum:  Change oxygen saturation probe site  4.  Every 4-6 hours:  If on nasal continuous positive airway pressure, respiratory therapy assess nares and determine need for appliance change or resting period.  11/25/2024 1012 by Renetta Lee LPN  Outcome: Progressing  11/24/2024 2228 by Jo Ann Narayanan, RN  Outcome: Progressing     Problem: Safety - Adult  Goal: Free from fall injury  11/25/2024 1012 by Renetta Lee LPN  Outcome: Progressing  11/24/2024 2228 by Jo Ann Narayanan, RN  Outcome: Progressing     Problem: Pain  Goal: Verbalizes/displays adequate comfort level or baseline comfort level  11/25/2024 1012 by Renetta Lee LPN  Outcome:  Progressing  11/24/2024 2228 by Jo Ann Narayanan RN  Outcome: Progressing     Problem: Neurosensory - Adult  Goal: Achieves stable or improved neurological status  11/25/2024 1012 by Renetta Lee LPN  Outcome: Progressing  Flowsheets (Taken 11/25/2024 0759)  Achieves stable or improved neurological status: Assess for and report changes in neurological status  11/24/2024 2228 by Jo Ann Narayanan RN  Outcome: Progressing  Goal: Achieves maximal functionality and self care  11/25/2024 1012 by Renetta Lee LPN  Outcome: Progressing  Flowsheets (Taken 11/25/2024 0759)  Achieves maximal functionality and self care: Encourage and assist patient to increase activity and self care with guidance from physical therapy/occupational therapy  11/24/2024 2228 by Jo Ann Narayanan RN  Outcome: Progressing     Problem: Skin/Tissue Integrity - Adult  Goal: Skin integrity remains intact  11/25/2024 1012 by Renetta Lee LPN  Outcome: Progressing  Flowsheets (Taken 11/25/2024 0714)  Skin Integrity Remains Intact: Monitor for areas of redness and/or skin breakdown  11/24/2024 2228 by Jo Ann Narayanan RN  Outcome: Progressing     Problem: Musculoskeletal - Adult  Goal: Return mobility to safest level of function  11/25/2024 1012 by Renetta Lee LPN  Outcome: Progressing  Flowsheets (Taken 11/25/2024 0759)  Return Mobility to Safest Level of Function: Assess patient stability and activity tolerance for standing, transferring and ambulating with or without assistive devices  11/24/2024 2228 by Jo Ann Narayanan RN  Outcome: Progressing  Goal: Return ADL status to a safe level of function  11/25/2024 1012 by Renetta Lee LPN  Outcome: Progressing  Flowsheets (Taken 11/25/2024 0759)  Return ADL Status to a Safe Level of Function: Assist and instruct patient to increase activity and self care as tolerated  11/24/2024 2228 by Jo Ann Narayanan RN  Outcome: Progressing     Problem:

## 2024-11-25 NOTE — PROGRESS NOTES
Physical Therapy  Name: Nguyen Olivo  MRN:  [id number]  Date of service:  11/25/2024 11/25/24 1108   Restrictions/Precautions   Restrictions/Precautions Fall Risk   General   Diagnosis LEFT LATERAL THALAMIC STROKE W/RIGHT SIDED WEAKNESS   General Comment   Comments sitting in WC post OT session   Subjective   Subjective Pt agreeable and states he is proud of how he did in PT earlier this am.   Ambulation   Surface Level tile   Device Rollator   Other Apparatus AFO  (R)   Assistance Contact guard assistance   Quality of Gait more of reciprocal pattern with additional cues to slow down and stay near AD, slight ER of hip and hyperextension R knee with wbing, downward gaze   Gait Deviations Decreased step height;Increased ABDULKADIR;Slow Kelsi   Distance 20'   Comments pt has newly acquired rollator so trial due to DME coverage issues   Ambulation 2   Surface - 2 level tile   Device 2 Rollator   Other Apparatus 2 Wheelchair follow;AFO;Right  (for distance gains)   Assistance 2 Contact guard assistance   Quality of Gait 2 as above   Distance 50'   Comments pt with more reluctance using rollator and more cues for technique; R hand doesn't allow brake management so relies on L to engage/disengage; pt voices preference to use RW upon discharge with intent to have family search storage arease for family owned device   Ambulation 3   Surface - 3 level tile   Device 3 Rolling Walker   Other Apparatus 3 AFO;Right;Wheelchair follow  (wc follow for distance gains)   Quality of Gait 3 improved step to technique with good wt shift, hand fatigues and reminders to tend to  on 2 occasions with pt using L hand to correct, downward gaze though less than with rollator, slight R hip ER and knee hyperextends with wbing   Distance 85'   Wheelchair Activities   Propulsion Yes   Propulsion 1   Propulsion Manual   Method LLE;LUE   Level of Assistance Modified independent;Independent   Description/ Details Multiple L/R turns   Distance 150'    PT Exercises   Resistive Exercises man resist B (R 2 sets of 10 with light resist and L x 20 with mod resist): hip flex/ext, knee flex/ext, hip abd/add   Motor Control/Coordination R heel to L shin x 10 working on precise motion and control   Assessment   Assessment Pt recently acquired rollator through insurance and DME coverage limits allowance for another device at this time-> experimented with rollator. Pt presents with dec confidence and inc need for cues, ultimately vocalizing preference for regular RW. Pt to have family seek device that  mother may have used. Will cont  training with RW as this preferred device and he cont to show gains in quality/endurance.   PT Individual Minutes   Time In 1105   Time Out 1200   Minutes 55         Electronically signed by Tiffani Marcos PTA on 2024 at 3:17 PM

## 2024-11-26 LAB
GLUCOSE BLD-MCNC: 151 MG/DL (ref 70–99)
GLUCOSE BLD-MCNC: 176 MG/DL (ref 70–99)
GLUCOSE BLD-MCNC: 196 MG/DL (ref 70–99)
GLUCOSE BLD-MCNC: 222 MG/DL (ref 70–99)
PERFORMED ON: ABNORMAL

## 2024-11-26 PROCEDURE — 1180000000 HC REHAB R&B

## 2024-11-26 PROCEDURE — 94760 N-INVAS EAR/PLS OXIMETRY 1: CPT

## 2024-11-26 PROCEDURE — 97130 THER IVNTJ EA ADDL 15 MIN: CPT

## 2024-11-26 PROCEDURE — 97129 THER IVNTJ 1ST 15 MIN: CPT

## 2024-11-26 PROCEDURE — 97110 THERAPEUTIC EXERCISES: CPT

## 2024-11-26 PROCEDURE — 82962 GLUCOSE BLOOD TEST: CPT

## 2024-11-26 PROCEDURE — 6370000000 HC RX 637 (ALT 250 FOR IP): Performed by: PSYCHIATRY & NEUROLOGY

## 2024-11-26 PROCEDURE — 99232 SBSQ HOSP IP/OBS MODERATE 35: CPT | Performed by: PSYCHIATRY & NEUROLOGY

## 2024-11-26 PROCEDURE — 97116 GAIT TRAINING THERAPY: CPT

## 2024-11-26 PROCEDURE — 97530 THERAPEUTIC ACTIVITIES: CPT

## 2024-11-26 PROCEDURE — 6360000002 HC RX W HCPCS: Performed by: STUDENT IN AN ORGANIZED HEALTH CARE EDUCATION/TRAINING PROGRAM

## 2024-11-26 PROCEDURE — 97535 SELF CARE MNGMENT TRAINING: CPT

## 2024-11-26 PROCEDURE — 6370000000 HC RX 637 (ALT 250 FOR IP): Performed by: STUDENT IN AN ORGANIZED HEALTH CARE EDUCATION/TRAINING PROGRAM

## 2024-11-26 RX ORDER — ATORVASTATIN CALCIUM 80 MG/1
80 TABLET, FILM COATED ORAL NIGHTLY
Qty: 30 TABLET | Refills: 0 | Status: SHIPPED | OUTPATIENT
Start: 2024-11-26

## 2024-11-26 RX ORDER — INSULIN GLARGINE 100 [IU]/ML
15 INJECTION, SOLUTION SUBCUTANEOUS NIGHTLY
Qty: 10 ML | Refills: 0 | Status: SHIPPED | OUTPATIENT
Start: 2024-11-26

## 2024-11-26 RX ORDER — GUAIFENESIN/DEXTROMETHORPHAN 100-10MG/5
5 SYRUP ORAL EVERY 6 HOURS PRN
Qty: 120 ML | Refills: 0 | Status: SHIPPED | OUTPATIENT
Start: 2024-11-26 | End: 2024-12-06

## 2024-11-26 RX ORDER — PANTOPRAZOLE SODIUM 40 MG/1
40 TABLET, DELAYED RELEASE ORAL
Qty: 30 TABLET | Refills: 0 | Status: SHIPPED | OUTPATIENT
Start: 2024-11-27

## 2024-11-26 RX ORDER — TRAZODONE HYDROCHLORIDE 50 MG/1
50 TABLET, FILM COATED ORAL NIGHTLY PRN
Qty: 30 TABLET | Refills: 0 | Status: SHIPPED | OUTPATIENT
Start: 2024-11-26

## 2024-11-26 RX ORDER — CARVEDILOL 25 MG/1
25 TABLET ORAL 2 TIMES DAILY WITH MEALS
Qty: 60 TABLET | Refills: 0 | Status: SHIPPED | OUTPATIENT
Start: 2024-11-26

## 2024-11-26 RX ORDER — NICOTINE 21 MG/24HR
1 PATCH, TRANSDERMAL 24 HOURS TRANSDERMAL EVERY 24 HOURS
Qty: 30 PATCH | Refills: 0 | Status: SHIPPED | OUTPATIENT
Start: 2024-11-26

## 2024-11-26 RX ORDER — CHOLECALCIFEROL (VITAMIN D3) 50 MCG
2000 TABLET ORAL DAILY
Qty: 30 TABLET | Refills: 0 | Status: SHIPPED | OUTPATIENT
Start: 2024-11-27

## 2024-11-26 RX ORDER — CITALOPRAM HYDROBROMIDE 40 MG/1
40 TABLET ORAL DAILY
Qty: 30 TABLET | Refills: 0 | Status: SHIPPED | OUTPATIENT
Start: 2024-11-27

## 2024-11-26 RX ORDER — LAMOTRIGINE 100 MG/1
100 TABLET ORAL 2 TIMES DAILY
Qty: 60 TABLET | Refills: 0 | Status: SHIPPED | OUTPATIENT
Start: 2024-11-26

## 2024-11-26 RX ORDER — PRAZOSIN HYDROCHLORIDE 1 MG/1
1 CAPSULE ORAL NIGHTLY
Qty: 30 CAPSULE | Refills: 0 | Status: SHIPPED | OUTPATIENT
Start: 2024-11-26

## 2024-11-26 RX ORDER — FAMOTIDINE 20 MG/1
20 TABLET, FILM COATED ORAL 2 TIMES DAILY
Qty: 60 TABLET | Refills: 0 | Status: SHIPPED | OUTPATIENT
Start: 2024-11-26

## 2024-11-26 RX ORDER — ASPIRIN 81 MG/1
81 TABLET, CHEWABLE ORAL DAILY
Qty: 30 TABLET | Refills: 0 | Status: SHIPPED | OUTPATIENT
Start: 2024-11-27

## 2024-11-26 RX ADMIN — LAMOTRIGINE 100 MG: 200 TABLET ORAL at 07:31

## 2024-11-26 RX ADMIN — ENOXAPARIN SODIUM 40 MG: 100 INJECTION SUBCUTANEOUS at 07:32

## 2024-11-26 RX ADMIN — CARVEDILOL 25 MG: 25 TABLET, FILM COATED ORAL at 07:31

## 2024-11-26 RX ADMIN — PRAZOSIN HYDROCHLORIDE 1 MG: 1 CAPSULE ORAL at 20:50

## 2024-11-26 RX ADMIN — FAMOTIDINE 20 MG: 20 TABLET ORAL at 07:31

## 2024-11-26 RX ADMIN — ATORVASTATIN CALCIUM 80 MG: 80 TABLET, FILM COATED ORAL at 20:50

## 2024-11-26 RX ADMIN — PANTOPRAZOLE SODIUM 40 MG: 40 TABLET, DELAYED RELEASE ORAL at 05:47

## 2024-11-26 RX ADMIN — ASPIRIN 81 MG: 81 TABLET, CHEWABLE ORAL at 07:31

## 2024-11-26 RX ADMIN — Medication 2000 UNITS: at 07:32

## 2024-11-26 RX ADMIN — TICAGRELOR 90 MG: 90 TABLET ORAL at 20:50

## 2024-11-26 RX ADMIN — FAMOTIDINE 20 MG: 20 TABLET ORAL at 20:50

## 2024-11-26 RX ADMIN — TICAGRELOR 90 MG: 90 TABLET ORAL at 07:31

## 2024-11-26 RX ADMIN — CITALOPRAM 40 MG: 10 TABLET ORAL at 07:32

## 2024-11-26 RX ADMIN — INSULIN GLARGINE 15 UNITS: 100 INJECTION, SOLUTION SUBCUTANEOUS at 20:49

## 2024-11-26 RX ADMIN — LAMOTRIGINE 100 MG: 200 TABLET ORAL at 20:50

## 2024-11-26 RX ADMIN — ACETAMINOPHEN 650 MG: 325 TABLET ORAL at 02:39

## 2024-11-26 ASSESSMENT — PAIN SCALES - GENERAL
PAINLEVEL_OUTOF10: 7
PAINLEVEL_OUTOF10: 3

## 2024-11-26 ASSESSMENT — PAIN DESCRIPTION - LOCATION: LOCATION: ANKLE;ARM

## 2024-11-26 ASSESSMENT — PAIN DESCRIPTION - DESCRIPTORS: DESCRIPTORS: ACHING

## 2024-11-26 ASSESSMENT — PAIN DESCRIPTION - ORIENTATION: ORIENTATION: RIGHT

## 2024-11-26 ASSESSMENT — PAIN - FUNCTIONAL ASSESSMENT: PAIN_FUNCTIONAL_ASSESSMENT: ACTIVITIES ARE NOT PREVENTED

## 2024-11-26 NOTE — PROGRESS NOTES
[facility]  INPATIENT SPEECH THERAPY  [facility] 8 REHAB UNIT        TIME   Time In: 1000  Time Out: 1030  Minutes: 30  Time In: 1400  Time Out: 1430  Minutes: 30       [x]Daily Note  []Progress Note    Date: 2024  Patient Name: Nguyen Olivo        MRN: [id number]    Account #: [id number]  : 1965  (59 y.o.)  Gender: male   Primary Provider: Mio Boo MD  Swallowing Status/Diet:  Liquid Consistency Recommendation: Thin  Diet Solids Recommendation: Regular          PATIENT DIAGNOSIS(ES):    Diagnosis: LEFT LATERAL THALAMIC STROKE W/RIGHT SIDED WEAKNESS     Additional Pertinent Hx: CAD WITH STENTING, BIPOLAR, DM2, GRD     RESTRICTIONS/PRECAUTIONS:    Restrictions/Precautions  Restrictions/Precautions: Fall Risk     Previous level of function and limitations:   He lives alone. His daughter lives next to him. He has a good friend that lives in [facility]. He also has other children that live in [facility], one in [place] and one in [place]. He was able to drive before but has a suspended license. He was managing his own meds and used [facility] in [facility]. He manages his own finances/bills. His hobbies include repairing equipment. He likes to listen to [employer]. 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 He does wear bifocals.     Labial: Decreased rate;Decreased seal;Right droop  Dentition: Edentulous (He did have U/L dentures, but these are broken)  Lingual: Left deviation;Decreased rate;Decreased strength                  Subjective:   He stated he did not sleep well last night due to his room being too cold. He stated he used five blankets but could not warm up. Maintenance has been called several times due to his room temperature. Will contact them again to adjust his room temperature again.      Objective:  He completed a task for left/right discrimination and scored at 100% without cues.    He was asked to recall details from a complex picture. He scored at 90%

## 2024-11-26 NOTE — PLAN OF CARE
Problem: Chronic Conditions and Co-morbidities  Goal: Patient's chronic conditions and co-morbidity symptoms are monitored and maintained or improved  11/26/2024 0005 by Diana Serra LPN  Outcome: Progressing  11/25/2024 1012 by Renetta Lee LPN  Outcome: Progressing  Flowsheets (Taken 11/25/2024 0759)  Care Plan - Patient's Chronic Conditions and Co-Morbidity Symptoms are Monitored and Maintained or Improved: Monitor and assess patient's chronic conditions and comorbid symptoms for stability, deterioration, or improvement     Problem: Discharge Planning  Goal: Discharge to home or other facility with appropriate resources  11/26/2024 0005 by Diana Serra LPN  Outcome: Progressing  11/25/2024 1012 by Renetta Lee LPN  Outcome: Progressing  Flowsheets (Taken 11/25/2024 0759)  Discharge to home or other facility with appropriate resources: Refer to discharge planning if patient needs post-hospital services based on physician order or complex needs related to functional status, cognitive ability or social support system     Problem: Skin/Tissue Integrity  Goal: Absence of new skin breakdown  Description: 1.  Monitor for areas of redness and/or skin breakdown  2.  Assess vascular access sites hourly  3.  Every 4-6 hours minimum:  Change oxygen saturation probe site  4.  Every 4-6 hours:  If on nasal continuous positive airway pressure, respiratory therapy assess nares and determine need for appliance change or resting period.  11/26/2024 0005 by Diana Serra LPN  Outcome: Progressing  11/25/2024 1012 by Renetta Lee LPN  Outcome: Progressing     Problem: Safety - Adult  Goal: Free from fall injury  11/26/2024 0005 by Diana Serra LPN  Outcome: Progressing  11/25/2024 1012 by Renetta Lee LPN  Outcome: Progressing     Problem: Pain  Goal: Verbalizes/displays adequate comfort level or baseline comfort level  11/26/2024 0005 by Diana Serra LPN  Outcome: Progressing  11/25/2024 1012  by Renetta Lee LPN  Outcome: Progressing     Problem: Neurosensory - Adult  Goal: Achieves stable or improved neurological status  11/26/2024 0005 by Diana Serra LPN  Outcome: Progressing  11/25/2024 1012 by Renetta Lee LPN  Outcome: Progressing  Flowsheets (Taken 11/25/2024 0759)  Achieves stable or improved neurological status: Assess for and report changes in neurological status  Goal: Achieves maximal functionality and self care  11/26/2024 0005 by Diana Serra LPN  Outcome: Progressing  11/25/2024 1012 by Renetta Lee LPN  Outcome: Progressing  Flowsheets (Taken 11/25/2024 0759)  Achieves maximal functionality and self care: Encourage and assist patient to increase activity and self care with guidance from physical therapy/occupational therapy     Problem: Skin/Tissue Integrity - Adult  Goal: Skin integrity remains intact  11/26/2024 0005 by Diana Serra LPN  Outcome: Progressing  11/25/2024 1012 by Renetta Lee LPN  Outcome: Progressing  Flowsheets (Taken 11/25/2024 0714)  Skin Integrity Remains Intact: Monitor for areas of redness and/or skin breakdown     Problem: Musculoskeletal - Adult  Goal: Return mobility to safest level of function  11/26/2024 0005 by Diana Serra LPN  Outcome: Progressing  11/25/2024 1012 by Renetta Lee LPN  Outcome: Progressing  Flowsheets (Taken 11/25/2024 0759)  Return Mobility to Safest Level of Function: Assess patient stability and activity tolerance for standing, transferring and ambulating with or without assistive devices  Goal: Return ADL status to a safe level of function  11/26/2024 0005 by Diana Serra LPN  Outcome: Progressing  11/25/2024 1012 by Renetta Lee LPN  Outcome: Progressing  Flowsheets (Taken 11/25/2024 0759)  Return ADL Status to a Safe Level of Function: Assist and instruct patient to increase activity and self care as tolerated     Problem: Metabolic/Fluid and Electrolytes - Adult  Goal: Electrolytes

## 2024-11-26 NOTE — PROGRESS NOTES
mellitus (HCC)     GERD (gastroesophageal reflux disease)     Hyperlipidemia     Hypertension        Past Surgical History:      Procedure Laterality Date    APPENDECTOMY      patient was 18    CHOLECYSTECTOMY  07/06/2006    CORONARY ANGIOPLASTY WITH STENT PLACEMENT  2021    ELBOW SURGERY      left, removed cyst       Medications in Hospital:      Current Facility-Administered Medications:     traZODone (DESYREL) tablet 50 mg, 50 mg, Oral, Nightly PRN, Mio Boo MD, 50 mg at 11/22/24 2039    citalopram (CELEXA) tablet 40 mg, 40 mg, Oral, Daily, iMo Boo MD, 40 mg at 11/26/24 0732    calcium carbonate (TUMS) chewable tablet 500 mg, 500 mg, Oral, TID PRN, Mio Boo MD, 500 mg at 11/18/24 0736    guaiFENesin-dextromethorphan (ROBITUSSIN DM) 100-10 MG/5ML syrup 5 mL, 5 mL, Oral, Q6H PRN, Mio Boo MD, 5 mL at 11/25/24 2028    insulin lispro (HUMALOG,ADMELOG) injection vial 0-4 Units, 0-4 Units, SubCUTAneous, 4x Daily AC & HS, Mio Boo MD, 1 Units at 11/25/24 1653    [DISCONTINUED] acetaminophen (TYLENOL) tablet 650 mg, 650 mg, Oral, Q4H PRN **OR** acetaminophen (TYLENOL) suppository 650 mg, 650 mg, Rectal, Q4H PRN, Keven Kowalski MD    aspirin chewable tablet 81 mg, 81 mg, Oral, Daily, 81 mg at 11/26/24 0731 **OR** [DISCONTINUED] aspirin suppository 300 mg, 300 mg, Rectal, Daily, Keven Kowalski MD    atorvastatin (LIPITOR) tablet 80 mg, 80 mg, Oral, Nightly, Keven Kowalski MD, 80 mg at 11/25/24 2023    carvedilol (COREG) tablet 25 mg, 25 mg, Oral, BID WC, Keven Kowalski MD, 25 mg at 11/26/24 0731    dextrose 10 % infusion, , IntraVENous, Continuous PRN, Keven Kowalski MD    dextrose bolus 10% 125 mL, 125 mL, IntraVENous, PRN **OR** dextrose bolus 10% 250 mL, 250 mL, IntraVENous, PRN, Keven Kowalski MD    enoxaparin (LOVENOX) injection 40 mg, 40 mg, SubCUTAneous, Daily, Keven Kowalski MD, 40 mg at 11/26/24 0732    famotidine (PEPCID) tablet 20 mg, 20 mg, Oral, BID,  11/25/2024    K 4.0 11/25/2024     11/25/2024    CO2 28 11/25/2024    BUN 10 11/25/2024    CREATININE 0.8 11/25/2024    GLUCOSE 146 (H) 11/25/2024    CALCIUM 9.2 11/25/2024    BILITOT 0.3 11/25/2024    ALKPHOS 85 11/25/2024    AST 14 11/25/2024    ALT 17 11/25/2024    LABGLOM >90 11/25/2024    GFRAA >59 11/09/2020     Lab Results   Component Value Date    INR 1.01 11/06/2024    INR 1.05 08/02/2023    PROTIME 13.0 11/06/2024    PROTIME 13.4 08/02/2023       Anne Marie Harding PTA  Physical Therapist Assistant  Physical Therapy     Progress Notes      Signed     Date of Service: 11/22/2024 12:23 PM     Signed            Name: Nguyen Olivo  MRN: [id number]  Date of Service:  11/22/2024 11/22/24 1100   Restrictions/Precautions   Restrictions/Precautions Fall Risk   Required Braces or Orthoses? No   General   Chart Reviewed Yes   Patient assessed for rehabilitation services? Yes   Additional Pertinent Hx CAD WITH STENTING, BIPOLAR, DM2, GRD   Diagnosis LEFT LATERAL THALAMIC STROKE W/RIGHT SIDED WEAKNESS   Follows Commands WFL   Subjective   Subjective Pt brought into gym by OT, agrees to participate in therapy.   Subjective   Subjective Pt reports no pain at moment, reports R heel was hurting earlier (from propelling in w/c so much)   Pain Verbal: 0/10   Transfers   Sit to Stand Contact guard assistance  (Pushing from w/c)   Stand to Sit Contact guard assistance;Stand by assistance   PT Exercises   Exercise Treatment Pt participated in aspects of sitting BLE ther-ex and STS HEP (able to perform AROM w/ RLE, w/o R AFO during sitting ther-ex and w/ R AFO during STS) and standing at // w/ BUE support: heel raises X 10 and LLE hip abd/add X 5 standing on LLE (standing ther-ex w/ R AFO) (required CGA/Min A to ensure balance during some standing ther-ex)  (Required intermittent sitting rest breaks throughout)   Activity Tolerance   Activity Tolerance Patient tolerated treatment well   Assessment   Assessment Pt able

## 2024-11-26 NOTE — PLAN OF CARE
Problem: Chronic Conditions and Co-morbidities  Goal: Patient's chronic conditions and co-morbidity symptoms are monitored and maintained or improved  Outcome: Progressing     Problem: Discharge Planning  Goal: Discharge to home or other facility with appropriate resources  Outcome: Progressing     Problem: Skin/Tissue Integrity  Goal: Absence of new skin breakdown  Description: 1.  Monitor for areas of redness and/or skin breakdown  2.  Assess vascular access sites hourly  3.  Every 4-6 hours minimum:  Change oxygen saturation probe site  4.  Every 4-6 hours:  If on nasal continuous positive airway pressure, respiratory therapy assess nares and determine need for appliance change or resting period.  Outcome: Progressing     Problem: Safety - Adult  Goal: Free from fall injury  Outcome: Progressing     Problem: Pain  Goal: Verbalizes/displays adequate comfort level or baseline comfort level  Outcome: Progressing     Problem: Neurosensory - Adult  Goal: Achieves stable or improved neurological status  Outcome: Progressing  Goal: Achieves maximal functionality and self care  Outcome: Progressing     Problem: Skin/Tissue Integrity - Adult  Goal: Skin integrity remains intact  Outcome: Progressing     Problem: Musculoskeletal - Adult  Goal: Return mobility to safest level of function  Outcome: Progressing  Goal: Return ADL status to a safe level of function  Outcome: Progressing     Problem: Metabolic/Fluid and Electrolytes - Adult  Goal: Electrolytes maintained within normal limits  Outcome: Progressing  Goal: Hemodynamic stability and optimal renal function maintained  Outcome: Progressing  Goal: Glucose maintained within prescribed range  Outcome: Progressing     Problem: Genitourinary - Adult  Goal: Absence of urinary retention  Outcome: Progressing     Problem: Nutrition Deficit:  Goal: Optimize nutritional status  Outcome: Progressing       Electronically signed by BISHOP GambinoN, RN on 11/26/2024 at

## 2024-11-26 NOTE — PROGRESS NOTES
Physical Therapy  Name: Nguyen Olivo  MRN:  [id number]  Date of service:  11/26/2024 11/26/24 1100   Restrictions/Precautions   Restrictions/Precautions Fall Risk   General   Additional Pertinent Hx CAD WITH STENTING, BIPOLAR, DM2, GRD   Diagnosis LEFT LATERAL THALAMIC STROKE W/RIGHT SIDED WEAKNESS   General Comment   Comments in WC post OT session   Subjective   Subjective Pt with slight discomfort above R ankle posterior and medial/laterally. States leg \"jumps at night\".     Subjective   Subjective R knee discomfort and R lower leg   Pain 5/10   Ambulation   Surface Level tile   Device Rolling Walker   Other Apparatus AFO  (R)   Assistance Contact guard assistance   Quality of Gait Slight ER of hip, hyperextension R knee with wbing and wide abdulkadir, pt maintained  R side of walker, repositioning on occasion,intermittent downward gaze with vc's to inc distance he advances RW before stepping, improved wt shifting   Gait Deviations Decreased step height;Increased ABDULKADIR;Slow Kelsi   Distance 200'   PT Exercises   Resistive Exercises man resist B x 20(R with light resist and L with mod resist): hip flex/ext, knee flex/ext, hip abd/add   Functional Mobility Circuit Training short distance amb chair to chair working on more fully approaching chair and eliminating need for correction or much backing to complete transition (6' x 2 with RW cg@ and less cues)   Motor Control/Coordination seated: R heel to L shin 2 x 10 working on precise motion and control; standing at stabilized RW: x 10 R foot to targets x 2 (1 positioned ant to each foot), 2  x 10 alternating B heel taps on 4\" block to promote heel strike, coordinated R LE placement and wt shifting   Assessment   Assessment Pt requiring less cues for amb and seems to  on errors rather quickly, both noting verbally and self correcting. Pt mainly reminded to keep head looking at horizon.Improved planning with turns to approach chair though will benefit from

## 2024-11-26 NOTE — PROGRESS NOTES
speech and right-sided weakness that developed when he woke up around 9 a.m. Last know normal as 4 a.m. when he went to bed. Work-up in ER CT head no acute findings, normal CT perfusion, CTA head/neck with 30-40% stenosis within left posterior cerebral artery, no other significant vascular occlusion, otherwise no carotid stenosis. He was not a candidate for TNK d/t being out of the time window. He was admitted to the Hospitalist service with consult for neurology. He was seen by Dr. Rian Nance, who suspected stroke. Dr. Nance recommended ASA, statin, and permissive hypertension. Echo was negative. MRI done revealed an acute lacunar type infarct seen within the lateral left thalamus measuring up to 9.6 mm AP maximum. Patient continues to have right-sided weakness and dysarthria. He is participating in PT/OT/SPT. He is felt to need a stay on Rehab to work towards his goal of returning home with assist of his daughter. He is now felt ready to start the Rehab program.     REVIEW OF SYSTEMS:  Constitutional - No fever or chills.  No diaphoresis or significant fatigue.  HENT -  No tinnitus or significant hearing loss.  Eyes - no sudden vision change or eye pain  Respiratory - no significant shortness of breath or cough  Cardiovascular - no chest pain No palpitations or significant leg swelling  Gastrointestinal - no abdominal swelling or pain.    Genitourinary - No difficulty urinating, dysuria  Musculoskeletal - no back pain or myalgia.  Skin - no color change or rash  Neurologic - No seizures.  No lateralizing weakness.  Hematologic - no easy bruising or excessive bleeding.  Psychiatric - no severe anxiety or nervousness.   All other review of systems are negative.        Past Medical History:  Past Medical History            Diagnosis Date    Bipolar disorder, current episode mixed, mild (HCC)       diagnosed at [facility]    Depression      Diabetes mellitus (HCC)      GERD (gastroesophageal reflux disease)       \"NA\", \"K\", \"CL\", \"CO2\", \"BUN\", \"CREATININE\", \"GLUCOSE\" in the last 72 hours.     Hepatic: No results for input(s): \"AST\", \"ALT\", \"BILITOT\", \"ALKPHOS\" in the last 72 hours.     Invalid input(s): \"ALB\"     Lipids: No results for input(s): \"CHOL\", \"HDL\" in the last 72 hours.     Invalid input(s): \"LDLCALCU\"     INR: No results for input(s): \"INR\" in the last 72 hours.           Assessment and Plan      1.  Acute ischemic stroke-on aspirin/statin  2.  Hyperlipidemia-on statin  3.  DVT prophylaxis-Lovenox  4.  Hypertension-on meds monitor  5.  GI/GERD-Pepcid/Protonix/bowel regimen  6.  Diabetes-on insulin monitor blood sugar  7.  Mood disorder-on meds monitor  8.  Smoker-NicoDerm patch  9.  Coronary artery disease-on aspirin/Brilinta/statin  10.  Vitamin D deficiency-on vitamin D  11.  PT/OT/speech           Patient's functional assessment  Prior to hospitalization the patient was continent of bowel and continent of bladder     Current therapy  Requires PT, OT and/or speech therapy     Anticipated discharge approximately 16 days     Functional assessment  All notes from reehab data were reviewed regarding the patient's functional status.           Anticipated discharge setting  Home with home care     No clear barriers to discharge     The patient appears to be an appropriate candidate for inpatient rehabilitation     Sufficiently stable: Patient's condition is sufficiently stable at the time of admission to allow the patient to actively participate in an intensive rehabilitation program     Close medical supervision: A rehabilitation physician, or other licensed treating physician with specialized training and experience in inpatient rehabilitation, will conduct face-to-face visits with the patient a minimum of at least 3 days per week throughout the patient's stay     This patient requires close medical supervision for the active management of the ongoing conditions and potential complications stated in the admission

## 2024-11-26 NOTE — PROGRESS NOTES
Physical Therapy  Name: Nguyen Olivo  MRN:  [id number]  Date of service:  11/26/2024 11/26/24 0820   Restrictions/Precautions   Restrictions/Precautions Fall Risk   General   Additional Pertinent Hx CAD WITH STENTING, BIPOLAR, DM2, GRD   Diagnosis LEFT LATERAL THALAMIC STROKE W/RIGHT SIDED WEAKNESS   General Comment   Comments in BR upon arrival   Subjective   Subjective States he didn't sleep good last night due to being cold.   Subjective   Subjective R knee discomfort   Pain 5/10   Ambulation   Surface Level tile   Device Rolling Walker   Other Apparatus AFO  (R)   Assistance Contact guard assistance   Quality of Gait Slight ER of hip, hyperextension R knee with wbing and wide abdulkadir, pt maintained  R side of walker, repositioning on occasion,intermittent downward gaze with vc's to inc distance he advances RW before stepping, improved wt shifting   Gait Deviations Decreased step height;Increased ABDULKADIR;Slow Kelsi   Distance 120'   Wheelchair Activities   Propulsion Yes   Propulsion 1   Propulsion Manual   Method LLE;LUE   Level of Assistance Modified independent;Independent   Description/ Details Multiple L/R turns   Distance 150'   PT Exercises   Dynamic Standing Balance Exercises sidestepping in // bars (small steps for R hip abd strengthening) 10' L and R cg@ for R hand management and cues to maintain proper proximity to bar and use proper technique   Standing Open/Closed Kinetic Chain Exercises // bars 2 x 10: B marching, B hip abd (stabilization lateral R hip during L hip abd due to trendelenberg)  (R knee hyperextends with single leg wbing and seated rests between ex's)   Assessment   Assessment Pt requiring less cues for amb and seems to  on errors rather quickly, both noting verbally and self correcting. Pt mainly reminded to keep head looking at horizon. Some difficulty judging distance and completing turn when approaching chair, needing correction due to excessive distance between himself and  chair.   PT Individual Minutes   Time In 0815   Time Out 0900   Minutes 45         Electronically signed by Tiffani Marcos PTA on 11/26/2024 at 9:00 AM

## 2024-11-26 NOTE — DISCHARGE SUMMARY
Neurology Discharge Summary     Patient Identification:  Nguyen Olivo is a 59 y.o. male.  :  1965  Admit Date:  2024  Discharge date : 2024   Attending Provider: Mio Boo MD     Account Number: [id number]                                   Admission Diagnoses:   Acute ischemic stroke (HCC) [I63.9]    Discharge Diagnoses:  Principal Problem:    Acute thalamic infarction (HCC)  Active Problems:    Hypertension    Hyperlipidemia    GERD (gastroesophageal reflux disease)    Diabetes mellitus (HCC)    Anxiety and depression    Bipolar disorder, current episode mixed, mild (HCC)    CAD (coronary artery disease)    Acute ischemic stroke (HCC)    Weakness  Resolved Problems:    * No resolved hospital problems. *      Discharge Medications:    Current Discharge Medication List             Details   aspirin 81 MG chewable tablet Take 1 tablet by mouth daily  Qty: 30 tablet, Refills: 0      citalopram (CELEXA) 40 MG tablet Take 1 tablet by mouth daily  Qty: 30 tablet, Refills: 0      traZODone (DESYREL) 50 MG tablet Take 1 tablet by mouth nightly as needed for Sleep  Qty: 30 tablet, Refills: 0      insulin glargine (LANTUS) 100 UNIT/ML injection vial Inject 15 Units into the skin nightly  Qty: 10 mL, Refills: 0      Needles & Syringes MISC 1 each by Does not apply route daily Insulin needles and syringes  Qty: 100 each, Refills: 0      guaiFENesin-dextromethorphan (ROBITUSSIN DM) 100-10 MG/5ML syrup Take 5 mLs by mouth every 6 hours as needed for Cough  Qty: 120 mL, Refills: 0      nicotine (NICODERM CQ) 21 MG/24HR Place 1 patch onto the skin every 24 hours  Qty: 30 patch, Refills: 0      famotidine (PEPCID) 20 MG tablet Take 1 tablet by mouth 2 times daily  Qty: 60 tablet, Refills: 0      pantoprazole (PROTONIX) 40 MG tablet Take 1 tablet by mouth every morning (before breakfast)  Qty: 30 tablet, Refills: 0      Vitamin D (CHOLECALCIFEROL) 50 MCG (2000) TABS tablet Take 1 tablet by mouth

## 2024-11-27 ENCOUNTER — APPOINTMENT (OUTPATIENT)
Age: 59
End: 2024-11-27
Attending: PSYCHIATRY & NEUROLOGY
Payer: MEDICAID

## 2024-11-27 VITALS
RESPIRATION RATE: 18 BRPM | SYSTOLIC BLOOD PRESSURE: 127 MMHG | BODY MASS INDEX: 28.94 KG/M2 | HEIGHT: 67 IN | TEMPERATURE: 97.9 F | DIASTOLIC BLOOD PRESSURE: 67 MMHG | WEIGHT: 184.4 LBS | HEART RATE: 69 BPM | OXYGEN SATURATION: 99 %

## 2024-11-27 LAB
ECHO BSA: 1.99 M2
GLUCOSE BLD-MCNC: 157 MG/DL (ref 70–99)
GLUCOSE BLD-MCNC: 178 MG/DL (ref 70–99)
GLUCOSE BLD-MCNC: 184 MG/DL (ref 70–99)
PERFORMED ON: ABNORMAL

## 2024-11-27 PROCEDURE — 97110 THERAPEUTIC EXERCISES: CPT

## 2024-11-27 PROCEDURE — 97129 THER IVNTJ 1ST 15 MIN: CPT

## 2024-11-27 PROCEDURE — 97130 THER IVNTJ EA ADDL 15 MIN: CPT

## 2024-11-27 PROCEDURE — 97530 THERAPEUTIC ACTIVITIES: CPT

## 2024-11-27 PROCEDURE — 6370000000 HC RX 637 (ALT 250 FOR IP): Performed by: PSYCHIATRY & NEUROLOGY

## 2024-11-27 PROCEDURE — 6370000000 HC RX 637 (ALT 250 FOR IP): Performed by: STUDENT IN AN ORGANIZED HEALTH CARE EDUCATION/TRAINING PROGRAM

## 2024-11-27 PROCEDURE — 82962 GLUCOSE BLOOD TEST: CPT

## 2024-11-27 PROCEDURE — 99239 HOSP IP/OBS DSCHRG MGMT >30: CPT | Performed by: PSYCHIATRY & NEUROLOGY

## 2024-11-27 PROCEDURE — 6360000002 HC RX W HCPCS: Performed by: STUDENT IN AN ORGANIZED HEALTH CARE EDUCATION/TRAINING PROGRAM

## 2024-11-27 PROCEDURE — 93246 EXT ECG>7D<15D RECORDING: CPT

## 2024-11-27 PROCEDURE — 97535 SELF CARE MNGMENT TRAINING: CPT

## 2024-11-27 PROCEDURE — 97116 GAIT TRAINING THERAPY: CPT

## 2024-11-27 RX ADMIN — ASPIRIN 81 MG: 81 TABLET, CHEWABLE ORAL at 11:01

## 2024-11-27 RX ADMIN — CARVEDILOL 25 MG: 25 TABLET, FILM COATED ORAL at 11:01

## 2024-11-27 RX ADMIN — Medication 2000 UNITS: at 11:00

## 2024-11-27 RX ADMIN — ACETAMINOPHEN 650 MG: 325 TABLET ORAL at 03:40

## 2024-11-27 RX ADMIN — LAMOTRIGINE 100 MG: 200 TABLET ORAL at 11:01

## 2024-11-27 RX ADMIN — TICAGRELOR 90 MG: 90 TABLET ORAL at 11:00

## 2024-11-27 RX ADMIN — CARVEDILOL 25 MG: 25 TABLET, FILM COATED ORAL at 17:07

## 2024-11-27 RX ADMIN — ENOXAPARIN SODIUM 40 MG: 100 INJECTION SUBCUTANEOUS at 11:01

## 2024-11-27 RX ADMIN — PANTOPRAZOLE SODIUM 40 MG: 40 TABLET, DELAYED RELEASE ORAL at 05:17

## 2024-11-27 RX ADMIN — CITALOPRAM 40 MG: 10 TABLET ORAL at 11:01

## 2024-11-27 RX ADMIN — FAMOTIDINE 20 MG: 20 TABLET ORAL at 11:01

## 2024-11-27 ASSESSMENT — PAIN SCALES - GENERAL
PAINLEVEL_OUTOF10: 3
PAINLEVEL_OUTOF10: 6

## 2024-11-27 ASSESSMENT — PAIN - FUNCTIONAL ASSESSMENT: PAIN_FUNCTIONAL_ASSESSMENT: ACTIVITIES ARE NOT PREVENTED

## 2024-11-27 ASSESSMENT — PAIN DESCRIPTION - LOCATION: LOCATION: BACK

## 2024-11-27 ASSESSMENT — PAIN DESCRIPTION - ORIENTATION: ORIENTATION: POSTERIOR

## 2024-11-27 ASSESSMENT — PAIN DESCRIPTION - DESCRIPTORS: DESCRIPTORS: ACHING

## 2024-11-27 NOTE — PROGRESS NOTES
Physical Therapy  Name: Nguyen Olivo  MRN:  [id number]  Date of service:  11/27/2024 11/27/24 1300   Restrictions/Precautions   Restrictions/Precautions Fall Risk   General   Additional Pertinent Hx CAD WITH STENTING, BIPOLAR, DM2, GRD   Diagnosis LEFT LATERAL THALAMIC STROKE W/RIGHT SIDED WEAKNESS   General Comment   Comments in WC post lunch   Subjective   Subjective No new to report.   Transfers   Sit to Stand Stand by assistance  (cues for hand placement)   Stand to Sit Stand by assistance   Bed to Chair Contact guard assistance;Stand by assistance   Car Transfer Contact guard assistance;Stand by assistance  (simulator set at [employer] ht; use of frame to steady self and self assists R LE into car)   Comment cues to use proper technique (hand placement) with STS   Ambulation   Surface Level tile   Device Rolling Walker   Other Apparatus AFO  (R)   Assistance Contact guard assistance;Stand by assistance   Quality of Gait Slight ER of hip, hyperextension R knee with wbing and wide abdulkadir, pt maintained  R side of walker, repositioning on occasion,intermittent downward gaze, improved wt shifting   Gait Deviations Decreased step height;Increased ABDULKADIR;Slow Kelsi   Distance 220'   Comments pt trying to use reciprocal pattern though not advised yet   Stairs   # Steps  4   Stairs Height 4\"   Rails Bilateral   Assistance Contact guard assistance   Comment cues for proper sequence to manage step to pattern (L asc 1st and R desc 1st); some difficulty planting weak R foot appropriately needing to adjust slightly before proceeding   Propulsion 1   Propulsion Manual   Method LLE;LUE   Level of Assistance Modified independent;Independent   Description/ Details Multiple L/R turns   Distance 200'   PT Exercises   Functional Mobility Circuit Training short distance amb chair to chair working on more fully approaching chair and eliminating need for correction or much backing to complete transition (5' x 4 with RW cg@)   Dynamic

## 2024-11-27 NOTE — PROGRESS NOTES
Occupational Therapy  Facility/Department: [facility] 8 REHAB UNIT  Rehabilitation Occupational Therapy Daily Treatment Note    Date: 24  Patient Name: Nguyen Olivo       Room: 0828/828-02  MRN: [id number]  Account: [id number]   : 1965  (59 y.o.) Gender: male               Treatment Diagnosis: (P) L CVA   Past Medical History:  has a past medical history of Bipolar disorder, current episode mixed, mild (HCC), Depression, Diabetes mellitus (HCC), GERD (gastroesophageal reflux disease), Hyperlipidemia, and Hypertension.  Past Surgical History:   has a past surgical history that includes Cholecystectomy (2006); Appendectomy; Elbow surgery; and Coronary angioplasty with stent ().     24 1100   Restrictions/Precautions   Restrictions/Precautions Fall Risk   Subjective   Subjective Pt is prepared for discharge tomorrow. DME arranged   Balance   Standing Balance Stand by assistance   Functional Mobility   Functional - Mobility Device Wheelchair   Activity To/From therapy gym   Assist Level Modified independent    Transfers   Sit to stand Stand by assistance   Stand to sit Stand by assistance   Toilet Transfers   Toilet Transfer Contact guard assistance   OT Exercises   Exercise Treatment 10# RUE, 25# LUE rickshaw   A/AROM Exercises 2# Tbar supine in reps of 10, 4 sets   Resistive Exercises 3 band hand exerciser   Long Term Goals   Long Term Goal 5 MET   Activity Tolerance   Activity Tolerance Patient Tolerated treatment well   Assessment   Performance deficits / Impairments Decreased functional mobility ;Decreased ADL status;Decreased ROM;Decreased safe awareness;Decreased sensation;Decreased fine motor control;Decreased strength;Decreased high-level IADLs   Assessment Arranged w/c and BSC   Treatment Diagnosis L CVA   Time Code Minutes    Timed Code Treatment Minutes 60 Minutes   OT Individual Minutes   Time In    Time Out 1200   Minutes 60        24 1100   Meal Prep   Meal Prep Level

## 2024-11-27 NOTE — PROGRESS NOTES
Facility/Department: [facility] 8 REHAB UNIT  Occupational Therapy     Name: Nguyen Olivo  : 1965  MRN: [id number]  Date of Service: 2024    Discharge Recommendations:  Continue to assess pending progress          Patient Diagnosis(es): The primary encounter diagnosis was Weakness. Diagnoses of Acute ischemic stroke (HCC), Dysarthria, Right sided weakness, Stroke-like symptoms, and Coronary artery disease due to lipid rich plaque were also pertinent to this visit.  Past Medical History:  has a past medical history of Bipolar disorder, current episode mixed, mild (HCC), Depression, Diabetes mellitus (HCC), GERD (gastroesophageal reflux disease), Hyperlipidemia, and Hypertension.  Past Surgical History:  has a past surgical history that includes Cholecystectomy (2006); Appendectomy; Elbow surgery; and Coronary angioplasty with stent ().    Treatment Diagnosis: L CVA      Assessment   Performance deficits / Impairments: Decreased functional mobility ;Decreased ADL status;Decreased ROM;Decreased safe awareness;Decreased sensation;Decreased fine motor control;Decreased strength;Decreased high-level IADLs  Treatment Diagnosis: L CVA  Prognosis: Good  Activity Tolerance  Activity Tolerance: Patient Tolerated treatment well            Plan   Occupational Therapy Plan  Current Treatment Recommendations: Strengthening, ROM, Balance training, Functional mobility training, Endurance training, Patient/Caregiver education & training, Equipment evaluation, education, & procurement, Safety education & training, Self-Care / ADL, Home management training, Neuromuscular re-education     Restrictions  Restrictions/Precautions  Restrictions/Precautions: Fall Risk  Required Braces or Orthoses?: No    Subjective   General  Patient assessed for rehabilitation services?: Yes  Subjective  Subjective: Pt states he did not sleep well last night. See assessment.     Social/Functional History  Social/Functional History  Lives With:

## 2024-11-27 NOTE — PROGRESS NOTES
Occupational Therapy  Facility/Department: [facility] REHAB UNIT  Rehabilitation Occupational Therapy Daily Treatment Note    Date: 24  Patient Name: Nguyen Olivo       Room: 0828/828-02  MRN: [id number]  Account: [id number]   : 1965  (59 y.o.) Gender: male                    Past Medical History:  has a past medical history of Bipolar disorder, current episode mixed, mild (HCC), Depression, Diabetes mellitus (HCC), GERD (gastroesophageal reflux disease), Hyperlipidemia, and Hypertension.  Past Surgical History:   has a past surgical history that includes Cholecystectomy (2006); Appendectomy; Elbow surgery; and Coronary angioplasty with stent ().     24 0900   ADL   Additional Comments shower, see CARE scores   Balance   Standing Balance Stand by assistance   Standing Balance   Activity ADLs   Functional Mobility   Functional - Mobility Device Wheelchair   Activity To/From therapy gym;To/from bathroom;Retrieve items;Transport items   Assist Level Modified independent    Transfers   Sit to stand Stand by assistance   Stand to sit Stand by assistance   Toilet Transfers   Toilet - Technique Stand step   Toilet Transfer Stand by assistance   Shower Transfers   Shower - Transfer From Other   Shower - Transfer Type To and From   Shower - Transfer To Transfer tub bench   Shower - Technique Ambulating;Stand pivot   Shower Transfers Contact Guard;Stand by assistance   Shower Transfers Comments ambulated handheld to TTB and pivot back to w/c at end of session   Wheelchair Bed Transfers   Wheelchair/Bed - Technique Stand pivot   Level of Asssistance Stand by assistance   OT Exercises   Exercise Treatment Cinthya BURNETT 8# 30 reps 2 sets   Short Term Goals   Short Term Goal 3 MET   Short Term Goal 4 MET   Activity Tolerance   Activity Tolerance Patient Tolerated treatment well   Assessment   Performance deficits / Impairments Decreased functional mobility ;Decreased ADL status;Decreased ROM;Decreased safe

## 2024-11-27 NOTE — PROGRESS NOTES
[facility]  INPATIENT SPEECH THERAPY  [facility] 8 REHAB UNIT        TIME   1015  1100  1400  1420        [x]Daily Note  []Progress Note    Date: 2024  Patient Name: Nguyen Olivo        MRN: [id number]    Account #: [id number]  : 1965  (59 y.o.)  Gender: male   Primary Provider: Mio Boo MD  Liquid Consistency Recommendation: Thin  Diet Solids Recommendation: Regular            PATIENT DIAGNOSIS(ES):    Diagnosis: LEFT LATERAL THALAMIC STROKE W/RIGHT SIDED WEAKNESS     Additional Pertinent Hx: CAD WITH STENTING, BIPOLAR, DM2, GRD     RESTRICTIONS/PRECAUTIONS:    Restrictions/Precautions  Restrictions/Precautions: Fall Risk     Previous level of function and limitations:   He lives alone. His daughter lives next to him. He has a good friend that lives in [facility]. He also has other children that live in [place] [facility], one in [place] and one in [place]. He was able to drive before but has a suspended license. He was managing his own meds and used [facility] in [facility]. He manages his own finances/bills. His hobbies include repairing equipment. He likes to listen to [employer]. 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 He does wear bifocals.     Labial: Decreased rate;Decreased seal;Right droop  Dentition: Edentulous (He did have U/L dentures, but these are broken)  Lingual: Left deviation;Decreased rate;Decreased strength        Subjective:  He states he slept better last night. He states his room temperature is better now.    Objective:  He completed a complex spatial orientation task and scored at 100% without cues.     He was asked to recall details from a complex paragraph and scored at 90%     He was asked to complete higher level word finding tasks. He scored at 90% without cues.     No overt s/s of aspiration observed with thin liquids via cup. Clear voicing was noted after all sips.     Mild dysarthria is observed this date due to slow rate and imprecise  precautions    []Home Exercise Program  []Progress and/or discharge information  Method of Education:  [x]Discussion          [x]Demonstration          []Handout          []Other  Evaluation of Education:   [x]Verbalized understanding   []Demonstrates without assistance  []Demonstrates with assistance  []Needs further instruction     []No evidence of learning                  []No family present      Plan: [x]Continue with current plan of care    []Modify current plan of care as follows:    []Discharge patient    Discharge Location:    Services/Supervision Recommended:      [x]Patient continues to require treatment by a licensed therapist to address functional deficits as outlined in the established plan of care.    Electronically Signed By:  Kerline Alejandro M.S., CCC-SLP  11/27/2024,9:34 AM.

## 2024-11-27 NOTE — PROGRESS NOTES
BID, Keven Kowalski MD, 20 mg at 11/26/24 2050    glucagon injection 1 mg, 1 mg, SubCUTAneous, PRN, Keven Kowalski MD    glucose chewable tablet 16 g, 4 tablet, Oral, PRN, Keven Kowalski MD    insulin glargine (LANTUS) injection vial 15 Units, 15 Units, SubCUTAneous, Nightly, Keven Kowalski MD, 15 Units at 11/26/24 2049    labetalol (NORMODYNE;TRANDATE) injection 10 mg, 10 mg, IntraVENous, Q10 Min PRN, Keven Kowalski MD    lamoTRIgine (LAMICTAL) tablet 100 mg, 100 mg, Oral, BID, Keven Kowalski MD, 100 mg at 11/26/24 2050    nicotine (NICODERM CQ) 21 MG/24HR 1 patch, 1 patch, TransDERmal, Q24H, Keven Kowalski MD, 1 patch at 11/26/24 2057    ondansetron (ZOFRAN-ODT) disintegrating tablet 4 mg, 4 mg, Oral, Q8H PRN **OR** ondansetron (ZOFRAN) injection 4 mg, 4 mg, IntraVENous, Q6H PRN, Keven Kowalski MD    pantoprazole (PROTONIX) tablet 40 mg, 40 mg, Oral, QAM AC, Keven Kowalski MD, 40 mg at 11/27/24 0517    prazosin (MINIPRESS) capsule 1 mg, 1 mg, Oral, Nightly, Keven Kowalski MD, 1 mg at 11/26/24 2050    sodium chloride flush 0.9 % injection 5-40 mL, 5-40 mL, IntraVENous, PRN, Keven Kowalski MD    ticagrelor (BRILINTA) tablet 90 mg, 90 mg, Oral, BID, Keven Kowalski MD, 90 mg at 11/26/24 2050    Vitamin D (CHOLECALCIFEROL) tablet 2,000 Units, 2,000 Units, Oral, Daily, Keven Kowalski MD, 2,000 Units at 11/26/24 0732    acetaminophen (TYLENOL) tablet 650 mg, 650 mg, Oral, Q4H PRN, Mio Boo MD, 650 mg at 11/27/24 0340    polyethylene glycol (GLYCOLAX) packet 17 g, 17 g, Oral, Daily PRN, Mio Boo MD    Allergies:  Bee venom, Pcn [penicillins], and Wasp venom    Social History:   TOBACCO:   reports that he has been smoking pipe and cigarettes. He has a 64.5 pack-year smoking history. He has quit using smokeless tobacco.  His smokeless tobacco use included chew.  ETOH:   reports current alcohol use.    Family History:       Problem Relation Age of Onset    Other Mother      11/25/2024    K 4.0 11/25/2024     11/25/2024    CO2 28 11/25/2024    BUN 10 11/25/2024    CREATININE 0.8 11/25/2024    GLUCOSE 146 (H) 11/25/2024    CALCIUM 9.2 11/25/2024    BILITOT 0.3 11/25/2024    ALKPHOS 85 11/25/2024    AST 14 11/25/2024    ALT 17 11/25/2024    LABGLOM >90 11/25/2024    GFRAA >59 11/09/2020     Lab Results   Component Value Date    INR 1.01 11/06/2024    INR 1.05 08/02/2023    PROTIME 13.0 11/06/2024    PROTIME 13.4 08/02/2023        Tiffani Marcos, PTA  Physical Therapist Assistant  Physical Therapy     Progress Notes      Cosign Needed     Date of Service: 11/26/2024  3:09 PM     Cosign Needed         Physical Therapy  Name: Nguyen Olivo  MRN:  [id number]  Date of service:  11/26/2024 11/26/24 1100   Restrictions/Precautions   Restrictions/Precautions Fall Risk   General   Additional Pertinent Hx CAD WITH STENTING, BIPOLAR, DM2, GRD   Diagnosis LEFT LATERAL THALAMIC STROKE W/RIGHT SIDED WEAKNESS   General Comment   Comments in WC post OT session   Subjective   Subjective Pt with slight discomfort above R ankle posterior and medial/laterally.   Subjective   Subjective R knee discomfort and R lower leg   Pain 5/10   Ambulation   Surface Level tile   Device Rolling Walker   Other Apparatus AFO  (R)   Assistance Contact guard assistance   Quality of Gait Slight ER of hip, hyperextension R knee with wbing and wide abdulkadir, pt maintained  R side of walker, repositioning on occasion,intermittent downward gaze with vc's to inc distance he advances RW before stepping, improved wt shifting   Gait Deviations Decreased step height;Increased ABDULKADIR;Slow Kelsi   Distance 200'   PT Exercises   Resistive Exercises man resist B x 20(R with light resist and L with mod resist): hip flex/ext, knee flex/ext, hip abd/add   Functional Mobility Circuit Training short distance amb chair to chair working on more fully approaching chair and eliminating need for correction or much backing to complete

## 2024-11-27 NOTE — PROGRESS NOTES
Discharge planned for tomorrow, 11/28. MSW notified Nell with [facility] of planned dc and [facility] contact info has been added to AVS. Discharge summary faxed to [facility] at [phone].    Electronically signed by ANGELA Awad on 11/27/24 at 9:34 AM CST

## 2024-11-28 NOTE — PROGRESS NOTES
Patient came to nursing station and asked if he could be discharged tonight instead of waiting until the morning. Patient tells this nurse that his daughter has \"plans\" on Thanksgiving day and is unable to come and take him home. The daughter told patient that she can only come and get him tonight. Patient stated that he would sign AMA papers to leave tonight if he had to.    This nurse sent Dr. Boo a message to inform him of situation; okay to discharge patient tonight, per Dr. Boo.     Patient's daughter is on the way to take patient home.     Electronically signed by BISHOP GambinoN, RN on 11/27/2024 at 6:35 PM

## 2024-11-28 NOTE — PROGRESS NOTES
ZioPatch placed on patient by EKG tech. Patient to remove and mail back in 14 days.     Electronically signed by BISHOP GambinoN, RN on 11/27/2024 at 6:39 PM

## 2024-11-28 NOTE — PLAN OF CARE
Problem: Chronic Conditions and Co-morbidities  Goal: Patient's chronic conditions and co-morbidity symptoms are monitored and maintained or improved  Outcome: Completed     Problem: Discharge Planning  Goal: Discharge to home or other facility with appropriate resources  Outcome: Completed     Problem: Skin/Tissue Integrity  Goal: Absence of new skin breakdown  Description: 1.  Monitor for areas of redness and/or skin breakdown  2.  Assess vascular access sites hourly  3.  Every 4-6 hours minimum:  Change oxygen saturation probe site  4.  Every 4-6 hours:  If on nasal continuous positive airway pressure, respiratory therapy assess nares and determine need for appliance change or resting period.  Outcome: Completed     Problem: Safety - Adult  Goal: Free from fall injury  Outcome: Completed     Problem: Pain  Goal: Verbalizes/displays adequate comfort level or baseline comfort level  Outcome: Completed     Problem: Neurosensory - Adult  Goal: Achieves stable or improved neurological status  Outcome: Completed  Goal: Achieves maximal functionality and self care  Outcome: Completed     Problem: Skin/Tissue Integrity - Adult  Goal: Skin integrity remains intact  Outcome: Completed     Problem: Musculoskeletal - Adult  Goal: Return mobility to safest level of function  Outcome: Completed  Goal: Return ADL status to a safe level of function  Outcome: Completed     Problem: Metabolic/Fluid and Electrolytes - Adult  Goal: Electrolytes maintained within normal limits  Outcome: Completed  Goal: Hemodynamic stability and optimal renal function maintained  Outcome: Completed  Goal: Glucose maintained within prescribed range  Outcome: Completed     Problem: Genitourinary - Adult  Goal: Absence of urinary retention  Outcome: Completed     Problem: Nutrition Deficit:  Goal: Optimize nutritional status  Outcome: Completed       Electronically signed by BISHOP GambinoN, RN on 11/27/2024 at 6:37 PM

## 2024-11-29 NOTE — DISCHARGE SUMMARY
Occupational Therapy Discharge Summary         Date: 2024  Patient Name: Nguyen Olivo        MRN: [id number]    : 1965  (59 y.o.)  Gender: male      Diagnosis: LEFT LATERAL THALAMIC STROKE W/RIGHT SIDED WEAKNESS  Restrictions/Precautions  Restrictions/Precautions: Fall Risk  Required Braces or Orthoses?: No      Discharge Date:24      UE Functioning:  L WNLs  R sh 3-, elb 3+, wrist 3-, hand 11#      Home Management:  Functional Mobility  Functional - Mobility Device: Wheelchair  Activity: To/From therapy gym  Assist Level: Modified independent   Functional Mobility Comments: ADL prep, and pt. room to OT gym.    Adaptive Equipment/DME Status:   Arranged wheelchair and BSC  Home Equipment: Rollator      Pain Assessment:          Remaining Problems:  Decreased functional mobility ;Decreased ADL status;Decreased ROM;Decreased safe awareness;Decreased sensation;Decreased fine motor control;Decreased strength;Decreased high-level IADLs     STGs:  Short Term Goals  Time Frame for Short Term Goals: 1-2 weeks  Short Term Goal 1: Mod A with toileting.  Short Term Goal 2: CGA with toilet transfers.  Short Term Goal 3: Supervision with bathing hygiene.  Short Term Goal 4: CGA with  LB dressing.  Short Term Goal 5: Supervision with donning/doffing socks and shoes.  Additional Goals?: Yes  Met all STGs  LTGs:  Long Term Goals  Time Frame for Long Term Goals : 3-4 weeks  Long Term Goal 1: Mod I with toileting and toilet transfers.  Long Term Goal 2: Mod I with bathing hygiene.  Long Term Goal 3: Mod I with overall dressing.  Long Term Goal 4: Mod I with homemaking tasks.  Long Term Goal 5: Patient verbalize DME needs.  Met LTG # 5    Discharge Setting and Recommendations:  Home with family support and Home Health Occupational Therapy to address remaining deficits.     Discharge Care Scores    Eating: CARE Score: 6  Oral Hygiene: CARE Score: 6  Toileting: CARE Score: 4  Shower/Bathe: CARE Score: 4  Upper  Body Dressing: CARE Score: 5  Lower Body Dressing: CARE Score: 4  Footwear: CARE Score: 4  Toilet Transfers: CARE Score: 4  Picking Up Object:  CARE Score: 4  Cognitive Patterns:  Cognitive Patterns  Cognitive Pattern Assessment Used: BIMS  Repetition of Three Words (First Attempt): 3  Temporal Orientation: Year: Correct  Temporal Orientation: Month: Accurate within 5 days  Temporal Orientation: Day: Correct  Able to recall \"sock”: Yes, no cue required  Able to recall \"blue\": Yes, no cue required  Able to recall \"bed\": Yes, no cue required  BIMS Summary Score: 15                 Confusion Assessment Method (CAM):  Confusion Assessment Method (CAM)  Is there evidence of an acute change in mental status from the patient's baseline?: Yes  Inattention: Behavior present, fluctuates (comes and goes, changes in severity)  Disorganized thinking: Behavior present, fluctuates (comes and goes, changes in severity)  Altered level of consciousness: Behavior not present  Health Literacy:  How often do you need to have someone help you when you read instructions, pamphlets, or other written material from your doctor or pharmacy?: Sometimes    Electronically signed by DIMPLE Espino on 11/29/24 at 5:16 PM CST

## 2024-11-29 NOTE — DISCHARGE SUMMARY
4  Discharge Goal: Supervision or touching assistance    Walk 10 Feet  Assistance Needed: Supervision or touching assistance  Reason if not Attempted: Not attempted due to medical condition or safety concerns  CARE Score: 4  Discharge Goal: Supervision or touching assistance    Walk 50 Feet with Two Turns  Assistance Needed: Supervision or touching assistance  Reason if not Attempted: Not attempted due to medical condition or safety concerns  CARE Score: 4  Discharge Goal: Not Attempted    Walk 150 Feet  Assistance Needed: Supervision or touching assistance  Reason if not Attempted: Not attempted due to medical condition or safety concerns  CARE Score: 4  Discharge Goal: Not Attempted    Walking 10 Feet on Uneven Surfaces  Reason if not Attempted: Not attempted due to medical condition or safety concerns  CARE Score: 88  Discharge Goal: Not Attempted    1 Step (Curb)  Assistance Needed: Supervision or touching assistance  Reason if not Attempted: Not attempted due to medical condition or safety concerns  CARE Score: 4  Discharge Goal: Supervision or touching assistance    4 Steps  Assistance Needed: Supervision or touching assistance  Reason if not Attempted: Not attempted due to medical condition or safety concerns  CARE Score: 4  Discharge Goal: Supervision or touching assistance    12 Steps  Reason if not Attempted: Not attempted due to medical condition or safety concerns  CARE Score: 88  Discharge Goal: Supervision or touching assistance    Wheel 50 Feet with Two Turns  Assistance Needed: Independent  CARE Score: 6  Discharge Goal: Independent    Wheel 150 Feet  Assistance Needed: Independent  Reason if not Attempted: Not attempted due to medical condition or safety concerns  CARE Score: 6  Discharge Goal: Independent      LAST TREATMENT TIME  PT Individual Minutes  Time In: 1300  Time Out: 1350  Minutes: 50      Electronically signed by Trey Portillo PT on 11/29/24 at 9:27 AM CST

## 2024-12-16 LAB — ECHO BSA: 1.99 M2

## 2024-12-17 ENCOUNTER — TRANSCRIBE ORDERS (OUTPATIENT)
Dept: ADMINISTRATIVE | Age: 59
End: 2024-12-17

## 2024-12-17 DIAGNOSIS — M79.601 PAIN OF RIGHT ARM: Primary | ICD-10-CM

## 2024-12-23 ENCOUNTER — TELEPHONE (OUTPATIENT)
Dept: NEUROLOGY | Age: 59
End: 2024-12-23

## 2024-12-23 RX ORDER — CARVEDILOL 25 MG/1
25 TABLET ORAL 2 TIMES DAILY WITH MEALS
Qty: 60 TABLET | Refills: 0 | OUTPATIENT
Start: 2024-12-23

## 2024-12-23 RX ORDER — TRAZODONE HYDROCHLORIDE 50 MG/1
50 TABLET ORAL NIGHTLY PRN
Qty: 30 TABLET | Refills: 0 | OUTPATIENT
Start: 2024-12-23

## 2024-12-23 RX ORDER — LAMOTRIGINE 100 MG/1
100 TABLET ORAL 2 TIMES DAILY
Qty: 60 TABLET | Refills: 0 | OUTPATIENT
Start: 2024-12-23

## 2024-12-23 RX ORDER — ACETAMINOPHEN 160 MG
TABLET,DISINTEGRATING ORAL DAILY
Qty: 30 CAPSULE | Refills: 0 | OUTPATIENT
Start: 2024-12-23

## 2024-12-23 RX ORDER — TICAGRELOR 90 MG/1
90 TABLET ORAL 2 TIMES DAILY
Qty: 60 TABLET | Refills: 0 | OUTPATIENT
Start: 2024-12-23

## 2024-12-23 RX ORDER — ATORVASTATIN CALCIUM 80 MG/1
80 TABLET, FILM COATED ORAL NIGHTLY
Qty: 30 TABLET | Refills: 0 | OUTPATIENT
Start: 2024-12-23

## 2024-12-23 RX ORDER — INSULIN GLARGINE 100 [IU]/ML
INJECTION, SOLUTION SUBCUTANEOUS
Qty: 10 ML | Refills: 0 | OUTPATIENT
Start: 2024-12-23

## 2024-12-23 RX ORDER — PRAZOSIN HYDROCHLORIDE 1 MG/1
1 CAPSULE ORAL NIGHTLY
Qty: 30 CAPSULE | Refills: 0 | OUTPATIENT
Start: 2024-12-23

## 2024-12-23 RX ORDER — PANTOPRAZOLE SODIUM 40 MG/1
40 TABLET, DELAYED RELEASE ORAL
Qty: 30 TABLET | Refills: 0 | OUTPATIENT
Start: 2024-12-23

## 2024-12-23 RX ORDER — NICOTINE 21 MG/24HR
PATCH, TRANSDERMAL 24 HOURS TRANSDERMAL
Qty: 28 PATCH | Refills: 0 | OUTPATIENT
Start: 2024-12-23

## 2024-12-23 RX ORDER — FAMOTIDINE 20 MG/1
20 TABLET, FILM COATED ORAL 2 TIMES DAILY
Qty: 60 TABLET | Refills: 0 | OUTPATIENT
Start: 2024-12-23

## 2024-12-23 RX ORDER — PEN NEEDLE, DIABETIC 29 G X1/2"
NEEDLE, DISPOSABLE MISCELLANEOUS
Refills: 0 | OUTPATIENT
Start: 2024-12-23

## 2024-12-23 RX ORDER — CITALOPRAM HYDROBROMIDE 40 MG/1
40 TABLET ORAL DAILY
Qty: 30 TABLET | Refills: 0 | OUTPATIENT
Start: 2024-12-23

## 2024-12-23 RX ORDER — ASPIRIN 81 MG
81 TABLET,CHEWABLE ORAL DAILY
Qty: 30 TABLET | Refills: 0 | OUTPATIENT
Start: 2024-12-23

## 2024-12-23 NOTE — TELEPHONE ENCOUNTER
Havent seen any paperwork but this is a hospital patient only and paperwork will need to go to PCP.

## 2024-12-23 NOTE — TELEPHONE ENCOUNTER
Call received from [employer] Dang to follow up on paperwork faxed pertaining to patient. Requesting call back to verify that paperwork was received. Returned call requesting fax to be resent.

## 2025-01-03 ENCOUNTER — HOSPITAL ENCOUNTER (OUTPATIENT)
Dept: VASCULAR LAB | Age: 60
End: 2025-01-03
Payer: MEDICAID

## 2025-01-03 ENCOUNTER — HOSPITAL ENCOUNTER (OUTPATIENT)
Dept: VASCULAR LAB | Age: 60
Discharge: HOME | End: 2025-01-03
Payer: MEDICAID

## 2025-01-03 DIAGNOSIS — M79.601 PAIN OF RIGHT ARM: ICD-10-CM

## 2025-01-03 PROCEDURE — 93971 EXTREMITY STUDY: CPT

## 2025-01-03 PROCEDURE — 93923 UPR/LXTR ART STDY 3+ LVLS: CPT

## 2025-01-05 LAB
VAS LEFT 1ST DIGIT BP: 139 MMHG
VAS LEFT ARM BP: 129 MMHG
VAS LEFT DBI 1ST DIGIT: 1.08
VAS LEFT RADIAL A PROX BP: 144 MMHG
VAS LEFT ULNAR A PROX BP: 161 MMHG
VAS LEFT WBI: 1.25
VAS RIGHT 1ST DIGIT BP: 118 MMHG
VAS RIGHT ARM BP: 123 MMHG
VAS RIGHT DBI 1ST DIGIT: 0.91
VAS RIGHT RADIAL A PROX BP: 134 MMHG
VAS RIGHT ULNAR A PROX BP: 139 MMHG
VAS RIGHT WBI: 1.08

## 2025-01-05 PROCEDURE — 93923 UPR/LXTR ART STDY 3+ LVLS: CPT | Performed by: SURGERY

## 2025-01-05 PROCEDURE — 93971 EXTREMITY STUDY: CPT | Performed by: SURGERY

## 2025-01-27 ENCOUNTER — HOSPITAL ENCOUNTER (OUTPATIENT)
Dept: SPEECH THERAPY | Age: 60
Setting detail: THERAPIES SERIES
Discharge: HOME | End: 2025-01-27

## 2025-01-27 NOTE — PROGRESS NOTES
Late Cancellation of ST Evaluation; Patient did not request to reschedule.  Electronically signed by EDNA Dugan on 1/31/2025 at 7:43 AM

## 2025-02-20 NOTE — H&P
Discussed pain med options. Very \"sleepy\", \"groggy\" doesn't want anymore IV pain medication. Pudding given so can take pain pill. Reports too tired to eat it.   Hospitalist: History and Physical    Date: 2024 Time: 6:50 PM    Name: Nguyen Olivo : 1965 MRN: [id number]    Code Status: Prior No additional code details      Patient's Chief Complaint Is: Right sided weakness and speech difficulty    HPI: Patient is a 59 y.o. male with CAD s/p stenting  on aspirin and Brilinta, diabetes, hypertension, dyslipidemia, GERD, bipolar disorder brought into the emergency department by family this afternoon due to dysarthric speech and right-sided weakness that he developed this morning at 9 AM when he woke up.  Last known normal was 4 AM when he went to bed.  Not candidate for tPA as out of time window.  He tells me that lately he has been noncompliant with his medications, forgetting to take them.  CT head no acute findings, normal CT perfusion, CTA head/neck with 30-40% stenosis within left posterior cerebral artery, no other significant vascular occlusion, otherwise no carotid stenosis.        Past Medical History:   Diagnosis Date    Bipolar disorder, current episode mixed, mild ([facility])     diagnosed at [facility]    Depression     Diabetes mellitus ([facility])     GERD (gastroesophageal reflux disease)     Hyperlipidemia     Hypertension      Past Surgical History:   Procedure Laterality Date    APPENDECTOMY      patient was 18    CHOLECYSTECTOMY  2006    CORONARY ANGIOPLASTY WITH STENT PLACEMENT      ELBOW SURGERY      left, removed cyst     Family History   Problem Relation Age of Onset    Other Mother         BLOOD DISEASE    Diabetes type 2  Mother     Emphysema Father     Hypertension Father     Depression Sister     Depression Brother     No Known Problems Maternal Grandmother     No Known Problems Maternal Grandfather     No Known Problems Paternal Grandmother     Emphysema Paternal Grandfather      Social History     Socioeconomic History    Marital status: Legally      Spouse name: Not on file    Number of children: 3    Years of education: Not on  stroke symptoms  TECHNIQUE:  Imaging of the head and neck was performed following the intravenous administration of contrast.  1 mm thin axial images and coronal and sagittal reconstructions and rotated 3-D reconstructions were obtained.  Comparison CT scan of the head dated 11/06/2024.  FINDINGS:  The evaluation of the neck was limited secondary to motion.  CT angiogram of the neck:  The left common carotid artery appears adequately patent.  There is a small amount of calcified plaque seen within the proximal left internal carotid artery not causing appreciable stenosis.  The mid and distal left internal carotid artery appear patent.  The right common carotid artery appears patent.  The proximal, mid and distal right internal carotid artery appear patent.  The right vertebral artery is dominant.  The proximal, mid and distal vertebral arteries appear patent.  The left subclavian artery appears patent.  The brachiocephalic artery, right subclavian artery appear patent.  The upper lungs are clear.  CT angiogram of the head:  The cavernous carotid artery appear adequately patent.  The supraclinoid ICA appear patent.  The M1 segments appear patent.  There is a normal appearance of the distal branches of the middle cerebral arteries.  The A1 segments, A2 segments and distal branches of the anterior cerebral arteries appear patent.  The basilar artery appears patent.  The right posterior cerebral artery appears patent.  There is approximately 30-47 stenosis seen within the P2 segment of the left posterior cerebral artery.      There is approximately 30-40% stenosis seen within the P2 segment of the left posterior cerebral artery.  There is no other intracranial stenosis or signs of acute vascular occlusion.  There is a small amount of plaque seen within the proximal left internal carotid artery not causing stenosis.  The proximal right internal carotid artery appears patent.  The vertebral arteries and subclavian  arteries appear patent.  Limited evaluation of the neck due to motion.  All CT scans are performed using dose optimization techniques as appropriate to the performed exam and include at least one of the following: Automated exposure control, adjustment of the mA and/or kV according to size, and the use of iterative reconstruction technique.  ______________________________________ Electronically signed by: HI VANG M.D. Date:     11/06/2024 Time:    16:07     XR CHEST PORTABLE    Result Date: 11/6/2024  EXAM: CHEST RADIOGRAPH  TECHNIQUE: Single frontal chest radiograph.  HISTORY: Stroke symptoms  COMPARISON: 02/29/2024      No acute findings. The heart size is normal. Mediastinal contours and pulmonary vasculature are within normal limits. The lungs are clear. There is no pleural effusion. There is no pneumothorax.        ______________________________________ Electronically signed by: URI MATUTE M.D. Date:     11/06/2024 Time:    16:07     CT HEAD WO CONTRAST    Result Date: 11/6/2024  EXAM: CT OF THE BRAIN WITHOUT CONTRAST  CLINICAL INDICATION: Stroke symptoms.  COMPARISON: 11/09/2020  PROCEDURE: Contiguous axial tomographic sections were obtained from the skull base to the vertex.  FINDINGS: There is moderate generalized cerebral involutional change. No acute intracranial hemorrhage, extra-axial fluid collection, hydrocephalus, mass effect, or midline shift. The ventricles, cisterns and sulci are normal. Gray-white differentiation is maintained. Normal variant cavum septum pellucidum vergae.  There are moderate patchy areas of hypodensity in the periventricular white matter, nonspecific but most commonly encountered in the setting of chronic microvascular disease.  The visualized paranasal sinuses and mastoid air cells are clear.  The visualized portions of the globes and orbits appear normal.  No acute calvarial abnormalities are seen.       1. No acute intracranial abnormality. 2. Moderate involutional   * No resolved hospital problems. *     Likely stroke, differential would also include TIA  Right-sided weakness and dysarthria  -- Reviewed imaging including CT head, CT perfusion study, CTA head/neck  - Neurochecks  - Allow permissive hypertension  -- Neurology consult  -Obtain MRI brain  - Obtain echo with bubble study  - Resume antiplatelet therapy with aspirin and Brilinta  -- High intensity statin, change atorvastatin to 80 mg  - Follow-up lipid panel    PT OT speech evaluations    CAD s/p stent 2021  - Aspirin, Brilinta    Hypertension  - Hold Coreg, allowing for permissive hypertension  -IV labetalol as needed for SBP over 220, DBP over 120    Diabetes mellitus with hyperglycemia  - Basal insulin nightly, sliding scale insulin correction as warranted with ongoing glucose monitoring  - Adjust insulin regimen if needed  - Hypoglycemia protocol in place  - Check hemoglobin A1c    Leukocytosis, suspect reactive, without any clear sign/symptoms of infectious process  Reviewed chest x-ray-unremarkable  Reviewed urinalysis-unremarkable    Bipolar disorder  - Resume home regimen including Lamictal    DVT prophylaxis- lovenox    Keven Kowalski MD  11/6/2024 6:50 PM

## 2025-06-13 ENCOUNTER — APPOINTMENT (OUTPATIENT)
Dept: CT IMAGING | Age: 60
End: 2025-06-13
Payer: MEDICAID

## 2025-06-13 ENCOUNTER — HOSPITAL ENCOUNTER (EMERGENCY)
Age: 60
Discharge: HOME | End: 2025-06-13
Attending: EMERGENCY MEDICINE
Payer: MEDICAID

## 2025-06-13 VITALS
WEIGHT: 188 LBS | BODY MASS INDEX: 29.44 KG/M2 | OXYGEN SATURATION: 96 % | TEMPERATURE: 97.6 F | DIASTOLIC BLOOD PRESSURE: 86 MMHG | RESPIRATION RATE: 17 BRPM | SYSTOLIC BLOOD PRESSURE: 142 MMHG | HEART RATE: 102 BPM
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
## 2025-06-13 DIAGNOSIS — S09.90XA CLOSED HEAD INJURY, INITIAL ENCOUNTER: Primary | ICD-10-CM

## 2025-06-13 PROCEDURE — 72125 CT NECK SPINE W/O DYE: CPT

## 2025-06-13 PROCEDURE — 70450 CT HEAD/BRAIN W/O DYE: CPT

## 2025-06-13 PROCEDURE — 72128 CT CHEST SPINE W/O DYE: CPT

## 2025-06-13 PROCEDURE — 99284 EMERGENCY DEPT VISIT MOD MDM: CPT

## 2025-06-14 NOTE — ED PROVIDER NOTES
[facility]  eMERGENCY dEPARTMENT eNCOUnter      Pt Name: Nguyen Olivo  MRN: [id number]  Birthdate 1965  Date of evaluation: 6/13/2025  Provider: Gianfranco Alexander MD    CHIEF COMPLAINT       Chief Complaint   Patient presents with    Fall     Fell yesterday; c/o headache, neck, and back ache today         HISTORY OF PRESENT ILLNESS   (Location/Symptom, Timing/Onset,Context/Setting, Quality, Duration, Modifying Factors, Severity)  Note limiting factors.   Nguyen Olivo is a 59 y.o. male who presents to the emergency department ***     HPI    NursingNotes were reviewed.    REVIEW OF SYSTEMS    (2-9 systems for level 4, 10 or more for level 5)     Review of Systems         PAST MEDICALHISTORY     Past Medical History:   Diagnosis Date    Bipolar disorder, current episode mixed, mild ([facility])     diagnosed at [facility]    Depression     Diabetes mellitus ([facility])     GERD (gastroesophageal reflux disease)     Hyperlipidemia     Hypertension          SURGICAL HISTORY       Past Surgical History:   Procedure Laterality Date    APPENDECTOMY      patient was 18    CHOLECYSTECTOMY  07/06/2006    CORONARY ANGIOPLASTY WITH STENT PLACEMENT  2021    ELBOW SURGERY      left, removed cyst         CURRENT MEDICATIONS     Previous Medications    ASPIRIN 81 MG CHEWABLE TABLET    Take 1 tablet by mouth daily    ATORVASTATIN (LIPITOR) 80 MG TABLET    Take 1 tablet by mouth nightly    CARVEDILOL (COREG) 25 MG TABLET    Take 1 tablet by mouth 2 times daily (with meals)    CITALOPRAM (CELEXA) 40 MG TABLET    Take 1 tablet by mouth daily    FAMOTIDINE (PEPCID) 20 MG TABLET    Take 1 tablet by mouth 2 times daily    INSULIN GLARGINE (LANTUS) 100 UNIT/ML INJECTION VIAL    Inject 15 Units into the skin nightly    LAMOTRIGINE (LAMICTAL) 100 MG TABLET    Take 1 tablet by mouth 2 times daily    NEEDLES & SYRINGES MISC    1 each by Does not apply route daily Insulin needles and syringes    NICOTINE (NICODERM CQ) 21 MG/24HR     97.6 °F (36.4 °C) Temporal (!) 102 17 96 %      Height Weight - Scale         -- 85.3 kg (188 lb)             Physical Exam  Vitals and nursing note reviewed.   HENT:      Mouth/Throat:      Mouth: Mucous membranes are moist. Mucous membranes are not dry.      Pharynx: No posterior oropharyngeal erythema.   Eyes:      General: No scleral icterus.     Pupils: Pupils are equal, round, and reactive to light.   Neck:      Trachea: No tracheal deviation.   Cardiovascular:      Rate and Rhythm: Normal rate and regular rhythm.      Heart sounds: Normal heart sounds. No murmur heard.  Pulmonary:      Effort: Pulmonary effort is normal. No respiratory distress.      Breath sounds: Normal breath sounds. No stridor.   Abdominal:      General: There is no distension.      Palpations: Abdomen is soft.      Tenderness: There is no abdominal tenderness. There is no guarding.   Musculoskeletal:      Right shoulder: No tenderness. Normal range of motion.      Left shoulder: No tenderness. Normal range of motion.      Right wrist: No tenderness.      Left wrist: No tenderness.      Cervical back: Tenderness present.      Thoracic back: Tenderness present.        Back:       Right hip: No tenderness. Normal range of motion.      Left hip: No tenderness. Normal range of motion.      Right knee: No tenderness.      Left knee: No tenderness.   Skin:     Coloration: Skin is not pale.      Findings: No rash.   Neurological:      Mental Status: He is alert and oriented to person, place, and time.   Psychiatric:         Behavior: Behavior is cooperative.         DIAGNOSTIC RESULTS       RADIOLOGY:  Non-plain film images such as CT, Ultrasound and MRI are read by the radiologist. Plain radiographic images are visualized and preliminarily interpreted bythe emergency physician with the below findings:      CT THORACIC SPINE WO CONTRAST   Final Result       No acute fracture       Evidence of an older appearing T8 compression deformity and some

## 2025-06-19 ASSESSMENT — ENCOUNTER SYMPTOMS
BACK PAIN: 1
SHORTNESS OF BREATH: 0
ABDOMINAL PAIN: 0